# Patient Record
Sex: FEMALE | Race: ASIAN | NOT HISPANIC OR LATINO | Employment: UNEMPLOYED
[De-identification: names, ages, dates, MRNs, and addresses within clinical notes are randomized per-mention and may not be internally consistent; named-entity substitution may affect disease eponyms.]

---

## 2018-03-07 ENCOUNTER — RECORDS - HEALTHEAST (OUTPATIENT)
Dept: ADMINISTRATIVE | Facility: OTHER | Age: 68
End: 2018-03-07

## 2018-03-15 ENCOUNTER — RECORDS - HEALTHEAST (OUTPATIENT)
Dept: ADMINISTRATIVE | Facility: OTHER | Age: 68
End: 2018-03-15

## 2018-03-23 ENCOUNTER — RECORDS - HEALTHEAST (OUTPATIENT)
Dept: ADMINISTRATIVE | Facility: OTHER | Age: 68
End: 2018-03-23

## 2018-03-29 ENCOUNTER — RECORDS - HEALTHEAST (OUTPATIENT)
Dept: ADMINISTRATIVE | Facility: OTHER | Age: 68
End: 2018-03-29

## 2018-05-09 ENCOUNTER — RECORDS - HEALTHEAST (OUTPATIENT)
Dept: ADMINISTRATIVE | Facility: OTHER | Age: 68
End: 2018-05-09

## 2018-08-16 ENCOUNTER — RECORDS - HEALTHEAST (OUTPATIENT)
Dept: ADMINISTRATIVE | Facility: OTHER | Age: 68
End: 2018-08-16

## 2018-08-20 ENCOUNTER — RECORDS - HEALTHEAST (OUTPATIENT)
Dept: ADMINISTRATIVE | Facility: OTHER | Age: 68
End: 2018-08-20

## 2019-08-19 ENCOUNTER — RECORDS - HEALTHEAST (OUTPATIENT)
Dept: ADMINISTRATIVE | Facility: OTHER | Age: 69
End: 2019-08-19

## 2019-08-20 ENCOUNTER — RECORDS - HEALTHEAST (OUTPATIENT)
Dept: ADMINISTRATIVE | Facility: OTHER | Age: 69
End: 2019-08-20

## 2019-12-13 ENCOUNTER — RECORDS - HEALTHEAST (OUTPATIENT)
Dept: ADMINISTRATIVE | Facility: OTHER | Age: 69
End: 2019-12-13

## 2019-12-13 ENCOUNTER — RECORDS - HEALTHEAST (OUTPATIENT)
Dept: LAB | Facility: CLINIC | Age: 69
End: 2019-12-13

## 2019-12-13 LAB
ALBUMIN SERPL-MCNC: 3.6 G/DL (ref 3.5–5)
ALP SERPL-CCNC: 127 U/L (ref 45–120)
ALT SERPL W P-5'-P-CCNC: 11 U/L (ref 0–45)
ANION GAP SERPL CALCULATED.3IONS-SCNC: 14 MMOL/L (ref 5–18)
AST SERPL W P-5'-P-CCNC: 20 U/L (ref 0–40)
BILIRUB SERPL-MCNC: 0.5 MG/DL (ref 0–1)
BUN SERPL-MCNC: 13 MG/DL (ref 8–22)
CALCIUM SERPL-MCNC: 9.5 MG/DL (ref 8.5–10.5)
CHLORIDE BLD-SCNC: 109 MMOL/L (ref 98–107)
CO2 SERPL-SCNC: 21 MMOL/L (ref 22–31)
CREAT SERPL-MCNC: 0.94 MG/DL (ref 0.6–1.1)
GFR SERPL CREATININE-BSD FRML MDRD: 59 ML/MIN/1.73M2
GLUCOSE BLD-MCNC: 94 MG/DL (ref 70–125)
POTASSIUM BLD-SCNC: 4.3 MMOL/L (ref 3.5–5)
PROT SERPL-MCNC: 8.1 G/DL (ref 6–8)
SODIUM SERPL-SCNC: 144 MMOL/L (ref 136–145)

## 2019-12-16 ENCOUNTER — RECORDS - HEALTHEAST (OUTPATIENT)
Dept: LAB | Facility: CLINIC | Age: 69
End: 2019-12-16

## 2019-12-16 ENCOUNTER — RECORDS - HEALTHEAST (OUTPATIENT)
Dept: ADMINISTRATIVE | Facility: OTHER | Age: 69
End: 2019-12-16

## 2019-12-16 LAB
FERRITIN SERPL-MCNC: 227 NG/ML (ref 10–130)
FOLATE SERPL-MCNC: 8.7 NG/ML
IRON SATN MFR SERPL: 15 % (ref 20–50)
IRON SERPL-MCNC: 35 UG/DL (ref 42–175)
TIBC SERPL-MCNC: 229 UG/DL (ref 313–563)
TRANSFERRIN SERPL-MCNC: 183 MG/DL (ref 212–360)
VIT B12 SERPL-MCNC: 479 PG/ML (ref 213–816)

## 2019-12-17 ENCOUNTER — RECORDS - HEALTHEAST (OUTPATIENT)
Dept: ADMINISTRATIVE | Facility: OTHER | Age: 69
End: 2019-12-17

## 2019-12-17 ENCOUNTER — AMBULATORY - HEALTHEAST (OUTPATIENT)
Dept: VASCULAR SURGERY | Facility: CLINIC | Age: 69
End: 2019-12-17

## 2019-12-17 DIAGNOSIS — I71.019 THORACIC AORTIC DISSECTION (H): ICD-10-CM

## 2019-12-19 ENCOUNTER — RECORDS - HEALTHEAST (OUTPATIENT)
Dept: ADMINISTRATIVE | Facility: OTHER | Age: 69
End: 2019-12-19

## 2019-12-19 ENCOUNTER — AMBULATORY - HEALTHEAST (OUTPATIENT)
Dept: VASCULAR SURGERY | Facility: CLINIC | Age: 69
End: 2019-12-19

## 2019-12-19 DIAGNOSIS — Z91.148 HX OF MEDICATION NONCOMPLIANCE: ICD-10-CM

## 2019-12-19 DIAGNOSIS — I71.21 ASCENDING AORTIC ANEURYSM (H): ICD-10-CM

## 2019-12-19 RX ORDER — ATORVASTATIN CALCIUM 20 MG/1
20 TABLET, FILM COATED ORAL DAILY
Status: ON HOLD | COMMUNITY
Start: 2019-12-19 | End: 2021-11-09

## 2019-12-24 ENCOUNTER — HOSPITAL ENCOUNTER (OUTPATIENT)
Dept: CT IMAGING | Facility: HOSPITAL | Age: 69
Discharge: HOME | End: 2019-12-24
Attending: FAMILY MEDICINE

## 2019-12-24 DIAGNOSIS — I71.019 THORACIC AORTIC DISSECTION (H): ICD-10-CM

## 2019-12-24 ASSESSMENT — MIFFLIN-ST. JEOR: SCORE: 1081.54

## 2020-01-03 ENCOUNTER — OFFICE VISIT - HEALTHEAST (OUTPATIENT)
Dept: VASCULAR SURGERY | Facility: CLINIC | Age: 70
End: 2020-01-03

## 2020-01-03 DIAGNOSIS — I71.019 THORACIC AORTIC DISSECTION (H): ICD-10-CM

## 2020-01-03 RX ORDER — LORATADINE 10 MG/1
650 TABLET ORAL EVERY 8 HOURS PRN
Status: SHIPPED | COMMUNITY
Start: 2020-01-02

## 2020-01-03 ASSESSMENT — MIFFLIN-ST. JEOR: SCORE: 1081.54

## 2020-01-06 ENCOUNTER — COMMUNICATION - HEALTHEAST (OUTPATIENT)
Dept: VASCULAR SURGERY | Facility: CLINIC | Age: 70
End: 2020-01-06

## 2020-01-10 ENCOUNTER — COMMUNICATION - HEALTHEAST (OUTPATIENT)
Dept: VASCULAR SURGERY | Facility: CLINIC | Age: 70
End: 2020-01-10

## 2020-02-03 ENCOUNTER — RECORDS - HEALTHEAST (OUTPATIENT)
Dept: LAB | Facility: CLINIC | Age: 70
End: 2020-02-03

## 2020-02-03 LAB
ANION GAP SERPL CALCULATED.3IONS-SCNC: 10 MMOL/L (ref 5–18)
BUN SERPL-MCNC: 24 MG/DL (ref 8–22)
CALCIUM SERPL-MCNC: 9 MG/DL (ref 8.5–10.5)
CHLORIDE BLD-SCNC: 112 MMOL/L (ref 98–107)
CO2 SERPL-SCNC: 19 MMOL/L (ref 22–31)
CREAT SERPL-MCNC: 1.09 MG/DL (ref 0.6–1.1)
GFR SERPL CREATININE-BSD FRML MDRD: 50 ML/MIN/1.73M2
GLUCOSE BLD-MCNC: 220 MG/DL (ref 70–125)
POTASSIUM BLD-SCNC: 4.6 MMOL/L (ref 3.5–5)
SODIUM SERPL-SCNC: 141 MMOL/L (ref 136–145)

## 2020-03-05 ENCOUNTER — RECORDS - HEALTHEAST (OUTPATIENT)
Dept: LAB | Facility: CLINIC | Age: 70
End: 2020-03-05

## 2020-03-05 LAB — TSH SERPL DL<=0.005 MIU/L-ACNC: 0.47 UIU/ML (ref 0.3–5)

## 2020-03-13 ENCOUNTER — AMBULATORY - HEALTHEAST (OUTPATIENT)
Dept: CARDIOLOGY | Facility: CLINIC | Age: 70
End: 2020-03-13

## 2020-03-16 ENCOUNTER — AMBULATORY - HEALTHEAST (OUTPATIENT)
Dept: CARDIOLOGY | Facility: CLINIC | Age: 70
End: 2020-03-16

## 2020-03-16 ENCOUNTER — RECORDS - HEALTHEAST (OUTPATIENT)
Dept: ADMINISTRATIVE | Facility: OTHER | Age: 70
End: 2020-03-16

## 2020-03-17 ENCOUNTER — AMBULATORY - HEALTHEAST (OUTPATIENT)
Dept: CARDIOLOGY | Facility: CLINIC | Age: 70
End: 2020-03-17

## 2020-03-20 ENCOUNTER — COMMUNICATION - HEALTHEAST (OUTPATIENT)
Dept: CARDIOLOGY | Facility: CLINIC | Age: 70
End: 2020-03-20

## 2020-04-07 ENCOUNTER — HOME CARE/HOSPICE - HEALTHEAST (OUTPATIENT)
Dept: HOME HEALTH SERVICES | Facility: HOME HEALTH | Age: 70
End: 2020-04-07

## 2020-04-29 ENCOUNTER — RECORDS - HEALTHEAST (OUTPATIENT)
Dept: ADMINISTRATIVE | Facility: OTHER | Age: 70
End: 2020-04-29

## 2020-04-29 ENCOUNTER — AMBULATORY - HEALTHEAST (OUTPATIENT)
Dept: CARDIOLOGY | Facility: CLINIC | Age: 70
End: 2020-04-29

## 2021-01-20 ENCOUNTER — RECORDS - HEALTHEAST (OUTPATIENT)
Dept: LAB | Facility: CLINIC | Age: 71
End: 2021-01-20

## 2021-01-20 LAB
ANION GAP SERPL CALCULATED.3IONS-SCNC: 7 MMOL/L (ref 5–18)
BUN SERPL-MCNC: 28 MG/DL (ref 8–22)
CALCIUM SERPL-MCNC: 9 MG/DL (ref 8.5–10.5)
CHLORIDE BLD-SCNC: 110 MMOL/L (ref 98–107)
CO2 SERPL-SCNC: 25 MMOL/L (ref 22–31)
CREAT SERPL-MCNC: 1.1 MG/DL (ref 0.6–1.1)
GFR SERPL CREATININE-BSD FRML MDRD: 49 ML/MIN/1.73M2
GLUCOSE BLD-MCNC: 88 MG/DL (ref 70–125)
POTASSIUM BLD-SCNC: 4.2 MMOL/L (ref 3.5–5)
SODIUM SERPL-SCNC: 142 MMOL/L (ref 136–145)

## 2021-02-22 ENCOUNTER — RECORDS - HEALTHEAST (OUTPATIENT)
Dept: ADMINISTRATIVE | Facility: OTHER | Age: 71
End: 2021-02-22

## 2021-02-22 ENCOUNTER — AMBULATORY - HEALTHEAST (OUTPATIENT)
Dept: CARDIOLOGY | Facility: CLINIC | Age: 71
End: 2021-02-22

## 2021-02-24 ENCOUNTER — COMMUNICATION - HEALTHEAST (OUTPATIENT)
Dept: CARDIOLOGY | Facility: CLINIC | Age: 71
End: 2021-02-24

## 2021-03-19 ENCOUNTER — COMMUNICATION - HEALTHEAST (OUTPATIENT)
Dept: CARDIOLOGY | Facility: CLINIC | Age: 71
End: 2021-03-19

## 2021-04-05 ASSESSMENT — MIFFLIN-ST. JEOR
SCORE: 1006.69
SCORE: 1083.81

## 2021-04-06 ENCOUNTER — SURGERY - HEALTHEAST (OUTPATIENT)
Dept: GASTROENTEROLOGY | Facility: HOSPITAL | Age: 71
End: 2021-04-06

## 2021-04-06 ENCOUNTER — COMMUNICATION - HEALTHEAST (OUTPATIENT)
Dept: SCHEDULING | Facility: CLINIC | Age: 71
End: 2021-04-06

## 2021-05-03 ENCOUNTER — RECORDS - HEALTHEAST (OUTPATIENT)
Dept: ADMINISTRATIVE | Facility: OTHER | Age: 71
End: 2021-05-03

## 2021-05-03 ENCOUNTER — RECORDS - HEALTHEAST (OUTPATIENT)
Dept: LAB | Facility: CLINIC | Age: 71
End: 2021-05-03

## 2021-05-03 LAB
IRON SATN MFR SERPL: 6 % (ref 20–50)
IRON SERPL-MCNC: 24 UG/DL (ref 42–175)
TIBC SERPL-MCNC: 385 UG/DL (ref 313–563)
TRANSFERRIN SERPL-MCNC: 308 MG/DL (ref 212–360)

## 2021-05-04 ENCOUNTER — AMBULATORY - HEALTHEAST (OUTPATIENT)
Dept: PHARMACY | Facility: HOSPITAL | Age: 71
End: 2021-05-04

## 2021-05-04 DIAGNOSIS — D50.9 IRON DEFICIENCY ANEMIA, UNSPECIFIED: ICD-10-CM

## 2021-05-13 ENCOUNTER — AMBULATORY - HEALTHEAST (OUTPATIENT)
Dept: INFUSION THERAPY | Facility: HOSPITAL | Age: 71
End: 2021-05-13

## 2021-05-13 ENCOUNTER — COMMUNICATION - HEALTHEAST (OUTPATIENT)
Dept: ADMINISTRATIVE | Facility: HOSPITAL | Age: 71
End: 2021-05-13

## 2021-05-13 DIAGNOSIS — D50.9 IRON DEFICIENCY ANEMIA, UNSPECIFIED: ICD-10-CM

## 2021-05-13 DIAGNOSIS — D50.9 IDA (IRON DEFICIENCY ANEMIA): ICD-10-CM

## 2021-05-26 ENCOUNTER — RECORDS - HEALTHEAST (OUTPATIENT)
Dept: LAB | Facility: CLINIC | Age: 71
End: 2021-05-26

## 2021-05-26 LAB — HGB BLD-MCNC: 8.2 G/DL (ref 12–16)

## 2021-06-02 ENCOUNTER — COMMUNICATION - HEALTHEAST (OUTPATIENT)
Dept: INFUSION THERAPY | Facility: HOSPITAL | Age: 71
End: 2021-06-02

## 2021-06-03 VITALS
OXYGEN SATURATION: 97 % | WEIGHT: 140 LBS | TEMPERATURE: 97.5 F | BODY MASS INDEX: 27.48 KG/M2 | HEIGHT: 60 IN | DIASTOLIC BLOOD PRESSURE: 82 MMHG | HEART RATE: 66 BPM | SYSTOLIC BLOOD PRESSURE: 122 MMHG

## 2021-06-03 VITALS — BODY MASS INDEX: 27.48 KG/M2 | WEIGHT: 140 LBS | HEIGHT: 60 IN

## 2021-06-04 NOTE — PROGRESS NOTES
Presenting complaint: Thoracoabdominal aortic aneurysm.      HPI: I am consulted in this 70 y.o. female regarding a thoracoabdominal aortic aneurysm.  Mrs. Becerril is a [facility] origin.     History was obtained after extensive interview with the patient, her daughter and reviewing a large stack of outside medical records which are from the [facility].      Below is an executive summary.      In April 2018 she was found to have shortness of breath and presented for treatment to [facility].  At that time she was found to have a murmur and clinical signs of congestive heart failure.  In March 2018 she underwent echocardiography which showed an ejection fraction of 55 to 60%.  It was noted that the systolic function is normal.  She was also found to have a 5.5 cm ascending aortic aneurysm.  Echocardiography also showed moderate aortic regurgitation.  At that point she underwent CT angiogram of the chest abdomen and pelvis.  This demonstrated a sending aneurysm measuring 5.5 cm.  At the level of the sinus of Valsalva the aortic size was 3.7 cm.  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  The descending thoracic aorta measured 3.6 cm which tapered down to 3.2 cm at the diaphragm.  In April 2018 she underwent aortic valve replacement with a 21 mm Rakesh pericardial bioprosthesis.  He also underwent aortic root reconstruction with a 27 mm Mitroflow Valsalva conduit graft, size 28 mm.  The ascending aortic aneurysm was repaired with a 28 mm Dacron graft. She was in the hospital for 10 days following her surgery.     In September 2019 she went to [facility] in [place] with complaints of severe sharp back pain.  At that time her systolic blood pressure was 196 mmHg.  She was started on esmolol and nicardipine.  A CT angiogram was obtained which showed a Reilly type B configuration thoracic dissection extending from the aortic arch into the suprarenal abdominal segment.  At that  time vascular surgery was consulted but medical management was opted at the symptoms resolved with aggressive blood pressure control.  From the report on the CT angiogram performed in September 2019 the thoracic aorta was noted to be diffusely aneurysmal and measured 4.3 cm in the distal arch and 3.2 cm at the diaphragmatic hiatus.    She recently relocated to [place] and is establishing vascular care.  She recently underwent a CT angiogram of the chest abdomen and pelvis.  This shows the proximal descending thoracic aorta to measure 4.9 x 5 cm in anterior-posterior and transverse diameter.  Just above the celiac axis is measures 28 x 37 mm.      No Known Allergies      Current Outpatient Medications:      ARTHRITIS PAIN RELIEF, ACETAM, 650 mg CR tablet, , Disp: , Rfl:      atorvastatin (LIPITOR) 20 MG tablet, Take 20 mg by mouth at bedtime., Disp: , Rfl:      ferrous sulfate 140 mg (45 mg iron) TbER, Take 1 tablet by mouth daily., Disp: , Rfl:      lisinopril (PRINIVIL,ZESTRIL) 10 MG tablet, Take 10 mg by mouth daily., Disp: , Rfl:      metoprolol succinate (TOPROL-XL) 25 MG, Take 25 mg by mouth daily., Disp: , Rfl:      mirtazapine (REMERON) 7.5 MG tablet, , Disp: , Rfl:      ranitidine (ZANTAC) 150 MG capsule, Take 150 mg by mouth 2 (two) times a day., Disp: , Rfl:      warfarin ANTICOAGULANT (COUMADIN/JANTOVEN) 2 MG tablet, Take 2 mg by mouth See Admin Instructions. Take 1 to 2 tablets (2 to 4 mg) by mouth daily. Adjust dose based on INR results as directed., Disp: , Rfl:     Past Medical History:   Diagnosis Date     Hypertension 12/19/2019       Past Surgical History:   Procedure Laterality Date     CARDIAC SURGERY  04/2018    aortic valve replacement, aortic root reconstruction       Social History     Socioeconomic History     Marital status:      Spouse name: Not on file     Number of children: Not on file     Years of education: Not on file     Highest education level: Not on file   Occupational  History     Not on file   Social Needs     Financial resource strain: Not on file     Food insecurity:     Worry: Not on file     Inability: Not on file     Transportation needs:     Medical: Not on file     Non-medical: Not on file   Tobacco Use     Smoking status: Never Smoker     Smokeless tobacco: Never Used   Substance and Sexual Activity     Alcohol use: Not on file     Drug use: Not on file     Sexual activity: Not on file   Lifestyle     Physical activity:     Days per week: Not on file     Minutes per session: Not on file     Stress: Not on file   Relationships     Social connections:     Talks on phone: Not on file     Gets together: Not on file     Attends Gnosticist service: Not on file     Active member of club or organization: Not on file     Attends meetings of clubs or organizations: Not on file     Relationship status: Not on file     Intimate partner violence:     Fear of current or ex partner: Not on file     Emotionally abused: Not on file     Physically abused: Not on file     Forced sexual activity: Not on file   Other Topics Concern     Not on file   Social History Narrative     Not on file       Review of Systems -patient and her daughter attest to severe deconditioning and limited mobility at home.  Climbing 1 flight of stairs makes her short of breath and feels weak.  She requires assistance with most activities of daily living.    /82   Pulse 66   Temp 97.5  F (36.4  C)   Ht 5' (1.524 m)   Wt 140 lb (63.5 kg)   SpO2 97%   BMI 27.34 kg/m    Body mass index is 27.34 kg/m .    EXAM:  GENERAL: This is a well developed female in no distress.  SKIN: Grossly intact  HEAD AND NECK: Cranial nerves intact. No neck masses or bruits.  CARDIAC: RRR w/out murmur  CHEST/LUNG: Clear  ABDOMEN: Soft, non-tender, B/S present, pulsatile aortic mass  GROINS: Both femoral pulses palpable    IMAGES:   Cta Chest Abdomen Pelvis    Result Date: 12/24/2019  EXAM: CTA CHEST ABDOMEN PELVIS LOCATION: [facility] DATE/TIME: 12/24/2019 11:14 AM INDICATION: Assess progression of dissection. COMPARISON: No comparison exams are available on our system. TECHNIQUE: CT angiogram chest abdomen pelvis during arterial phase of injection of IV contrast. 2D and 3D MIP reconstructions were performed by the CT technologist. Dose reduction techniques were used. CONTRAST: Iohexol (Omni) 75mL FINDINGS: CT ANGIOGRAM CHEST, ABDOMEN, AND PELVIS: Postsurgical changes of aortic valve replacement and ascending aortic aneurysm tube graft repair. The ascending aortic tube graft measures 3.4 cm in diameter. Coronary artery origins are patent. There is surrounding postoperative change without evidence of pseudoaneurysm. The native aortic arch and descending thoracic aorta is severely ectatic and tortuous with aneurysmal dilation of the proximal descending thoracic aorta measuring 4.9 x 5.0 cm in AP and transverse diameter. Ectatic and tortuous right brachiocephalic, left common carotid, and left subclavian artery takeoffs from the aortic arch. No great vessel stenosis. The descending thoracic aorta has a focal saccular outpouching along the 5:00 position with maximum diameter of this segment measuring 4.2 cm AP. There is mild to moderate asymmetric mural thrombus along the descending thoracic aorta with shallow mural ulcerations along the 3:00 position near the aortic hiatus. The supraceliac abdominal aorta has a focal saccular outpouching at the 3:00 aspect with the aorta measuring maximum of 3.5 cm at this area. The celiac artery takeoff is moderately stenotic at its origin with upward hooking orientation. SMA is patent with a post ostial moderate stenosis and mild poststenotic fusiform dilation. Bilateral single main renal arteries are widely patent. Inferior mesenteric artery is widely patent. The iliac, common femoral, and visualized portions of the superficial femoral and profunda arteries are patent without aneurysmal dilation.  No evidence of dissection flap throughout the descending thoracic aorta or abdominal aorta. LUNGS AND PLEURA: Dependent atelectasis. No pleural effusion. MEDIASTINUM/AXILLAE: Multinodular, enlarged thyroid with multiple calcifications. No mediastinal lymphadenopathy. No axillary lymphadenopathy. No pericardial effusion. HEPATOBILIARY: Normal. Gallbladder is decompressed. PANCREAS: Normal. SPLEEN: Normal. ADRENAL GLANDS: Normal. KIDNEYS/BLADDER: Normal. BOWEL: Normal. LYMPH NODES: Normal. PELVIC ORGANS: Postmenopausal uterus. Bladder is decompressed. OTHER: Atrophic appearing psoas muscles. MUSCULOSKELETAL: Degenerative facet hypertrophy at the lower lumbar spine. Sternotomy wires. No acute or destructive osseous lesions.     1.  Postsurgical changes of aortic root and ascending aortic tube graft repair. 2.  Diffusely aneurysmal and tortuous thoracic aorta extending from the aortic arch to the aortic hiatus with mural thrombus. Remote intramural hematoma could have a similar appearance. No dissection flap is identified. There are multiple ectatic saccular outpouchings of the aortic lumen at the descending thoracic aorta and supraceliac abdominal aorta. No adjacent fat stranding or fluid to suggest impending rupture. 3.  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 4.  Multinodular, enlarged thyroid gland.       Assessment/Plan: This is a complex situation.  The descending component of the aneurysm has continued to grow over time.  He also developed an area of dissection which is contributed to the aneurysmal degeneration.  My recommendation would be to have the patient evaluated by Dr. Villarreal at the [facility] for definitive treatment.    Total time spent, interviewing patient, examining patient, documentation and reviewing multiple outside records was 65 minutes, greater than 50% on face-to-face counseling.    ED PUENTES MD

## 2021-06-04 NOTE — PROGRESS NOTES
Faxed referral to [facility] along with progress note and CT results. Fax # [phone]

## 2021-06-05 VITALS — WEIGHT: 113 LBS | BODY MASS INDEX: 20.02 KG/M2 | HEIGHT: 63 IN

## 2021-06-05 NOTE — TELEPHONE ENCOUNTER
Lisa at [facility] called to request office visit notes and imaging be sent to them. They need this information before they can schedule the patient. Dr. Sandy sent a referral to [facility]-Dr. Aramis Villarreal for thoracoabdominal aortic aneuryrsm. Fax number is [phone].

## 2021-06-05 NOTE — TELEPHONE ENCOUNTER
Patient had seen Dr. Sandy last week. They had brought in a pile of medical records. Dr. Sandy wanted to hold on to them and have patient/family  medical records the following Monday. They have not picked it up yet still 1 week later. Writer left  for pt or family member to make sure they  her medical records.

## 2021-06-05 NOTE — TELEPHONE ENCOUNTER
[facility] records were faxed. Writer requested images to be pushed. Also, wrote in fax cover if [place] wants a CD please call back with address and it can be mailed.

## 2021-06-07 NOTE — TELEPHONE ENCOUNTER
Wellness Screening Tool  Symptom Screening:  Do you have a:    Fever?  No    Cough?  No    Shortness of breath?  No   o If yes, is this a change? No    Skin rash?  No  Within the past 14 days, have you been in contact with someone who:    Is currently being ruled out for COVID-19 (novel coronavirus)?  No    Has tested positive for COVID-19?  No    Has symptoms of a respiratory illness (fever, cough, shortness of breath)?  No  Have you recently traveled to an area with COVID-19 areas:    Refer to the CDC Coronavirus webpage for COVID-19 areas:  No  Within the past 3 weeks, have you been exposed to the following:    Pertussis?  No    Chicken pox?  No    Measles? No  Patient's appointment status: patient will be seen in clinic as scheduled  Patient reminded that no visitors are allowed at this time and if their appointment is at [facility], there is no  serviced offered due to COVID-19 concerns.

## 2021-06-09 ENCOUNTER — INFUSION - HEALTHEAST (OUTPATIENT)
Dept: INFUSION THERAPY | Facility: HOSPITAL | Age: 71
End: 2021-06-09

## 2021-06-09 DIAGNOSIS — D50.9 IRON DEFICIENCY ANEMIA, UNSPECIFIED: ICD-10-CM

## 2021-06-16 ENCOUNTER — INFUSION - HEALTHEAST (OUTPATIENT)
Dept: INFUSION THERAPY | Facility: HOSPITAL | Age: 71
End: 2021-06-16

## 2021-06-16 DIAGNOSIS — D50.9 IRON DEFICIENCY ANEMIA, UNSPECIFIED: ICD-10-CM

## 2021-06-16 PROBLEM — I71.21 ASCENDING AORTIC ANEURYSM (H): Status: ACTIVE | Noted: 2019-12-19

## 2021-06-16 PROBLEM — K92.2 UGIB (UPPER GASTROINTESTINAL BLEED): Status: ACTIVE | Noted: 2021-04-05

## 2021-06-16 PROBLEM — R32 URINARY INCONTINENCE: Status: ACTIVE | Noted: 2019-12-19

## 2021-06-16 PROBLEM — I10 HYPERTENSION: Status: ACTIVE | Noted: 2019-12-19

## 2021-06-16 PROBLEM — F32.A DEPRESSION: Status: ACTIVE | Noted: 2019-12-19

## 2021-06-16 PROBLEM — Z91.148 HX OF MEDICATION NONCOMPLIANCE: Status: ACTIVE | Noted: 2019-12-19

## 2021-06-20 NOTE — LETTER
Letter by Cezar Sandy MD at      Author: Cezar Sandy MD Service: -- Author Type: --    Filed:  Encounter Date: 1/3/2020 Status: Signed         Erica Canada MD  [address] [facility] [address] [facility] [address]                                  January 3, 2020    Patient: Shelly Becerril   MR Number: [id number]   YOB: 1950   Date of Visit: 1/3/2020     Dear Dr. Nancie MD:    Thank you for referring Shelly Becerril to me for evaluation. Below are the relevant portions of my assessment and plan of care.    If you have questions, please do not hesitate to call me. I look forward to following Shelly along with you.    Sincerely,        Cezar Sandy MD          CC  No Recipients  Cezar Sandy MD  1/3/2020  2:33 PM  Signed  Presenting complaint: Thoracoabdominal aortic aneurysm.      HPI: I am consulted in this 70 y.o. female regarding a thoracoabdominal aortic aneurysm.  Mrs. Becerril is a [place] origin.     History was obtained after extensive interview with the patient, her daughter and reviewing a large stack of outside medical records which are from the [facility].      Below is an executive summary.      In April 2018 she was found to have shortness of breath and presented for treatment to [facility].  At that time she was found to have a murmur and clinical signs of congestive heart failure.  In March 2018 she underwent echocardiography which showed an ejection fraction of 55 to 60%.  It was noted that the systolic function is normal.  She was also found to have a 5.5 cm ascending aortic aneurysm.  Echocardiography also showed moderate aortic regurgitation.  At that point she underwent CT angiogram of the chest abdomen and pelvis.  This demonstrated a sending aneurysm measuring 5.5 cm.  At the level of the sinus of Valsalva the aortic size was 3.7 cm.  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  The descending thoracic aorta measured 3.6 cm which tapered down to 3.2 cm at the  diaphragm.  In April 2018 she underwent aortic valve replacement with a 21 mm Rakesh pericardial bioprosthesis.  He also underwent aortic root reconstruction with a 27 mm Mitroflow Valsalva conduit graft, size 28 mm.  The ascending aortic aneurysm was repaired with a 28 mm Dacron graft. She was in the hospital for 10 days following her surgery.     In September 2019 she went to [facility] in [place] with complaints of severe sharp back pain.  At that time her systolic blood pressure was 196 mmHg.  She was started on esmolol and nicardipine.  A CT angiogram was obtained which showed a Reilly type B configuration thoracic dissection extending from the aortic arch into the suprarenal abdominal segment.  At that time vascular surgery was consulted but medical management was opted at the symptoms resolved with aggressive blood pressure control.  From the report on the CT angiogram performed in September 2019 the thoracic aorta was noted to be diffusely aneurysmal and measured 4.3 cm in the distal arch and 3.2 cm at the diaphragmatic hiatus.    She recently relocated to [place] and is establishing vascular care.  She recently underwent a CT angiogram of the chest abdomen and pelvis.  This shows the proximal descending thoracic aorta to measure 4.9 x 5 cm in anterior-posterior and transverse diameter.  Just above the celiac axis is measures 28 x 37 mm.      No Known Allergies      Current Outpatient Medications:   ?  ARTHRITIS PAIN RELIEF, ACETAM, 650 mg CR tablet, , Disp: , Rfl:   ?  atorvastatin (LIPITOR) 20 MG tablet, Take 20 mg by mouth at bedtime., Disp: , Rfl:   ?  ferrous sulfate 140 mg (45 mg iron) TbER, Take 1 tablet by mouth daily., Disp: , Rfl:   ?  lisinopril (PRINIVIL,ZESTRIL) 10 MG tablet, Take 10 mg by mouth daily., Disp: , Rfl:   ?  metoprolol succinate (TOPROL-XL) 25 MG, Take 25 mg by mouth daily., Disp: , Rfl:   ?  mirtazapine (REMERON) 7.5 MG tablet, , Disp: , Rfl:   ?   ranitidine (ZANTAC) 150 MG capsule, Take 150 mg by mouth 2 (two) times a day., Disp: , Rfl:   ?  warfarin ANTICOAGULANT (COUMADIN/JANTOVEN) 2 MG tablet, Take 2 mg by mouth See Admin Instructions. Take 1 to 2 tablets (2 to 4 mg) by mouth daily. Adjust dose based on INR results as directed., Disp: , Rfl:     Past Medical History:   Diagnosis Date   ? Hypertension 12/19/2019       Past Surgical History:   Procedure Laterality Date   ? CARDIAC SURGERY  04/2018    aortic valve replacement, aortic root reconstruction       Social History     Socioeconomic History   ? Marital status:      Spouse name: Not on file   ? Number of children: Not on file   ? Years of education: Not on file   ? Highest education level: Not on file   Occupational History   ? Not on file   Social Needs   ? Financial resource strain: Not on file   ? Food insecurity:     Worry: Not on file     Inability: Not on file   ? Transportation needs:     Medical: Not on file     Non-medical: Not on file   Tobacco Use   ? Smoking status: Never Smoker   ? Smokeless tobacco: Never Used   Substance and Sexual Activity   ? Alcohol use: Not on file   ? Drug use: Not on file   ? Sexual activity: Not on file   Lifestyle   ? Physical activity:     Days per week: Not on file     Minutes per session: Not on file   ? Stress: Not on file   Relationships   ? Social connections:     Talks on phone: Not on file     Gets together: Not on file     Attends Restorationism service: Not on file     Active member of club or organization: Not on file     Attends meetings of clubs or organizations: Not on file     Relationship status: Not on file   ? Intimate partner violence:     Fear of current or ex partner: Not on file     Emotionally abused: Not on file     Physically abused: Not on file     Forced sexual activity: Not on file   Other Topics Concern   ? Not on file   Social History Narrative   ? Not on file       Review of Systems -patient and her daughter attest to severe  deconditioning and limited mobility at home.  Climbing 1 flight of stairs makes her short of breath and feels weak.  She requires assistance with most activities of daily living.    /82   Pulse 66   Temp 97.5  F (36.4  C)   Ht 5' (1.524 m)   Wt 140 lb (63.5 kg)   SpO2 97%   BMI 27.34 kg/m     Body mass index is 27.34 kg/m .    EXAM:  GENERAL: This is a well developed female in no distress.  SKIN: Grossly intact  HEAD AND NECK: Cranial nerves intact. No neck masses or bruits.  CARDIAC: RRR w/out murmur  CHEST/LUNG: Clear  ABDOMEN: Soft, non-tender, B/S present, pulsatile aortic mass  GROINS: Both femoral pulses palpable    IMAGES:   Cta Chest Abdomen Pelvis    Result Date: 12/24/2019  EXAM: CTA CHEST ABDOMEN PELVIS LOCATION: [facility] DATE/TIME: 12/24/2019 11:14 AM INDICATION: Assess progression of dissection. COMPARISON: No comparison exams are available on our system. TECHNIQUE: CT angiogram chest abdomen pelvis during arterial phase of injection of IV contrast. 2D and 3D MIP reconstructions were performed by the CT technologist. Dose reduction techniques were used. CONTRAST: Iohexol (Omni) 75mL FINDINGS: CT ANGIOGRAM CHEST, ABDOMEN, AND PELVIS: Postsurgical changes of aortic valve replacement and ascending aortic aneurysm tube graft repair. The ascending aortic tube graft measures 3.4 cm in diameter. Coronary artery origins are patent. There is surrounding postoperative change without evidence of pseudoaneurysm. The native aortic arch and descending thoracic aorta is severely ectatic and tortuous with aneurysmal dilation of the proximal descending thoracic aorta measuring 4.9 x 5.0 cm in AP and transverse diameter. Ectatic and tortuous right brachiocephalic, left common carotid, and left subclavian artery takeoffs from the aortic arch. No great vessel stenosis. The descending thoracic aorta has a focal saccular outpouching along the 5:00 position with maximum diameter of this segment  measuring 4.2 cm AP. There is mild to moderate asymmetric mural thrombus along the descending thoracic aorta with shallow mural ulcerations along the 3:00 position near the aortic hiatus. The supraceliac abdominal aorta has a focal saccular outpouching at the 3:00 aspect with the aorta measuring maximum of 3.5 cm at this area. The celiac artery takeoff is moderately stenotic at its origin with upward hooking orientation. SMA is patent with a post ostial moderate stenosis and mild poststenotic fusiform dilation. Bilateral single main renal arteries are widely patent. Inferior mesenteric artery is widely patent. The iliac, common femoral, and visualized portions of the superficial femoral and profunda arteries are patent without aneurysmal dilation. No evidence of dissection flap throughout the descending thoracic aorta or abdominal aorta. LUNGS AND PLEURA: Dependent atelectasis. No pleural effusion. MEDIASTINUM/AXILLAE: Multinodular, enlarged thyroid with multiple calcifications. No mediastinal lymphadenopathy. No axillary lymphadenopathy. No pericardial effusion. HEPATOBILIARY: Normal. Gallbladder is decompressed. PANCREAS: Normal. SPLEEN: Normal. ADRENAL GLANDS: Normal. KIDNEYS/BLADDER: Normal. BOWEL: Normal. LYMPH NODES: Normal. PELVIC ORGANS: Postmenopausal uterus. Bladder is decompressed. OTHER: Atrophic appearing psoas muscles. MUSCULOSKELETAL: Degenerative facet hypertrophy at the lower lumbar spine. Sternotomy wires. No acute or destructive osseous lesions.     1.  Postsurgical changes of aortic root and ascending aortic tube graft repair. 2.  Diffusely aneurysmal and tortuous thoracic aorta extending from the aortic arch to the aortic hiatus with mural thrombus. Remote intramural hematoma could have a similar appearance. No dissection flap is identified. There are multiple ectatic saccular outpouchings of the aortic lumen at the descending thoracic aorta and supraceliac abdominal aorta. No adjacent fat  stranding or fluid to suggest impending rupture. 3.  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 4.  Multinodular, enlarged thyroid gland.       Assessment/Plan: This is a complex situation.  The descending component of the aneurysm has continued to grow over time.  He also developed an area of dissection which is contributed to the aneurysmal degeneration.  My recommendation would be to have the patient evaluated by Dr. Villarreal at the [facility] for definitive treatment.    Total time spent, interviewing patient, examining patient, documentation and reviewing multiple outside records was 65 minutes, greater than 50% on face-to-face counseling.    ED PUENTES MD     [facility], Carlita DUENAS, [facility]  1/3/2020  2:33 PM  Signed   CONSULT CTA comp 12/24. Thoracic aortic dissection. Dr. Canada referring. On warfarin, statin.

## 2021-06-23 ENCOUNTER — RECORDS - HEALTHEAST (OUTPATIENT)
Dept: ADMINISTRATIVE | Facility: OTHER | Age: 71
End: 2021-06-23

## 2021-06-26 NOTE — PROGRESS NOTES
Patient presented to infusion for iron. Peripheral IV placed to left wrist with good blood return. Venofer given, tolerated well. PIV flushed with saline and removed. Gauze dressing placed. Patient left infusion center via wheelchair accompanied by daughter.

## 2021-07-03 NOTE — ADDENDUM NOTE
Addendum Note by Theodore Douglass RN at 1/3/2020  1:20 PM     Author: Theodore Douglass RN Service: -- Author Type: Registered Nurse    Filed: 1/3/2020  2:57 PM Encounter Date: 1/3/2020 Status: Signed    : Theodore Douglass RN (Registered Nurse)    Addended by: THEODORE DOUGLASS on: 1/3/2020 02:57 PM        Modules accepted: Orders

## 2021-07-04 NOTE — PATIENT INSTRUCTIONS - HE
Patient Instructions by Lindy Blue RN at 2021  2:00 PM     Author: Lindy Blue RN Service: -- Author Type: Registered Nurse    Filed: 2021  3:04 PM Encounter Date: 2021 Status: Addendum    : Lindy Blue RN (Registered Nurse)    Related Notes: Original Note by Lindy Blue RN (Registered Nurse) filed at 2021  3:03 PM       Call with any questions or concerns. [address] floor infusion [phone] option #2  Patient Education     Venofer Injection 20 mg/mL  Uses  For anemia.  Instructions  This is an IV medicine. It is given through a sterile tube directly into the vein by a healthcare provider.  This medicine is given gradually through the IV line.  Read and make sure you understand the instructions for measuring your dose and using the syringe before using this medicine.  Always inspect the medicine before using.  The liquid should be clear and dark brown.  Do not use the medicine if it contains any particles or if it has changed color.  Keep this medicine at room temperature.  Protect medicine from light.  Speak with your nurse or pharmacist about how long the medicine can be stored safely at room temperature or in the refrigerator before it needs to be discarded.  Never use any medicine that has .  Discard any remaining medicine after your dose is given.  If you miss a dose, contact your doctor for instructions.  Please tell your doctor and pharmacist about all the medicines you take. Include both prescription and over-the-counter medicines. Also tell them about any vitamins, herbal medicines, or anything else you take for your health.  It is very important that you follow your doctor's instructions for all blood tests.  Cautions  Tell your doctor and pharmacist if you ever had an allergic reaction to a medicine. Symptoms of an allergic reaction can include trouble breathing, skin rash, itching, swelling, or severe dizziness.  Do not use the medication any more  than instructed.  This medicine may cause dizziness or fainting, especially after exercising or in hot weather. Be very careful when standing or sitting up quickly.  Tell the doctor or pharmacist if you are pregnant, planning to be pregnant, or breastfeeding.  Ask your pharmacist if this medicine can interact with any of your other medicines. Be sure to tell them about all the medicines you take.  Please tell all your doctors and dentists that you are on this medicine before they provide care.  Do not start or stop any other medicines without first speaking to your doctor or pharmacist.  Used needles and syringes should be thrown away properly in a medical waste container. Ask your doctor or pharmacist if you need help.  Do not share this medicine with anyone who has not been prescribed this medicine.  Side Effects  The following is a list of some common side effects from this medicine. Please speak with your doctor about what you should do if you experience these or other side effects.    constipation    diarrhea    dizziness    swelling of the legs, feet, and hands    muscle cramps    nausea    changes in taste or unpleasant taste    vomiting  Call your doctor or get medical help right away if you notice any of these more serious side effects:    chest pain    fainting    severe or persistent headache    fast or irregular heart beats    severe stomach or bowel pain    blurring or changes of vision  A few people may have an allergic reactions to this medicine. Symptoms can include difficulty breathing, skin rash, itching, swelling, or severe dizziness. If you notice any of these symptoms, seek medical help quickly.  Extra  Please speak with your doctor, nurse, or pharmacist if you have any questions about this medicine.  [email or web address]  IMPORTANT NOTE: This document tells you briefly how to take your medicine, but it does not tell you all there is to know about it.Your doctor or  pharmacist may give you other documents about your medicine. Please talk to them if you have any questions.Always follow their advice. There is a more complete description of this medicine available in English.Scan this code on your smartphone or tablet or use the web address below. You can also ask your pharmacist for a printout. If you have any questions, please ask your pharmacist.     2021 [employer].

## 2021-07-06 VITALS
OXYGEN SATURATION: 96 % | HEART RATE: 76 BPM | DIASTOLIC BLOOD PRESSURE: 73 MMHG | SYSTOLIC BLOOD PRESSURE: 144 MMHG | RESPIRATION RATE: 18 BRPM | TEMPERATURE: 98.1 F

## 2021-11-04 ENCOUNTER — APPOINTMENT (OUTPATIENT)
Dept: CT IMAGING | Facility: HOSPITAL | Age: 71
End: 2021-11-04
Attending: STUDENT IN AN ORGANIZED HEALTH CARE EDUCATION/TRAINING PROGRAM
Payer: COMMERCIAL

## 2021-11-04 ENCOUNTER — HOSPITAL ENCOUNTER (INPATIENT)
Facility: HOSPITAL | Age: 71
LOS: 4 days | Discharge: HOME | End: 2021-11-09
Attending: EMERGENCY MEDICINE | Admitting: FAMILY MEDICINE
Payer: COMMERCIAL

## 2021-11-04 DIAGNOSIS — R29.90 STROKE-LIKE SYMPTOMS: ICD-10-CM

## 2021-11-04 DIAGNOSIS — I10 ESSENTIAL HYPERTENSION: ICD-10-CM

## 2021-11-04 DIAGNOSIS — I63.9 ACUTE CVA (CEREBROVASCULAR ACCIDENT) (H): Primary | ICD-10-CM

## 2021-11-04 LAB
ANION GAP SERPL CALCULATED.3IONS-SCNC: 7 MMOL/L (ref 5–18)
APTT PPP: 30 SECONDS (ref 22–38)
BASOPHILS # BLD AUTO: 0 10E3/UL (ref 0–0.2)
BASOPHILS NFR BLD AUTO: 0 %
BUN SERPL-MCNC: 20 MG/DL (ref 8–22)
CALCIUM SERPL-MCNC: 9 MG/DL (ref 8.5–10.5)
CHLORIDE BLD-SCNC: 111 MMOL/L (ref 98–107)
CO2 SERPL-SCNC: 25 MMOL/L (ref 22–31)
CREAT BLD-MCNC: 1.1 MG/DL (ref 0.6–1.1)
CREAT SERPL-MCNC: 0.98 MG/DL (ref 0.6–1.1)
EOSINOPHIL # BLD AUTO: 0.1 10E3/UL (ref 0–0.7)
EOSINOPHIL NFR BLD AUTO: 1 %
ERYTHROCYTE [DISTWIDTH] IN BLOOD BY AUTOMATED COUNT: 17 % (ref 10–15)
GFR SERPL CREATININE-BSD FRML MDRD: 51 ML/MIN/1.73M2
GFR SERPL CREATININE-BSD FRML MDRD: 59 ML/MIN/1.73M2
GLUCOSE BLD-MCNC: 115 MG/DL (ref 70–125)
GLUCOSE BLDC GLUCOMTR-MCNC: 137 MG/DL (ref 70–99)
HCT VFR BLD AUTO: 29.3 % (ref 35–47)
HGB BLD-MCNC: 8.5 G/DL (ref 11.7–15.7)
HOLD SPECIMEN: NORMAL
IMM GRANULOCYTES # BLD: 0 10E3/UL
IMM GRANULOCYTES NFR BLD: 1 %
INR PPP: 1 (ref 0.9–1.15)
LYMPHOCYTES # BLD AUTO: 2.2 10E3/UL (ref 0.8–5.3)
LYMPHOCYTES NFR BLD AUTO: 30 %
MCH RBC QN AUTO: 24 PG (ref 26.5–33)
MCHC RBC AUTO-ENTMCNC: 29 G/DL (ref 31.5–36.5)
MCV RBC AUTO: 83 FL (ref 78–100)
MONOCYTES # BLD AUTO: 0.5 10E3/UL (ref 0–1.3)
MONOCYTES NFR BLD AUTO: 6 %
NEUTROPHILS # BLD AUTO: 4.7 10E3/UL (ref 1.6–8.3)
NEUTROPHILS NFR BLD AUTO: 62 %
NRBC # BLD AUTO: 0 10E3/UL
NRBC BLD AUTO-RTO: 0 /100
PLATELET # BLD AUTO: 314 10E3/UL (ref 150–450)
POTASSIUM BLD-SCNC: 3.7 MMOL/L (ref 3.5–5)
RBC # BLD AUTO: 3.54 10E6/UL (ref 3.8–5.2)
SARS-COV-2 RNA RESP QL NAA+PROBE: NEGATIVE
SODIUM SERPL-SCNC: 143 MMOL/L (ref 136–145)
TROPONIN I SERPL-MCNC: <0.01 NG/ML (ref 0–0.29)
WBC # BLD AUTO: 7.4 10E3/UL (ref 4–11)

## 2021-11-04 PROCEDURE — 84484 ASSAY OF TROPONIN QUANT: CPT | Performed by: EMERGENCY MEDICINE

## 2021-11-04 PROCEDURE — 87635 SARS-COV-2 COVID-19 AMP PRB: CPT | Performed by: EMERGENCY MEDICINE

## 2021-11-04 PROCEDURE — C9803 HOPD COVID-19 SPEC COLLECT: HCPCS

## 2021-11-04 PROCEDURE — 250N000009 HC RX 250: Performed by: EMERGENCY MEDICINE

## 2021-11-04 PROCEDURE — 83036 HEMOGLOBIN GLYCOSYLATED A1C: CPT | Performed by: FAMILY MEDICINE

## 2021-11-04 PROCEDURE — 250N000011 HC RX IP 250 OP 636: Performed by: FAMILY MEDICINE

## 2021-11-04 PROCEDURE — G0378 HOSPITAL OBSERVATION PER HR: HCPCS

## 2021-11-04 PROCEDURE — 93005 ELECTROCARDIOGRAM TRACING: CPT | Performed by: EMERGENCY MEDICINE

## 2021-11-04 PROCEDURE — 70496 CT ANGIOGRAPHY HEAD: CPT

## 2021-11-04 PROCEDURE — 250N000013 HC RX MED GY IP 250 OP 250 PS 637: Performed by: EMERGENCY MEDICINE

## 2021-11-04 PROCEDURE — 99220 PR INITIAL OBSERVATION CARE,LEVEL III: CPT | Performed by: FAMILY MEDICINE

## 2021-11-04 PROCEDURE — 85730 THROMBOPLASTIN TIME PARTIAL: CPT | Performed by: EMERGENCY MEDICINE

## 2021-11-04 PROCEDURE — 85025 COMPLETE CBC W/AUTO DIFF WBC: CPT | Performed by: EMERGENCY MEDICINE

## 2021-11-04 PROCEDURE — 96365 THER/PROPH/DIAG IV INF INIT: CPT

## 2021-11-04 PROCEDURE — 82565 ASSAY OF CREATININE: CPT

## 2021-11-04 PROCEDURE — 96366 THER/PROPH/DIAG IV INF ADDON: CPT

## 2021-11-04 PROCEDURE — 84443 ASSAY THYROID STIM HORMONE: CPT | Performed by: FAMILY MEDICINE

## 2021-11-04 PROCEDURE — 80061 LIPID PANEL: CPT | Performed by: FAMILY MEDICINE

## 2021-11-04 PROCEDURE — 85610 PROTHROMBIN TIME: CPT | Performed by: EMERGENCY MEDICINE

## 2021-11-04 PROCEDURE — 99285 EMERGENCY DEPT VISIT HI MDM: CPT | Mod: 25

## 2021-11-04 PROCEDURE — 80048 BASIC METABOLIC PNL TOTAL CA: CPT | Performed by: EMERGENCY MEDICINE

## 2021-11-04 PROCEDURE — 96361 HYDRATE IV INFUSION ADD-ON: CPT

## 2021-11-04 PROCEDURE — 36415 COLL VENOUS BLD VENIPUNCTURE: CPT | Performed by: STUDENT IN AN ORGANIZED HEALTH CARE EDUCATION/TRAINING PROGRAM

## 2021-11-04 RX ORDER — IOPAMIDOL 755 MG/ML
75 INJECTION, SOLUTION INTRAVASCULAR ONCE
Status: COMPLETED | OUTPATIENT
Start: 2021-11-04 | End: 2021-11-04

## 2021-11-04 RX ORDER — ASPIRIN 325 MG
325 TABLET ORAL ONCE
Status: COMPLETED | OUTPATIENT
Start: 2021-11-04 | End: 2021-11-04

## 2021-11-04 RX ADMIN — IOPAMIDOL 75 ML: 755 INJECTION, SOLUTION INTRAVENOUS at 20:15

## 2021-11-04 RX ADMIN — ASPIRIN 325 MG: 325 TABLET, FILM COATED ORAL at 22:10

## 2021-11-04 RX ADMIN — NICARDIPINE HYDROCHLORIDE 2.5 MG/HR: 0.2 INJECTION, SOLUTION INTRAVENOUS at 20:26

## 2021-11-04 ASSESSMENT — MIFFLIN-ST. JEOR: SCORE: 1083.81

## 2021-11-05 ENCOUNTER — APPOINTMENT (OUTPATIENT)
Dept: SPEECH THERAPY | Facility: HOSPITAL | Age: 71
End: 2021-11-05
Attending: FAMILY MEDICINE
Payer: COMMERCIAL

## 2021-11-05 ENCOUNTER — APPOINTMENT (OUTPATIENT)
Dept: PHYSICAL THERAPY | Facility: HOSPITAL | Age: 71
End: 2021-11-05
Attending: FAMILY MEDICINE
Payer: COMMERCIAL

## 2021-11-05 ENCOUNTER — APPOINTMENT (OUTPATIENT)
Dept: MRI IMAGING | Facility: HOSPITAL | Age: 71
End: 2021-11-05
Attending: FAMILY MEDICINE
Payer: COMMERCIAL

## 2021-11-05 ENCOUNTER — APPOINTMENT (OUTPATIENT)
Dept: CARDIOLOGY | Facility: HOSPITAL | Age: 71
End: 2021-11-05
Attending: FAMILY MEDICINE
Payer: COMMERCIAL

## 2021-11-05 PROBLEM — R47.02 DYSPHASIA: Status: ACTIVE | Noted: 2021-11-05

## 2021-11-05 PROBLEM — I63.81 RIGHT-SIDED LACUNAR INFARCTION (H): Status: ACTIVE | Noted: 2021-11-04

## 2021-11-05 PROBLEM — I66.03 MIDDLE CEREBRAL ARTERY STENOSIS, BILATERAL: Status: ACTIVE | Noted: 2021-11-05

## 2021-11-05 PROBLEM — F39 MOOD DISORDER (H): Status: ACTIVE | Noted: 2019-12-19

## 2021-11-05 PROBLEM — D64.9 NORMOCYTIC ANEMIA: Status: ACTIVE | Noted: 2021-11-05

## 2021-11-05 LAB
CHOLEST SERPL-MCNC: 206 MG/DL
GLUCOSE BLDC GLUCOMTR-MCNC: 91 MG/DL (ref 70–99)
HBA1C MFR BLD: 4.7 %
HDLC SERPL-MCNC: 36 MG/DL
LDLC SERPL CALC-MCNC: 115 MG/DL
LVEF ECHO: NORMAL
TRIGL SERPL-MCNC: 273 MG/DL
TROPONIN I SERPL-MCNC: <0.01 NG/ML (ref 0–0.29)
TSH SERPL DL<=0.005 MIU/L-ACNC: 0.13 UIU/ML (ref 0.3–5)

## 2021-11-05 PROCEDURE — G0378 HOSPITAL OBSERVATION PER HR: HCPCS

## 2021-11-05 PROCEDURE — 96372 THER/PROPH/DIAG INJ SC/IM: CPT | Performed by: FAMILY MEDICINE

## 2021-11-05 PROCEDURE — 255N000002 HC RX 255 OP 636: Performed by: FAMILY MEDICINE

## 2021-11-05 PROCEDURE — 70553 MRI BRAIN STEM W/O & W/DYE: CPT

## 2021-11-05 PROCEDURE — 97110 THERAPEUTIC EXERCISES: CPT | Mod: GP

## 2021-11-05 PROCEDURE — 120N000001 HC R&B MED SURG/OB

## 2021-11-05 PROCEDURE — 250N000011 HC RX IP 250 OP 636: Performed by: FAMILY MEDICINE

## 2021-11-05 PROCEDURE — 99223 1ST HOSP IP/OBS HIGH 75: CPT | Performed by: PSYCHIATRY & NEUROLOGY

## 2021-11-05 PROCEDURE — 250N000013 HC RX MED GY IP 250 OP 250 PS 637: Performed by: FAMILY MEDICINE

## 2021-11-05 PROCEDURE — 92610 EVALUATE SWALLOWING FUNCTION: CPT | Mod: GN | Performed by: SPEECH-LANGUAGE PATHOLOGIST

## 2021-11-05 PROCEDURE — 36415 COLL VENOUS BLD VENIPUNCTURE: CPT | Performed by: FAMILY MEDICINE

## 2021-11-05 PROCEDURE — 97530 THERAPEUTIC ACTIVITIES: CPT | Mod: GP

## 2021-11-05 PROCEDURE — 93306 TTE W/DOPPLER COMPLETE: CPT

## 2021-11-05 PROCEDURE — 93306 TTE W/DOPPLER COMPLETE: CPT | Mod: 26 | Performed by: INTERNAL MEDICINE

## 2021-11-05 PROCEDURE — A9585 GADOBUTROL INJECTION: HCPCS | Performed by: FAMILY MEDICINE

## 2021-11-05 PROCEDURE — 258N000003 HC RX IP 258 OP 636: Performed by: INTERNAL MEDICINE

## 2021-11-05 PROCEDURE — 99233 SBSQ HOSP IP/OBS HIGH 50: CPT | Performed by: INTERNAL MEDICINE

## 2021-11-05 PROCEDURE — 92526 ORAL FUNCTION THERAPY: CPT | Mod: GN | Performed by: SPEECH-LANGUAGE PATHOLOGIST

## 2021-11-05 PROCEDURE — 97162 PT EVAL MOD COMPLEX 30 MIN: CPT | Mod: GP

## 2021-11-05 PROCEDURE — 250N000013 HC RX MED GY IP 250 OP 250 PS 637: Performed by: INTERNAL MEDICINE

## 2021-11-05 PROCEDURE — 84484 ASSAY OF TROPONIN QUANT: CPT | Performed by: FAMILY MEDICINE

## 2021-11-05 RX ORDER — LIDOCAINE 40 MG/G
CREAM TOPICAL
Status: DISCONTINUED | OUTPATIENT
Start: 2021-11-05 | End: 2021-11-09 | Stop reason: HOSPADM

## 2021-11-05 RX ORDER — ASPIRIN 81 MG/1
81 TABLET ORAL DAILY
Status: DISCONTINUED | OUTPATIENT
Start: 2021-11-05 | End: 2021-11-08

## 2021-11-05 RX ORDER — WARFARIN SODIUM 2 MG/1
2 TABLET ORAL
Status: COMPLETED | OUTPATIENT
Start: 2021-11-05 | End: 2021-11-05

## 2021-11-05 RX ORDER — GADOBUTROL 604.72 MG/ML
5 INJECTION INTRAVENOUS ONCE
Status: COMPLETED | OUTPATIENT
Start: 2021-11-05 | End: 2021-11-05

## 2021-11-05 RX ORDER — METOPROLOL SUCCINATE 25 MG/1
25 TABLET, EXTENDED RELEASE ORAL DAILY
Status: DISCONTINUED | OUTPATIENT
Start: 2021-11-05 | End: 2021-11-09 | Stop reason: HOSPADM

## 2021-11-05 RX ORDER — ASPIRIN 81 MG/1
81 TABLET, CHEWABLE ORAL DAILY
Status: DISCONTINUED | OUTPATIENT
Start: 2021-11-05 | End: 2021-11-08

## 2021-11-05 RX ORDER — ATORVASTATIN CALCIUM 40 MG/1
40 TABLET, FILM COATED ORAL EVERY EVENING
Status: DISCONTINUED | OUTPATIENT
Start: 2021-11-05 | End: 2021-11-09 | Stop reason: HOSPADM

## 2021-11-05 RX ORDER — HYDRALAZINE HYDROCHLORIDE 20 MG/ML
10 INJECTION INTRAMUSCULAR; INTRAVENOUS EVERY 6 HOURS PRN
Status: DISCONTINUED | OUTPATIENT
Start: 2021-11-05 | End: 2021-11-09 | Stop reason: HOSPADM

## 2021-11-05 RX ORDER — ONDANSETRON 4 MG/1
4 TABLET, ORALLY DISINTEGRATING ORAL EVERY 6 HOURS PRN
Status: DISCONTINUED | OUTPATIENT
Start: 2021-11-05 | End: 2021-11-09 | Stop reason: HOSPADM

## 2021-11-05 RX ORDER — MIRTAZAPINE 15 MG/1
15 TABLET, FILM COATED ORAL AT BEDTIME
Status: DISCONTINUED | OUTPATIENT
Start: 2021-11-05 | End: 2021-11-05

## 2021-11-05 RX ORDER — MIRTAZAPINE 15 MG/1
15 TABLET, FILM COATED ORAL AT BEDTIME
Status: DISCONTINUED | OUTPATIENT
Start: 2021-11-05 | End: 2021-11-09 | Stop reason: HOSPADM

## 2021-11-05 RX ORDER — WARFARIN SODIUM 2 MG/1
2 TABLET ORAL ONCE
Status: DISCONTINUED | OUTPATIENT
Start: 2021-11-05 | End: 2021-11-05

## 2021-11-05 RX ORDER — ONDANSETRON 2 MG/ML
4 INJECTION INTRAMUSCULAR; INTRAVENOUS EVERY 6 HOURS PRN
Status: DISCONTINUED | OUTPATIENT
Start: 2021-11-05 | End: 2021-11-09 | Stop reason: HOSPADM

## 2021-11-05 RX ORDER — SODIUM CHLORIDE 9 MG/ML
INJECTION, SOLUTION INTRAVENOUS CONTINUOUS
Status: DISCONTINUED | OUTPATIENT
Start: 2021-11-05 | End: 2021-11-06

## 2021-11-05 RX ORDER — ATORVASTATIN CALCIUM 10 MG/1
20 TABLET, FILM COATED ORAL DAILY
Status: DISCONTINUED | OUTPATIENT
Start: 2021-11-05 | End: 2021-11-05

## 2021-11-05 RX ORDER — ACETAMINOPHEN 325 MG/1
650 TABLET ORAL EVERY 4 HOURS PRN
Status: DISCONTINUED | OUTPATIENT
Start: 2021-11-05 | End: 2021-11-09 | Stop reason: HOSPADM

## 2021-11-05 RX ADMIN — MIRTAZAPINE 15 MG: 15 TABLET, FILM COATED ORAL at 01:42

## 2021-11-05 RX ADMIN — MIRTAZAPINE 15 MG: 15 TABLET, FILM COATED ORAL at 21:48

## 2021-11-05 RX ADMIN — SODIUM CHLORIDE: 9 INJECTION, SOLUTION INTRAVENOUS at 10:33

## 2021-11-05 RX ADMIN — GADOBUTROL 5 ML: 604.72 INJECTION INTRAVENOUS at 02:36

## 2021-11-05 RX ADMIN — ATORVASTATIN CALCIUM 40 MG: 40 TABLET, FILM COATED ORAL at 21:31

## 2021-11-05 RX ADMIN — ENOXAPARIN SODIUM 90 MG: 100 INJECTION SUBCUTANEOUS at 06:49

## 2021-11-05 RX ADMIN — WARFARIN SODIUM 2 MG: 2 TABLET ORAL at 18:46

## 2021-11-05 ASSESSMENT — ACTIVITIES OF DAILY LIVING (ADL)
WHICH_OF_THE_ABOVE_FUNCTIONAL_RISKS_HAD_A_RECENT_ONSET_OR_CHANGE?: SWALLOWING;COMMUNICATION/SPEECH
ADLS_ACUITY_SCORE: 10
DRESSING/BATHING_DIFFICULTY: NO
ADLS_ACUITY_SCORE: 16
ADLS_ACUITY_SCORE: 16
TOILETING_ISSUES: NO
DEPENDENT_IADLS:: CLEANING;COOKING;LAUNDRY;SHOPPING;MEAL PREPARATION;MEDICATION MANAGEMENT;MONEY MANAGEMENT;TRANSPORTATION
WALKING_OR_CLIMBING_STAIRS_DIFFICULTY: NO
DIFFICULTY_COMMUNICATING: YES
CONCENTRATING,_REMEMBERING_OR_MAKING_DECISIONS_DIFFICULTY: NO
DOING_ERRANDS_INDEPENDENTLY_DIFFICULTY: YES
ADLS_ACUITY_SCORE: 10
COMMUNICATION: DIFFICULTY SPEAKING
ADLS_ACUITY_SCORE: 10
ADLS_ACUITY_SCORE: 8
EATING/SWALLOWING: SWALLOWING LIQUIDS;SWALLOWING SOLID FOOD;EATING
DIFFICULTY_EATING/SWALLOWING: YES
FALL_HISTORY_WITHIN_LAST_SIX_MONTHS: NO
ADLS_ACUITY_SCORE: 16
WEAR_GLASSES_OR_BLIND: NO
ADLS_ACUITY_SCORE: 10
ADLS_ACUITY_SCORE: 10
ADLS_ACUITY_SCORE: 14
ADLS_ACUITY_SCORE: 10

## 2021-11-05 ASSESSMENT — MIFFLIN-ST. JEOR
SCORE: 989
SCORE: 1036.18

## 2021-11-05 NOTE — PHARMACY-ADMISSION MEDICATION HISTORY
"Pharmacy Note - Admission Medication History    Pertinent Provider Information:   - With help of an , the patient stated that she \"took everything\". Upon further questioning, she appeared not knowing what medications she would take daily.  - Per the medication dispense histories, the patient has not filled any of her medications since June, 2021.   - The most recent filling of Warfarin was on April 2021.     ______________________________________________________________________    Prior To Admission (PTA) med list completed and updated in EMR.       PTA Med List   Medication Sig Last Dose     ARTHRITIS PAIN RELIEF, ACETAM, 650 mg CR tablet [ARTHRITIS PAIN RELIEF, ACETAM, 650 MG CR TABLET] Take 650 mg by mouth every 8 (eight) hours as needed for pain (knee pain).  Unknown at Unknown time     atorvastatin (LIPITOR) 20 MG tablet [ATORVASTATIN (LIPITOR) 20 MG TABLET] Take 20 mg by mouth daily.  Unknown at Unknown time     lisinopriL (PRINIVIL,ZESTRIL) 40 MG tablet [LISINOPRIL (PRINIVIL,ZESTRIL) 40 MG TABLET] Take 40 mg by mouth daily. Unknown at Unknown time     metoprolol succinate (TOPROL-XL) 50 MG 24 hr tablet [METOPROLOL SUCCINATE (TOPROL-XL) 50 MG 24 HR TABLET] Take 1 tablet (50 mg total) by mouth daily. Unknown at Unknown time     mirtazapine (REMERON) 15 MG tablet [MIRTAZAPINE (REMERON) 15 MG TABLET] Take 15 mg by mouth at bedtime.  Unknown at Unknown time     warfarin ANTICOAGULANT (COUMADIN/JANTOVEN) 2 MG tablet [WARFARIN ANTICOAGULANT (COUMADIN/JANTOVEN) 2 MG TABLET] Take 1 tablet (2 mg total) by mouth daily. Adjust dose based on INR results as directed. Unknown at Not taken. Ran out.       Information source(s): Patient, Patient's pharmacy and [facility]  Method of interview communication: in-person and iPad with the     Summary of Changes to PTA Med List  New:   Discontinued:   Changed:     Patient was asked about OTC/herbal products specifically.  PTA med list reflects " this.    In the past week, patient estimated taking medication this percent of the time:  less than 50% due to other.    Allergies were reviewed, assessed, and updated with the patient.      Patient did not bring any medications to the hospital and can't retrieve from home. No multi-dose medications are available for use during hospital stay.     The information provided in this note is only as accurate as the sources available at the time of the update(s).    Thank you for the opportunity to participate in the care of this patient.    Keyanna Wong, PharmD.  11/5/2021 9:35 AM

## 2021-11-05 NOTE — PROGRESS NOTES
[facility]    PROGRESS NOTE - Hospitalist Service    Assessment and Plan    71-year-old female with past medical history of thoracic and abdominal aortic aneurysm, HTN, AVR presents to the emergency room, with slurred speech concerning for CVA.,  She was admitted with    Acute CVA  - Patient had stopped taking her home medication including warfarin about a month ago.  - CT head and neck show no definite acute infarction but with age intermediate lacunar infarction in the right and left sides of the brain as well as chronic microvascular changes.  - Patient is outside TPA window  - MRI brain shows small acute lacunar infarction in the posterior limb of right internal capsule  - Neurology consult, appreciate input  - Continue neuro telemetry  - PT/OT and speech evaluation   - Pending echo    Slurred speech with aphasia  - Secondary to acute CVA  - Speech evaluation  - Patient failed speech study and should be n.p.o.    History of AAA  - Patient with know descending thoracic aortic aneurysm as well as abdominal aortic aneurysm (with dissection).    - Patient did have aortic root reconstruction and abdominal aneurysm repair in 4/2018.   - Patient then had thoracic aortic aneurysm diagnosed in 2019.     Aortic valve replacement  - Patient should be on warfarin but has not taken for the past 1 month because she ran out of her prescription  - Will bridge with lovenox  - Warfarin per pharmacy, goal INR      Accelerated hypertension  -Secondary to acute CVA plus patient stopped her medication a month ago  - Allow permissive blood pressure for CVA   - Continue to hold home lisinopril  - Continue metoprolol at half home dose for 1 day per stroke protocol recommendations.  - Add IV hydralazine as needed     Hyperlipidemia  - Lipid panel shows elevation of cholesterol and TG, low of HDL  - Patient has stopped her statin  - Start Lipitor     Normocytic anemia  - History of priorGI bleed  - Patient with  recent upper GI bleed in the setting of H. pylori infection.   - Hemoglobin stable  - Continue home iron supplement when able to take oral      Active Problems:    Mood disorder (H)    Benign essential hypertension    Ascending aortic aneurysm (H)    Hx of repair of dissecting aneurysm of ascending thoracic aorta    Stroke-like symptoms    Dysphasia    Middle cerebral artery stenosis, bilateral    Normocytic anemia      VTE prophylaxis:  Enoxaparin (Lovenox) SQ  DIET: Orders Placed This Encounter      NPO for Medical/Clinical Reasons Except for: Meds, Ice Chips      Disposition/Barriers to discharge: Post stroke protocol, dysphagia  Code Status: Full Code    Subjective:  Phoua is about the same today, still has aphasia.  No fever chills overnight.  Denies any chest pain or shortness of breath.    PHYSICAL EXAM  Vitals:    11/04/21 1900 11/05/21 0000   Weight: 59 kg (130 lb) 59 kg (130 lb)     B/P:140/75 T:98.4 P:89 R:16     Intake/Output Summary (Last 24 hours) at 11/5/2021 1352  Last data filed at 11/5/2021 0528  Gross per 24 hour   Intake --   Output 900 ml   Net -900 ml      Body mass index is 25.39 kg/m .    Constitutional: awake, alert, cooperative, no apparent distress, and appears stated age  Eyes: Lids and lashes normal, pupils equal, round and reactive to light, extra ocular muscles intact, sclera clear, conjunctiva normal  ENT: Normocephalic, without obvious abnormality, atraumatic, sinuses nontender on palpation, external ears without lesions, oral pharynx with moist mucous membranes, tonsils without erythema or exudates, gums normal and good dentition.  Respiratory: No increased work of breathing, good air exchange, clear to auscultation bilaterally, no crackles or wheezing  Cardiovascular: Normal apical impulse, regular rate and rhythm, normal S1 and S2, no S3 or S4, and no murmur noted  GI: No scars, normal bowel sounds, soft, non-distended, non-tender, no masses palpated, no hepatosplenomegally  Skin:  no bruising or bleeding and normal skin color, texture, turgor  Musculoskeletal: There is no redness, warmth, or swelling of the joints.  Full range of motion noted.  no lower extremity pitting edema present  Neurologic: Lethargic but responding to verbal stimuli, still aphasic.   Neuropsychiatric: Appropriate with examiner      PERTINENT LABS/IMAGING:  Recent Labs   Lab 11/04/21 2006 11/04/21 2002 11/04/21 1947   WBC 7.4  --   --    HGB 8.5*  --   --    MCV 83  --   --      --   --    INR 1.00  --   --      --   --    POTASSIUM 3.7  --   --    CHLORIDE 111*  --   --    CO2 25  --   --    BUN 20  --   --    CR 0.98 1.1  --    ANIONGAP 7  --   --    FAVIOLA 9.0  --   --      --  137*     Recent Results (from the past 24 hour(s))   CTA Head Neck with Contrast    Narrative    EXAM: CTA  HEAD NECK WITH CONTRAST  LOCATION: [facility]  DATE/TIME: 11/4/2021 8:01 PM    INDICATION: Code Stroke to evaluate for potential thrombolysis and thrombectomy. Slurred speech.  COMPARISON: None.  CONTRAST: Isovue 370, 75 mL.  TECHNIQUE: Head and neck CT angiogram with IV contrast. Noncontrast head CT followed by axial helical CT images of the head and neck vessels obtained during the arterial phase of intravenous contrast administration. Axial 2D reconstructed images and   multiplanar 3D MIP reconstructed images of the head and neck vessels were performed by the technologist. Dose reduction techniques were used. All stenosis measurements made according to NASCET criteria unless otherwise specified.    FINDINGS:   NONCONTRAST HEAD CT:   INTRACRANIAL CONTENTS: No intracranial hemorrhage, extraaxial collection, or mass effect. No definite large territory infarction. Age-indeterminate lacunar type infarctions identified involving the right caudate nucleus, right putamen, right subinsular   region, right jay and left cerebellum. Patchy hypodensity is identified involving the right corona  radiata/centrum semiovale which may reflect chronic small vessel ischemic change or an age-indeterminate region of ischemic injury. No definite large   territory infarction. Mild presumed chronic small vessel ischemic changes. Mild generalized volume loss. No hydrocephalus. Note is made of coarse atherosclerotic calcifications of the intracranial vasculature.     VISUALIZED ORBITS/SINUSES/MASTOIDS: No intraorbital abnormality. Right frontal sinus mucous retention cyst. No middle ear or mastoid effusion.    BONES/SOFT TISSUES: No acute abnormality.    HEAD CTA: Venous contamination degrades evaluation.    ANTERIOR CIRCULATION: There is a proximal left A2 anterior cerebral artery fusiform aneurysm measuring 2 x 3 x 3 mm (AP x TV x CC as seen on image 353 series 7). There is severe short segment stenosis identified involving the left proximal cavernous ICA   (image 320 series 7). Additionally there is moderate to severe stenosis of the left proximal supraclinoid ICA (image 335 series 7). Mild narrowing involving the left M1 middle cerebral artery proximally. There is moderate short segment narrowing of the   left M2 anterior temporal division middle cerebral artery proximally. Question fusiform poststenotic dilatation versus short segment fusiform aneurysm measuring up to 4 x 3 mm in greatest axial dimensions (image 341 series 7) involving the left proximal   anterior temporal division middle artery.  No proximal large vessel occlusion. No high flow vascular malformation. Standard [place] anatomy.    POSTERIOR CIRCULATION: Mild scattered plaque identified involving the bilateral intradural vertebral arteries, left greater than right. Mild to moderate narrowing is identified involving the mid to distal left intradural vertebral artery. Dense   noncalcified plaque is identified involving the proximal basilar artery which causes mild to moderate stenosis. Mild narrowing is identified involving the distal basilar  artery due to noncalcified plaque. The bilateral superior cerebellar arteries are   patent. Mild narrowing identified involving the right P1 posterior cerebral artery. Mild short segment narrowing involving the left proximal to mid P2 posterior cerebral artery. Moderate to severe short segment stenosis involving the distal right P2   posterior cerebral artery. Dominant left and smaller right vertebral artery contribute to a normal basilar artery.     DURAL VENOUS SINUSES: Expected enhancement of the major dural venous sinuses.    NECK CTA:  RIGHT CAROTID: No signal stenosis or dissection. Scattered plaque.    LEFT CAROTID: No significant stenosis or dissection. Scattered plaque.    VERTEBRAL ARTERIES: No focal stenosis or dissection. Dominant left and smaller right vertebral arteries.    AORTIC ARCH: The aortic arch is incompletely imaged. Scattered noncalcified plaque and calcified plaque is noted along the aortic arch. No high-grade stenosis identified involving the origins of the imaged great vessels. The patient is status post median   sternotomy. Probable descending thoracic aortic aneurysm, incompletely imaged.    NONVASCULAR STRUCTURES: Enlarged multinodular thyroid.      Impression    IMPRESSION:   HEAD CT:  1.  No definite acute intracranial hemorrhage or acute large territory infarction. Age-indeterminate lacunar type infarctions identified involving the right caudate nucleus, right putamen, right subinsular region, right jay and left cerebellum. Patchy   hypodensity is identified involving the right corona radiata/centrum semiovale which may reflect chronic small vessel ischemic change or an age-indeterminate region of ischemic injury.  2.  Chronic senescent and presumed microvascular ischemic changes, as above.    HEAD CTA:   1.  No definite acute large territory infarction.  2.  Moderate to severe short segment stenosis identified involving the left cavernous and supraclinoid internal carotid arteries,  as above.  3.  Moderate short segment stenosis involving the proximal left M2 anterior temporal division middle cerebral artery.  4.  Mild to moderate stenosis involving the left intradural vertebral artery and basilar artery.  5.  Moderate to severe short segment stenosis involving the distal right P2 posterior cerebral artery.  6.  Additional intracranial stenoses, as above.  7.  Short segment fusiform aneurysm identified involving the left proximal A2 anterior cerebral artery measuring up to 3 mm.  8.  Poststenotic dilatation versus fusiform aneurysm involving the proximal left anterior temporal M2 middle cerebral artery measuring up to 4 mm.    NECK CTA:  1.  Scattered atheromatous disease without significant stenosis or dissection.  2.  Incompletely imaged and presumed descending thoracic aortic aneurysm. Consider CTA chest for further assessment.   3.  Heterogeneous multinodular thyroid. Recommend follow-up thyroid ultrasound for further assessment.    Noncontrast head CT findings discussed with Dr. Ellis at 8:16 PM on 11/04/2021.    CTA head neck findings discussed with Dr. Ellis at 8:31 PM on 11/04/2021.   MR Brain w/o & w Contrast    Narrative    EXAM: MR BRAIN W/O and W CONTRAST  LOCATION: [facility]  DATE/TIME: 11/5/2021 2:31 AM    INDICATION: Neuro deficit, acute, stroke suspected. Slurred speech.  COMPARISON: 11/04/2021  CONTRAST: 5ml Gadavist  TECHNIQUE: Routine multiplanar multisequence head MRI without and with intravenous contrast.    FINDINGS:  INTRACRANIAL CONTENTS: There is a 4 mm focus of diffusion restriction in the posterior limb of the right internal capsule. Subtle associated FLAIR hyperintensity. Findings are consistent with an acute infarct. Chronic lacunar infarcts in the right   caudate nucleus, right external capsule, bilateral jay, left thalamus, and bilateral cerebellar hemispheres. No mass, acute hemorrhage, or extra-axial fluid collections.  Scattered chronic microhemorrhages in the bilateral cerebral hemispheres   subcortical white matter, the splenium of the corpus callosum, the left centrum semiovale, the right thalamus, the bilateral jay, and left cerebellum. Patchy and confluent nonspecific T2/FLAIR hyperintensities within the cerebral white matter most   consistent with moderate chronic microvascular ischemic change. Mild to moderate generalized cerebral atrophy. No hydrocephalus. Normal position of the cerebellar tonsils. No pathologic contrast enhancement.    SELLA: No abnormality accounting for technique.    OSSEOUS STRUCTURES/SOFT TISSUES: Normal marrow signal. The major intracranial vascular flow voids are maintained.     ORBITS: No abnormality accounting for technique.     SINUSES/MASTOIDS: Mucous retention cyst in the right nasofrontal recess. Mild mucosal thickening of ethmoid air cells. No middle ear or mastoid effusion.       Impression    IMPRESSION:  1.  Small acute lacunar infarct in the posterior limb of the right internal capsule.  2.  Chronic ischemic and age-related changes as described.  3.  Multifocal chronic microhemorrhages. Appearance is nonspecific and differential diagnosis includes chronic hypertensive microhemorrhages or sequela of prior trauma, however, early cerebral amyloid angiopathy could have a similar appearance.    Findings were discussed with Dr. Guerrero at 3:14 AM on 11/05/2021.       Discussed with patient, family, neurology, nursing staff and discharge planner    Héctor Russo MD  [facility] Medicine Service  [phone]

## 2021-11-05 NOTE — PROGRESS NOTES
[facility]      OUTPATIENT PHYSICAL THERAPY EVALUATION  PLAN OF TREATMENT FOR OUTPATIENT REHABILITATION  (COMPLETE FOR INITIAL CLAIMS ONLY)  Patient's Last Name, First Name, M.I.  YOB: 1950  Shelly Becerril                           Provider's Name  [facility] Medical Record No.  [id number]                               Onset Date:  11/04/21   Start of Care Date:  (P) 11/05/21      Type:     _X_PT   ___OT   ___SLP Medical Diagnosis:  (P) slurred speech                        PT Diagnosis:  decreased activity tolerance   Visits from SOC:  1   _________________________________________________________________________________  Plan of Treatment/Functional Goals    Planned Interventions: bed mobility training, gait training, strengthening, transfer training     Goals: See Physical Therapy Goals on Care Plan in [email or web address] electronic health record.    Therapy Frequency: Daily  Predicted Duration of Therapy Intervention: 4 days  _________________________________________________________________________________    I CERTIFY THE NEED FOR THESE SERVICES FURNISHED UNDER        THIS PLAN OF TREATMENT AND WHILE UNDER MY CARE     (Physician co-signature of this document indicates review and certification of the therapy plan).                Certification date from: (P) 11/05/21, Certification date to: (P) 11/12/21    Referring Physician: NITIN Brewer            Initial Assessment        See Physical Therapy evaluation dated (P) 11/05/21 in Epic electronic health record.

## 2021-11-05 NOTE — CONSULTS
[facility]    Stroke Consult Note    Reason for Consult: Stroke Code     Chief Complaint: Slurred Speech      IVANA Becerril is a 71 year old female hx HTN, GI ulcer 4/2021, thoracic aortic dissection 9/2019, aortic valve replacement with bioprosthesis, comes in with 1900 onset of slurred speech.    Patient had episode slurred speech [id number] that resolved without intervention but returned 1900, also found to have a slowness of speech although able to follow commands, no obvious focal weakness or numbness. /101 in ED.    Patient was once on Coumadin but was stopped, hx of GI bleed 4/2021, thoracic aortic dissection.    Imaging Findings  CTH no ICH, age indeterminate lacunars R caudate, R putamen, R insula, R jay, L cerebellum  CTA neck no LVO or dissection  CTA head severe cavernous L ICA stenosis, LM2 stenosis, L vert and bilar moderate stenosis, L A2 3mm aneurysm     Thrombolytic Treatment   Not given due to family declined after risk benefit discussion.    Endovascular Treatment  Not initiated due to absence of proximal vessel occlusion    Impression     72yo woman with significant intracranial atherosclerosis, prior CVA's, bioprosthetic valve, and HTN, comes in with slurred speech.    Exam findings are mild with bilat leg drift and slowness/dysarthria of speech and patient has significant risk factors for bleeding. A new subcortical infarct is high on differential but given mildness of symptoms and significant risk for systemic bleeding, family declined TNK after extensive discussion. Differential includes hypertensive emergency, toxic metabolic syndrome. Given findings of previously unknown multiple old infarcts, patient should come in for stroke evaluation, no need to wait for MRI.    Patient may be a condidate for DAPT given suspected mild stroke and intracranial atherosclerosis, but given bleeding risk, MRI should be completed and risk for bleeding further assessed  "before committing to Plavix.    Recommendations  - Use orderset: \"Ischemic Stroke Routine Admission\" or \"Ischemic Stroke No Thrombolytics/No Thrombectomy ICU Admission\"  - Neurochecks and Vital Signs every q2h   - Permissive HTN; goal SBP < 220 mmHg  - Daily aspirin 81 mg for secondary stroke prevention  - Statin: Atorvastatin 40mg qhs  - MRI Brain with and without contrast  - TTE (with Bubble Study if age 60 yrs or less)  - Telemetry, EKG  - Bedside Glucose Monitoring  - A1c, Lipid Panel, Troponin x 3  - PT/OT/SLP  - Stroke Education  - Euthermia, Euglycemia  - Inpt vs op NSGY follow-up for ABEL aneurysm    Patient Follow-up        Thank you for this consult. We will continue to follow.     The Stroke Staff is Dr. Urena.    Reji Sánchez MD  Vascular Neurology Fellow  To page me or covering stroke neurology team member, click here: AMCOM   Choose \"On Call\" tab at top, then search dropdown box for \"Neurology Adult\", select location, press Enter, then look for stroke/neuro ICU/telestroke.    ______________________________________________________    Clinically Significant Risk Factors Present on Admission            # Coagulation Defect: home medication list includes an anticoagulant medication           Past Medical History   Past Medical History:   Diagnosis Date     Anticoagulated on Coumadin      Aortic aneurysm (H)      Hypertension 12/19/2019     Past Surgical History   Past Surgical History:   Procedure Laterality Date     CARDIAC SURGERY  04/2018    aortic valve replacement, aortic root reconstruction     OH ESOPHAGOGASTRODUODENOSCOPY TRANSORAL DIAGNOSTIC N/A 4/6/2021    Procedure: ESOPHAGOGASTRODUODENOSCOPY (EGD) WITH BIOPSY.;  Surgeon: Joey Garcia MD;  Location: [facility];  Service: Gastroenterology     Medications   Home Meds  Prior to Admission medications    Medication Sig Start Date End Date Taking? Authorizing Provider   ARTHRITIS PAIN RELIEF, ACETAM, 650 mg CR tablet [ARTHRITIS PAIN RELIEF, " ACETAM, 650 MG CR TABLET] Take 650 mg by mouth every 8 (eight) hours as needed for pain (knee pain).  1/2/20   Provider, Historical   atorvastatin (LIPITOR) 20 MG tablet [ATORVASTATIN (LIPITOR) 20 MG TABLET] Take 20 mg by mouth daily.  12/19/19   Provider, Historical   ferrous sulfate 140 mg (45 mg iron) TbER [FERROUS SULFATE 140 MG (45 MG IRON) TBER] Take 1 tablet by mouth daily. 4/5/21   Provider, Historical   glucosamine-chondroitin 500-400 mg cap [GLUCOSAMINE-CHONDROITIN 500-400 MG CAP] Take 1 capsule by mouth 2 (two) times a day. 4/5/21   Provider, Historical   lisinopriL (PRINIVIL,ZESTRIL) 40 MG tablet [LISINOPRIL (PRINIVIL,ZESTRIL) 40 MG TABLET] Take 40 mg by mouth daily. 1/20/21   Provider, Historical   MAGNESIUM HYDROXIDE 400 mg/5 mL suspension [MAGNESIUM HYDROXIDE 400 MG/5 ML SUSPENSION] Take 15 mL by mouth at bedtime as needed (constipation/heartburn).  1/20/21   Provider, Historical   metoprolol succinate (TOPROL-XL) 50 MG 24 hr tablet [METOPROLOL SUCCINATE (TOPROL-XL) 50 MG 24 HR TABLET] Take 1 tablet (50 mg total) by mouth daily. 4/8/21   Devin Javed MD   mirtazapine (REMERON) 15 MG tablet [MIRTAZAPINE (REMERON) 15 MG TABLET] Take 15 mg by mouth at bedtime.  1/20/21   Provider, Historical   SENEXON-S 8.6-50 mg tablet [SENEXON-S 8.6-50 MG TABLET] Take 1 tablet by mouth at bedtime.  2/3/21   Provider, Historical   warfarin ANTICOAGULANT (COUMADIN/JANTOVEN) 2 MG tablet [WARFARIN ANTICOAGULANT (COUMADIN/JANTOVEN) 2 MG TABLET] Take 1 tablet (2 mg total) by mouth daily. Adjust dose based on INR results as directed. 4/7/21   Devin Javed MD       Scheduled Meds      Infusion Meds      PRN Meds      Allergies   No Known Allergies  Family History   No family history on file.  Social History   Social History     Tobacco Use     Smoking status: Never Smoker     Smokeless tobacco: Never Used   Substance Use Topics     Alcohol use: Never     Drug use: Not on file       Review of Systems   The 5 point Review of  Systems is negative other than noted in the HPI or here.        PHYSICAL EXAMINATION  Temp:  [98.1  F (36.7  C)] 98.1  F (36.7  C)  Pulse:  [] 95  Resp:  [18] 18  BP: (168-228)/() 168/82  SpO2:  [96 %-99 %] 97 %     Neuro Exam  Mental Status:  Alert to self and month but not age, Able to repeat and follow commands, dysarthria and slowness of speech  Cranial Nerves:  visual fields intact (tested by nurse), EOMI with normal smooth pursuit, facial sensation intact and symmetric (tested by nurse), facial movements symmetric, hearing not formally tested but intact to conversation,   Motor: Bilat LE drift otherwise intact  Reflexes:  unable to test (telestroke)  Sensory:  light touch sensation intact and symmetric throughout upper and lower extremities (assessed by nurse), no extinction on double simultaneous stimulation (assessed by nurse)  Coordination:  normal finger-to-nose and heel-to-shin bilaterally without dysmetria, rapid alternating movements symmetric  Station/Gait:  unable to test due to telestroke    Dysphagia Screen  Per Nursing    Stroke Scales    NIHSS  Interval     Interval Comments     1a. Level of Consciousness 0-->Alert, keenly responsive   1b. LOC Questions 1-->Answers one question correctly   1c. LOC Commands 0-->Performs both tasks correctly   2.   Best Gaze 0-->Normal   3.   Visual 0-->No visual loss   4.   Facial Palsy 0-->Normal symmetrical movements   5a. Motor Arm, Left 0-->No drift, limb holds 90 (or 45) degrees for full 10 secs   5b. Motor Arm, Right 0-->No drift, limb holds 90 (or 45) degrees for full 10 secs   6a. Motor Leg, Left 1-->Drift, leg falls by the end of the 5-sec period but does not hit bed   6b. Motor Leg, right 1-->Drift, leg falls by the end of the 5-sec period but does not hit bed   7.   Limb Ataxia 0-->Absent   8.   Sensory 0-->Normal, no sensory loss   9.   Best Language 0-->No aphasia, normal   10. Dysarthria 1-->Mild-to-moderate dysarthria, patient slurs at  least some words and, at worst, can be understood with some difficulty   11. Extinction and Inattention  0-->No abnormality   Total 4 (11/04/21 2125)     Imaging  I personally reviewed all imaging; relevant findings per HPI.     Lab Results Data   CBC  Recent Labs   Lab 11/04/21 2006   WBC 7.4   RBC 3.54*   HGB 8.5*   HCT 29.3*        Basic Metabolic Panel    Recent Labs   Lab 11/04/21 2006 11/04/21 2002 11/04/21  1947     --   --    POTASSIUM 3.7  --   --    CHLORIDE 111*  --   --    CO2 25  --   --    BUN 20  --   --    CR 0.98 1.1  --      --  137*   FAVIOLA 9.0  --   --      Liver Panel  No results for input(s): PROTTOTAL, ALBUMIN, BILITOTAL, ALKPHOS, AST, ALT, BILIDIRECT in the last 168 hours.  INR    Recent Labs   Lab Test 11/04/21 2006 04/05/21  1535   INR 1.00 0.98      Lipid Profile  No lab results found.  A1C  No lab results found.  Troponin I  No results for input(s): TROPONIN, GHTROP in the last 168 hours.       Stroke Code Data Data   Stroke Code Data  (for stroke code with tele)  Stroke code activated 11/04/21   1948   First stroke provider response 11/04/21 1950   Video start time 11/04/21 2030   Video end time 11/04/21 2110   Last known normal 11/04/21   1900   Time of discovery  (or onset of symptoms)  11/04/21   1900   Head CT read by Stroke Neuro Dr/Provider 11/04/21 2007   Was stroke code de-escalated?   11/04/21 2115           Telestroke Service Details  Type of service telemedicine diagnostic assessment of acute neurological changes   Reason telemedicine is appropriate patient requires assessment with a specialist for diagnosis and treatment of neurological symptoms   Mode of transmission secure interactive audio and video communication per Nisa   Originating site (patient location) [facility]    Distant site (provider location) Provider remote site

## 2021-11-05 NOTE — CONSULTS
This is a neurological consultation    71-year-old hospital on November 4, 2021    Chief complaint  Dysarthria/aphasia  Small lacunar infarct posterior limb right internal capsule  Significant intracranial stenosis  History of atrial fibrillation  Small fusiform aneurysm intracranial  Multifocal chronic microhemorrhages consistent with cerebral amyloid angiopathy or chronic hypertensive microhemorrhages      HPI  71-year-old brought to the hospital on November 4, 2021 with onset of slurred speech in the morning at around 8-9 AM.  It seemed to resolve after about 2 hours but then occurred again later in the evening  Patient seems to have difficulty making sense  Speech still is not clear either    Patient is anticoagulated with warfarin    Past medical history  Hypertension  Aortic valve repair  Ascending aortic aneurysm  History of repair of dissecting thoracic aneurysm  Peptic ulcer disease   Depression  Urinary incontinence      Habits  Non-smoker  Does not drink alcohol    Family history  Noncontributory      Work-up  MRI head 11/4/2021  1.  Small acute lacunar infarct in the posterior limb of the right internal capsule.  2.  Chronic ischemic and age-related changes as described.  3.  Multifocal chronic microhemorrhages. Appearance is nonspecific and differential diagnosis includes chronic hypertensive microhemorrhages or sequela of prior trauma, however, early cerebral amyloid angiopathy could have a similar appearance.  HEAD CT: 11/4/2021  1.  No definite acute intracranial hemorrhage or acute large territory infarction. Age-indeterminate lacunar type infarctions identified involving the right caudate nucleus, right putamen, right subinsular region, right jay and left cerebellum. Patchy   hypodensity is identified involving the right corona radiata/centrum semiovale which may reflect chronic small vessel ischemic change or an age-indeterminate region of ischemic injury.  2.  Chronic senescent and presumed  microvascular ischemic changes, as above.  HEAD CTA: 11/4/2021  1.  No definite acute large territory infarction.  2.  Moderate to severe short segment stenosis identified involving the left cavernous and supraclinoid internal carotid arteries, as above.  3.  Moderate short segment stenosis involving the proximal left M2 anterior temporal division middle cerebral artery.  4.  Mild to moderate stenosis involving the left intradural vertebral artery and basilar artery.  5.  Moderate to severe short segment stenosis involving the distal right P2 posterior cerebral artery.  6.  Additional intracranial stenoses, as above.  7.  Short segment fusiform aneurysm identified involving left proximal A2 identified involving the left proximal A2 anterior cerebral artery measuring up to 3 mm.  8.  Poststenotic dilatation versus fusiform aneurysm involving the proximal left anterior temporal M2 middle cerebral artery measuring up to 4 mm.  NECK CTA: 11/4/2021  1.  Scattered atheromatous disease without significant stenosis or dissection.  2.  Incompletely imaged and presumed descending thoracic aortic aneurysm. Consider CTA chest for further assessment.   3.  Heterogeneous multinodular thyroid. Recommend follow-up thyroid ultrasound for further assessment.  Covid negative  Troponin on admission less than 0.01  Hemoglobin A1c 4.7%  HDL 36/  TSH 0.13  On admission INR 1.0/PTT 30      Laboratory data  Sodium 143 potassium 3.7  BUN 20 creatinine 0.98  Glucose 115  White blood count 7.4 hemoglobin 8.5, platelets 314,000        Exam    Review of systems  Pertinent positives and negatives  Difficulty both with word output but also some slurred speech  Very tired    No specific headache or chest pain or trouble breathing  No abdominal pain  Denied focal weakness  No diplopia   No visual changes    Otherwise review systems negative    General exam  HEENT no signs of trauma  Lungs clear  Heart rate is regular  Abdomen soft  No edema  the feet    Neurologic exam  Patient sleepy difficult interview even with the help of family  Wakes up and just says that she is tired and wants to close her eyes  Difficult to test speech  Follows some simple commands by family  Speech sounds thick  Does not seem to have as much aphasia as she does some dysarthria  No neglect  Unable to test memory due to being too tired and not wanting to participate in the exam    Cranial nerves II through XII  No hemiplegia  No nystagmus  Visual fields intact  Face symmetrical    Upper extremities  No significant drift  Poor effort bilaterally    Lower extremities  Move okay in the bed    Gait  Deferred  Usually uses a walker at home    Assessment/plan    1.  Small acute lacunar infarct posterior limb internal capsule      2.  Intracranial atherosclerosis       Moderate to severe left cavernous and supraclinoid internal carotid artery stenosis       Moderate left M2 M2 temporal division stenosis       Mild to moderate left vertebral intradural stenosis and basilar artery stenosis       Moderate to severe short segment right P2 stenosis           3.  Intracranial aneurysm       Left proximal A2 fusiform aneurysm 3 mm       Left anterior M2 dilatation versus fusiform aneurysm 4 mm    4.  Chronic lacunar infarcts, right caudate, right putamen, right subinsular region, right jay, left cerebellum and patchy chronic small vessel changes seen on CT    5.  Multifocal chronic microhemorrhages consistent with cerebral amyloid angiopathy or chronic hypertensive microhemorrhages    6.  History of peptic ulcer disease, anemia hemoglobin 8.5, (hemoglobin 8.2 April 2021 chronically low)    Plan    Diagnosis  Small vessel acute lacunar infarct posterior limb internal capsule  Severe intracranial atherosclerosis  Small fusiform aneurysms  History of possible cerebral amyloid angiopathy versus microhemorrhages from hypertension  Multifocal small vessel ischemic strokes  Chronic anemia with  peptic ulcer disease  Noncompliance with medication      Echo pending  Stroke order set already placed    Patient with history of noncompliance with medication  INR was 1.0 on admission PTT 30    Complex situation will need to have tracking of her hemoglobin closely if she is going to be on both antiplatelet indication anticoagulation  Current stroke appears to be more from small vessel disease  If she is going to be on the warfarin she would not be a good candidate for dual antiplatelet agents      Aspirin 81 mg once per day  Lipitor 40 mg once per day  Warfarin dosing per pharmacy    70 minutes total care time greater than 50% face-to-face with patient care team also reviewed notes and EMR notes and studies above.

## 2021-11-05 NOTE — PHARMACY-ANTICOAGULATION SERVICE
Clinical Pharmacy - Warfarin Dosing Consult     Pharmacy has been consulted to manage this patient s warfarin therapy.  Indication: Mechanical Aortic Valve Replacement  Therapy Goal: INR 2-3  Provider/Team: Dr. Brewer  Warfarin Prior to Admission: Yes  Warfarin PTA Regimen: 2 mg daily (Has not taken in > 1 month)  Significant drug interactions: Aspirin, enoxaparin  Recent documented change in oral intake/nutrition: No    INR   Date Value Ref Range Status   11/04/2021 1.00 0.90 - 1.15 Final   04/05/2021 0.98 0.90 - 1.10 Final       Recommend warfarin 2 mg today.  Pharmacy will monitor Shelly Jone daily and order warfarin doses to achieve specified goal.      Please contact pharmacy as soon as possible if the warfarin needs to be held for a procedure or if the warfarin goals change.

## 2021-11-05 NOTE — ED TRIAGE NOTES
Per family patient had slurred speech this morning around 8-9 am that resolved. Patient developed slurred speech again tonight at 1900 hours. Son-in-law stated speech has improved but is still not normal.

## 2021-11-05 NOTE — UTILIZATION REVIEW
Admission Status; Secondary Review Determination   Under the authority of the Utilization Management Committee, the utilization review process indicated a secondary review on Shelly Becerril. The review outcome is based on review of the medical records, discussions with staff, and applying clinical experience noted on the date of the review.   (x) Inpatient Status Appropriate - This patient's medical care is consistent with medical management for inpatient care and reasonable inpatient medical practice.     RATIONALE FOR DETERMINATION   71 yr old female with thoracic and abdominal aortic aneurysm, HTN, AVR presents to the emergency room, with slurred speech .  MRI brain shows small acute lacunar infarction in the posterior limb of right internal capsule .  Patient failed speech study and is currently NPO while working with family and patient to establish goals of care and nutritional options.  Not stable for discharge with inability to take PO at this time.  At the time of admission with the information available to the attending physician more than 2 nights Hospital complex care was anticipated, based on patient risk of adverse outcome if treated as outpatient and complex care required. Inpatient admission is appropriate based on the Medicare guidelines.   The information on this document is developed by the utilization review team in order for the business office to ensure compliance. This only denotes the appropriateness of proper admission status and does not reflect the quality of care rendered.   The definitions of Inpatient Status and Observation Status used in making the determination above are those provided in the CMS Coverage Manual, Chapter 1 and Chapter 6, section 70.4.   Sincerely,   Marly Alcantar MD  Utilization Review  Physician Advisor  [facility]

## 2021-11-05 NOTE — PROGRESS NOTES
11/05/21 1030   Quick Adds   Type of Visit Initial PT Evaluation   Living Environment   People in home child(cristopher), adult   Current Living Arrangements house   Home Accessibility stairs to enter home   Number of Stairs, Main Entrance 3   Stair Railings, Main Entrance railings safe and in good condition   Transportation Anticipated family or friend will provide   Living Environment Comments someone always with pt on stairs or when walking   Self-Care   Usual Activity Tolerance moderate   Current Activity Tolerance fair   Equipment Currently Used at Home walker, rolling;cane, straight   Activity/Exercise/Self-Care Comment daughter is PCA, pt gets assist with ADL's as needed   General Information   Onset of Illness/Injury or Date of Surgery 11/04/21   Referring Physician NITIN Brewer   Patient/Family Therapy Goals Statement (PT) return home   Pertinent History of Current Problem (include personal factors and/or comorbidities that impact the POC) admit with slurred speech   Existing Precautions/Restrictions fall   General Observations pt agreeable to PT   Cognition   Orientation Status (Cognition) oriented x 3   Affect/Mental Status (Cognition) WFL   Follows Commands (Cognition) WFL   Pain Assessment   Patient Currently in Pain No   Range of Motion (ROM)   ROM Comment LE ROM WFL   Strength   Strength Comments BLE strength 4/5 and equal bilat   Bed Mobility   Bed Mobility supine-sit;sit-supine   Supine-Sit [place] (Bed Mobility) contact guard;minimum assist (75% patient effort)   Sit-Supine [place] (Bed Mobility) minimum assist (75% patient effort)   Bed Mobility Limitations   (on a cart in ED, difficult to maneuver)   Impairments Contributing to Impaired Bed Mobility decreased strength   Assistive Device (Bed Mobility) bed rails   Transfers   Transfers sit-stand transfer   Sit-Stand Transfer   Sit-Stand [place] (Transfers) contact guard   Assistive Device (Sit-Stand Transfers) walker, front-wheeled    Sit/Stand Transfer Comments able to get self on/off a higher cart with CGA   Gait/Stairs (Locomotion)   [place] Level (Gait) contact guard   Assistive Device (Gait) walker, front-wheeled   Distance in Feet (Required for LE Total Joints) 8   Pattern (Gait) step-to   Comment (Gait/Stairs) distance limited by length of IV (non-portable)   Clinical Impression   Criteria for Skilled Therapeutic Intervention yes, treatment indicated   PT Diagnosis (PT) decreased activity tolerance   Influenced by the following impairments fatigue   Functional limitations due to impairments fatigue   Clinical Presentation Stable/Uncomplicated   Clinical Presentation Rationale presents as medically diagnosed   Clinical Decision Making (Complexity) moderate complexity   Therapy Frequency (PT) Daily   Predicted Duration of Therapy Intervention (days/wks) 4 days   Planned Therapy Interventions (PT) bed mobility training;gait training;strengthening;transfer training   Anticipated Equipment Needs at Discharge (PT) walker, rolling   Risk & Benefits of therapy have been explained care plan/treatment goals reviewed;patient;daughter   PT Discharge Planning    PT Discharge Recommendation (DC Rec) home with assist   PT Rationale for DC Rec assist of family as prior to admit   PT Brief overview of current status  tolerated PT well   Total Evaluation Time   Total Evaluation Time (Minutes) 10

## 2021-11-05 NOTE — PROGRESS NOTES
"   11/05/21 0923   General Information   Onset of Illness/Injury or Date of Surgery 11/04/21   Referring Physician Janel Brewer,    Behavioral Issues   (emotional lability at times)   Patient/Family Therapy Goal Statement (SLP) Patient not able to state.   Pertinent History of Current Problem Per MD note: \"71-year-old female with thoracic and abdominal aortic aneurysm, HTN, AVR presents with slurred speech concerning for CVA. Per patient and her family she had an episode of difficult speech in the morning on 10/4 which initially resolved and then returned at around 7 PM.  Patient still feels like she is not speaking very clearly, feels like she drools at times.  Denies any pain at all in her body including chest pain, abdominal pain, headache, pain in the extremities.  Denies any weakness or tingling/numbness sensations.  Per family members and patient she has not taken her warfarin in over a month as she ran out of her prescription and had no refills and has not followed up with physician about this.\" HCT: No definite acute intracranial hemorrhage or acute large territory infarction. Age-indeterminate lacunar type infarctions identified involving the right caudate nucleus, right putamen, right subinsular region, right jay and left cerebellum. Patchy hypodensity is identified involving the right corona radiata/centrum semiovale which may reflect chronic small vessel ischemic change or an age-indeterminate region of ischemic injury.\"   General Observations Patient awake, appeared uncomfortable; eyes mostly closed. Patient able to sit self upright; note some restlessness. RN reported water drooled out of mouth this morning with attempt to swallow water.   Past History of Dysphagia None per chart review. Patient not able to provide hx information.       Present yes  (via I pad)   Language Hmong   Type of Evaluation   Type of Evaluation Swallow Evaluation   Oral Motor   Oral Musculature " anomalies present   Structural Abnormalities none present   Mucosal Quality adequate   Dentition (Oral Motor)   Dentition (Oral Motor) natural dentition   Facial Symmetry (Oral Motor)   Facial Symmetry (Oral Motor) right side impairment   Right Side Facial Asymmetry minimal impairment   Lip Function (Oral Motor)   Lip Range of Motion (Oral Motor) retraction impairment;unable/difficult to assess  (difficulty following directions)   Retraction, Lip Range of Motion moderate impairment   Lip Strength (Oral Motor) mild impairment;moderate impairment   Tongue Function (Oral Motor)   Tongue ROM (Oral Motor) protrusion is impaired;lateralization is impaired   Protrusion, Tongue ROM Impairment (Oral Motor) moderate impairment   Lateralization, Tongue ROM Impairment (Oral Motor) moderate impairment   Tongue Strength (Oral Motor) strength decreased;bilateral   Jaw Function (Oral Motor)   Jaw Function (Oral Motor) WNL   Cough/Swallow/Gag Reflex (Oral Motor)   Soft Palate/Velum (Oral Motor) unable/difficult to assess   Volitional Throat Clear/Cough (Oral Motor) reduced strength   Volitional Swallow (Oral Motor) unable/difficult to assess   Comment, Cough/Swallow/Gag Reflex (Oral Motor) Note 1 spontaneous swallow during exam; appeared weak, incoordinated.   Vocal Quality/Secretion Management (Oral Motor)   Vocal Quality (Oral Motor) breathy;hypophonic;hoarse   Secretion Management (Oral Motor) difficulty swallowing secretions;drooling, left side;awareness of drooling   General Swallowing Observations   Current Diet/Method of Nutritional Intake (General Swallowing Observations, NIS) NPO   Swallowing Evaluation Clinical swallow evaluation   Clinical Swallow Evaluation   Feeding Assistance dependent   Additional evaluation(s) completed today Recommended  (when demonstrating readiness)   Clinical Swallow Evaluation Textures Trialed mildly thick liquids   Clinical Swallow Eval: Mildly Thick Liquids   Mode of Presentation fed by  clinician;spoon   Volume Presented 2 small sips by spoon mildly thick water   Oral Phase poor AP movement;residue in oral cavity  (poor oral acceptance; minimal to no AP bolus transit)   Pharyngeal Phase   (minimal to no swallow response apparent)   Diagnostic Statement Mild drooling left and right side. High aspiration risk.   Swallowing Recommendations   Diet Consistency Recommendations NPO;consider alternative means of nutrition   Medication Administration Recommendations, Swallowing (SLP) alternate route   Instrumental Assessment Recommendations VFSS (videofluroscopic swallowing study)  (when demonstrating readiness)   General Therapy Interventions   Planned Therapy Interventions Dysphagia Treatment   Dysphagia treatment Oropharyngeal exercise training;Modified diet education;Instruction of safe swallow strategies;Compensatory strategies for swallowing   SLP Therapy Assessment/Plan   Criteria for Skilled Therapeutic Interventions Met (SLP Eval) yes;treatment indicated   SLP Diagnosis Severe oropharyngeal dysphagia   Rehab Potential (SLP Eval) good, to achieve stated therapy goals   Therapy Frequency (SLP Eval) daily   Predicted Duration of Therapy Intervention (SLP Eval) 2 weeks   Comment, Therapy Assessment/Plan (SLP) Severe oropharyngeal dysphagia characterized by moderate oral motor impairments, poor oral acceptance and oral bolus control, poor AP bolus transit and minimal to no swallow movement apparent with limited PO trials of mildly thick water by spoon. Patient does not appear to be managing oral secretions (wiping saliva with tissue) and note mild drooling on left and right side during exam. Patient currently at high aspiration risk.   Therapy Plan Review/Discharge Plan (SLP)   Therapy Plan Review (SLP) evaluation/treatment results reviewed;care plan/treatment goals reviewed;risks/benefits reviewed;current/potential barriers reviewed;participants voiced agreement with care plan;participants  included;patient   Demonstrates Need for Referral to Another Service (SLP) occupational therapist;physical therapist   SLP Discharge Planning    SLP Discharge Recommendation (DC Rec) [facility] patient will benefit from intensive, interdisciplinary therapy.  Anticipate will be able to tolerate 3 hours of therapy per day   SLP Rationale for DC Rec Patient below baseline for swallowing.   SLP Brief overview of current status  Recommend continue NPO status due to high aspiration risk. Patient not managing oral secretions. Recommend alternate route for medications; may consider alternate nutrition/hydration source pending patient progress. Recommend video swallow study once patient demonstrating readiness.    Total Evaluation Time   Total Evaluation Time (Minutes) 18

## 2021-11-05 NOTE — ED PROVIDER NOTES
"EMERGENCY DEPARTMENT NOTE     Name: Shelly Becerril    Age/Sex: 71 year old female   MRN: [id number]   Evaluation Date & Time:  No admission date for patient encounter.    PCP:    Erica Canada   ED Provider: Devin Ellis D.O.       CHIEF COMPLAINT    Slurred Speech       DIAGNOSIS & DISPOSITION     1. Stroke-like symptoms      DISPOSITION: Admit to neuro telemetry observation/[facility]    At the conclusion of the encounter I discussed the results of all of the tests and the disposition. The questions were answered. The patient or family acknowledged understanding and was agreeable with the care plan.    TOTAL CRITICAL CARE TIME (EXCLUDING PROCEDURES): Not applicable    PROCEDURES:   None    EMERGENCY DEPARTMENT COURSE/MEDICAL DECISION MAKING   7:42 PM I met with the patient to gather history and to perform my initial exam.  We discussed treatment options and the plan for care while in the Emergency Department. PPE worn: N95, surgical mask and cap, face shield.   7:45 PM Tier 1 stroke code called.   7:54 PM I spoke to neurology regarding the patient's case.   8:16 PM Spoke to [facility].  9:15 PM Stroke code cancelled.   9:21 PM Spoke to neurology regarding the patient's case.   9:57 PM I spoke with Dr. Brewer with the hospitalist service who agrees to admit the patient.        Shelly Becerril is a 71 year old female with relevant past history of thoracic aortic dissection status post repair with AVR, admission in April for GI bleeding not currently anticoagulated per the family, hypertension, hyperlipidemia who presents to the emergency department for evaluation of stroke like symptoms  Triage note reviewed:No notes on file  Vital signs:BP (!) 172/98   Pulse 101   Temp 98.1  F (36.7  C) (Oral)   Resp 20   Ht 1.6 m (5' 3\")   Wt 59 kg (130 lb)   SpO2 97%   BMI 23.03 kg/m    Pertinent physical exam findings:  Cardiac: Regular rate and rhythm S1-S2 without murmur rub  Neuro: See NIH stroke scale.  Patient had " mild dysarthria and left-sided facial paralysis  Diagnostic studies:  Imaging: CT of the head: No acute mass bleed or infarct  Lab: Hemoglobin 8.5, basic metabolic profile within normal limits, INR 1, troponin less than 0.01  EKG Impression: Sinus tachycardia  Interventions: Aspirin  Medical decision making: Patient was not a TNKase candidate secondary to low NIH stroke scale, bleeding risk with prior history of upper GI bleeding, thoracic aortic dissection, current anemia, 3 mm aneurysm on CT scan.  She will be admitted to neuro telemetry for further stroke work-up with MRI and echocardiogram.    ED INTERVENTIONS     Medications   iopamidol (ISOVUE-370) solution 75 mL (75 mLs Intravenous Given 11/4/21 2015)   niCARdipine 40 mg in 200 mL 0.83% NaCl (CARDENE) infusion (0 mg/hr Intravenous Stopped 11/4/21 2128)   aspirin (ASA) tablet 325 mg (325 mg Oral Given 11/4/21 2210)       DISCHARGE MEDICATIONS        Review of your medicines      UNREVIEWED medicines. Ask your doctor about these medicines      Dose / Directions   atorvastatin 20 MG tablet  Commonly known as: LIPITOR      Dose: 20 mg  [ATORVASTATIN (LIPITOR) 20 MG TABLET] Take 20 mg by mouth daily.  Refills: 0     ferrous sulfate 140 (45 Fe) MG Tbcr CR tablet      Dose: 1 tablet  [FERROUS SULFATE 140 MG (45 MG IRON) TBER] Take 1 tablet by mouth daily.  Refills: 0     lisinopril 40 MG tablet  Commonly known as: ZESTRIL      Dose: 40 mg  [LISINOPRIL (PRINIVIL,ZESTRIL) 40 MG TABLET] Take 40 mg by mouth daily.  Refills: 0     metoprolol succinate ER 50 MG 24 hr tablet  Commonly known as: TOPROL-XL  Used for: Essential hypertension      Dose: 50 mg  [METOPROLOL SUCCINATE (TOPROL-XL) 50 MG 24 HR TABLET] Take 1 tablet (50 mg total) by mouth daily.  Quantity: 30 tablet  Refills: 0     Milk of Magnesia 400 MG/5ML suspension  Generic drug: magnesium hydroxide      Dose: 15 mL  [MAGNESIUM HYDROXIDE 400 MG/5 ML SUSPENSION] Take 15 mL by mouth at bedtime as needed  (constipation/heartburn).  Refills: 0     mirtazapine 15 MG tablet  Commonly known as: REMERON      Dose: 15 mg  [MIRTAZAPINE (REMERON) 15 MG TABLET] Take 15 mg by mouth at bedtime.  Refills: 0     RA Arthritis Pain Relief 650 MG CR tablet  Generic drug: acetaminophen      Dose: 650 mg  [ARTHRITIS PAIN RELIEF, ACETAM, 650 MG CR TABLET] Take 650 mg by mouth every 8 (eight) hours as needed for pain (knee pain).  Refills: 0     Senexon-S 8.6-50 MG tablet  Generic drug: senna-docusate      Dose: 1 tablet  [SENEXON-S 8.6-50 MG TABLET] Take 1 tablet by mouth at bedtime.  Refills: 0     warfarin ANTICOAGULANT 2 MG tablet  Commonly known as: COUMADIN  Used for: H/O aortic valve replacement      Dose: 2 mg  [WARFARIN ANTICOAGULANT (COUMADIN/JANTOVEN) 2 MG TABLET] Take 1 tablet (2 mg total) by mouth daily. Adjust dose based on INR results as directed.  Quantity: 30 tablet  Refills: 0        CONTINUE these medicines which have NOT CHANGED      Dose / Directions   glucosamine-chondroitin 500-400 MG Caps per capsule      Dose: 1 capsule  [GLUCOSAMINE-CHONDROITIN 500-400 MG CAP] Take 1 capsule by mouth 2 (two) times a day.  Refills: 0              INFORMATION SOURCE AND LIMITATIONS    History/Exam limitations: None  Patient information was obtained from: Patient's family member  Use of : Yes (In person) - Language ong    HISTORY OF PRESENT ILLNESS   Shelly Becerril female with a relevant past history of HTN, aortic aneurysm, aortic valve replacement, and anticoagulation on coumadin, who presents to this ED via walk in for evaluation of slurred speech.    Per patient's family member, patient had onset of slurred speech this morning around 8:00 AM. Slurred speech resolved after 2 hours and patient returned to normal for the day. Around 7:00 PM tonight (~45 minutes ago), patient's slurred speech returned. Her words make sense, but the speech is not clear. Patient lives with her family member.     Patient is anticoagulated  on Coumadin.     REVIEW OF SYSTEMS:   Constitutional: Negative for  fever.   HENT: Negative for URI symptoms or sore throat.    Cardiac: Negative for  chest pain,palpitations, near syncope or syncope  Respiratory: Negative for cough and shortness of breath.    Gastrointestinal: Negative for abdominal pain, nausea, vomiting, constipation, diarrhea, rectal bleeding or melena.  Genitourinary: Negative for dysuria, flank pain and hematuria.   Musculoskeletal: Negative for back pain.   Skin: Negative for  rash  Neurological: Positive for slurred speech. Negative for dizziness, headache, syncope, unilateral weakness or imbalance with walking.   Hematological: Negative for adenopathy. Does not bruise/bleed easily.   Psychiatric/Behavioral: Negative for confusion.       PATIENT HISTORY     Past Medical History:   Diagnosis Date     Anticoagulated on Coumadin      Aortic aneurysm (H)      Hypertension 12/19/2019     Patient Active Problem List   Diagnosis     Depression     Hypertension     Hx of medication noncompliance     Urinary incontinence     Ascending aortic aneurysm (H)     UGIB (upper gastrointestinal bleed)     Anticoagulated on Coumadin     H/O aortic valve replacement     Hx of repair of dissecting aneurysm of ascending thoracic aorta     Anemia due to blood loss, acute     Duodenal ulcer due to Helicobacter pylori     Iron deficiency anemia, unspecified     Stroke-like symptoms     Past Surgical History:   Procedure Laterality Date     CARDIAC SURGERY  04/2018    aortic valve replacement, aortic root reconstruction     CT ESOPHAGOGASTRODUODENOSCOPY TRANSORAL DIAGNOSTIC N/A 4/6/2021    Procedure: ESOPHAGOGASTRODUODENOSCOPY (EGD) WITH BIOPSY.;  Surgeon: Joey Garcia MD;  [facility]: [facility];  Service: Gastroenterology     [facility]  Smoking:None  Alcohol Use:None  No Known Allergies      OUTPATIENT MEDICATIONS     New Prescriptions    No medications on file      Vitals:    11/04/21 2017 11/04/21  2030 11/04/21 2045 11/04/21 2100   BP:  (!) 168/82 (!) 148/69 (!) 156/75   Pulse:  95 99 106   Resp:       Temp: 98.1  F (36.7  C)      TempSrc: Oral      SpO2:  97% 93% 97%   Weight:       Height:           Physical Exam   Constitutional: Oriented to person, place, and time. Appears well-developed and well-nourished.   HEENT:    Head: Atraumatic.   Neck: Normal range of motion. Neck supple.   Cardiovascular: Normal rate, regular rhythm and normal heart sounds.    Pulmonary/Chest: Normal effort  and breath sounds normal.   Abdominal: Soft. Bowel sounds are normal.   Musculoskeletal: Normal range of motion.   Neurological: See NIH Stroke Scale.   National Institutes of Health Stroke Scale  Exam Interval: Baseline   Score    Level of consciousness: (0)   Alert, keenly responsive    LOC questions: (0)   Answers both questions correctly    LOC commands: (0)   Performs both tasks correctly    Best gaze: (0)   Normal    Visual: (0)   No visual loss    Facial palsy: (1)   Minor paralysis (flat nasolabial fold, smile asymmetry)    Motor arm (left): (0)   No drift    Motor arm (right): (0)   No drift    Motor leg (left): (0)   No drift    Motor leg (right): (0)   No drift    Limb ataxia: (0)   Absent    Sensory: (0)   Normal- no sensory loss    Best language: (0)   Normal- no aphasia    Dysarthria: (1)   Mild to moderate dysarthria    Extinction and inattention: (0)   No abnormality        Total Score:  2     Psychiatric: Normal mood and affect. Behavior is normal. Thought content normal.       DIAGNOSTICS    LABORATORY FINDINGS (REVIEWED AND INTERPRETED):  Labs Ordered and Resulted from Time of ED Arrival to Time of ED Departure   BASIC METABOLIC PANEL - Abnormal       Result Value    Sodium 143      Potassium 3.7      Chloride 111 (*)     Carbon Dioxide (CO2) 25      Anion Gap 7      Urea Nitrogen 20      Creatinine 0.98      Calcium 9.0      Glucose 115      GFR Estimate 59 (*)    GLUCOSE BY METER - Abnormal    GLUCOSE BY  METER POCT 137 (*)    ISTAT CREATININE POCT - Abnormal    Creatinine POCT 1.1      GFR, ESTIMATED POCT 51 (*)    CBC WITH PLATELETS AND DIFFERENTIAL - Abnormal    WBC Count 7.4      RBC Count 3.54 (*)     Hemoglobin 8.5 (*)     Hematocrit 29.3 (*)     MCV 83      MCH 24.0 (*)     MCHC 29.0 (*)     RDW 17.0 (*)     Platelet Count 314      % Neutrophils 62      % Lymphocytes 30      % Monocytes 6      % Eosinophils 1      % Basophils 0      % Immature Granulocytes 1      NRBCs per 100 WBC 0      Absolute Neutrophils 4.7      Absolute Lymphocytes 2.2      Absolute Monocytes 0.5      Absolute Eosinophils 0.1      Absolute Basophils 0.0      Absolute Immature Granulocytes 0.0      Absolute NRBCs 0.0     INR - Normal    INR 1.00     PARTIAL THROMBOPLASTIN TIME - Normal    aPTT 30     TROPONIN I - Normal    Troponin I <0.01     COVID-19 VIRUS (CORONAVIRUS) BY PCR - Normal    SARS CoV2 PCR Negative           IMAGING (REVIEWED AND INTERPRETED):  CTA Head Neck with Contrast   Final Result   IMPRESSION:    HEAD CT:   1.  No definite acute intracranial hemorrhage or acute large territory infarction. Age-indeterminate lacunar type infarctions identified involving the right caudate nucleus, right putamen, right subinsular region, right jay and left cerebellum. Patchy    hypodensity is identified involving the right corona radiata/centrum semiovale which may reflect chronic small vessel ischemic change or an age-indeterminate region of ischemic injury.   2.  Chronic senescent and presumed microvascular ischemic changes, as above.      HEAD CTA:    1.  No definite acute large territory infarction.   2.  Moderate to severe short segment stenosis identified involving the left cavernous and supraclinoid internal carotid arteries, as above.   3.  Moderate short segment stenosis involving the proximal left M2 anterior temporal division middle cerebral artery.   4.  Mild to moderate stenosis involving the left intradural vertebral  artery and basilar artery.   5.  Moderate to severe short segment stenosis involving the distal right P2 posterior cerebral artery.   6.  Additional intracranial stenoses, as above.   7.  Short segment fusiform aneurysm identified involving the left proximal A2 anterior cerebral artery measuring up to 3 mm.   8.  Poststenotic dilatation versus fusiform aneurysm involving the proximal left anterior temporal M2 middle cerebral artery measuring up to 4 mm.      NECK CTA:   1.  Scattered atheromatous disease without significant stenosis or dissection.   2.  Incompletely imaged and presumed descending thoracic aortic aneurysm. Consider CTA chest for further assessment.    3.  Heterogeneous multinodular thyroid. Recommend follow-up thyroid ultrasound for further assessment.      Noncontrast head CT findings discussed with Dr. Ellis at 8:16 PM on 11/04/2021.      CTA head neck findings discussed with Dr. Ellis at 8:31 PM on 11/04/2021.              ECG (REVIEWED AND INTERPRETED):   ECG:   Performed at: 21:20  HR:  107bpm  Rhythm: Sinus  Axis: 90  QRS duration: 88 ms  QTC: 480 ms  ST changes: No ST segment elevation or depression, no T wave inversion,No Q wave  Interpretation: Sinus tachycardia  No prior for comparison    I have reviewed the patient's ECG, with comments made as listed above. Please see scanned image for full interpretation.         I, Kia Gomez, am serving as a scribe to document services personally performed by Devin Ellis D.O., based on my observation and the provider s statements to me.    I, Devin Ellis D.O., attest that Kia Gomez is acting in a scribe capacity, has observed my performance of the services and has documented them in accordance with my direction.    Devin Ellis D.O.  EMERGENCY MEDICINE   11/04/21  [facility] EMERGENCY DEPARTMENT  [address] [place] [id number]  [phone]  Dept: [phone]       Devin Ellis,  DO  11/05/21 0013

## 2021-11-05 NOTE — ED TRIAGE NOTES
Pt back from CT.  Has had slurred speech since 1900 today.  Had another episode earlier today but it resolved and came back.  No incident in CT,  Pt was abble to get up from w/c to ct table and then to stretcher with asst of one.

## 2021-11-05 NOTE — PHARMACY-CONSULT NOTE
Pharmacy Consult to evaluate for medication related stroke core measures    Shelly Becerril, 71 year old female admitted for Acute CVA on 11/4/2021.    Thrombolytic was not given because of Time from onset contraindications    VTE Prophylaxis Enoxaparin given on 11/5/21 as appropriate prior to end of hospital day 2.    Antithrombotic: aspirin started on 11/4/21, as appropriate by end of hospital day 2. Continue antithrombotic therapy on discharge to meet quality measures, unless contraindicated.    Anticoagulation if history of A-fib/flutter: Patient on warfarin; continue anticoagulation on discharge to meet quality measures, unless contraindicated.    LDL Cholesterol Calculated   Date Value Ref Range Status   11/04/2021 115 <=129 mg/dL Final       Patient currently receiving Lipitor (atorvastatin) continue statin on discharge to meet quality measures, unless contraindicated.    Recommendations: None at this time    Thank you for the consult.    Gela Al [facility] 11/5/2021 2:49 PM

## 2021-11-05 NOTE — H&P
Admission History & Physical  Shelly eBcerril, 1950, [id number]    [facility]  [unfilled]  [place], [phone]   Code Status:  No Order  FULL CODE   Extended Emergency Contact Information  Primary Emergency Contact: Quintin Lopez  Mobile Phone: [phone]  Relation: Daughter  Secondary Emergency Contact: Kelly Banegas  Mobile Phone: [phone]  Relation: Grandchild     Assessment and Plan:   71-year-old female with thoracic and abdominal aortic aneurysm, HTN, AVR presents with slurred speech concerning for CVA.    Active Problems:    Mood disorder (H)    Benign essential hypertension    Ascending aortic aneurysm (H)    Hx of repair of dissecting aneurysm of ascending thoracic aorta    Stroke-like symptoms    Dysphasia    Middle cerebral artery stenosis, bilateral    Normocytic anemia    Present on Admission:    Stroke-like symptoms      Dysphasia  -CTA head and neck with no definitive acute infarct but with age indeterminate lacunar infarctions in both the right and left sides of the brain, as well as chronic microvascular changes. Emergency physician did discuss with neurology and thrombolytics was not recommended at that time.  Patient slurred speech has persisted.  -Neuro telemetry  -ASA 81 mg p.o. daily  -Increase atorvastatin to 40 mg p.o. daily  -Check lipid panel  -Permissive hypertension  -Neurochecks  -TTE  -MRI brain  -PT/OT/SLP  -Neurology consulted, appreciate recommendations    Cerebral artery stenosis  -Patient with short segment of severe stenosis of the left cavernous and supraclinoid carotid arteries as well as moderate stenosis of the left M2 anterior division of the cerebral artery, moderate/severe stenosis of the right P2 posterior cerebral artery    AAA  -Patient with know descending thoracic aortic aneurysm as well as abdominal aortic aneurysm (with dissection).  Patient did have aortic root reconstruction and abdominal aneurysm repair in 4/2018. Patient then had thoracic aortic  aneurysm diagnosed in 2019.    Aortic valve replacement  -Patient should be on warfarin but has not taken for the past 1 month because she ran out of her prescription  -Will bridge with lovenox  -Warfarin per pharmacy, goal INR     HTN  -Hold home lisinopril  -Continue metoprolol at half home dose for 1 day per stroke protocol recommendations.  -Hydralazine as needed  -Labetalol as needed    Mood disorder  -Mirtazapine 15 mg p.o. q. bedtime    Normocytic anemia, priorGI bleed  -Patient with recent upper GI bleed in the setting of H. pylori infection. -Hemoglobin currently 8.5 with an MCV of 83.  -Continue home iron supplement    Electrolytes: Within normal limits  Fluids: P.o.  Diet: N.p.o. until SLP evaluation  VTE prophylaxis: Lovenox bridge and warfarin        COVID19 symptom check 11/4/2021  Fevers/Chills/myalgias: NEGATIVE TO ALL  Sick contacts/COVID19 exposure: NEGATIVE TO ALL  Cough/trouble breathing/SOB/sore throat: NEGATIVE TO ALL  Diarrhea: NEGATIVE    Medical reconciliation still pending at time of admission      Chief Complaint:  Slurred speech     HPI:    Shelly Becerril is a 71 year old old female with AVR, thoracic and abdominal aortic aneurysms, HTN presents with slurred speech.  Per patient and her family she had an episode of difficult speech in the morning on 10/4 which initially resolved and then returned at around 7 PM.  Patient still feels like she is not speaking very clearly, feels like she drools at times.  Denies any pain at all in her body including chest pain, abdominal pain, headache, pain in the extremities.  Denies any weakness or tingling/numbness sensations.  Per family members and patient she has not taken her warfarin in over a month as she ran out of her prescription and had no refills and has not followed up with physician about this.    History is provided by patient and family at bedside, [facility] interpretor used    Medical History  Present on Admission:    Stroke-like symptoms      Past Medical History:   Diagnosis Date     Anticoagulated on Coumadin      Aortic aneurysm (H)      Hypertension 12/19/2019     Patient Active Problem List   Diagnosis     Depression     Hypertension     Hx of medication noncompliance     Urinary incontinence     Ascending aortic aneurysm (H)     UGIB (upper gastrointestinal bleed)     Anticoagulated on Coumadin     H/O aortic valve replacement     Hx of repair of dissecting aneurysm of ascending thoracic aorta     Anemia due to blood loss, acute     Duodenal ulcer due to Helicobacter pylori     Iron deficiency anemia, unspecified     Stroke-like symptoms     Surgical History  She  has a past surgical history that includes Cardiac surgery (04/2018) and Pr Esophagogastroduodenoscopy Transoral Diagnostic (N/A, 4/6/2021).     Past Surgical History:   Procedure Laterality Date     CARDIAC SURGERY  04/2018    aortic valve replacement, aortic root reconstruction     ME ESOPHAGOGASTRODUODENOSCOPY TRANSORAL DIAGNOSTIC N/A 4/6/2021    Procedure: ESOPHAGOGASTRODUODENOSCOPY (EGD) WITH BIOPSY.;  Surgeon: Joey Garcia MD;  Location: [facility];  Service: Gastroenterology    Social History  Reviewed, and she  reports that she has never smoked. She has never used smokeless tobacco. She reports that she does not drink alcohol.  Social History     Tobacco Use     Smoking status: Never Smoker     Smokeless tobacco: Never Used   Substance Use Topics     Alcohol use: Never      Allergies  No Known Allergies Family History  Reviewed, and family history is not on file.  Noncontributory family history Psychosocial Needs  Social History     Social History Narrative     Not on file     Additional psychosocial needs reviewed per nursing assessment.       Prior to Admission Medications   (Not in a hospital admission)    Prior to Admission Medications   Prescriptions Last Dose Informant Patient Reported? Taking?   ARTHRITIS PAIN RELIEF, ACETAM, 650 mg CR tablet   Yes No   Sig:  [ARTHRITIS PAIN RELIEF, ACETAM, 650 MG CR TABLET] Take 650 mg by mouth every 8 (eight) hours as needed for pain (knee pain).    MAGNESIUM HYDROXIDE 400 mg/5 mL suspension   Yes No   Sig: [MAGNESIUM HYDROXIDE 400 MG/5 ML SUSPENSION] Take 15 mL by mouth at bedtime as needed (constipation/heartburn).    SENEXON-S 8.6-50 mg tablet   Yes No   Sig: [SENEXON-S 8.6-50 MG TABLET] Take 1 tablet by mouth at bedtime.    atorvastatin (LIPITOR) 20 MG tablet   Yes No   Sig: [ATORVASTATIN (LIPITOR) 20 MG TABLET] Take 20 mg by mouth daily.    ferrous sulfate 140 mg (45 mg iron) TbER   Yes No   Sig: [FERROUS SULFATE 140 MG (45 MG IRON) TBER] Take 1 tablet by mouth daily.   glucosamine-chondroitin 500-400 mg cap   Yes No   Sig: [GLUCOSAMINE-CHONDROITIN 500-400 MG CAP] Take 1 capsule by mouth 2 (two) times a day.   lisinopriL (PRINIVIL,ZESTRIL) 40 MG tablet   Yes No   Sig: [LISINOPRIL (PRINIVIL,ZESTRIL) 40 MG TABLET] Take 40 mg by mouth daily.   metoprolol succinate (TOPROL-XL) 50 MG 24 hr tablet   No No   Sig: [METOPROLOL SUCCINATE (TOPROL-XL) 50 MG 24 HR TABLET] Take 1 tablet (50 mg total) by mouth daily.   mirtazapine (REMERON) 15 MG tablet   Yes No   Sig: [MIRTAZAPINE (REMERON) 15 MG TABLET] Take 15 mg by mouth at bedtime.    warfarin ANTICOAGULANT (COUMADIN/JANTOVEN) 2 MG tablet   No No   Sig: [WARFARIN ANTICOAGULANT (COUMADIN/JANTOVEN) 2 MG TABLET] Take 1 tablet (2 mg total) by mouth daily. Adjust dose based on INR results as directed.      Facility-Administered Medications: None             Review of Systems: History obtained from the patient  General ROS: negative  for  - chills, fatigue, fever  ENT ROS: negative for - headaches, hearing change, nasal congestion, nasal discharge  Hematological and Lymphatic ROS: negative for - bleeding problems, blood clots, bruising  Respiratory ROS: negative for - cough, pleuritic pain, shortness of breath  Cardiovascular ROS: negative for - chest pain, dyspnea on exertion,  "edema  Gastrointestinal ROS: negative for - abdominal pain, constipation, diarrhea, and nausea/vomiting  Genito-Urinary ROS: negative for - change in urinary stream, dysuria, hematuria  Musculoskeletal ROS: negative for - gait disturbance, muscle pain  Neurological ROS: negative for - behavioral changes, confusion, dizziness, numbness/tingling, positive for difficulty speaking  Dermatological ROS: negative for pruritus, rash    BP (!) 168/82   Pulse 95   Temp 98.1  F (36.7  C) (Oral)   Resp 18   Ht 1.6 m (5' 3\")   Wt 59 kg (130 lb)   SpO2 97%   BMI 23.03 kg/m      Physical Examination:   General appearance - alert, well appearing, and in no distress and oriented to person, place, and time  Mental status - alert, oriented to person, place, and time, normal mood, behavior, speech is somewhat slurred and slow and difficult to understand, dress normal, motor activity normal, and thought processes intact  HEENT - sclera anicteric, left eye normal, right eye normal, nares normal and patent, no erythema, very slight downward droop to the right side of the mouth.  Patient keeps her eyes mainly close but when asked to open them wide they do both open equally  Lymphatics - no palpable lymphadenopathy, no hepatosplenomegaly  Respiratory - clear to auscultation, no wheezes, rales or rhonchi, symmetric air entry, no tachypnea, retractions or cyanosis  Cardiac - normal rate, regular rhythm, normal S1, S2, loud clicking systolic murmur, no rubs or gallops, no JVD  Abdomen - soft, nontender, nondistended, no masses or organomegaly no rebound tenderness noted bowel sounds normal, no bladder distension noted  Neurological - alert, oriented, speech is slow and difficult to understand, no focal findings or movement disorder noted, 5+ upper extremity and  strength, 5+ lower extremity strength.  Musculoskeletal - no joint tenderness, deformity or swelling, full range of motion without pain  Extremities - peripheral pulses " normal, no pedal edema, no clubbing or cyanosis  Skin - normal coloration and turgor, no rashes, no suspicious skin lesions noted    Results:  Recent Results (from the past 24 hour(s))   Glucose by meter    Collection Time: 11/04/21  7:47 PM   Result Value Ref Range    GLUCOSE BY METER POCT 137 (H) 70 - 99 mg/dL   Creatinine POCT    Collection Time: 11/04/21  8:02 PM   Result Value Ref Range    Creatinine POCT 1.1 0.6 - 1.1 mg/dL    GFR, ESTIMATED POCT 51 (L) >60 mL/min/1.73m2   Basic metabolic panel    Collection Time: 11/04/21  8:06 PM   Result Value Ref Range    Sodium 143 136 - 145 mmol/L    Potassium 3.7 3.5 - 5.0 mmol/L    Chloride 111 (H) 98 - 107 mmol/L    Carbon Dioxide (CO2) 25 22 - 31 mmol/L    Anion Gap 7 5 - 18 mmol/L    Urea Nitrogen 20 8 - 22 mg/dL    Creatinine 0.98 0.60 - 1.10 mg/dL    Calcium 9.0 8.5 - 10.5 mg/dL    Glucose 115 70 - 125 mg/dL    GFR Estimate 59 (L) >60 mL/min/1.73m2   INR    Collection Time: 11/04/21  8:06 PM   Result Value Ref Range    INR 1.00 0.90 - 1.15   Partial thromboplastin time    Collection Time: 11/04/21  8:06 PM   Result Value Ref Range    aPTT 30 22 - 38 Seconds   Troponin I    Collection Time: 11/04/21  8:06 PM   Result Value Ref Range    Troponin I <0.01 0.00 - 0.29 ng/mL   CBC with platelets and differential    Collection Time: 11/04/21  8:06 PM   Result Value Ref Range    WBC Count 7.4 4.0 - 11.0 10e3/uL    RBC Count 3.54 (L) 3.80 - 5.20 10e6/uL    Hemoglobin 8.5 (L) 11.7 - 15.7 g/dL    Hematocrit 29.3 (L) 35.0 - 47.0 %    MCV 83 78 - 100 fL    MCH 24.0 (L) 26.5 - 33.0 pg    MCHC 29.0 (L) 31.5 - 36.5 g/dL    RDW 17.0 (H) 10.0 - 15.0 %    Platelet Count 314 150 - 450 10e3/uL    % Neutrophils 62 %    % Lymphocytes 30 %    % Monocytes 6 %    % Eosinophils 1 %    % Basophils 0 %    % Immature Granulocytes 1 %    NRBCs per 100 WBC 0 <1 /100    Absolute Neutrophils 4.7 1.6 - 8.3 10e3/uL    Absolute Lymphocytes 2.2 0.8 - 5.3 10e3/uL    Absolute Monocytes 0.5 0.0 - 1.3  10e3/uL    Absolute Eosinophils 0.1 0.0 - 0.7 10e3/uL    Absolute Basophils 0.0 0.0 - 0.2 10e3/uL    Absolute Immature Granulocytes 0.0 <=0.0 10e3/uL    Absolute NRBCs 0.0 10e3/uL   Extra Red Top Tube    Collection Time: 11/04/21  8:06 PM   Result Value Ref Range    Hold Specimen JIC        IMPRESSION:   HEAD CT:  1.  No definite acute intracranial hemorrhage or acute large territory infarction. Age-indeterminate lacunar type infarctions identified involving the right caudate nucleus, right putamen, right subinsular region, right jay and left cerebellum. Patchy   hypodensity is identified involving the right corona radiata/centrum semiovale which may reflect chronic small vessel ischemic change or an age-indeterminate region of ischemic injury.  2.  Chronic senescent and presumed microvascular ischemic changes, as above.     HEAD CTA:   1.  No definite acute large territory infarction.  2.  Severe short segment stenosis identified involving the left cavernous and supraclinoid internal carotid arteries, as above.  3.  Moderate short segment stenosis involving the proximal left M2 anterior division middle cerebral artery.  4.  Mild to moderate stenosis involving the left intradural vertebral artery and basilar artery.  5.  Moderate to severe short segment stenosis involving the distal right P2 posterior cerebral artery.  6.  Additional intracranial stenoses, as above.  7.  Short segment fusiform aneurysm identified involving the left proximal A2 anterior cerebral artery measuring up to 3 mm.  8.  Poststenotic dilatation versus fusiform aneurysm involving the proximal left anterior temporal M2 middle cerebral artery.     NECK CTA:  1.  Scattered atheromatous disease without significant stenosis or dissection.  2.  Incompletely imaged and presumed descending thoracic aortic aneurysm.  3.  Heterogeneous multinodular thyroid. Recommend follow-up thyroid ultrasound for further assessment.     Noncontrast head CT findings  discussed with Dr. Ellis at 8:16 PM on 11/04/2021.    Lab Results personally reviewed 11/4  Imaging Results personally reviewed 11/4  Discussed with patient and family at bedside  Reviewed old records   Discharge Planning discussed with patient and family at bedside  Discussed care with patient and family at bedside for 55 minutes with greater than 50% of time spent in counseling and coordination of care.        This note was created using dragon dictation, any spelling and grammatical errors are unintentional.

## 2021-11-05 NOTE — ED NOTES
[facility] ED Handoff Report    ED Chief Complaint: Slurred speach    ED Diagnosis:  (R29.90) Stroke-like symptoms  Comment:   Plan:        PMH:    Past Medical History:   Diagnosis Date     Anticoagulated on Coumadin      Aortic aneurysm (H)      Hypertension 12/19/2019        Code Status:  Full Code     Falls Risk: Yes Band: Applied    Current Living Situation/Residence: lives with their son or daughter     Elimination Status: Continent: Yes     Activity Level: SBA    Patients Preferred Language:  Other: Hmong     Needed: Yes    Vital Signs:  BP (!) 157/83   Pulse 88   Temp 98.1  F (36.7  C) (Oral)   Resp 17   Ht 1.524 m (5')   Wt 59 kg (130 lb)   SpO2 96%   BMI 25.39 kg/m       Cardiac Rhythm: NSR, occasional ST    Pain Score: 0/10    Is the Patient Confused:  No    Last Food or Drink: 11/05/21 at 8am    Focused Assessment: Shelly Becerril female with a relevant past history of HTN, aortic aneurysm, aortic valve replacement, and anticoagulation on coumadin, who presents to this ED via walk in for evaluation of slurred speech.     Per patient's family member, patient had onset of slurred speech this morning around 8:00 AM.     Cardiac: WDL  Respiratory: CLR, Negative for cough and shortness of breath.    Gastrointestinal: Negative for abdominal pain, nausea, vomiting  Genitourinary: WDL  Musculoskeletal: Denies pain  Skin: Negative for  rash  Neurological: Positive for slurred speech. Poor swallowing, Negative for dizziness, headache  Psychiatric/Behavioral: Negative for confusion.        Tests Performed: Done: Labs and Imaging    Treatments Provided:      Family Dynamics/Concerns: no    Family Updated On Visitor Policy: Yes    Plan of Care Communicated to Family: Yes    Who Was Updated about Plan of Care: Daughter    Belongings Checklist Done and Signed by Patient: Yes    Covid: asymptomatic , negative    Additional Information:     RN: Elder Montero   11/5/2021 11:53 AM

## 2021-11-05 NOTE — PROGRESS NOTES
NEUROLOGY INPATIENT PROGRESS NOTE       [facility] [place]  [address] [place] [address]  Tel: [phone]  Fax: [phone]  [email or web address]     ASSESSMENT & PLAN   Hospital Day#: 1  Visit diagnosis:  Lacunar infarction    Right internal capsule lacunar infarction  71-year-old female with history of HTN, atrial fibrillation, AVR admitted with speech difficulty  CT of the head and CTA shows small vessel ischemic disease.  Moderate to severe left M2, left vertebral and basilar artery, right P2 stenosis.  No significant stenosis noted in bilateral internal carotid  Incidental proximal A2 aneurysm measuring 3 mm noted  MRI brain confirms a right internal capsule lacunar infarct with multifocal chronic microhemorrhages likely due to chronic hypertension bilaterally cerebral amyloid angiopathy could have similar presentation  Echocardiogram shows normal ejection fraction with no significant regional wall motion abnormality.  Mild mitral regurgitation  Patient on aspirin and Coumadin in addition to Lipitor.  LDL is 115, in view of significant intracranial atherosclerotic disease goal is less than 70  Physical, occupational, speech, pharmacy consult  Stroke education provided    Neurology Discharge Planning :   FLORY Pena MD  [facility]  ([facility], [facility]     PROBLEM LIST      Patient Active Problem List   Diagnosis Code     Mood disorder (H) F39     Benign essential hypertension I10     Hx of medication noncompliance Z91.14     Urinary incontinence R32     Ascending aortic aneurysm (H) I71.2     UGIB (upper gastrointestinal bleed) K92.2     Anticoagulated on Coumadin Z79.01     H/O aortic valve replacement Z95.2     Hx of repair of dissecting aneurysm of ascending thoracic aorta Z98.890, Z86.79     Anemia due to blood loss, acute D62     Duodenal ulcer due to Helicobacter pylori K26.9, B96.81     Iron  deficiency anemia, unspecified D50.9     Right-sided lacunar infarction (H) I63.81     Dysphasia R47.02     Middle cerebral artery stenosis, bilateral I66.03     Normocytic anemia D64.9       Past Medical History:   Patient  has a past medical history of Anticoagulated on Coumadin, Aortic aneurysm (H), and Hypertension (12/19/2019).     SUBJECTIVE     Patient drowsy but wakes up when name called.  She has dysarthric speech and facial droop.  Does not follow commands     OBJECTIVE     Vital signs in last 24 hours  Temp:  [97.1  F (36.2  C)-98.4  F (36.9  C)] 97.9  F (36.6  C)  Pulse:  [] 80  Resp:  [16-26] 18  BP: (130-196)/() 156/82  SpO2:  [91 %-98 %] 95 %    Weight:   Wt Readings from Last 1 Encounters:   11/05/21 54.3 kg (119 lb 9.6 oz)         I/O last 24 hours    Intake/Output Summary (Last 24 hours) at 11/5/2021 1830  Last data filed at 11/5/2021 0528  Gross per 24 hour   Intake --   Output 900 ml   Net -900 ml       Review of Systems   Pertinent items are noted in HPI.    General Physical Exam: Patient is alert and oriented x 1. Vital signs were reviewed and are documented in EMR. Neck was supple, no Carotid bruit, thyromegaly, JVD or lymphadenopathy noted.  Neurological Exam:  Patient is alert and oriented wakes up when name called and able to follow some commands.  Speech appears dysarthric according to family member no aphasia.  Cranial nerves are intact left pronator drift.  Reflexes 1+ with equivocal toe on the left downgoing toe on the right gait testing deferred     DIAGNOSTIC STUDIES     Pertinent Radiology   Following imaging studies were reviewed:    CT  HEAD CT:   1.  No definite acute intracranial hemorrhage or acute large territory infarction. Age-indeterminate lacunar type infarctions identified involving the right caudate nucleus, right putamen, right subinsular region, right jay and left cerebellum. Patchy   hypodensity is identified involving the right corona radiata/centrum  semiovale which may reflect chronic small vessel ischemic change or an age-indeterminate region of ischemic injury.   2.  Chronic senescent and presumed microvascular ischemic changes, as above.     HEAD CTA:   1.  No definite acute large territory infarction.   2.  Moderate to severe short segment stenosis identified involving the left cavernous and supraclinoid internal carotid arteries, as above.   3.  Moderate short segment stenosis involving the proximal left M2 anterior temporal division middle cerebral artery.   4.  Mild to moderate stenosis involving the left intradural vertebral artery and basilar artery.   5.  Moderate to severe short segment stenosis involving the distal right P2 posterior cerebral artery.   6.  Additional intracranial stenoses, as above.   7.  Short segment fusiform aneurysm identified involving the left proximal A2 anterior cerebral artery measuring up to 3 mm.   8.  Poststenotic dilatation versus fusiform aneurysm involving the proximal left anterior temporal M2 middle cerebral artery measuring up to 4 mm.     NECK CTA:   1.  Scattered atheromatous disease without significant stenosis or dissection.   2.  Incompletely imaged and presumed descending thoracic aortic aneurysm. Consider CTA chest for further assessment.   3.  Heterogeneous multinodular thyroid. Recommend follow-up thyroid ultrasound for further assessment.     MRI  1.  Small acute lacunar infarct in the posterior limb of the right internal capsule.   2.  Chronic ischemic and age-related changes as described.   3.  Multifocal chronic microhemorrhages. Appearance is nonspecific and differential diagnosis includes chronic hypertensive microhemorrhages or sequela of prior trauma, however, early cerebral amyloid angiopathy could have a similar appearance.     Echocardiogram  Echo result w/o MOPS: Interpretation Summary There is mild concentric left ventricular hypertrophy.The visual ejection fraction is 55-60%.No regional wall  motion abnormalities noted.There is mild (1+) mitral regurgitation.There is mild trileaflet aortic sclerosis.    Pertinent Labs   Lab Results: Personally Reviewed  Recent Results (from the past 24 hour(s))   Echocardiogram Complete - For age > 60 yrs    Collection Time: 11/05/21  3:42 PM   Result Value Ref Range    LVEF  55-60%    Glucose by meter    Collection Time: 11/05/21  6:00 PM   Result Value Ref Range    GLUCOSE BY METER POCT 91 70 - 99 mg/dL         HOSPITAL MEDICATIONS       aspirin  81 mg Oral Daily    Or     aspirin  81 mg Oral or NG Tube Daily     atorvastatin  40 mg Oral QPM     enoxaparin ANTICOAGULANT  1.5 mg/kg Subcutaneous Q24H     metoprolol succinate ER  25 mg Oral Daily     mirtazapine  15 mg Oral At Bedtime     sodium chloride (PF)  3 mL Intracatheter Q8H        Total time spent for face to face visit, reviewing labs/imaging studies, counseling and coordination of care was: 30 Minutes More than 50% of this time was spent on counseling and coordination of care.      This note was dictated using voice recognition software.  Any grammatical or context distortions are unintentional and inherent to the software.

## 2021-11-05 NOTE — CONSULTS
Care Management Initial Consult    General Information  Assessment completed with: Children, Daughter, Quintin  Type of CM/SW Visit: Initial Assessment    Primary Care Provider verified and updated as needed: Yes   Readmission within the last 30 days:        Reason for Consult: discharge planning  Advance Care Planning:       General Information Comments: Family goal is a home dischare    Communication Assessment  Patient's communication style: spoken language (non-English)    Hearing Difficulty or Deaf: no   Wear Glasses or Blind: no    Cognitive  Cognitive/Neuro/Behavioral: .WDL except, speech  Level of Consciousness: alert     Orientation: oriented x 4  Mood/Behavior: calm, cooperative  Best Language: 0 - No aphasia  Speech: slurred (per family report)    Living Environment:   People in home: child(cristopher), adult  Quintin Joseph, daughter  Current living Arrangements: house      Able to return to prior arrangements: yes  Living Arrangement Comments: Daughter would like to take patient to outpatient speech therapy.    Family/Social Support:  Care provided by: child(cristopher)  Provides care for: no one, unable/limited ability to care for self     Children, PCA          Description of Support System: Supportive, Involved    Support Assessment: Adequate family and caregiver support    Current Resources:   Patient receiving home care services: No     Community Resources: PCA  Equipment currently used at home:    Supplies currently used at home:      Employment/Financial:  Employment Status: retired        Financial Concerns: No concerns identified   Referral to Financial Counselor: No       Lifestyle & Psychosocial Needs:  Social Determinants of Health     Tobacco Use: Low Risk      Smoking Tobacco Use: Never Smoker     Smokeless Tobacco Use: Never Used   Alcohol Use:      Frequency of Alcohol Consumption:      Average Number of Drinks:      Frequency of Binge Drinking:    Financial Resource Strain:      Difficulty of Paying Living  Expenses:    Food Insecurity:      Worried About Running Out of Food in the Last Year:      Ran Out of Food in the Last Year:    Transportation Needs:      Lack of Transportation (Medical):      Lack of Transportation (Non-Medical):    Physical Activity:      Days of Exercise per Week:      Minutes of Exercise per Session:    Stress:      Feeling of Stress :    Social Connections:      Frequency of Communication with Friends and Family:      Frequency of Social Gatherings with Friends and Family:      Attends [employer] Services:      Active Member of Clubs or Organizations:      Attends Club or Organization Meetings:      Marital Status:    Intimate Partner Violence:      Fear of Current or Ex-Partner:      Emotionally Abused:      Physically Abused:      Sexually Abused:    Depression:      PHQ-2 Score:    Housing Stability:      Unable to Pay for Housing in the Last Year:      Number of Places Lived in the Last Year:      Unstable Housing in the Last Year:        Functional Status:  Prior to admission patient needed assistance:   Dependent ADLs:: Bathing, Dressing, Grooming  Dependent IADLs:: Cleaning, Cooking, Laundry, Shopping, Meal Preparation, Medication Management, Money Management, Transportation       Mental Health Status:  Mental Health Status: No Current Concerns       Chemical Dependency Status:  Chemical Dependency Status: No Current Concerns             Values/Beliefs:  Spiritual, Cultural Beliefs, Episcopal Practices, Values that affect care:                 Additional Information:  Writer met with patient's daughter Quintin, with support of [employer]  via [employer], to review role of care management services, discuss goals of care and assess need for any possible services at discharge. Patient is having difficulty with speech. Per Quintin, patient lives with family who provide assist with all activities of daily living as needed at baseline. Daughter Quintin states that she is patient's PCA 8.5 hours per  day. Discussed therapy recommendation of acute rehab but patient indicated to her daughter that she did not want to go anywhere without family. Quintin states that their strong preference is to take patient home at discharge. She is willing to take her to outpatient speech therapy daily.  Care management will follow along.     Susannah Becerra RN

## 2021-11-06 ENCOUNTER — APPOINTMENT (OUTPATIENT)
Dept: OCCUPATIONAL THERAPY | Facility: HOSPITAL | Age: 71
End: 2021-11-06
Attending: FAMILY MEDICINE
Payer: COMMERCIAL

## 2021-11-06 ENCOUNTER — APPOINTMENT (OUTPATIENT)
Dept: SPEECH THERAPY | Facility: HOSPITAL | Age: 71
End: 2021-11-06
Payer: COMMERCIAL

## 2021-11-06 ENCOUNTER — APPOINTMENT (OUTPATIENT)
Dept: RADIOLOGY | Facility: HOSPITAL | Age: 71
End: 2021-11-06
Attending: INTERNAL MEDICINE
Payer: COMMERCIAL

## 2021-11-06 LAB
ALBUMIN SERPL-MCNC: 3.3 G/DL (ref 3.5–5)
ALP SERPL-CCNC: 138 U/L (ref 45–120)
ALT SERPL W P-5'-P-CCNC: <9 U/L (ref 0–45)
ANION GAP SERPL CALCULATED.3IONS-SCNC: 5 MMOL/L (ref 5–18)
AST SERPL W P-5'-P-CCNC: 18 U/L (ref 0–40)
BILIRUB SERPL-MCNC: 0.8 MG/DL (ref 0–1)
BUN SERPL-MCNC: 13 MG/DL (ref 8–22)
CALCIUM SERPL-MCNC: 9.1 MG/DL (ref 8.5–10.5)
CHLORIDE BLD-SCNC: 116 MMOL/L (ref 98–107)
CO2 SERPL-SCNC: 23 MMOL/L (ref 22–31)
CREAT SERPL-MCNC: 0.83 MG/DL (ref 0.6–1.1)
ERYTHROCYTE [DISTWIDTH] IN BLOOD BY AUTOMATED COUNT: 17.2 % (ref 10–15)
GFR SERPL CREATININE-BSD FRML MDRD: 72 ML/MIN/1.73M2
GLUCOSE BLD-MCNC: 82 MG/DL (ref 70–125)
GLUCOSE BLDC GLUCOMTR-MCNC: 101 MG/DL (ref 70–99)
GLUCOSE BLDC GLUCOMTR-MCNC: 78 MG/DL (ref 70–99)
GLUCOSE BLDC GLUCOMTR-MCNC: 81 MG/DL (ref 70–99)
HCT VFR BLD AUTO: 29.3 % (ref 35–47)
HGB BLD-MCNC: 8.4 G/DL (ref 11.7–15.7)
HOLD SPECIMEN: NORMAL
INR PPP: 1.11 (ref 0.9–1.15)
MCH RBC QN AUTO: 23.5 PG (ref 26.5–33)
MCHC RBC AUTO-ENTMCNC: 28.7 G/DL (ref 31.5–36.5)
MCV RBC AUTO: 82 FL (ref 78–100)
PLATELET # BLD AUTO: 289 10E3/UL (ref 150–450)
POTASSIUM BLD-SCNC: 3.8 MMOL/L (ref 3.5–5)
PROT SERPL-MCNC: 7.1 G/DL (ref 6–8)
RBC # BLD AUTO: 3.58 10E6/UL (ref 3.8–5.2)
SODIUM SERPL-SCNC: 144 MMOL/L (ref 136–145)
WBC # BLD AUTO: 5.2 10E3/UL (ref 4–11)

## 2021-11-06 PROCEDURE — 36415 COLL VENOUS BLD VENIPUNCTURE: CPT | Performed by: INTERNAL MEDICINE

## 2021-11-06 PROCEDURE — 250N000011 HC RX IP 250 OP 636: Performed by: FAMILY MEDICINE

## 2021-11-06 PROCEDURE — 82040 ASSAY OF SERUM ALBUMIN: CPT | Performed by: INTERNAL MEDICINE

## 2021-11-06 PROCEDURE — 250N000013 HC RX MED GY IP 250 OP 250 PS 637: Performed by: INTERNAL MEDICINE

## 2021-11-06 PROCEDURE — 120N000001 HC R&B MED SURG/OB

## 2021-11-06 PROCEDURE — 250N000011 HC RX IP 250 OP 636: Performed by: INTERNAL MEDICINE

## 2021-11-06 PROCEDURE — 92611 MOTION FLUOROSCOPY/SWALLOW: CPT | Mod: GN

## 2021-11-06 PROCEDURE — 97166 OT EVAL MOD COMPLEX 45 MIN: CPT | Mod: GO

## 2021-11-06 PROCEDURE — 99232 SBSQ HOSP IP/OBS MODERATE 35: CPT | Performed by: INTERNAL MEDICINE

## 2021-11-06 PROCEDURE — 250N000013 HC RX MED GY IP 250 OP 250 PS 637: Performed by: FAMILY MEDICINE

## 2021-11-06 PROCEDURE — 74230 X-RAY XM SWLNG FUNCJ C+: CPT

## 2021-11-06 PROCEDURE — 99232 SBSQ HOSP IP/OBS MODERATE 35: CPT | Performed by: PSYCHIATRY & NEUROLOGY

## 2021-11-06 PROCEDURE — 85027 COMPLETE CBC AUTOMATED: CPT | Performed by: INTERNAL MEDICINE

## 2021-11-06 PROCEDURE — 85610 PROTHROMBIN TIME: CPT | Performed by: FAMILY MEDICINE

## 2021-11-06 PROCEDURE — 258N000003 HC RX IP 258 OP 636: Performed by: INTERNAL MEDICINE

## 2021-11-06 PROCEDURE — 92526 ORAL FUNCTION THERAPY: CPT | Mod: GN

## 2021-11-06 RX ORDER — WARFARIN SODIUM 2.5 MG/1
2.5 TABLET ORAL
Status: COMPLETED | OUTPATIENT
Start: 2021-11-06 | End: 2021-11-06

## 2021-11-06 RX ORDER — BARIUM SULFATE 400 MG/ML
SUSPENSION ORAL ONCE
Status: COMPLETED | OUTPATIENT
Start: 2021-11-06 | End: 2021-11-06

## 2021-11-06 RX ORDER — DEXTROSE MONOHYDRATE, SODIUM CHLORIDE, AND POTASSIUM CHLORIDE 50; 1.49; 4.5 G/1000ML; G/1000ML; G/1000ML
INJECTION, SOLUTION INTRAVENOUS CONTINUOUS
Status: DISCONTINUED | OUTPATIENT
Start: 2021-11-06 | End: 2021-11-08

## 2021-11-06 RX ADMIN — BARIUM SULFATE 20 ML: 400 SUSPENSION ORAL at 14:19

## 2021-11-06 RX ADMIN — ASPIRIN 81 MG CHEWABLE TABLET 81 MG: 81 TABLET CHEWABLE at 08:13

## 2021-11-06 RX ADMIN — METOPROLOL SUCCINATE 25 MG: 25 TABLET, EXTENDED RELEASE ORAL at 08:14

## 2021-11-06 RX ADMIN — POTASSIUM CHLORIDE, DEXTROSE MONOHYDRATE AND SODIUM CHLORIDE: 150; 5; 450 INJECTION, SOLUTION INTRAVENOUS at 09:25

## 2021-11-06 RX ADMIN — BARIUM SULFATE 60 ML: 400 SUSPENSION ORAL at 14:20

## 2021-11-06 RX ADMIN — ENOXAPARIN SODIUM 90 MG: 100 INJECTION SUBCUTANEOUS at 07:40

## 2021-11-06 RX ADMIN — HYDRALAZINE HYDROCHLORIDE 10 MG: 20 INJECTION INTRAMUSCULAR; INTRAVENOUS at 19:23

## 2021-11-06 RX ADMIN — ATORVASTATIN CALCIUM 40 MG: 40 TABLET, FILM COATED ORAL at 20:39

## 2021-11-06 RX ADMIN — WARFARIN SODIUM 2.5 MG: 2.5 TABLET ORAL at 19:24

## 2021-11-06 RX ADMIN — MIRTAZAPINE 15 MG: 15 TABLET, FILM COATED ORAL at 22:46

## 2021-11-06 ASSESSMENT — ACTIVITIES OF DAILY LIVING (ADL)
ADLS_ACUITY_SCORE: 14
ADLS_ACUITY_SCORE: 16
ADLS_ACUITY_SCORE: 14
ADLS_ACUITY_SCORE: 16
ADLS_ACUITY_SCORE: 16
ADLS_ACUITY_SCORE: 18
ADLS_ACUITY_SCORE: 16
ADLS_ACUITY_SCORE: 16
ADLS_ACUITY_SCORE: 14
ADLS_ACUITY_SCORE: 16
ADLS_ACUITY_SCORE: 14
ADLS_ACUITY_SCORE: 18
ADLS_ACUITY_SCORE: 16
ADLS_ACUITY_SCORE: 14

## 2021-11-06 NOTE — PLAN OF CARE
Problem: Communication Impairment (Stroke, Ischemic/Transient Ischemic Attack)  Goal: Improved Communication Skills  Outcome: No Change   Speech slurred and hard for  to understand sometimes.       Problem: Eating/Swallowing Impairment (Stroke, Ischemic/Transient Ischemic Attack)  Goal: Oral Intake without Aspiration  Outcome: No Change   Patient has difficulty with swallowing and noted coughing on nectar thick liquids. SLP following and swallow eval done this shift. Patient now on pureed and honey thickened liquids.     NIH score of 3 for right facial droop, LOC questions and dysarthria .  NSR with prolonged QT on tele.

## 2021-11-06 NOTE — PLAN OF CARE
Problem: Adult Inpatient Plan of Care  Goal: Plan of Care Review  Outcome: No Change  Problem: Cognitive Impairment (Stroke, Ischemic/Transient Ischemic Attack)  Goal: Optimal Cognitive Function  Outcome: No Change  Problem: Communication Impairment (Stroke, Ischemic/Transient Ischemic Attack)  Goal: Improved Communication Skills  Outcome: No Change    Patient drowsy but easily woken up. Scored 5 on NIH for facial droop, slurred speech, drowsiness, and not answering LOC questions correctly.  was not able to understand some answers per report. Patient able to follow commands. VSS. On IVF. Continuing to monitor.    Teagan Smith RN

## 2021-11-06 NOTE — PROGRESS NOTES
11/06/21 1148   Quick Adds   Type of Visit Initial Occupational Therapy Evaluation       Present yes  (language line 2254)   Language Hmong   Living Environment   Living Environment Comments See PT note   Self-Care   Activity/Exercise/Self-Care Comment See PT note; Pt's speech unintelligible per    General Information   Onset of Illness/Injury or Date of Surgery 11/04/21   Referring Physician Daniella   Patient/Family Therapy Goal Statement (OT) none stated   Performance Patterns (Routines, Roles, Habits) PCA help from family for ADL per PT note. Hours and prior level of care unknown.    Existing Precautions/Restrictions fall   Cognitive Status Examination   Cognitive Status Comments Unable to assess d/t dysarthria.  unable to understand.    Visual Perception   Visual Impairment/Limitations   (Further assessment needed)   Sensory   Sensory Quick Adds No deficits were identified   Posture   Posture Comments Cues for upright posture with transfer.    Range of Motion Comprehensive   General Range of Motion no range of motion deficits identified   Strength Comprehensive (MMT)   Comment, General Manual Muscle Testing (MMT) Assessment generalized weakness B 4+/5. L elbow extension slightly weaker than R    Coordination   Gross Motor Coordination WFL    Coordination Comments Further assessment indicated   Bed Mobility   Bed Mobility supine-sit   Supine-Sit [place] (Bed Mobility) moderate assist (50% patient effort)   Assistive Device (Bed Mobility) bed rails   Comment (Bed Mobility) weakness   Transfers   Transfers bed-chair transfer   Transfer Comments Min A bed>chair close transfer using FWW.    Balance   Balance Assessment sitting balance: static;standing balance: static   Sitting Balance: Static mild impairment   Standing Balance: Static fair balance;poor balance   Clinical Impression   Criteria for Skilled Therapeutic Interventions Met (OT) yes;meets criteria;skilled  treatment is necessary   OT Problem List-Impairments impacting ADL problems related to;activity tolerance impaired;balance;communication;mobility;strength;postural control   Therapy Frequency (OT) Daily   Predicted Duration of Therapy 5 days   Risk & Benefits of therapy have been explained evaluation/treatment results reviewed;care plan/treatment goals reviewed;patient   OT Discharge Planning    OT Discharge Recommendation (DC Rec) Home with assist;home with home care occupational therapy;Transitional Care Facility   OT Rationale for DC Rec Pt requires min A with close transfers. If going home, will need 24 hour assist, may need w/c and/or commode for mobility and toileting.    Total Evaluation Time (Minutes)   Total Evaluation Time (Minutes) 15

## 2021-11-06 NOTE — PLAN OF CARE
Problem: Adult Inpatient Plan of Care  Goal: Readiness for Transition of Care  Outcome: No Change    used during assessments and patient teaching. Patient drowsy and lethargic at beginning of shift but was more alert as shift progressed. Alert & oriented except for time. Scored 4 on NIH. NSR on telemetry. Denied pain. Able to help reposition self. Purewick in place. Patient reminded to use call light to call for help. Call light within reach.

## 2021-11-06 NOTE — PROGRESS NOTES
Chart review. MD update; swallow evaluation, care management follow for recommendations. 11/05 PT recommendations home with assistance.    1400 Charge RN update. Video swallow, care manager follow for recommendations.

## 2021-11-06 NOTE — PROGRESS NOTES
[facility]    PROGRESS NOTE - Hospitalist Service    Assessment and Plan    71-year-old female with past medical history of thoracic and abdominal aortic aneurysm, HTN, AVR presents to the emergency room, with slurred speech concerning for CVA.,  She was admitted with     Acute CVA  - Patient had stopped taking her home medication including warfarin about a month ago.  - CT head and neck show no definite acute infarction but with age intermediate lacunar infarction in the right and left sides of the brain as well as chronic microvascular changes.  - Patient is outside TPA window  - MRI brain shows small acute lacunar infarction in the posterior limb of right internal capsule  - Neurology consult, appreciate input  - Continue neuro telemetry  - PT/OT and speech evaluation   - Unremarkable echo except for mild concentric left ventricular hypertrophy with EF of 55 to 60%     Slurred speech with aphasia  - Secondary to acute CVA  - Speech evaluation  - Patient failed speech study and should be n.p.o.  - Plan for video swallow today  - If persistent dysphagia will consider tube feeding     History of AAA  - Patient with know descending thoracic aortic aneurysm as well as abdominal aortic aneurysm (with dissection).    - Patient did have aortic root reconstruction and abdominal aneurysm repair in 4/2018.   - Patient then had thoracic aortic aneurysm diagnosed in 2019.     Aortic valve replacement  - Patient should be on warfarin but has not taken for the past 1 month because she ran out of her prescription  - Will bridge with lovenox  - Warfarin per pharmacy, goal INR      Accelerated hypertension  -Secondary to acute CVA plus patient stopped her medication a month ago  - Allow permissive blood pressure for CVA   - hold home lisinopril initially, restart if blood pressure is elevated.  - Continue metoprolol at half home dose for 1 day per stroke protocol recommendations.  - Add IV hydralazine as  needed  - Avoid hypotension    Hypoglycemia  - Secondary to n.p.o. status  - Change IV fluids to dextrose normal saline  - Continue to monitor blood sugar  - Low hemoglobin A1c at 4.7     Hyperlipidemia  - Lipid panel shows elevation of cholesterol and TG, low of HDL  - Patient has stopped her statin  - Started on Lipitor     Normocytic anemia  - History of priorGI bleed  - Patient with recent upper GI bleed in the setting of H. pylori infection.   - Hemoglobin stable  - Continue home iron supplement when able to take oral    Principal Problem:    Right-sided lacunar infarction (H)  Active Problems:    Mood disorder (H)    Benign essential hypertension    Ascending aortic aneurysm (H)    Hx of repair of dissecting aneurysm of ascending thoracic aorta    Dysphasia    Middle cerebral artery stenosis, bilateral    Normocytic anemia      VTE prophylaxis:  Enoxaparin (Lovenox) SQ  DIET: Orders Placed This Encounter      NPO for Medical/Clinical Reasons Except for: Meds, Ice Chips      Disposition/Barriers to discharge: Improve mental condition, n.p.o., post stroke protocol  Code Status: Full Code    Subjective:  Shelly is feeling slightly better today, still has some slurred speech.  No significant acute neurological changes overnight.    PHYSICAL EXAM  Vitals:    11/04/21 1900 11/05/21 0000 11/05/21 1337   Weight: 59 kg (130 lb) 59 kg (130 lb) 54.3 kg (119 lb 9.6 oz)     B/P:140/93 T:98.9 P:80 R:18     Intake/Output Summary (Last 24 hours) at 11/6/2021 1437  Last data filed at 11/6/2021 1300  Gross per 24 hour   Intake 877 ml   Output 200 ml   Net 677 ml      Body mass index is 23.36 kg/m .    Constitutional: awake, alert, cooperative, no apparent distress, and appears stated age  Eyes: Lids and lashes normal, pupils equal, round and reactive to light, extra ocular muscles intact, sclera clear, conjunctiva normal  ENT: Normocephalic, without obvious abnormality, atraumatic, sinuses nontender on palpation, external ears  without lesions, oral pharynx with moist mucous membranes, tonsils without erythema or exudates, gums normal and good dentition.  Respiratory: No increased work of breathing, good air exchange, clear to auscultation bilaterally, no crackles or wheezing  Cardiovascular: Normal apical impulse, regular rate and rhythm, normal S1 and S2, no S3 or S4, and no murmur noted  GI: No scars, normal bowel sounds, soft, non-distended, non-tender, no masses palpated, no hepatosplenomegally  Skin: no bruising or bleeding and normal skin color, texture, turgor  Musculoskeletal: There is no redness, warmth, or swelling of the joints.  Full range of motion noted.  no lower extremity pitting edema present  Neurologic: Awake, alert, oriented to name, place but not time.  Still has slurred speech.    Neuropsychiatric: Appropriate with examiner      PERTINENT LABS/IMAGING:  Recent Labs   Lab 11/06/21  0840 11/06/21  0759 11/06/21  0758 11/05/21  1800 11/04/21 2006 11/04/21 2002 11/04/21  1947   WBC  --  5.2  --   --  7.4  --   --    HGB  --  8.4*  --   --  8.5*  --   --    MCV  --  82  --   --  83  --   --    PLT  --  289  --   --  314  --   --    INR  --   --  1.11  --  1.00  --   --    NA  --   --  144  --  143  --   --    POTASSIUM  --   --  3.8  --  3.7  --   --    CHLORIDE  --   --  116*  --  111*  --   --    CO2  --   --  23  --  25  --   --    BUN  --   --  13  --  20  --   --    CR  --   --  0.83  --  0.98 1.1  --    ANIONGAP  --   --  5  --  7  --   --    FAVIOLA  --   --  9.1  --  9.0  --   --    GLC 78  --  82 91 115  --    < >   ALBUMIN  --   --  3.3*  --   --   --   --    PROTTOTAL  --   --  7.1  --   --   --   --    BILITOTAL  --   --  0.8  --   --   --   --    ALKPHOS  --   --  138*  --   --   --   --    ALT  --   --  <9  --   --   --   --    AST  --   --  18  --   --   --   --     < > = values in this interval not displayed.     Recent Results (from the past 24 hour(s))   Echocardiogram Complete - For age > 60 yrs   Result  Value    LVEF  55-60%    [facility]    [id number]  [id number]  [id number]  [id number]^ROBERT^RONY^MARYURI     [id number] [place] [address]     Name: JAYESH HOOVER  MRN: [id number]  : 1950  Study Date: 2021 02:50 PM  Age: 71 yrs  Gender: Female  Patient Location: [facility]  Reason For Study: Cerebrovascular Incident  History: Bioprosthetic AVR (2018); Ascending Aorta Disscection with graft  repair (2018)  Ordering Physician: RONY JONES  Performed By: MADISON     BSA: 1.5 m2  Height: 60 in  Weight: 119 lb  HR: 89  BP: 140/75 mmHg  ______________________________________________________________________________  Procedure  Complete Portable Echo Adult.  ______________________________________________________________________________  Interpretation Summary     There is mild concentric left ventricular hypertrophy.  The visual ejection fraction is 55-60%.  No regional wall motion abnormalities noted.  There is mild (1+) mitral regurgitation.  There is mild trileaflet aortic sclerosis.  ______________________________________________________________________________  Left Ventricle  The left ventricle is normal in size. There is mild concentric left  ventricular hypertrophy. The visual ejection fraction is 55-60%. No regional  wall motion abnormalities noted.     Right Ventricle  The right ventricle is normal size. TAPSE is abnormal, which is consistent  with abnormal right ventricular systolic function. Mildly decreased right  ventricular systolic function.     Atria  Normal left atrial size. Right atrial size is normal. Intact atrial septum.     Mitral Valve  The mitral valve leaflets appear thickened, but open well. There is mild (1+)  mitral regurgitation.     Tricuspid Valve  Tricuspid valve leaflets appear normal. There is no evidence of tricuspid  stenosis or clinically significant tricuspid regurgitation.     Aortic Valve  There is mild trileaflet aortic sclerosis. No aortic regurgitation is  present.  No aortic stenosis is present.     Pulmonic Valve  Normal pulmonic valve. There is trace pulmonic valvular regurgitation.     Vessels  The aorta root is normal. IVC diameter <2.1 cm collapsing >50% with sniff  suggests a normal RA pressure of 3 mmHg.     Pericardium  There is no pericardial effusion.     ______________________________________________________________________________  MMode/2D Measurements & Calculations  RVDd: 2.8 cm  IVSd: 1.3 cm  LVIDd: 4.1 cm  LVIDs: 2.9 cm  LVPWd: 1.2 cm  FS: 30.3 %     LV mass(C)d: 180.3 grams  LV mass(C)dI: 120.4 grams/m2  Ao root diam: 3.3 cm  LA dimension: 3.4 cm  asc Aorta Diam: 3.9 cm  LA/Ao: 1.0  LVOT diam: 1.9 cm  LVOT area: 2.8 cm2  LA Volume Indexed (AL/bp): 29.6 ml/m2  RWT: 0.58     Time Measurements  Aortic HR: 82.0 BPM  MM HR: 80.0 BPM     Doppler Measurements & Calculations  MV E max ryan: 73.7 cm/sec  MV A max ryan: 100.4 cm/sec  MV E/A: 0.73  MV dec slope: 436.2 cm/sec2  MV dec time: 0.17 sec  Ao V2 max: 181.9 cm/sec  Ao max P.0 mmHg  Ao V2 mean: 126.5 cm/sec  Ao mean P.0 mmHg  Ao V2 VTI: 34.1 cm  YAMILET(I,D): 1.8 cm2  YAMILET(V,D): 1.8 cm2  LV V1 max P.3 mmHg  LV V1 max: 115.1 cm/sec  LV V1 VTI: 21.7 cm  CO(LVOT): 5.0 l/min  CI(LVOT): 3.3 l/min/m2  SV(LVOT): 60.8 ml  SI(LVOT): 40.6 ml/m2  PA acc time: 0.11 sec  PI end-d ryan: 78.7 cm/sec  AV Ryan Ratio (DI): 0.63  YAMILET Index (cm2/m2): 1.2  E/E' av.7  Lateral E/e': 13.4  Medial E/e': 12.1     ______________________________________________________________________________  Report approved by: Stephon Goins 2021 04:07 PM         XR Video Swallow with SLP or OT    Narrative    EXAM: XR VIDEO SWALLOW WITH SLP OR OT  LOCATION: [facility]  DATE/TIME: 2021 1:47 PM    INDICATION: Difficulty swallowing.  COMPARISON: None.    TECHNIQUE: Routine swallow study with speech pathology using multiple barium thicknesses.    FINDINGS:   FLUOROSCOPIC TIME: 1.5 minutes  NUMBER OF  IMAGES: 8    Swallow study with Speech Pathology using multiple barium thicknesses. Aspiration with thin liquid noted. Episode of penetration with nectar. No penetration or aspiration with honey or pudding. Consistent delayed AP movement of oral bolus.             Discussed with patient, family, neurology, nursing staff and discharge planner    Héctor Russo MD  [facility] Medicine Service  [phone]

## 2021-11-06 NOTE — PROGRESS NOTES
"   11/06/21 1430   General Information   Onset of Illness/Injury or Date of Surgery 11/04/21   Pertinent History of Current Problem Per MD note: \"71-year-old female with thoracic and abdominal aortic aneurysm, HTN, AVR presents with slurred speech concerning for CVA. Per patient and her family she had an episode of difficult speech in the morning on 10/4 which initially resolved and then returned at around 7 PM.  Patient still feels like she is not speaking very clearly, feels like she drools at times.  Denies any pain at all in her body including chest pain, abdominal pain, headache, pain in the extremities.  Denies any weakness or tingling/numbness sensations.  Per family members and patient she has not taken her warfarin in over a month as she ran out of her prescription and had no refills and has not followed up with physician about this.\" HCT: No definite acute intracranial hemorrhage or acute large territory infarction. Age-indeterminate lacunar type infarctions identified involving the right caudate nucleus, right putamen, right subinsular region, right jay and left cerebellum. Patchy hypodensity is identified involving the right corona radiata/centrum semiovale which may reflect chronic small vessel ischemic change or an age-indeterminate region of ischemic injury.\"      Language Hmong via language line   Type of Evaluation   Type of Evaluation Swallow Evaluation   Oral Motor   Oral Musculature anomalies present  (weak, decreased movement)   Dentition (Oral Motor)   Dentition (Oral Motor)   (own lower dentition, no upper dentition)   Facial Symmetry (Oral Motor)   Bilateral Facial Asymmetry minimal impairment;moderate impairment   Comment, Facial Symmetry (Oral Motor) Difficult to fully assess due to limited movement   Lip Function (Oral Motor)   Comment, Lip Function (Oral Motor) Decreased lip seal and overall decreased movement   Tongue Function (Oral Motor)   Tongue ROM (Oral Motor) " lateralization is impaired;protrusion is impaired   Cough/Swallow/Gag Reflex (Oral Motor)   Comment, Cough/Swallow/Gag Reflex (Oral Motor) Spontaneous cough intact, adequate strength   General Swallowing Observations   Current Diet/Method of Nutritional Intake (General Swallowing Observations, NIS) NPO   Comment, General Swallowing Observations See BSS   Swallowing Evaluation Videofluoroscopic swallow study (VFSS)   VFSS Evaluation   Radiologist Dr. Anguiano   Views Taken left lateral   VFSS Textures Trialed thin liquids;mildly thick liquids;moderately thick liquids/liquidized;pureed   VFSS Eval: Thin Liquid Texture Trial   Mode of Presentation, Thin Liquid cup   Preparatory Phase Poor bolus control   Oral Phase, Thin Liquid Poor AP movement;Premature pharyngeal entry   Pharyngeal Phase, Thin Liquid Delayed swallow reflex   Rosenbek's Penetration Aspiration Scale: Thin Liquid Trial Results 7 - contrast passes glottis, visible subglottic residue remains despite patient's response (aspiration)   Response to Aspiration unproductive reflexive involuntary cough/throat clear   Diagnostic Statement Ghulam aspiration with thin liquids; Poor bolus control   VFSS Eval: Mildly Thick Liquids   Mode of Presentation cup   Preparatory Phase WFL;Holding  (decreased bolus control; poor AP)   Oral Phase Premature pharyngeal entry   Pharyngeal Phase Delayed swallow reflex   Rosenbek's Penetration Aspiration Scale 3 - contrast remains above the vocal cords, visible residue remains (penetration)   Diagnostic Statement Penetration that does not clear   VFSS Eval: Moderately Thick Liquids   Mode of Presentation spoon   Preparatory Phase   (highly effortful AP transit; tips head back to assist)   Oral Phase Poor AP movement;WFL  (WFL with increased time and effort)   Pharyngeal Phase WFL;Delayed swallow reflex   Rosenbek's Penetration Aspiration Scale 1 - no aspiration, contrast does not enter airway   Diagnostic Statement No aspiration or  penetration; Effortful and delayed transit needing increased time- inefficient   VFSS Evaluation: Puree Solid Texture Trial   Mode of Presentation, Puree spoon   Preparatory Phase WFL  (Effortful and delayed AP transit; appearance of holding)   Oral Phase, Puree Poor AP movement;WFL   Pharyngeal Phase, Puree WFL;Delayed swallow reflex   Rosenbek's Penetration Aspiration Scale: Puree Food Trial Results 1 - no aspiration, contrast does not enter airway   Diagnostic Statement WFL with effort and time for AP transit   Swallowing Recommendations   Diet Consistency Recommendations moderately thick liquids/liquidized (level 3);pureed (level 4)   Supervision Level for Intake close supervision needed   Mode of Delivery Recommendations bolus size, small;slow rate of intake   Postural Recommendations   (neutral head position-do not tilt back)   Swallowing Maneuver Recommendations alternate food and liquid intake   Monitoring/Assistance Required (Eating/Swallowing) monitor for cough or change in vocal quality with intake   Recommended Feeding/Eating Techniques (Swallow Eval) set-up and prepare tray;patient is independent, no specific recommendations;maintain upright sitting position for eating   Medication Administration Recommendations, Swallowing (SLP) crushed in puree   Instrumental Assessment Recommendations VFSS (videofluroscopic swallowing study)  (repeat in 2-3 weeks)   Comment, Swallowing Recommendations VFSS completed. Aspiration with a cough with thin liquids and penetration that does not fully clear with mildly thick liquids. Significant oral phase difficulties with effortful and delayed AP transit, but ultimately safe and complete with puree and honey thick liquids. There is concern for decreased intake given oral phase inefficiency and intake should be monitored. SLP to continue to follow for strategies, diet tolerance and advancement as appopriate.    General Therapy Interventions   Planned Therapy Interventions  Dysphagia Treatment   Dysphagia treatment Compensatory strategies for swallowing;Instruction of safe swallow strategies;Modified diet education   SLP Therapy Assessment/Plan   Criteria for Skilled Therapeutic Interventions Met (SLP Eval) yes;treatment indicated   SLP Diagnosis oral and pharyngeal dysphagia   Rehab Potential (SLP Eval) fair, will monitor progress closely   Therapy Frequency (SLP Eval) 6 times/wk   Predicted Duration of Therapy Intervention (SLP Eval) LOS   Comment, Therapy Assessment/Plan (SLP) SLP to follow for diet tolerance and further diet recommendations   SLP Discharge Planning    SLP Discharge Recommendation (DC Rec) [facility] patient will benefit from intensive, interdisciplinary therapy.  Anticipate will be able to tolerate 3 hours of therapy per day  (ongoing ST at d/c)    Total Evaluation Time   Total Evaluation Time (Minutes) 15

## 2021-11-07 ENCOUNTER — APPOINTMENT (OUTPATIENT)
Dept: PHYSICAL THERAPY | Facility: HOSPITAL | Age: 71
End: 2021-11-07
Payer: COMMERCIAL

## 2021-11-07 ENCOUNTER — APPOINTMENT (OUTPATIENT)
Dept: OCCUPATIONAL THERAPY | Facility: HOSPITAL | Age: 71
End: 2021-11-07
Payer: COMMERCIAL

## 2021-11-07 ENCOUNTER — APPOINTMENT (OUTPATIENT)
Dept: SPEECH THERAPY | Facility: HOSPITAL | Age: 71
End: 2021-11-07
Payer: COMMERCIAL

## 2021-11-07 LAB
CREAT SERPL-MCNC: 0.8 MG/DL (ref 0.6–1.1)
GFR SERPL CREATININE-BSD FRML MDRD: 75 ML/MIN/1.73M2
INR PPP: 1.17 (ref 0.9–1.15)
PLATELET # BLD AUTO: 318 10E3/UL (ref 150–450)

## 2021-11-07 PROCEDURE — 250N000013 HC RX MED GY IP 250 OP 250 PS 637: Performed by: INTERNAL MEDICINE

## 2021-11-07 PROCEDURE — 85610 PROTHROMBIN TIME: CPT | Performed by: FAMILY MEDICINE

## 2021-11-07 PROCEDURE — 97110 THERAPEUTIC EXERCISES: CPT | Mod: GP

## 2021-11-07 PROCEDURE — 258N000003 HC RX IP 258 OP 636

## 2021-11-07 PROCEDURE — 82565 ASSAY OF CREATININE: CPT | Performed by: FAMILY MEDICINE

## 2021-11-07 PROCEDURE — 99231 SBSQ HOSP IP/OBS SF/LOW 25: CPT | Performed by: INTERNAL MEDICINE

## 2021-11-07 PROCEDURE — 92526 ORAL FUNCTION THERAPY: CPT | Mod: GN | Performed by: SPEECH-LANGUAGE PATHOLOGIST

## 2021-11-07 PROCEDURE — 120N000001 HC R&B MED SURG/OB

## 2021-11-07 PROCEDURE — 99232 SBSQ HOSP IP/OBS MODERATE 35: CPT | Performed by: PSYCHIATRY & NEUROLOGY

## 2021-11-07 PROCEDURE — 250N000013 HC RX MED GY IP 250 OP 250 PS 637: Performed by: FAMILY MEDICINE

## 2021-11-07 PROCEDURE — 97116 GAIT TRAINING THERAPY: CPT | Mod: GP

## 2021-11-07 PROCEDURE — 250N000011 HC RX IP 250 OP 636: Performed by: INTERNAL MEDICINE

## 2021-11-07 PROCEDURE — 36415 COLL VENOUS BLD VENIPUNCTURE: CPT | Performed by: FAMILY MEDICINE

## 2021-11-07 PROCEDURE — 250N000011 HC RX IP 250 OP 636: Performed by: FAMILY MEDICINE

## 2021-11-07 PROCEDURE — 85049 AUTOMATED PLATELET COUNT: CPT | Performed by: FAMILY MEDICINE

## 2021-11-07 PROCEDURE — 97535 SELF CARE MNGMENT TRAINING: CPT | Mod: GO

## 2021-11-07 RX ORDER — WARFARIN SODIUM 3 MG/1
3 TABLET ORAL
Status: COMPLETED | OUTPATIENT
Start: 2021-11-07 | End: 2021-11-07

## 2021-11-07 RX ORDER — LISINOPRIL 20 MG/1
20 TABLET ORAL DAILY
Status: DISCONTINUED | OUTPATIENT
Start: 2021-11-07 | End: 2021-11-08

## 2021-11-07 RX ORDER — DEXTROSE MONOHYDRATE, SODIUM CHLORIDE, AND POTASSIUM CHLORIDE 50; 1.49; 4.5 G/1000ML; G/1000ML; G/1000ML
INJECTION, SOLUTION INTRAVENOUS
Status: COMPLETED
Start: 2021-11-07 | End: 2021-11-07

## 2021-11-07 RX ADMIN — HYDRALAZINE HYDROCHLORIDE 10 MG: 20 INJECTION INTRAMUSCULAR; INTRAVENOUS at 04:41

## 2021-11-07 RX ADMIN — ATORVASTATIN CALCIUM 40 MG: 40 TABLET, FILM COATED ORAL at 21:53

## 2021-11-07 RX ADMIN — ASPIRIN 81 MG CHEWABLE TABLET 81 MG: 81 TABLET CHEWABLE at 09:55

## 2021-11-07 RX ADMIN — METOPROLOL SUCCINATE 25 MG: 25 TABLET, EXTENDED RELEASE ORAL at 09:56

## 2021-11-07 RX ADMIN — MIRTAZAPINE 15 MG: 15 TABLET, FILM COATED ORAL at 21:53

## 2021-11-07 RX ADMIN — WARFARIN SODIUM 3 MG: 3 TABLET ORAL at 18:45

## 2021-11-07 RX ADMIN — POTASSIUM CHLORIDE, DEXTROSE MONOHYDRATE AND SODIUM CHLORIDE 1 ML: 150; 5; 450 INJECTION, SOLUTION INTRAVENOUS at 04:34

## 2021-11-07 RX ADMIN — ENOXAPARIN SODIUM 90 MG: 100 INJECTION SUBCUTANEOUS at 06:31

## 2021-11-07 RX ADMIN — LISINOPRIL 20 MG: 20 TABLET ORAL at 16:32

## 2021-11-07 ASSESSMENT — ACTIVITIES OF DAILY LIVING (ADL)
ADLS_ACUITY_SCORE: 14

## 2021-11-07 NOTE — PROGRESS NOTES
Spoke with pt's daughter Quintin Joseph [phone] with phone   to introduce care management role, discuss MD/PT recommendations that of home care services RN,OT/PT, Speech and assist with discharge plan. Informed daughter that of readiness for discharge per MD is Monday, 1108. Daughter notes she is able to provide 24 hour assist for mother. Daughter in agreement of home care services and referral to [facility] (agency that is accepting referrals at this time). Referral made to Norma with [facility]; referral accepted. Family able to provide transport at discharge.   Show Aperture Variable?: Yes Consent: The patient's consent was obtained including but not limited to risks of crusting, scabbing, blistering, scarring, darker or lighter pigmentary change, recurrence, incomplete removal and infection. Duration Of Freeze Thaw-Cycle (Seconds): 5 Detail Level: Detailed Render Note In Bullet Format When Appropriate: No Number Of Freeze-Thaw Cycles: 2 freeze-thaw cycles Post-Care Instructions: I reviewed with the patient in detail post-care instructions. Patient is to wear sunprotection, and avoid picking at any of the treated lesions. Pt may apply Vaseline to crusted or scabbing areas.

## 2021-11-07 NOTE — PLAN OF CARE
Problem: Adult Inpatient Plan of Care  Goal: Plan of Care Review  Outcome: No Change  Problem: Cognitive Impairment (Stroke, Ischemic/Transient Ischemic Attack)  Goal: Optimal Cognitive Function  Outcome: No Change  Problem: Communication Impairment (Stroke, Ischemic/Transient Ischemic Attack)  Goal: Improved Communication Skills  Outcome: No Change  VSS. Patient scored a 3 on NIH scale for LOC questions, right facial droop, and slurred speech. Patient alert but disoriented to time. Patient able to make needs known. On IVF. Purewick in place. Continuing to monitor.     Patient's BP elevated at 185/93, PRN hydralazine given.    Problem: Pain Acute  Goal: Acceptable Pain Control and Functional Ability  Outcome: No Change  Intervention: Develop Pain Management Plan    Patient reports sore throat. Declined medication. Offered oral intake but pt declined. Patient educated on plan of care. Answered all questions and concerns. Verbalized understanding. Continuing to monitor.    [employer]  used.    Teagan Smith RN

## 2021-11-07 NOTE — PROGRESS NOTES
NEUROLOGY INPATIENT PROGRESS NOTE       [facility] [place]  [address] [place] [address]  Tel: [phone]  Fax: [phone]  [email or web address]     ASSESSMENT & PLAN   Hospital Day#: 2  Visit diagnosis:  Lacunar infarction    Right internal capsule lacunar infarction  71-year-old female with history of HTN, atrial fibrillation, AVR admitted with speech difficulty  CT of the head shows small vessel ischemic disease.  CTA shows moderate to severe intracranial atherosclerotic disease including left M2, left vertebral, basilar, right P2.  No stenosis noted in bilateral internal carotid arteries.  CTA shows incidental A2 aneurysm measuring 3 mm.  No intervention needed at present but she will need yearly CTA to ensure stability.  I would recommend intervention if the size of aneurysm increases above 5 mm  MRI brain shows a right internal capsule lacunar infarct in addition to multifocal microhemorrhages due to underlying hypertension.  Echocardiogram shows normal ejection fraction with no significant valvular heart abnormality or cardiac source of emboli.  Secondary prevention with Coumadin and aspirin.  LDL is 115, patient on Lipitor.  In view of significant intracranial atherosclerotic disease I would recommend keeping LDL less than 70  Blood pressure goal long-term 120-130/80  Evaluated by physical and occupational therapy they feel patient can return home with assistance    Neurology Discharge Planning :   Discharge when okay with hospitalist    Rodrick Pena MD  [facility]  ([facility], [facility]     PROBLEM LIST      Patient Active Problem List   Diagnosis Code     Mood disorder (H) F39     Benign essential hypertension I10     Hx of medication noncompliance Z91.14     Urinary incontinence R32     Ascending aortic aneurysm (H) I71.2     UGIB (upper gastrointestinal bleed) K92.2     Anticoagulated on Coumadin Z79.01      H/O aortic valve replacement Z95.2     Hx of repair of dissecting aneurysm of ascending thoracic aorta Z98.890, Z86.79     Anemia due to blood loss, acute D62     Duodenal ulcer due to Helicobacter pylori K26.9, B96.81     Iron deficiency anemia, unspecified D50.9     Right-sided lacunar infarction (H) I63.81     Dysphasia R47.02     Middle cerebral artery stenosis, bilateral I66.03     Normocytic anemia D64.9       Past Medical History:   Patient  has a past medical history of Anticoagulated on Coumadin, Aortic aneurysm (H), and Hypertension (12/19/2019).     SUBJECTIVE     No significant change in neuro status, evaluated by therapies and they feel patient can return home with assistance     OBJECTIVE     Vital signs in last 24 hours  Temp:  [97.8  F (36.6  C)-98.9  F (37.2  C)] 98.5  F (36.9  C)  Pulse:  [75-93] 75  Resp:  [18] 18  BP: (117-198)/() 185/93  SpO2:  [95 %-98 %] 95 %    Weight:   Wt Readings from Last 1 Encounters:   11/05/21 54.3 kg (119 lb 9.6 oz)         I/O last 24 hours    Intake/Output Summary (Last 24 hours) at 11/5/2021 1830  Last data filed at 11/5/2021 0528  Gross per 24 hour   Intake --   Output 900 ml   Net -900 ml       Review of Systems   Pertinent items are noted in HPI.    General Physical Exam: Patient is alert and oriented x 1. Vital signs were reviewed and are documented in EMR. Neck was supple, no Carotid bruit, thyromegaly, JVD or lymphadenopathy noted.  Neurological Exam:  Patient is alert and oriented wakes up when name called and able to follow some commands.  Speech appears dysarthric according to family member no aphasia.  Cranial nerves are intact left pronator drift.  Reflexes 1+ with equivocal toe on the left downgoing toe on the right gait testing deferred     DIAGNOSTIC STUDIES     Pertinent Radiology   Following imaging studies were reviewed:    CT  HEAD CT:   1.  No definite acute intracranial hemorrhage or acute large territory infarction. Age-indeterminate  lacunar type infarctions identified involving the right caudate nucleus, right putamen, right subinsular region, right jay and left cerebellum. Patchy   hypodensity is identified involving the right corona radiata/centrum semiovale which may reflect chronic small vessel ischemic change or an age-indeterminate region of ischemic injury.   2.  Chronic senescent and presumed microvascular ischemic changes, as above.     HEAD CTA:   1.  No definite acute large territory infarction.   2.  Moderate to severe short segment stenosis identified involving the left cavernous and supraclinoid internal carotid arteries, as above.   3.  Moderate short segment stenosis involving the proximal left M2 anterior temporal division middle cerebral artery.   4.  Mild to moderate stenosis involving the left intradural vertebral artery and basilar artery.   5.  Moderate to severe short segment stenosis involving the distal right P2 posterior cerebral artery.   6.  Additional intracranial stenoses, as above.   7.  Short segment fusiform aneurysm identified involving the left proximal A2 anterior cerebral artery measuring up to 3 mm.   8.  Poststenotic dilatation versus fusiform aneurysm involving the proximal left anterior temporal M2 middle cerebral artery measuring up to 4 mm.     NECK CTA:   1.  Scattered atheromatous disease without significant stenosis or dissection.   2.  Incompletely imaged and presumed descending thoracic aortic aneurysm. Consider CTA chest for further assessment.   3.  Heterogeneous multinodular thyroid. Recommend follow-up thyroid ultrasound for further assessment.     MRI  1.  Small acute lacunar infarct in the posterior limb of the right internal capsule.   2.  Chronic ischemic and age-related changes as described.   3.  Multifocal chronic microhemorrhages. Appearance is nonspecific and differential diagnosis includes chronic hypertensive microhemorrhages or sequela of prior trauma, however, early cerebral  amyloid angiopathy could have a similar appearance.     Echocardiogram  Echo result w/o MOPS: Interpretation Summary There is mild concentric left ventricular hypertrophy.The visual ejection fraction is 55-60%.No regional wall motion abnormalities noted.There is mild (1+) mitral regurgitation.There is mild trileaflet aortic sclerosis.    Pertinent Labs   Lab Results: Personally Reviewed  Recent Results (from the past 24 hour(s))   INR    Collection Time: 11/06/21  7:58 AM   Result Value Ref Range    INR 1.11 0.90 - 1.15   Comprehensive metabolic panel    Collection Time: 11/06/21  7:58 AM   Result Value Ref Range    Sodium 144 136 - 145 mmol/L    Potassium 3.8 3.5 - 5.0 mmol/L    Chloride 116 (H) 98 - 107 mmol/L    Carbon Dioxide (CO2) 23 22 - 31 mmol/L    Anion Gap 5 5 - 18 mmol/L    Urea Nitrogen 13 8 - 22 mg/dL    Creatinine 0.83 0.60 - 1.10 mg/dL    Calcium 9.1 8.5 - 10.5 mg/dL    Glucose 82 70 - 125 mg/dL    Alkaline Phosphatase 138 (H) 45 - 120 U/L    AST 18 0 - 40 U/L    ALT <9 0 - 45 U/L    Protein Total 7.1 6.0 - 8.0 g/dL    Albumin 3.3 (L) 3.5 - 5.0 g/dL    Bilirubin Total 0.8 0.0 - 1.0 mg/dL    GFR Estimate 72 >60 mL/min/1.73m2   Extra Red Top Tube    Collection Time: 11/06/21  7:58 AM   Result Value Ref Range    Hold Specimen JI    CBC with platelets    Collection Time: 11/06/21  7:59 AM   Result Value Ref Range    WBC Count 5.2 4.0 - 11.0 10e3/uL    RBC Count 3.58 (L) 3.80 - 5.20 10e6/uL    Hemoglobin 8.4 (L) 11.7 - 15.7 g/dL    Hematocrit 29.3 (L) 35.0 - 47.0 %    MCV 82 78 - 100 fL    MCH 23.5 (L) 26.5 - 33.0 pg    MCHC 28.7 (L) 31.5 - 36.5 g/dL    RDW 17.2 (H) 10.0 - 15.0 %    Platelet Count 289 150 - 450 10e3/uL   Glucose by meter    Collection Time: 11/06/21  8:40 AM   Result Value Ref Range    GLUCOSE BY METER POCT 78 70 - 99 mg/dL   Glucose by meter    Collection Time: 11/06/21  5:02 PM   Result Value Ref Range    GLUCOSE BY METER POCT 81 70 - 99 mg/dL   Glucose by meter    Collection Time:  11/06/21  8:32 PM   Result Value Ref Range    GLUCOSE BY METER POCT 101 (H) 70 - 99 mg/dL         HOSPITAL MEDICATIONS       aspirin  81 mg Oral Daily    Or     aspirin  81 mg Oral or NG Tube Daily     atorvastatin  40 mg Oral QPM     enoxaparin ANTICOAGULANT  1.5 mg/kg Subcutaneous Q24H     metoprolol succinate ER  25 mg Oral Daily     mirtazapine  15 mg Oral At Bedtime     sodium chloride (PF)  3 mL Intracatheter Q8H        Total time spent for face to face visit, reviewing labs/imaging studies, counseling and coordination of care was: 30 Minutes More than 50% of this time was spent on counseling and coordination of care.      This note was dictated using voice recognition software.  Any grammatical or context distortions are unintentional and inherent to the software.

## 2021-11-07 NOTE — PROGRESS NOTES
"CLINICAL NUTRITION SERVICES - ASSESSMENT NOTE     Nutrition Prescription    RECOMMENDATIONS FOR MDs/PROVIDERS TO ORDER:      Malnutrition Status:    unable    Recommendations already ordered by Registered Dietitian (RD):  Continue diet as ordered.  Generate meal trays.    Future/Additional Recommendations:  Consider Enteral Nutrition if unable to take adequate po     Pt admitted with CVA, aphasia.  Hx AV replacement, HTN    REASON FOR ASSESSMENT  Shelly Becerril is a/an 71 year old female assessed by the dietitian for Admission Nutrition Risk Screen for wt loss and decreased po intake.    NUTRITION HISTORY  Met pt and pt's daughter as speech therapist working with pt.  Pt unable to give hx. Pt's daughter reports pt ate slow at home, would hold food/fluid in mouth before being able to swallow it.  Pt eating small amounts at home    CURRENT NUTRITION ORDERS  Diet: Level 4: Pureed Dysphagia Diet with level 3 liquids. Was NPO x 2 days  Intake/Tolerance: eating bites.  Pt refused meals today, only eating some applesauce and thickened water with speech therapist.      LABS  Labs reviewed    MEDICATIONS  D5% infusing at 50 ml/hr  Medications reviewed    ANTHROPOMETRICS  Height: 152.4 cm (5' 0\")  Most Recent Weight: 54.3 kg (119 lb 9.6 oz)    BMI: Normal BMI  Weight History:   Wt Readings from Last 7 Encounters:   11/05/21 54.3 kg (119 lb 9.6 oz)   04/05/21 51.3 kg (113 lb)   01/03/20 63.5 kg (140 lb)   limited wt hx available.    Dosing Weight: 54.3 kg    ASSESSED NUTRITION NEEDS  Estimated Energy Needs: 8685-5541 kcals/day (25 - 30 kcals/kg)  Justification: Maintenance  Estimated Protein Needs: 54+ grams protein/day (1+)  Justification: Maintenance  Estimated Fluid Needs: 4750-0837 mL/day (1 mL/kcal)   Justification: Maintenance    PHYSICAL FINDINGS  See malnutrition section below.    MALNUTRITION:  % Weight Loss:  None noted, limited data.  % Intake:  <75% for > 7 days (moderate malnutrition)  Subcutaneous Fat Loss:  " Deferred as speech therapist working with pt  Muscle Loss:  deferred  Fluid Retention:  None noted per charting    NUTRITION DIAGNOSIS  Swallowing difficulty related to acute and chronic illness as evidenced by dysphagia      INTERVENTIONS  Implementation     Collaboration with other providers   Will generate meal trays as pt has refused trays and with aphasia ordering would be challenging.  Pt declines supplements    Goals  Patient to consume % of nutritionally adequate meals three times per day, or the equivalent with supplements/snacks.     Monitoring/Evaluation  Progress toward goals will be monitored and evaluated per protocol.

## 2021-11-07 NOTE — PROGRESS NOTES
[facility]    PROGRESS NOTE - Hospitalist Service    Assessment and Plan  71-year-old female with past medical history of thoracic and abdominal aortic aneurysm, HTN, AVR presents to the emergency room, with slurred speech concerning for CVA.,  She was admitted with     Acute CVA  - Patient had stopped taking her home medication including warfarin about a month ago.  - CT head and neck show no definite acute infarction but with age intermediate lacunar infarction in the right and left sides of the brain as well as chronic microvascular changes.  - Patient is outside TPA window  - MRI brain shows small acute lacunar infarction in the posterior limb of right internal capsule  - Neurology consult, appreciate input  - Continue neuro telemetry  - PT/OT and speech evaluation   - Unremarkable echo except for mild concentric left ventricular hypertrophy with EF of 55 to 60%     Slurred speech with aphasia  - Secondary to acute CVA  - Speech evaluation  - Patient failed speech study and should be n.p.o.  - Plan for video swallow today  - If persistent dysphagia will consider tube feeding     History of AAA  - Patient with know descending thoracic aortic aneurysm as well as abdominal aortic aneurysm (with dissection).    - Patient did have aortic root reconstruction and abdominal aneurysm repair in 4/2018.   - Patient then had thoracic aortic aneurysm diagnosed in 2019.     Aortic valve replacement  - Patient should be on warfarin but has not taken for the past 1 month because she ran out of her prescription  - Will bridge with lovenox  - Warfarin per pharmacy, goal INR      Accelerated hypertension  -Secondary to acute CVA plus patient stopped her medication a month ago  - Allow permissive blood pressure for CVA   - hold home lisinopril initially, restart if blood pressure is elevated.  - Continue metoprolol at half home dose for 1 day per stroke protocol recommendations.  - Add IV hydralazine as  needed  - Avoid hypotension  - Restart lisinopril today at lower dose as blood pressure is up     Hypoglycemia  - Secondary to n.p.o. status  - Change IV fluids to dextrose normal saline  - Continue to monitor blood sugar  - Low hemoglobin A1c at 4.7     Hyperlipidemia  - Lipid panel shows elevation of cholesterol and TG, low of HDL  - Patient has stopped her statin  - Started on Lipitor     Normocytic anemia  - History of priorGI bleed  - Patient with recent upper GI bleed in the setting of H. pylori infection.   - Hemoglobin stable  - Continue home iron supplement as oral diet is started         Principal Problem:    Right-sided lacunar infarction (H)  Active Problems:    Mood disorder (H)    Benign essential hypertension    Ascending aortic aneurysm (H)    Hx of repair of dissecting aneurysm of ascending thoracic aorta    Dysphasia    Middle cerebral artery stenosis, bilateral    Normocytic anemia      VTE prophylaxis:  Enoxaparin (Lovenox) SQ  DIET: Orders Placed This Encounter      Pureed Diet (level 4) Liquidized/Moderately Thick (level 3)      Disposition/Barriers to discharge: Post stroke protocol, speech therapy  Code Status: Full Code    Subjective:  Phoua is feeling better, tolerating puréed diet.  No fever chills overnight.  No significant new changes overnight.  Still has some slurred speech    PHYSICAL EXAM  Vitals:    11/04/21 1900 11/05/21 0000 11/05/21 1337   Weight: 59 kg (130 lb) 59 kg (130 lb) 54.3 kg (119 lb 9.6 oz)     B/P:127/70 T:98 P:84 R:20     Intake/Output Summary (Last 24 hours) at 11/7/2021 1614  Last data filed at 11/7/2021 0434  Gross per 24 hour   Intake 995 ml   Output 1400 ml   Net -405 ml      Body mass index is 23.36 kg/m .    Constitutional: awake, alert, cooperative, no apparent distress, and appears stated age  Eyes: Lids and lashes normal, pupils equal, round and reactive to light, extra ocular muscles intact, sclera clear, conjunctiva normal  ENT: Normocephalic, without  obvious abnormality, atraumatic, sinuses nontender on palpation, external ears without lesions, oral pharynx with moist mucous membranes, tonsils without erythema or exudates, gums normal and good dentition.  Respiratory: No increased work of breathing, good air exchange, clear to auscultation bilaterally, no crackles or wheezing  Cardiovascular: Normal apical impulse, regular rate and rhythm, normal S1 and S2, no S3 or S4, and no murmur noted  GI: No scars, normal bowel sounds, soft, non-distended, non-tender, no masses palpated, no hepatosplenomegally  Skin: no bruising or bleeding and normal skin color, texture, turgor  Musculoskeletal: There is no redness, warmth, or swelling of the joints.  Full range of motion noted.  no lower extremity pitting edema present  Neurologic: Awake, alert, oriented to name, place but not time.  Slurred speech.  Right facial droop.  Neuropsychiatric: Appropriate with examiner      PERTINENT LABS/IMAGING:  Recent Labs   Lab 11/07/21  0720 11/06/21  2032 11/06/21  1702 11/06/21  0840 11/06/21  0759 11/06/21  0758 11/05/21  1800 11/04/21 2006   WBC  --   --   --   --  5.2  --   --  7.4   HGB  --   --   --   --  8.4*  --   --  8.5*   MCV  --   --   --   --  82  --   --  83     --   --   --  289  --   --  314   INR 1.17*  --   --   --   --  1.11  --  1.00   NA  --   --   --   --   --  144  --  143   POTASSIUM  --   --   --   --   --  3.8  --  3.7   CHLORIDE  --   --   --   --   --  116*  --  111*   CO2  --   --   --   --   --  23  --  25   BUN  --   --   --   --   --  13  --  20   CR 0.80  --   --   --   --  0.83  --  0.98   ANIONGAP  --   --   --   --   --  5  --  7   FAVIOLA  --   --   --   --   --  9.1  --  9.0   GLC  --  101* 81 78  --  82   < > 115   ALBUMIN  --   --   --   --   --  3.3*  --   --    PROTTOTAL  --   --   --   --   --  7.1  --   --    BILITOTAL  --   --   --   --   --  0.8  --   --    ALKPHOS  --   --   --   --   --  138*  --   --    ALT  --   --   --   --   --   <9  --   --    AST  --   --   --   --   --  18  --   --     < > = values in this interval not displayed.       Discussed with patient, family, neurology, nursing staff and discharge planner    Héctor Russo MD  [facility] Medicine Service  [phone]

## 2021-11-07 NOTE — PROGRESS NOTES
11/07/21 1439   Signing Clinician's Name / Credentials   Signing clinician's name / credentials Renée Knight MS CCC SLP   Quick Adds   Rehab Discipline SLP   SLP - Dysphagia/Swallow   Minutes of Treatment 24 minutes   Symptoms Noted During/After Treatment Fatigue   Treatment Detail RN reported intake concerns, after starting po yesterday she did eat ~30% of a meal, then all day today refusing all po intake. Pt sitting upright in recliner chair. Utilized  via language line on [employer]. A daughter was present. Observed pt with po, she required encouragement to trial. No immediate coughing with pureed/moderately thick liquids. Continues to have significantly delayed oral phase, oral holding and delayed AP transit with both consistencies observed. She naturally goes at very slow rate and takes small bolus size. She was alternating puree and moderately thick intermittently as well. She seemed to have difficulty with swallow trigger and intermittently would tilt head backwards, cued to keep head in neutral position and not tilt head back. Daughter gave some ideas of foods she eats at baseline to SLP and dietitian present. Concern for decreased intake and refusal of po intake. Pt has oral phase inefficiency and intake should be monitored closely. MD/Medical team, please consider if discussion on pt/family wishes/alternative nutrition is necessary.   SLP Discharge Planning    SLP Discharge Recommendation (DC Rec)   (per treatment team)   SLP Rationale for DC Rec needs ongoing ST at dc, below baseline for swallowing   SLP Brief overview of current status  diet: Level 4 Puree, Level 3 Moderately thick; concern of poor intake   Additional Documentation   SLP Plan f/u on diet tolerance/intake concerns, has had significantly delayed oral phase, monitor if appropriate to trial advancement   Total Session Time   Total Session Time (minutes) 24 minutes

## 2021-11-07 NOTE — PLAN OF CARE
Problem: Oral Intake Inadequate  Goal: Improved Oral Intake  Outcome: Declining   Patient declined breakfast and lunch this shift. Discussed with SLP/daughter. Daughter thinks she does not like the meals offered here but would attempt to order some rice for her mom tonight. Dietician aware. Will continue to monitor.     -NIH score of 3; Dysarthria, facial droop and LOC questions  -/61 after PRN IV hydralazine earlier this A.M . Recheck at noon was 161/74. Permissive HTN allowed .

## 2021-11-08 ENCOUNTER — APPOINTMENT (OUTPATIENT)
Dept: SPEECH THERAPY | Facility: HOSPITAL | Age: 71
End: 2021-11-08
Payer: COMMERCIAL

## 2021-11-08 ENCOUNTER — APPOINTMENT (OUTPATIENT)
Dept: OCCUPATIONAL THERAPY | Facility: HOSPITAL | Age: 71
End: 2021-11-08
Payer: COMMERCIAL

## 2021-11-08 LAB
GLUCOSE BLDC GLUCOMTR-MCNC: 102 MG/DL (ref 70–99)
GLUCOSE BLDC GLUCOMTR-MCNC: 85 MG/DL (ref 70–99)
HOLD SPECIMEN: NORMAL
HOLD SPECIMEN: NORMAL
INR PPP: 1.27 (ref 0.9–1.15)

## 2021-11-08 PROCEDURE — 99232 SBSQ HOSP IP/OBS MODERATE 35: CPT | Performed by: PSYCHIATRY & NEUROLOGY

## 2021-11-08 PROCEDURE — 92526 ORAL FUNCTION THERAPY: CPT | Mod: GN

## 2021-11-08 PROCEDURE — 97535 SELF CARE MNGMENT TRAINING: CPT | Mod: GO

## 2021-11-08 PROCEDURE — 250N000013 HC RX MED GY IP 250 OP 250 PS 637: Performed by: PSYCHIATRY & NEUROLOGY

## 2021-11-08 PROCEDURE — 250N000011 HC RX IP 250 OP 636: Performed by: FAMILY MEDICINE

## 2021-11-08 PROCEDURE — 250N000013 HC RX MED GY IP 250 OP 250 PS 637: Performed by: FAMILY MEDICINE

## 2021-11-08 PROCEDURE — 85610 PROTHROMBIN TIME: CPT | Performed by: FAMILY MEDICINE

## 2021-11-08 PROCEDURE — 250N000013 HC RX MED GY IP 250 OP 250 PS 637: Performed by: INTERNAL MEDICINE

## 2021-11-08 PROCEDURE — 99232 SBSQ HOSP IP/OBS MODERATE 35: CPT | Performed by: INTERNAL MEDICINE

## 2021-11-08 PROCEDURE — 36415 COLL VENOUS BLD VENIPUNCTURE: CPT | Performed by: FAMILY MEDICINE

## 2021-11-08 PROCEDURE — 258N000003 HC RX IP 258 OP 636: Performed by: INTERNAL MEDICINE

## 2021-11-08 PROCEDURE — 120N000001 HC R&B MED SURG/OB

## 2021-11-08 PROCEDURE — 97110 THERAPEUTIC EXERCISES: CPT | Mod: GO

## 2021-11-08 RX ORDER — LISINOPRIL 20 MG/1
40 TABLET ORAL DAILY
Status: DISCONTINUED | OUTPATIENT
Start: 2021-11-08 | End: 2021-11-09 | Stop reason: HOSPADM

## 2021-11-08 RX ADMIN — APIXABAN 5 MG: 5 TABLET, FILM COATED ORAL at 21:06

## 2021-11-08 RX ADMIN — METOPROLOL SUCCINATE 25 MG: 25 TABLET, EXTENDED RELEASE ORAL at 09:03

## 2021-11-08 RX ADMIN — MIRTAZAPINE 15 MG: 15 TABLET, FILM COATED ORAL at 21:06

## 2021-11-08 RX ADMIN — POTASSIUM CHLORIDE, DEXTROSE MONOHYDRATE AND SODIUM CHLORIDE: 150; 5; 450 INJECTION, SOLUTION INTRAVENOUS at 00:48

## 2021-11-08 RX ADMIN — ENOXAPARIN SODIUM 90 MG: 100 INJECTION SUBCUTANEOUS at 05:27

## 2021-11-08 RX ADMIN — ATORVASTATIN CALCIUM 40 MG: 40 TABLET, FILM COATED ORAL at 21:06

## 2021-11-08 RX ADMIN — APIXABAN 5 MG: 5 TABLET, FILM COATED ORAL at 09:04

## 2021-11-08 RX ADMIN — LISINOPRIL 40 MG: 20 TABLET ORAL at 09:03

## 2021-11-08 ASSESSMENT — ACTIVITIES OF DAILY LIVING (ADL)
ADLS_ACUITY_SCORE: 14

## 2021-11-08 ASSESSMENT — MIFFLIN-ST. JEOR: SCORE: 991.27

## 2021-11-08 NOTE — PLAN OF CARE
Problem: Cognitive Impairment (Stroke, Ischemic/Transient Ischemic Attack)  Goal: Optimal Cognitive Function  Outcome: No Change     Problem: Functional Ability Impaired (Stroke, Ischemic/Transient Ischemic Attack)  Goal: Optimal Functional Ability  Outcome: No Change  Intervention: Optimize Functional Ability  Recent Flowsheet Documentation  Taken 11/7/2021 2000 by Pérez Burgos RN  Activity Management: activity adjusted per tolerance  Taken 11/7/2021 1600 by Pérez Burgos RN  Activity Management: activity adjusted per tolerance     Problem: Eating/Swallowing Impairment (Stroke, Ischemic/Transient Ischemic Attack)  Goal: Oral Intake without Aspiration  Outcome: No Change     Problem: Communication Impairment  Goal: Effective Communication Skills  Outcome: No Change     Problem: Adult Inpatient Plan of Care  Goal: Plan of Care Review  Outcome: Improving  Goal: Optimal Comfort and Wellbeing  Outcome: Improving     Problem: Pain Acute  Goal: Acceptable Pain Control and Functional Ability  Outcome: Adequate for Discharge   Pt denies pain or nausea, ate about 50% of her dinner. IVF infusing. Assist of 1 with gait belt and walker. NIH q4. Purewick placed at HS.

## 2021-11-08 NOTE — PROGRESS NOTES
[facility]    PROGRESS NOTE - Hospitalist Service    Assessment and Plan  71-year-old female with past medical history of thoracic and abdominal aortic aneurysm, HTN, AVR presents to the emergency room, with slurred speech concerning for CVA.,  She was admitted with     Acute CVA  - Patient had stopped taking her home medication including warfarin about a month ago.  - CT head and neck show no definite acute infarction but with age intermediate lacunar infarction in the right and left sides of the brain as well as chronic microvascular changes.  - Patient is outside TPA window  - MRI brain shows small acute lacunar infarction in the posterior limb of right internal capsule  - Neurology consult, appreciate input  - Continue neuro telemetry  - PT/OT and speech evaluation   - Unremarkable echo except for mild concentric left ventricular hypertrophy with EF of 55 to 60%     Slurred speech with aphasia  - Secondary to acute CVA  - Speech evaluation  - Patient failed speech study and should be n.p.o on admission  -Had video swallow test on 11/6/2021 which shows aspiration of clear liquid  - Started on puréed diet with honey thickened liquids as recommended by speech  - Continue to monitor oral intake     History of AAA  - Patient with know descending thoracic aortic aneurysm as well as abdominal aortic aneurysm (with dissection).    - Patient did have aortic root reconstruction and abdominal aneurysm repair in 4/2018.   - Patient then had thoracic aortic aneurysm diagnosed in 2019.     Aortic valve replacement  - Patient should be on warfarin but has not taken for the past 1 month because she ran out of her prescription  - Will bridge with lovenox  - Warfarin per pharmacy, goal INR   - Change to Eliquis by neurologist given her noncompliance with warfarin  - Unremarkable echo for significant acute changes     Accelerated hypertension  -Secondary to acute CVA plus patient stopped her medication a  month ago  - Allow permissive blood pressure for CVA   - hold home lisinopril initially, restart if blood pressure is elevated.  - Continue metoprolol at half home dose for 1 day per stroke protocol recommendations.  - Add IV hydralazine as needed  - Avoid hypotension  - Restart lisinopril today at lower dose as blood pressure is up     Hypoglycemia  - Secondary to n.p.o. status  - Change IV fluids to dextrose normal saline  - Continue to monitor blood sugar  - Low hemoglobin A1c at 4.7  - Discontinue IV fluid and monitor oral intake today     Hyperlipidemia  - Lipid panel shows elevation of cholesterol and TG, low of HDL  - Patient has stopped her statin  - Started on Lipitor     Normocytic anemia  - History of priorGI bleed  - Patient with recent upper GI bleed in the setting of H. pylori infection.   - Hemoglobin stable  - Continue home iron supplement as oral diet is started     Weakness and deconditioning  - PT/OT evaluation  - Patient would need home care on discharge    Principal Problem:    Right-sided lacunar infarction (H)  Active Problems:    Mood disorder (H)    Benign essential hypertension    Ascending aortic aneurysm (H)    Hx of repair of dissecting aneurysm of ascending thoracic aorta    Dysphasia    Middle cerebral artery stenosis, bilateral    Normocytic anemia      VTE prophylaxis:  DOAC  DIET: Orders Placed This Encounter      Pureed Diet (level 4) Liquidized/Moderately Thick (level 3)      Disposition/Barriers to discharge: Post stroke protocol, monitor oral intake  Code Status: Full Code    Subjective:  Shelly is more lethargic today, denies any chest pain or shortness of breath.  Still has little oral intake.  No significant acute neurological changes overnight    PHYSICAL EXAM  Vitals:    11/05/21 0000 11/05/21 1337 11/08/21 1015   Weight: 59 kg (130 lb) 54.3 kg (119 lb 9.6 oz) 54.5 kg (120 lb 1.6 oz)     B/P:118/70 T:98.1 P:74 R:18     Intake/Output Summary (Last 24 hours) at 11/8/2021  1225  Last data filed at 11/8/2021 1052  Gross per 24 hour   Intake 980 ml   Output --   Net 980 ml      Body mass index is 23.46 kg/m .    Constitutional: awake, alert, cooperative, no apparent distress, and appears stated age  Eyes: Lids and lashes normal, pupils equal, round and reactive to light, extra ocular muscles intact, sclera clear, conjunctiva normal  ENT: Normocephalic, without obvious abnormality, atraumatic, sinuses nontender on palpation, external ears without lesions, oral pharynx with moist mucous membranes, tonsils without erythema or exudates, gums normal and good dentition.  Respiratory: No increased work of breathing, good air exchange, clear to auscultation bilaterally, no crackles or wheezing  Cardiovascular: Normal apical impulse, regular rate and rhythm, normal S1 and S2, no S3 or S4, and no murmur noted  GI: No scars, normal bowel sounds, soft, non-distended, non-tender, no masses palpated, no hepatosplenomegally  Skin: no bruising or bleeding and normal skin color, texture, turgor  Musculoskeletal: There is no redness, warmth, or swelling of the joints.  Full range of motion noted.  no lower extremity pitting edema present  Neurologic: Awake, alert, oriented to name, place only.  Left facial droop.  Slurred speech.  Neuropsychiatric: Appropriate with examiner      PERTINENT LABS/IMAGING:  Recent Labs   Lab 11/08/21  0858 11/08/21  0648 11/07/21  0720 11/06/21  2032 11/06/21  1702 11/06/21  0840 11/06/21  0759 11/06/21  0758 11/05/21  1800 11/04/21 2006   WBC  --   --   --   --   --   --  5.2  --   --  7.4   HGB  --   --   --   --   --   --  8.4*  --   --  8.5*   MCV  --   --   --   --   --   --  82  --   --  83   PLT  --   --  318  --   --   --  289  --   --  314   INR  --  1.27* 1.17*  --   --   --   --  1.11  --  1.00   NA  --   --   --   --   --   --   --  144  --  143   POTASSIUM  --   --   --   --   --   --   --  3.8  --  3.7   CHLORIDE  --   --   --   --   --   --   --  116*  --   111*   CO2  --   --   --   --   --   --   --  23  --  25   BUN  --   --   --   --   --   --   --  13  --  20   CR  --   --  0.80  --   --   --   --  0.83  --  0.98   ANIONGAP  --   --   --   --   --   --   --  5  --  7   FAVIOLA  --   --   --   --   --   --   --  9.1  --  9.0   *  --   --  101* 81   < >  --  82   < > 115   ALBUMIN  --   --   --   --   --   --   --  3.3*  --   --    PROTTOTAL  --   --   --   --   --   --   --  7.1  --   --    BILITOTAL  --   --   --   --   --   --   --  0.8  --   --    ALKPHOS  --   --   --   --   --   --   --  138*  --   --    ALT  --   --   --   --   --   --   --  <9  --   --    AST  --   --   --   --   --   --   --  18  --   --     < > = values in this interval not displayed.     No results found for this or any previous visit (from the past 24 hour(s)).    Discussed with patient, family, neurology only, nursing staff and discharge planner    Héctor Russo MD  [facility] Medicine Service  [phone]

## 2021-11-08 NOTE — DISCHARGE INSTRUCTIONS
Home care services have been arranged for you.  Agency: [facility]  Services: RN,OT/PT, Speech  Phone: [phone]  Instructions: Home Care will contact you within 24 hours to arrange the first visit.     Follow UP appointment: Friday November 12, 2021 at 11:30 AM with Marguerite Canada.

## 2021-11-08 NOTE — PROGRESS NOTES
Care Management Follow Up    Length of Stay (days): 3    Expected Discharge Date: 11/09/2021     Concerns to be Addressed: all concerns addressed in this encounter     Patient plan of care discussed at interdisciplinary rounds: Yes    Anticipated Discharge Disposition: Home Care     Anticipated Discharge Services:    Anticipated Discharge DME:      Patient/family educated on Medicare website which has current facility and service quality ratings: no  Education Provided on the Discharge Plan:    Patient/Family in Agreement with the Plan: yes    Referrals Placed by CM/SW:    Private pay costs discussed: Not applicable    Additional Information:  Spoke to Norma at [facility] and verified they can accept. Updated that anticipated discharge date is tomorrow       Susannah Lr RN

## 2021-11-08 NOTE — PROGRESS NOTES
NEUROLOGY INPATIENT PROGRESS NOTE       [facility] NEUROLOGY [place]  [address] [place] [address]  Tel: [phone]  Fax: [phone]  [email or web address]     ASSESSMENT & PLAN   Hospital Day#: 3  Visit diagnosis:  Lacunar infarction    Right internal capsule lacunar infarction  71-year-old female with history of HTN, atrial fibrillation, AVR admitted with speech difficulty  CT of the head shows small vessel ischemic disease.  CTA shows moderate to severe intracranial atherosclerotic disease including left M2, left vertebral, basilar, right P2.  No stenosis noted in bilateral internal carotid arteries.  CTA shows incidental A2 aneurysm measuring 3 mm.  No intervention needed at present but she will need yearly CTA to ensure stability.  I would recommend intervention if the size of aneurysm increases above 5 mm  MRI brain shows a right internal capsule lacunar infarct in addition to multifocal microhemorrhages due to underlying hypertension.    Echocardiogram shows normal ejection fraction with no significant valvular heart abnormality or cardiac source of emboli.    I was under the impression that patient was taking Coumadin at home but it appears she had stopped taking Coumadin about a month ago.  I would therefore recommend switching her to Eliquis 5 mg twice daily  LDL is 115, patient on Lipitor.  In view of significant intracranial atherosclerotic disease I would recommend keeping LDL less than 70  Blood pressure goal long-term 120-130/80  Evaluated by physical and occupational therapy they feel patient can return home with assistance  Patient failed swallow study and had Video swallowing that showed aspiration with thin liquid.  They recommended ongoing speech therapy after discharge    Neurology Discharge Planning :   Discharge when okay with hospitalist    Rodrick Pena MD  [facility]  (Formerly, [facility]     PROBLEM  LIST      Patient Active Problem List   Diagnosis Code     Mood disorder (H) F39     Benign essential hypertension I10     Hx of medication noncompliance Z91.14     Urinary incontinence R32     Ascending aortic aneurysm (H) I71.2     UGIB (upper gastrointestinal bleed) K92.2     Anticoagulated on Coumadin Z79.01     H/O aortic valve replacement Z95.2     Hx of repair of dissecting aneurysm of ascending thoracic aorta Z98.890, Z86.79     Anemia due to blood loss, acute D62     Duodenal ulcer due to Helicobacter pylori K26.9, B96.81     Iron deficiency anemia, unspecified D50.9     Right-sided lacunar infarction (H) I63.81     Dysphasia R47.02     Middle cerebral artery stenosis, bilateral I66.03     Normocytic anemia D64.9       Past Medical History:   Patient  has a past medical history of Anticoagulated on Coumadin, Aortic aneurysm (H), and Hypertension (12/19/2019).     SUBJECTIVE     No significant change in neuro status, evaluated by therapies and they feel patient can return home with assistance however, she failed swallow study and also was noted to have aspiration on video swallow study     OBJECTIVE     Vital signs in last 24 hours  Temp:  [97.9  F (36.6  C)-98.8  F (37.1  C)] 98.6  F (37  C)  Pulse:  [] 74  Resp:  [16-20] 16  BP: (106-161)/(61-86) 150/76  SpO2:  [91 %-98 %] 94 %    Weight:   Wt Readings from Last 1 Encounters:   11/05/21 54.3 kg (119 lb 9.6 oz)         I/O last 24 hours  Intake/Output Summary (Last 24 hours) at 11/5/2021 1830  Last data filed at 11/5/2021 0528  Gross per 24 hour   Intake --   Output 900 ml   Net -900 ml       Review of Systems   Pertinent items are noted in HPI.    General Physical Exam: Patient is alert and oriented x 1. Vital signs were reviewed and are documented in EMR. Neck was supple, no Carotid bruit, thyromegaly, JVD or lymphadenopathy noted.  Neurological Exam:  Patient is alert and oriented wakes up when name called and able to follow some commands.  Speech  appears dysarthric according to family member no aphasia.  Cranial nerves are intact left pronator drift.  Reflexes 1+ with equivocal toe on the left downgoing toe on the right gait testing deferred     DIAGNOSTIC STUDIES     Pertinent Radiology   Following imaging studies were reviewed:    CT  HEAD CT:   1.  No definite acute intracranial hemorrhage or acute large territory infarction. Age-indeterminate lacunar type infarctions identified involving the right caudate nucleus, right putamen, right subinsular region, right jay and left cerebellum. Patchy   hypodensity is identified involving the right corona radiata/centrum semiovale which may reflect chronic small vessel ischemic change or an age-indeterminate region of ischemic injury.   2.  Chronic senescent and presumed microvascular ischemic changes, as above.     HEAD CTA:   1.  No definite acute large territory infarction.   2.  Moderate to severe short segment stenosis identified involving the left cavernous and supraclinoid internal carotid arteries, as above.   3.  Moderate short segment stenosis involving the proximal left M2 anterior temporal division middle cerebral artery.   4.  Mild to moderate stenosis involving the left intradural vertebral artery and basilar artery.   5.  Moderate to severe short segment stenosis involving the distal right P2 posterior cerebral artery.   6.  Additional intracranial stenoses, as above.   7.  Short segment fusiform aneurysm identified involving the left proximal A2 anterior cerebral artery measuring up to 3 mm.   8.  Poststenotic dilatation versus fusiform aneurysm involving the proximal left anterior temporal M2 middle cerebral artery measuring up to 4 mm.     NECK CTA:   1.  Scattered atheromatous disease without significant stenosis or dissection.   2.  Incompletely imaged and presumed descending thoracic aortic aneurysm. Consider CTA chest for further assessment.   3.  Heterogeneous multinodular thyroid. Recommend  follow-up thyroid ultrasound for further assessment.     MRI  1.  Small acute lacunar infarct in the posterior limb of the right internal capsule.   2.  Chronic ischemic and age-related changes as described.   3.  Multifocal chronic microhemorrhages. Appearance is nonspecific and differential diagnosis includes chronic hypertensive microhemorrhages or sequela of prior trauma, however, early cerebral amyloid angiopathy could have a similar appearance.     Echocardiogram  Echo result w/o MOPS: Interpretation Summary There is mild concentric left ventricular hypertrophy.The visual ejection fraction is 55-60%.No regional wall motion abnormalities noted.There is mild (1+) mitral regurgitation.There is mild trileaflet aortic sclerosis.    Pertinent Labs   Lab Results: Personally Reviewed  Recent Results (from the past 24 hour(s))   Platelet count    Collection Time: 11/07/21  7:20 AM   Result Value Ref Range    Platelet Count 318 150 - 450 10e3/uL   Creatinine    Collection Time: 11/07/21  7:20 AM   Result Value Ref Range    Creatinine 0.80 0.60 - 1.10 mg/dL    GFR Estimate 75 >60 mL/min/1.73m2   INR    Collection Time: 11/07/21  7:20 AM   Result Value Ref Range    INR 1.17 (H) 0.90 - 1.15         HOSPITAL MEDICATIONS       apixaban ANTICOAGULANT  5 mg Oral BID     atorvastatin  40 mg Oral QPM     enoxaparin ANTICOAGULANT  1.5 mg/kg Subcutaneous Q24H     lisinopril  20 mg Oral Daily     metoprolol succinate ER  25 mg Oral Daily     mirtazapine  15 mg Oral At Bedtime     sodium chloride (PF)  3 mL Intracatheter Q8H        Total time spent for face to face visit, reviewing labs/imaging studies, counseling and coordination of care was: 30 Minutes More than 50% of this time was spent on counseling and coordination of care.      This note was dictated using voice recognition software.  Any grammatical or context distortions are unintentional and inherent to the software.

## 2021-11-08 NOTE — PLAN OF CARE
Nih 6 today. Pt unable to participate in most of the assessments.  Non verbal for me so far today. Nod head yes an no. Assist of one to the bed or chair.  Fed self her pureed diet.

## 2021-11-09 ENCOUNTER — APPOINTMENT (OUTPATIENT)
Dept: PHYSICAL THERAPY | Facility: HOSPITAL | Age: 71
End: 2021-11-09
Payer: COMMERCIAL

## 2021-11-09 ENCOUNTER — PATIENT OUTREACH (OUTPATIENT)
Dept: CARE COORDINATION | Facility: CLINIC | Age: 71
End: 2021-11-09
Payer: COMMERCIAL

## 2021-11-09 VITALS
HEART RATE: 76 BPM | RESPIRATION RATE: 18 BRPM | TEMPERATURE: 98.5 F | DIASTOLIC BLOOD PRESSURE: 72 MMHG | WEIGHT: 120.1 LBS | OXYGEN SATURATION: 95 % | HEIGHT: 60 IN | SYSTOLIC BLOOD PRESSURE: 115 MMHG | BODY MASS INDEX: 23.58 KG/M2

## 2021-11-09 LAB
GLUCOSE BLDC GLUCOMTR-MCNC: 75 MG/DL (ref 70–99)
HOLD SPECIMEN: NORMAL
HOLD SPECIMEN: NORMAL
INR PPP: 1.69 (ref 0.9–1.15)

## 2021-11-09 PROCEDURE — 99239 HOSP IP/OBS DSCHRG MGMT >30: CPT | Performed by: INTERNAL MEDICINE

## 2021-11-09 PROCEDURE — 250N000013 HC RX MED GY IP 250 OP 250 PS 637: Performed by: FAMILY MEDICINE

## 2021-11-09 PROCEDURE — 99232 SBSQ HOSP IP/OBS MODERATE 35: CPT | Performed by: PSYCHIATRY & NEUROLOGY

## 2021-11-09 PROCEDURE — 97530 THERAPEUTIC ACTIVITIES: CPT | Mod: GP

## 2021-11-09 PROCEDURE — 36415 COLL VENOUS BLD VENIPUNCTURE: CPT | Performed by: FAMILY MEDICINE

## 2021-11-09 PROCEDURE — 85610 PROTHROMBIN TIME: CPT | Performed by: FAMILY MEDICINE

## 2021-11-09 PROCEDURE — 250N000013 HC RX MED GY IP 250 OP 250 PS 637: Performed by: PSYCHIATRY & NEUROLOGY

## 2021-11-09 PROCEDURE — 97116 GAIT TRAINING THERAPY: CPT | Mod: GP

## 2021-11-09 RX ORDER — ATORVASTATIN CALCIUM 40 MG/1
40 TABLET, FILM COATED ORAL EVERY EVENING
Qty: 90 TABLET | Refills: 0 | Status: ON HOLD | OUTPATIENT
Start: 2021-11-09 | End: 2023-01-01

## 2021-11-09 RX ORDER — METOPROLOL SUCCINATE 50 MG/1
50 TABLET, EXTENDED RELEASE ORAL DAILY
Qty: 90 TABLET | Refills: 0 | Status: ON HOLD | OUTPATIENT
Start: 2021-11-09 | End: 2023-01-01

## 2021-11-09 RX ORDER — LISINOPRIL 40 MG/1
40 TABLET ORAL DAILY
Qty: 90 TABLET | Refills: 0 | Status: ON HOLD | OUTPATIENT
Start: 2021-11-09 | End: 2023-01-01

## 2021-11-09 RX ADMIN — METOPROLOL SUCCINATE 25 MG: 25 TABLET, EXTENDED RELEASE ORAL at 08:22

## 2021-11-09 RX ADMIN — APIXABAN 5 MG: 5 TABLET, FILM COATED ORAL at 08:22

## 2021-11-09 ASSESSMENT — ACTIVITIES OF DAILY LIVING (ADL)
ADLS_ACUITY_SCORE: 14
ADLS_ACUITY_SCORE: 20
ADLS_ACUITY_SCORE: 18
ADLS_ACUITY_SCORE: 18
ADLS_ACUITY_SCORE: 20
ADLS_ACUITY_SCORE: 20
ADLS_ACUITY_SCORE: 18
ADLS_ACUITY_SCORE: 14
ADLS_ACUITY_SCORE: 16
ADLS_ACUITY_SCORE: 16
ADLS_ACUITY_SCORE: 20
ADLS_ACUITY_SCORE: 18
ADLS_ACUITY_SCORE: 18

## 2021-11-09 NOTE — PLAN OF CARE
Problem: Adult Inpatient Plan of Care  Goal: Plan of Care Review  Outcome: Improving     Problem: Adult Inpatient Plan of Care  Goal: Optimal Comfort and Wellbeing  Outcome: Improving     Problem: Adjustment to Illness (Stroke, Ischemic/Transient Ischemic Attack)  Goal: Optimal Coping  Outcome: Improving     Problem: Communication Impairment (Stroke, Ischemic/Transient Ischemic Attack)  Goal: Improved Communication Skills  Outcome: Improving    Pt drowsy but easily arousable for assessments on NOC. Answering questions appropriately although very slurred and difficult for  to understand. Patient scored 7 on NIH d/t not answering questions correctly, drowsiness, slurring of words. Up to chair this am, ambulating well with SBA. Denies pain or discomfort. BP slightly elevated, although per Neurology ok for slight elevations. Tele: NSR

## 2021-11-09 NOTE — PROGRESS NOTES
NEUROLOGY INPATIENT PROGRESS NOTE       [facility] NEUROLOGY [place]  [address] [place] [address]  Tel: [phone]  Fax: [phone]  [email or web address]     ASSESSMENT & PLAN   Hospital Day#: 4  Visit diagnosis:  Lacunar infarction    Right internal capsule lacunar infarction  71-year-old female with history of HTN, atrial fibrillation, AVR admitted with speech difficulty  CT of the head showed small vessel ischemic disease.  CTA showed moderate to severe intracranial atherosclerotic disease, including left M2, left vertebral, basilar, right P2 internal carotids were patent.  Incidentally A2 3 mm aneurysm noted.  This is an incidental finding but will need yearly CTA to ensure stability  MRI brain confirmed right internal capsule lacunar infarct with additional multifocal microhemorrhages likely due to untreated underlying hypertension  Echocardiogram showed normal ejection fraction with no significant valvular heart abnormality  Although patient has history of atrial fibrillation she was not taking Coumadin and was switched to Eliquis.  Continue Lipitor with goal of LDL less than 70 discharge when okay with hospitalist.  Follow-up with me in 6 to 8 weeks    Neurology Discharge Planning :   Discharge when okay with hospitalist    Rodrick Pena MD  [facility]  ([facility], [facility]     PROBLEM LIST      Patient Active Problem List   Diagnosis Code     Mood disorder (H) F39     Benign essential hypertension I10     Hx of medication noncompliance Z91.14     Urinary incontinence R32     Ascending aortic aneurysm (H) I71.2     UGIB (upper gastrointestinal bleed) K92.2     Anticoagulated on Coumadin Z79.01     H/O aortic valve replacement Z95.2     Hx of repair of dissecting aneurysm of ascending thoracic aorta Z98.890, Z86.79     Anemia due to blood loss, acute D62     Duodenal ulcer due to Helicobacter pylori K26.9, B96.81      Iron deficiency anemia, unspecified D50.9     Right-sided lacunar infarction (H) I63.81     Dysphasia R47.02     Middle cerebral artery stenosis, bilateral I66.03     Normocytic anemia D64.9       Past Medical History:   Patient  has a past medical history of Anticoagulated on Coumadin, Aortic aneurysm (H), and Hypertension (12/19/2019).     SUBJECTIVE     No significant change in neuro status.  Patient alert but at times confused family feels that patient is doing well     OBJECTIVE     Vital signs in last 24 hours  Temp:  [97.5  F (36.4  C)-98.6  F (37  C)] 98.2  F (36.8  C)  Pulse:  [69-88] 69  Resp:  [16-19] 19  BP: (114-157)/(59-78) 157/74  SpO2:  [93 %-96 %] 96 %    Weight:   Wt Readings from Last 1 Encounters:   11/08/21 54.5 kg (120 lb 1.6 oz)         I/O last 24 hours  Intake/Output Summary (Last 24 hours) at 11/5/2021 1830  Last data filed at 11/5/2021 0528  Gross per 24 hour   Intake --   Output 900 ml   Net -900 ml       Review of Systems   Pertinent items are noted in HPI.    General Physical Exam: Patient is alert and oriented x 1. Vital signs were reviewed and are documented in EMR. Neck was supple, no Carotid bruit, thyromegaly, JVD or lymphadenopathy noted.  Neurological Exam:  Patient is alert and oriented wakes up when name called and able to follow some commands.  Speech appears dysarthric according to family member no aphasia.  Cranial nerves are intact left pronator drift.  Reflexes 1+ with equivocal toe on the left downgoing toe on the right gait testing deferred     DIAGNOSTIC STUDIES     Pertinent Radiology   Following imaging studies were reviewed:    CT  HEAD CT:   1.  No definite acute intracranial hemorrhage or acute large territory infarction. Age-indeterminate lacunar type infarctions identified involving the right caudate nucleus, right putamen, right subinsular region, right jay and left cerebellum. Patchy   hypodensity is identified involving the right corona radiata/centrum  semiovale which may reflect chronic small vessel ischemic change or an age-indeterminate region of ischemic injury.   2.  Chronic senescent and presumed microvascular ischemic changes, as above.     HEAD CTA:   1.  No definite acute large territory infarction.   2.  Moderate to severe short segment stenosis identified involving the left cavernous and supraclinoid internal carotid arteries, as above.   3.  Moderate short segment stenosis involving the proximal left M2 anterior temporal division middle cerebral artery.   4.  Mild to moderate stenosis involving the left intradural vertebral artery and basilar artery.   5.  Moderate to severe short segment stenosis involving the distal right P2 posterior cerebral artery.   6.  Additional intracranial stenoses, as above.   7.  Short segment fusiform aneurysm identified involving the left proximal A2 anterior cerebral artery measuring up to 3 mm.   8.  Poststenotic dilatation versus fusiform aneurysm involving the proximal left anterior temporal M2 middle cerebral artery measuring up to 4 mm.     NECK CTA:   1.  Scattered atheromatous disease without significant stenosis or dissection.   2.  Incompletely imaged and presumed descending thoracic aortic aneurysm. Consider CTA chest for further assessment.   3.  Heterogeneous multinodular thyroid. Recommend follow-up thyroid ultrasound for further assessment.     MRI  1.  Small acute lacunar infarct in the posterior limb of the right internal capsule.   2.  Chronic ischemic and age-related changes as described.   3.  Multifocal chronic microhemorrhages. Appearance is nonspecific and differential diagnosis includes chronic hypertensive microhemorrhages or sequela of prior trauma, however, early cerebral amyloid angiopathy could have a similar appearance.     Echocardiogram  Echo result w/o MOPS: Interpretation Summary There is mild concentric left ventricular hypertrophy.The visual ejection fraction is 55-60%.No regional wall  motion abnormalities noted.There is mild (1+) mitral regurgitation.There is mild trileaflet aortic sclerosis.    Pertinent Labs   Lab Results: Personally Reviewed  Recent Results (from the past 24 hour(s))   INR    Collection Time: 11/08/21  6:48 AM   Result Value Ref Range    INR 1.27 (H) 0.90 - 1.15   Extra Purple Top Tube    Collection Time: 11/08/21  6:48 AM   Result Value Ref Range    Hold Specimen JIC    Extra Red Top Tube    Collection Time: 11/08/21  6:48 AM   Result Value Ref Range    Hold Specimen JIC    Glucose by meter    Collection Time: 11/08/21  8:58 AM   Result Value Ref Range    GLUCOSE BY METER POCT 102 (H) 70 - 99 mg/dL   Glucose by meter    Collection Time: 11/08/21 12:42 PM   Result Value Ref Range    GLUCOSE BY METER POCT 85 70 - 99 mg/dL         HOSPITAL MEDICATIONS       apixaban ANTICOAGULANT  5 mg Oral BID     atorvastatin  40 mg Oral QPM     lisinopril  40 mg Oral Daily     metoprolol succinate ER  25 mg Oral Daily     mirtazapine  15 mg Oral At Bedtime     sodium chloride (PF)  3 mL Intracatheter Q8H        Total time spent for face to face visit, reviewing labs/imaging studies, counseling and coordination of care was: 30 Minutes More than 50% of this time was spent on counseling and coordination of care.      This note was dictated using voice recognition software.  Any grammatical or context distortions are unintentional and inherent to the software.

## 2021-11-09 NOTE — PLAN OF CARE
Physical Therapy Discharge Summary    Reason for therapy discharge:    Discharged to home with home therapy.    Progress towards therapy goal(s). See goals on Care Plan in [facility] electronic health record for goal details.  Goals met    Therapy recommendation(s):    Continued therapy is recommended.  Rationale/Recommendations:  home PT for CVA rehab.    Juany Castaneda, PT

## 2021-11-09 NOTE — PLAN OF CARE
"Speech Language Therapy Discharge Summary    Reason for therapy discharge:    Discharged to home with home therapy.    Progress towards therapy goal(s). See goals on Care Plan in [facility] electronic health record for goal details.  Goals partially met.  Barriers to achieving goals:   discharge from facility.    Therapy recommendation(s):    Continued therapy is recommended.  Rationale/Recommendations:  See below.    SLP note from 11/8/2021: Patient lethargic. Her speech remains quite dysarthric, with daughter unable to understand her at times, and  (present via Jabber) unable to hear patient most of the time. Patient stated that she \"can't swallow\". Reviewed results of VFSS (11/6/21) with patient and daughter and explained rationale for current diet of pureed textures and moderately thick liquids. Observed patient with several sips of moderately thick juice via spoon. Occasional brief bolus holding noted. No overt clinical s/sx of aspiration. Also observed her with several bites of applesauce. Patient frequently demonstrated prolonged bolus holding with puree. She often required max verbal and tactile cues and increased time to initiate a swallow. Per recent CM documentation, probable discharge home tomorrow with home care services. Explained to daughter that, to minimize risk of aspiration/choking, patient should continue current diet at discharge. Recommend home care Speech Therapy to continue to address dysphagia. Daughter verbalized understanding of this information and was in agreement with these recommendations.      "

## 2021-11-09 NOTE — PLAN OF CARE
Occupational Therapy Discharge Summary    Reason for therapy discharge:    Discharged to home with home therapy.    Progress towards therapy goal(s). See goals on Care Plan in [facility] electronic health record for goal details.  Goals partially met.  Barriers to achieving goals:   discharge from facility.    Therapy recommendation(s):    Continued therapy is recommended.  Rationale/Recommendations:  not at baseline ADL functioning.

## 2021-11-09 NOTE — DISCHARGE SUMMARY
[facility]  Hospitalist Discharge Summary      Date of Admission:  11/4/2021  Date of Discharge:  11/9/2021  Discharging Provider: Héctor Russo MD      Discharge Diagnoses   Acute CVA  Slow speech with dysphagia, improving  History of AAA  History of aortic valve replacement  Accelerated hypertension, resolved  Hypoglycemia, resolved  Weakness and deconditioning  Hyperlipidemia    Follow-ups Needed After Discharge   Follow-up Appointments     Follow-up and recommended labs and tests       Follow up with primary care provider, Eriac Canada, within 7 days for   hospital follow- up.  No follow up labs or test are needed.    Follow up with neurology as scheduled             Unresulted Labs Ordered in the Past 30 Days of this Admission     No orders found from 10/5/2021 to 11/5/2021.      These results will be followed up by PCP    Discharge Disposition   Discharged to home with home care  Condition at discharge: Stable      Hospital Course   71-year-old female with past medical history of thoracic and abdominal aortic aneurysm, HTN, AVR presents to the emergency room, with slurred speech concerning for CVA.,  She was admitted with     Acute CVA  - Patient had stopped taking her home medication including warfarin about a month ago.  - CT head and neck show no definite acute infarction but with age intermediate lacunar infarction in the right and left sides of the brain as well as chronic microvascular changes.  - Patient is outside TPA window  - MRI brain shows small acute lacunar infarction in the posterior limb of right internal capsule  - Neurology consult, appreciate input  - Unremarkable neuro telemetry  - PT/OT and speech evaluation   - Unremarkable echo except for mild concentric left ventricular hypertrophy with EF of 55 to 60%  - Anticoagulation changed from warfarin to Eliquis because of noncompliance as recommended by neurology  - Continue PT/OT and speech therapy on  discharge     Slurred speech with aphasia  - Secondary to acute CVA  - Speech evaluation  - Patient failed speech study and should be n.p.o on admission  -Had video swallow test on 11/6/2021 which shows aspiration of clear liquid  - Started on puréed diet with honey thickened liquids as recommended by speech  - Continue speech therapy with home care     History of AAA  - Patient with know descending thoracic aortic aneurysm as well as abdominal aortic aneurysm (with dissection).    - Patient did have aortic root reconstruction and abdominal aneurysm repair in 4/2018.   - Patient then had thoracic aortic aneurysm diagnosed in 2019.     Aortic valve replacement  - Patient should be on warfarin but has not taken for the past 1 month because she ran out of her prescription  -Discontinue Lovenox and warfarin because of noncompliance  - Change to Eliquis by neurologist given her noncompliance with warfarin  - Unremarkable echo for significant acute changes     Accelerated hypertension  -Secondary to acute CVA plus patient stopped her medication a month ago  - Allow permissive blood pressure for CVA   - hold home lisinopril initially, restart if blood pressure is elevated.  - Continue metoprolol at half home dose for 1 day per stroke protocol recommendations.  - Add IV hydralazine as needed  - Avoid hypotension  - Restart home medication on discharge and discussed with patient the importance of medication compliance.    Hypoglycemia  - Secondary to n.p.o. status  - Change IV fluids to dextrose normal saline  - Continue to monitor blood sugar  - Low hemoglobin A1c at 4.7  - Discontinue IV fluid and monitor oral intake today     Hyperlipidemia  - Lipid panel shows elevation of cholesterol and TG, low of HDL  - Patient has stopped her statin  - Started on Lipitor, increase dose on discharge     Normocytic anemia  - History of priorGI bleed  - Patient with recent upper GI bleed in the setting of H. pylori infection.   -  Hemoglobin stable  - Continue home iron supplement as oral diet is started     Weakness and deconditioning  - PT/OT evaluation  - Family declined TCU  - Patient would need home care on discharge    Consultations This Hospital Stay   NEUROLOGY IP CONSULT  SPEECH LANGUAGE PATH ADULT IP CONSULT  PHARMACY IP CONSULT  PHARMACY IP CONSULT  PHARMACY IP CONSULT  PHYSICAL THERAPY ADULT IP CONSULT  OCCUPATIONAL THERAPY ADULT IP CONSULT  REHAB ADMISSIONS LIAISON IP CONSULT  CARE MANAGEMENT / SOCIAL WORK IP CONSULT  PHARMACY TO DOSE WARFARIN  SMOKING CESSATION PROGRAM IP CONSULT    Code Status   Full Code    Time Spent on this Encounter   I, Héctor Russo MD, personally saw the patient today and spent greater than 30 minutes discharging this patient.       Héctor Russo MD  [address] [address]  Phone: [phone]  Fax: [phone]  ______________________________________________________________________    Physical Exam   Vital Signs: Temp: 98.5  F (36.9  C) Temp src: Oral BP: 115/72 Pulse: 76   Resp: 18 SpO2: 95 % O2 Device: None (Room air)    Weight: 120 lbs 1.6 oz  Constitutional: awake, alert, cooperative, no apparent distress, and appears stated age  Eyes: Lids and lashes normal, pupils equal, round and reactive to light, extra ocular muscles intact, sclera clear, conjunctiva normal  ENT: Normocephalic, without obvious abnormality, atraumatic, sinuses nontender on palpation, external ears without lesions, oral pharynx with moist mucous membranes, tonsils without erythema or exudates, gums normal and good dentition.  Respiratory: No increased work of breathing, good air exchange, clear to auscultation bilaterally, no crackles or wheezing  Cardiovascular: Normal apical impulse, regular rate and rhythm, normal S1 and S2, no S3 or S4, and no murmur noted  GI: No scars, normal bowel sounds, soft, non-distended, non-tender, no masses palpated, no  hepatosplenomegally  Skin: no bruising or bleeding and normal skin color, texture, turgor  Musculoskeletal: There is no redness, warmth, or swelling of the joints.  Full range of motion noted.  no lower extremity pitting edema present  Neurologic: Awake, alert, oriented to name, place only.  Left facial droop.  Slurred speech.  Neuropsychiatric: Appropriate with examiner       Primary Care Physician   Erica Canada    Discharge Orders      Home Care PT Referral for Hospital Discharge      Home Care OT Referral for Hospital Discharge      Home Care SLP Referral for Hospital Discharge      Reason for your hospital stay    Acute CVA     Follow-up and recommended labs and tests     Follow up with primary care provider, Erica Canada, within 7 days for hospital follow- up.  No follow up labs or test are needed.    Follow up with neurology as scheduled     Activity    Your activity upon discharge: activity as tolerated     MD face to face encounter    Documentation of Face to Face and Certification for Home Health Services    I certify that patient: Shelly Becerril is under my care and that I, or a nurse practitioner or physician's assistant working with me, had a face-to-face encounter that meets the physician face-to-face encounter requirements with this patient on: 11/9/21.    This encounter with the patient was in whole, or in part, for the following medical condition, which is the primary reason for home health care: .71-year-old female with past medical history of thoracic and abdominal aortic aneurysm, HTN, AVR presents to the emergency room, with slurred speech concerning for CVA.,  She was admitted with Acute CVA    I certify that, based on my findings, the following services are medically necessary home health services: Nursing, Occupational Therapy, and Physical Therapy.    My clinical findings support the need for the above services because: Nurse is needed: To provide assessment and oversight required in the  home to assure adherence to the medical plan due to: non compliance .., Occupational Therapy Services are needed to assess and treat cognitive ability and address ADL safety due to impairment in ambulation ., and Physical Therapy Services are needed to assess and treat the following functional impairments: ambulation.    Further, I certify that my clinical findings support that this patient is homebound (i.e. absences from home require considerable and taxing effort and are for medical reasons or [employer] services or infrequently or of short duration when for other reasons) because: Requires assistance of another person or specialized equipment to access medical services because patient: Is unable to operate assistive equipment on their own...    Based on the above findings. I certify that this patient is confined to the home and needs intermittent skilled nursing care, physical therapy and/or speech therapy.  The patient is under my care, and I have initiated the establishment of the plan of care.  This patient will be followed by a physician who will periodically review the plan of care.  Physician/Provider to provide follow up care: Erica Canada    Attending hospital physician (the Medicare certified [place] provider): Héctor Russo MD  Physician Signature: See electronic signature associated with these discharge orders.  Date: 11/9/2021     Diet    Follow this diet upon discharge: Orders Placed This Encounter      Pureed Diet (level 4) Liquidized/Moderately Thick (level 3)       Significant Results and Procedures   Most Recent 3 CBC's:Recent Labs   Lab Test 11/07/21  0720 11/06/21  0759 11/04/21 2006 05/26/21  1050 04/06/21  1315 04/06/21  0630   WBC  --  5.2 7.4  --   --  4.9   HGB  --  8.4* 8.5* 8.2*   < > 8.2*   MCV  --  82 83  --   --  87    289 314  --   --  178    < > = values in this interval not displayed.     Most Recent 3 BMP's:Recent Labs   Lab Test 11/09/21  0859 11/08/21  1242  11/08/21  0858 11/07/21  0720 11/06/21  0840 11/06/21  0758 11/05/21  1800 11/04/21 2006 11/04/21 1947 04/06/21  0630   NA  --   --   --   --   --  144  --  143  --  141   POTASSIUM  --   --   --   --   --  3.8  --  3.7  --  4.2   CHLORIDE  --   --   --   --   --  116*  --  111*  --  113*   CO2  --   --   --   --   --  23  --  25  --  27   BUN  --   --   --   --   --  13  --  20  --  30*   CR  --   --   --  0.80  --  0.83  --  0.98   < > 0.91   ANIONGAP  --   --   --   --   --  5  --  7  --  1*   FAVIOLA  --   --   --   --   --  9.1  --  9.0  --  8.3*   GLC 75 85 102*  --    < > 82   < > 115   < > 100    < > = values in this interval not displayed.   ,   Results for orders placed or performed during the hospital encounter of 11/04/21   CTA Head Neck with Contrast    Narrative    EXAM: CTA  HEAD NECK WITH CONTRAST  LOCATION: [facility]  DATE/TIME: 11/4/2021 8:01 PM    INDICATION: Code Stroke to evaluate for potential thrombolysis and thrombectomy. Slurred speech.  COMPARISON: None.  CONTRAST: Isovue 370, 75 mL.  TECHNIQUE: Head and neck CT angiogram with IV contrast. Noncontrast head CT followed by axial helical CT images of the head and neck vessels obtained during the arterial phase of intravenous contrast administration. Axial 2D reconstructed images and   multiplanar 3D MIP reconstructed images of the head and neck vessels were performed by the technologist. Dose reduction techniques were used. All stenosis measurements made according to NASCET criteria unless otherwise specified.    FINDINGS:   NONCONTRAST HEAD CT:   INTRACRANIAL CONTENTS: No intracranial hemorrhage, extraaxial collection, or mass effect. No definite large territory infarction. Age-indeterminate lacunar type infarctions identified involving the right caudate nucleus, right putamen, right subinsular   region, right jay and left cerebellum. Patchy hypodensity is identified involving the right corona radiata/centrum  semiovale which may reflect chronic small vessel ischemic change or an age-indeterminate region of ischemic injury. No definite large   territory infarction. Mild presumed chronic small vessel ischemic changes. Mild generalized volume loss. No hydrocephalus. Note is made of coarse atherosclerotic calcifications of the intracranial vasculature.     VISUALIZED ORBITS/SINUSES/MASTOIDS: No intraorbital abnormality. Right frontal sinus mucous retention cyst. No middle ear or mastoid effusion.    BONES/SOFT TISSUES: No acute abnormality.    HEAD CTA: Venous contamination degrades evaluation.    ANTERIOR CIRCULATION: There is a proximal left A2 anterior cerebral artery fusiform aneurysm measuring 2 x 3 x 3 mm (AP x TV x CC as seen on image 353 series 7). There is severe short segment stenosis identified involving the left proximal cavernous ICA   (image 320 series 7). Additionally there is moderate to severe stenosis of the left proximal supraclinoid ICA (image 335 series 7). Mild narrowing involving the left M1 middle cerebral artery proximally. There is moderate short segment narrowing of the   left M2 anterior temporal division middle cerebral artery proximally. Question fusiform poststenotic dilatation versus short segment fusiform aneurysm measuring up to 4 x 3 mm in greatest axial dimensions (image 341 series 7) involving the left proximal   anterior temporal division middle artery.  No proximal large vessel occlusion. No high flow vascular malformation. Standard [place] of [place] anatomy.    POSTERIOR CIRCULATION: Mild scattered plaque identified involving the bilateral intradural vertebral arteries, left greater than right. Mild to moderate narrowing is identified involving the mid to distal left intradural vertebral artery. Dense   noncalcified plaque is identified involving the proximal basilar artery which causes mild to moderate stenosis. Mild narrowing is identified involving the distal basilar artery due to  noncalcified plaque. The bilateral superior cerebellar arteries are   patent. Mild narrowing identified involving the right P1 posterior cerebral artery. Mild short segment narrowing involving the left proximal to mid P2 posterior cerebral artery. Moderate to severe short segment stenosis involving the distal right P2   posterior cerebral artery. Dominant left and smaller right vertebral artery contribute to a normal basilar artery.     DURAL VENOUS SINUSES: Expected enhancement of the major dural venous sinuses.    NECK CTA:  RIGHT CAROTID: No signal stenosis or dissection. Scattered plaque.    LEFT CAROTID: No significant stenosis or dissection. Scattered plaque.    VERTEBRAL ARTERIES: No focal stenosis or dissection. Dominant left and smaller right vertebral arteries.    AORTIC ARCH: The aortic arch is incompletely imaged. Scattered noncalcified plaque and calcified plaque is noted along the aortic arch. No high-grade stenosis identified involving the origins of the imaged great vessels. The patient is status post median   sternotomy. Probable descending thoracic aortic aneurysm, incompletely imaged.    NONVASCULAR STRUCTURES: Enlarged multinodular thyroid.      Impression    IMPRESSION:   HEAD CT:  1.  No definite acute intracranial hemorrhage or acute large territory infarction. Age-indeterminate lacunar type infarctions identified involving the right caudate nucleus, right putamen, right subinsular region, right jay and left cerebellum. Patchy   hypodensity is identified involving the right corona radiata/centrum semiovale which may reflect chronic small vessel ischemic change or an age-indeterminate region of ischemic injury.  2.  Chronic senescent and presumed microvascular ischemic changes, as above.    HEAD CTA:   1.  No definite acute large territory infarction.  2.  Moderate to severe short segment stenosis identified involving the left cavernous and supraclinoid internal carotid arteries, as above.  3.   Moderate short segment stenosis involving the proximal left M2 anterior temporal division middle cerebral artery.  4.  Mild to moderate stenosis involving the left intradural vertebral artery and basilar artery.  5.  Moderate to severe short segment stenosis involving the distal right P2 posterior cerebral artery.  6.  Additional intracranial stenoses, as above.  7.  Short segment fusiform aneurysm identified involving the left proximal A2 anterior cerebral artery measuring up to 3 mm.  8.  Poststenotic dilatation versus fusiform aneurysm involving the proximal left anterior temporal M2 middle cerebral artery measuring up to 4 mm.    NECK CTA:  1.  Scattered atheromatous disease without significant stenosis or dissection.  2.  Incompletely imaged and presumed descending thoracic aortic aneurysm. Consider CTA chest for further assessment.   3.  Heterogeneous multinodular thyroid. Recommend follow-up thyroid ultrasound for further assessment.    Noncontrast head CT findings discussed with Dr. Ellis at 8:16 PM on 11/04/2021.    CTA head neck findings discussed with Dr. Ellis at 8:31 PM on 11/04/2021.   MR Brain w/o & w Contrast    Narrative    EXAM: MR BRAIN W/O and W CONTRAST  LOCATION: [facility]  DATE/TIME: 11/5/2021 2:31 AM    INDICATION: Neuro deficit, acute, stroke suspected. Slurred speech.  COMPARISON: 11/04/2021  CONTRAST: 5ml Gadavist  TECHNIQUE: Routine multiplanar multisequence head MRI without and with intravenous contrast.    FINDINGS:  INTRACRANIAL CONTENTS: There is a 4 mm focus of diffusion restriction in the posterior limb of the right internal capsule. Subtle associated FLAIR hyperintensity. Findings are consistent with an acute infarct. Chronic lacunar infarcts in the right   caudate nucleus, right external capsule, bilateral jay, left thalamus, and bilateral cerebellar hemispheres. No mass, acute hemorrhage, or extra-axial fluid collections. Scattered chronic  microhemorrhages in the bilateral cerebral hemispheres   subcortical white matter, the splenium of the corpus callosum, the left centrum semiovale, the right thalamus, the bilateral jay, and left cerebellum. Patchy and confluent nonspecific T2/FLAIR hyperintensities within the cerebral white matter most   consistent with moderate chronic microvascular ischemic change. Mild to moderate generalized cerebral atrophy. No hydrocephalus. Normal position of the cerebellar tonsils. No pathologic contrast enhancement.    SELLA: No abnormality accounting for technique.    OSSEOUS STRUCTURES/SOFT TISSUES: Normal marrow signal. The major intracranial vascular flow voids are maintained.     ORBITS: No abnormality accounting for technique.     SINUSES/MASTOIDS: Mucous retention cyst in the right nasofrontal recess. Mild mucosal thickening of ethmoid air cells. No middle ear or mastoid effusion.       Impression    IMPRESSION:  1.  Small acute lacunar infarct in the posterior limb of the right internal capsule.  2.  Chronic ischemic and age-related changes as described.  3.  Multifocal chronic microhemorrhages. Appearance is nonspecific and differential diagnosis includes chronic hypertensive microhemorrhages or sequela of prior trauma, however, early cerebral amyloid angiopathy could have a similar appearance.    Findings were discussed with Dr. Guerrero at 3:14 AM on 11/05/2021.   XR Video Swallow with SLP or OT    Narrative    EXAM: XR VIDEO SWALLOW WITH SLP OR OT  LOCATION: [facility]  DATE/TIME: 11/6/2021 1:47 PM    INDICATION: Difficulty swallowing.  COMPARISON: None.    TECHNIQUE: Routine swallow study with speech pathology using multiple barium thicknesses.    FINDINGS:   FLUOROSCOPIC TIME: 1.5 minutes  NUMBER OF IMAGES: 8    Swallow study with Speech Pathology using multiple barium thicknesses. Aspiration with thin liquid noted. Episode of penetration with nectar. No penetration or aspiration with  honey or pudding. Consistent delayed AP movement of oral bolus.         Echocardiogram Complete - For age > 60 yrs     Value    LVEF  55-60%    Narrative    [id number]  [id number]  [id number]  [id number]^[id number]^RONY^[place] [address]     Name: JAYESH HOOVER  MRN: [id number]  : 1950  Study Date: 2021 02:50 PM  Age: 71 yrs  Gender: Female  Patient Location: [facility]  Reason For Study: Cerebrovascular Incident  History: Bioprosthetic AVR (2018); Ascending Aorta Disscection with graft  repair (2018)  Ordering Physician: RONY JONES  Performed By: MADISON     BSA: 1.5 m2  Height: 60 in  Weight: 119 lb  HR: 89  BP: 140/75 mmHg  ______________________________________________________________________________  Procedure  Complete Portable Echo Adult.  ______________________________________________________________________________  Interpretation Summary     There is mild concentric left ventricular hypertrophy.  The visual ejection fraction is 55-60%.  No regional wall motion abnormalities noted.  There is mild (1+) mitral regurgitation.  There is mild trileaflet aortic sclerosis.  ______________________________________________________________________________  Left Ventricle  The left ventricle is normal in size. There is mild concentric left  ventricular hypertrophy. The visual ejection fraction is 55-60%. No regional  wall motion abnormalities noted.     Right Ventricle  The right ventricle is normal size. TAPSE is abnormal, which is consistent  with abnormal right ventricular systolic function. Mildly decreased right  ventricular systolic function.     Atria  Normal left atrial size. Right atrial size is normal. Intact atrial septum.     Mitral Valve  The mitral valve leaflets appear thickened, but open well. There is mild (1+)  mitral regurgitation.     Tricuspid Valve  Tricuspid valve leaflets appear normal. There is no evidence of tricuspid  stenosis or clinically  significant tricuspid regurgitation.     Aortic Valve  There is mild trileaflet aortic sclerosis. No aortic regurgitation is present.  No aortic stenosis is present.     Pulmonic Valve  Normal pulmonic valve. There is trace pulmonic valvular regurgitation.     Vessels  The aorta root is normal. IVC diameter <2.1 cm collapsing >50% with sniff  suggests a normal RA pressure of 3 mmHg.     Pericardium  There is no pericardial effusion.     ______________________________________________________________________________  MMode/2D Measurements & Calculations  RVDd: 2.8 cm  IVSd: 1.3 cm  LVIDd: 4.1 cm  LVIDs: 2.9 cm  LVPWd: 1.2 cm  FS: 30.3 %     LV mass(C)d: 180.3 grams  LV mass(C)dI: 120.4 grams/m2  Ao root diam: 3.3 cm  LA dimension: 3.4 cm  asc Aorta Diam: 3.9 cm  LA/Ao: 1.0  LVOT diam: 1.9 cm  LVOT area: 2.8 cm2  LA Volume Indexed (AL/bp): 29.6 ml/m2  RWT: 0.58     Time Measurements  Aortic HR: 82.0 BPM  MM HR: 80.0 BPM     Doppler Measurements & Calculations  MV E max ryan: 73.7 cm/sec  MV A max ryan: 100.4 cm/sec  MV E/A: 0.73  MV dec slope: 436.2 cm/sec2  MV dec time: 0.17 sec  Ao V2 max: 181.9 cm/sec  Ao max P.0 mmHg  Ao V2 mean: 126.5 cm/sec  Ao mean P.0 mmHg  Ao V2 VTI: 34.1 cm  YAMILET(I,D): 1.8 cm2  YAMILET(V,D): 1.8 cm2  LV V1 max P.3 mmHg  LV V1 max: 115.1 cm/sec  LV V1 VTI: 21.7 cm  CO(LVOT): 5.0 l/min  CI(LVOT): 3.3 l/min/m2  SV(LVOT): 60.8 ml  SI(LVOT): 40.6 ml/m2  PA acc time: 0.11 sec  PI end-d ryan: 78.7 cm/sec  AV Ryan Ratio (DI): 0.63  YAMILET Index (cm2/m2): 1.2  E/E' av.7  Lateral E/e': 13.4  Medial E/e': 12.1     ______________________________________________________________________________  Report approved by: Stephon Goins 2021 04:07 PM               Discharge Medications   Current Discharge Medication List      START taking these medications    Details   apixaban ANTICOAGULANT (ELIQUIS) 5 MG tablet Take 1 tablet (5 mg) by mouth 2 times daily  Qty: 180 tablet, Refills: 1    Associated  Diagnoses: Acute CVA (cerebrovascular accident) (H)         CONTINUE these medications which have CHANGED    Details   atorvastatin (LIPITOR) 40 MG tablet Take 1 tablet (40 mg) by mouth every evening  Qty: 90 tablet, Refills: 0    Associated Diagnoses: Acute CVA (cerebrovascular accident) (H)      lisinopril (ZESTRIL) 40 MG tablet Take 1 tablet (40 mg) by mouth daily  Qty: 90 tablet, Refills: 0    Associated Diagnoses: Acute CVA (cerebrovascular accident) (H)      metoprolol succinate ER (TOPROL-XL) 50 MG 24 hr tablet Take 1 tablet (50 mg) by mouth daily  Qty: 90 tablet, Refills: 0    Associated Diagnoses: Essential hypertension         CONTINUE these medications which have NOT CHANGED    Details   ARTHRITIS PAIN RELIEF, ACETAM, 650 mg CR tablet [ARTHRITIS PAIN RELIEF, ACETAM, 650 MG CR TABLET] Take 650 mg by mouth every 8 (eight) hours as needed for pain (knee pain).       mirtazapine (REMERON) 15 MG tablet [MIRTAZAPINE (REMERON) 15 MG TABLET] Take 15 mg by mouth at bedtime.            Allergies   No Known Allergies

## 2021-11-09 NOTE — PLAN OF CARE
Patient is discharge to home with home care follow up. Patient and her daughter Quintin received discharge instructions. Follow up appointment scheduled. Patient has all her belongings and her daughter is providing transport to home.

## 2021-11-09 NOTE — PLAN OF CARE
Problem: Functional Ability Impaired (Stroke, Ischemic/Transient Ischemic Attack)  Goal: Optimal Functional Ability  Outcome: No Change     Problem: Adjustment to Illness (Stroke, Ischemic/Transient Ischemic Attack)  Goal: Optimal Coping  Outcome: Improving   Patient alert with confusion noted.  VSS.  No c/o pain.  Daughter visited and stated that she felt Phoua was doing better today.  Patient stated they weren't talking much due to throat irritation.  Lozenge offered but refused.  Patient had difficulty with [facility] due to need for  and difficulty speaking.

## 2021-11-09 NOTE — PROGRESS NOTES
Clinic Care Coordination Contact  Care Team Conversations    Patient identified for care management outreach, however [facility] RN staff has already followed up with patient to ensure they are following up with PCP and have needs and resources met. Clinic RN will refer back to JASON MARIN if needed/appropriate.    Gifty Wang [facility]  Clinic Care Coordinator  [facility]   [phone]

## 2021-11-09 NOTE — PROGRESS NOTES
Care Management Discharge Note    Discharge Date: 11/09/2021       Discharge Disposition: Home Care    Discharge Services:      Discharge DME:      Discharge Transportation: family or friend will provide    Private pay costs discussed: Not applicable    PAS Confirmation Code:    Patient/family educated on Medicare website which has current facility and service quality ratings: no    Education Provided on the Discharge Plan:    Persons Notified of Discharge Plans: home care  Patient/Family in Agreement with the Plan: yes    Handoff Referral Completed: Yes    Additional Information:  Discharge orders faxed via [employer] to [facility]         Susannah Lr RN

## 2022-01-01 ENCOUNTER — TRANSCRIBE ORDERS (OUTPATIENT)
Dept: FAMILY MEDICINE | Facility: CLINIC | Age: 72
End: 2022-01-01

## 2022-01-01 ENCOUNTER — LAB REQUISITION (OUTPATIENT)
Dept: LAB | Facility: CLINIC | Age: 72
End: 2022-01-01
Payer: COMMERCIAL

## 2022-01-01 DIAGNOSIS — D64.9 ANEMIA, UNSPECIFIED: ICD-10-CM

## 2022-01-01 DIAGNOSIS — I10 ESSENTIAL (PRIMARY) HYPERTENSION: ICD-10-CM

## 2022-01-01 DIAGNOSIS — I69.391 DYSPHAGIA AS LATE EFFECT OF STROKE: Primary | ICD-10-CM

## 2022-01-01 DIAGNOSIS — E78.5 HYPERLIPIDEMIA, UNSPECIFIED: ICD-10-CM

## 2022-01-01 LAB
ALBUMIN SERPL-MCNC: 3.3 G/DL (ref 3.5–5)
ALP SERPL-CCNC: 123 U/L (ref 45–120)
ALT SERPL W P-5'-P-CCNC: 15 U/L (ref 0–45)
ANION GAP SERPL CALCULATED.3IONS-SCNC: 13 MMOL/L (ref 7–15)
ANION GAP SERPL CALCULATED.3IONS-SCNC: 9 MMOL/L (ref 5–18)
AST SERPL W P-5'-P-CCNC: 20 U/L (ref 0–40)
BILIRUB SERPL-MCNC: 0.5 MG/DL (ref 0–1)
BUN SERPL-MCNC: 26.3 MG/DL (ref 8–23)
BUN SERPL-MCNC: 28 MG/DL (ref 8–28)
CALCIUM SERPL-MCNC: 8.7 MG/DL (ref 8.5–10.5)
CALCIUM SERPL-MCNC: 9.1 MG/DL (ref 8.8–10.2)
CHLORIDE BLD-SCNC: 112 MMOL/L (ref 98–107)
CHLORIDE SERPL-SCNC: 103 MMOL/L (ref 98–107)
CHOLEST SERPL-MCNC: 223 MG/DL
CO2 SERPL-SCNC: 23 MMOL/L (ref 22–31)
CREAT SERPL-MCNC: 1.01 MG/DL (ref 0.6–1.1)
CREAT SERPL-MCNC: 1.22 MG/DL (ref 0.51–0.95)
DEPRECATED HCO3 PLAS-SCNC: 20 MMOL/L (ref 22–29)
GFR SERPL CREATININE-BSD FRML MDRD: 48 ML/MIN/1.73M2
GFR SERPL CREATININE-BSD FRML MDRD: 60 ML/MIN/1.73M2
GLUCOSE BLD-MCNC: 107 MG/DL (ref 70–125)
GLUCOSE SERPL-MCNC: 91 MG/DL (ref 70–99)
HDLC SERPL-MCNC: 49 MG/DL
HGB BLD-MCNC: 12.1 G/DL (ref 11.7–15.7)
LDLC SERPL CALC-MCNC: 151 MG/DL
POTASSIUM BLD-SCNC: 3.9 MMOL/L (ref 3.5–5)
POTASSIUM SERPL-SCNC: 4 MMOL/L (ref 3.4–5.3)
PROT SERPL-MCNC: 6.9 G/DL (ref 6–8)
SODIUM SERPL-SCNC: 136 MMOL/L (ref 136–145)
SODIUM SERPL-SCNC: 144 MMOL/L (ref 136–145)
TRIGL SERPL-MCNC: 115 MG/DL

## 2022-01-01 PROCEDURE — 85018 HEMOGLOBIN: CPT | Mod: ORL | Performed by: FAMILY MEDICINE

## 2022-01-01 PROCEDURE — 80053 COMPREHEN METABOLIC PANEL: CPT | Mod: ORL | Performed by: FAMILY MEDICINE

## 2022-01-01 PROCEDURE — 80048 BASIC METABOLIC PNL TOTAL CA: CPT | Mod: ORL | Performed by: FAMILY MEDICINE

## 2022-01-01 PROCEDURE — 80061 LIPID PANEL: CPT | Mod: ORL | Performed by: FAMILY MEDICINE

## 2023-01-01 ENCOUNTER — APPOINTMENT (OUTPATIENT)
Dept: GENERAL RADIOLOGY | Facility: CLINIC | Age: 73
End: 2023-01-01
Attending: SURGERY
Payer: COMMERCIAL

## 2023-01-01 ENCOUNTER — APPOINTMENT (OUTPATIENT)
Dept: CARDIOLOGY | Facility: CLINIC | Age: 73
End: 2023-01-01
Attending: STUDENT IN AN ORGANIZED HEALTH CARE EDUCATION/TRAINING PROGRAM
Payer: COMMERCIAL

## 2023-01-01 ENCOUNTER — APPOINTMENT (OUTPATIENT)
Dept: INTERPRETER SERVICES | Facility: CLINIC | Age: 73
End: 2023-01-01
Attending: SURGERY
Payer: COMMERCIAL

## 2023-01-01 ENCOUNTER — APPOINTMENT (OUTPATIENT)
Dept: GENERAL RADIOLOGY | Facility: CLINIC | Age: 73
End: 2023-01-01
Attending: STUDENT IN AN ORGANIZED HEALTH CARE EDUCATION/TRAINING PROGRAM
Payer: COMMERCIAL

## 2023-01-01 ENCOUNTER — APPOINTMENT (OUTPATIENT)
Dept: ULTRASOUND IMAGING | Facility: CLINIC | Age: 73
End: 2023-01-01
Attending: PHYSICIAN ASSISTANT
Payer: COMMERCIAL

## 2023-01-01 ENCOUNTER — APPOINTMENT (OUTPATIENT)
Dept: CT IMAGING | Facility: CLINIC | Age: 73
End: 2023-01-01
Attending: SURGERY
Payer: COMMERCIAL

## 2023-01-01 ENCOUNTER — ANESTHESIA (OUTPATIENT)
Dept: INTENSIVE CARE | Facility: CLINIC | Age: 73
End: 2023-01-01
Payer: COMMERCIAL

## 2023-01-01 ENCOUNTER — APPOINTMENT (OUTPATIENT)
Dept: GENERAL RADIOLOGY | Facility: CLINIC | Age: 73
End: 2023-01-01
Attending: INTERNAL MEDICINE
Payer: COMMERCIAL

## 2023-01-01 ENCOUNTER — APPOINTMENT (OUTPATIENT)
Dept: OCCUPATIONAL THERAPY | Facility: CLINIC | Age: 73
End: 2023-01-01
Attending: STUDENT IN AN ORGANIZED HEALTH CARE EDUCATION/TRAINING PROGRAM
Payer: COMMERCIAL

## 2023-01-01 ENCOUNTER — APPOINTMENT (OUTPATIENT)
Dept: CARDIOLOGY | Facility: CLINIC | Age: 73
End: 2023-01-01
Attending: SURGERY
Payer: COMMERCIAL

## 2023-01-01 ENCOUNTER — APPOINTMENT (OUTPATIENT)
Dept: MRI IMAGING | Facility: CLINIC | Age: 73
End: 2023-01-01
Attending: SURGERY
Payer: COMMERCIAL

## 2023-01-01 ENCOUNTER — APPOINTMENT (OUTPATIENT)
Dept: ULTRASOUND IMAGING | Facility: CLINIC | Age: 73
End: 2023-01-01
Attending: STUDENT IN AN ORGANIZED HEALTH CARE EDUCATION/TRAINING PROGRAM
Payer: COMMERCIAL

## 2023-01-01 ENCOUNTER — HOSPITAL ENCOUNTER (INPATIENT)
Facility: CLINIC | Age: 73
LOS: 18 days | End: 2023-02-22
Attending: SURGERY | Admitting: INTERNAL MEDICINE
Payer: COMMERCIAL

## 2023-01-01 ENCOUNTER — APPOINTMENT (OUTPATIENT)
Dept: CT IMAGING | Facility: CLINIC | Age: 73
End: 2023-01-01
Attending: STUDENT IN AN ORGANIZED HEALTH CARE EDUCATION/TRAINING PROGRAM
Payer: COMMERCIAL

## 2023-01-01 ENCOUNTER — APPOINTMENT (OUTPATIENT)
Dept: CARDIOLOGY | Facility: CLINIC | Age: 73
End: 2023-01-01
Attending: PHYSICIAN ASSISTANT
Payer: COMMERCIAL

## 2023-01-01 ENCOUNTER — APPOINTMENT (OUTPATIENT)
Dept: ULTRASOUND IMAGING | Facility: CLINIC | Age: 73
End: 2023-01-01
Attending: SURGERY
Payer: COMMERCIAL

## 2023-01-01 ENCOUNTER — APPOINTMENT (OUTPATIENT)
Dept: GENERAL RADIOLOGY | Facility: CLINIC | Age: 73
End: 2023-01-01
Attending: THORACIC SURGERY (CARDIOTHORACIC VASCULAR SURGERY)
Payer: COMMERCIAL

## 2023-01-01 ENCOUNTER — APPOINTMENT (OUTPATIENT)
Dept: CT IMAGING | Facility: HOSPITAL | Age: 73
End: 2023-01-01
Attending: STUDENT IN AN ORGANIZED HEALTH CARE EDUCATION/TRAINING PROGRAM
Payer: COMMERCIAL

## 2023-01-01 ENCOUNTER — ANESTHESIA (OUTPATIENT)
Dept: SURGERY | Facility: CLINIC | Age: 73
End: 2023-01-01
Payer: COMMERCIAL

## 2023-01-01 ENCOUNTER — TRANSFERRED RECORDS (OUTPATIENT)
Dept: HEALTH INFORMATION MANAGEMENT | Facility: CLINIC | Age: 73
End: 2023-01-01

## 2023-01-01 ENCOUNTER — PREP FOR PROCEDURE (OUTPATIENT)
Dept: CARDIOLOGY | Facility: CLINIC | Age: 73
End: 2023-01-01
Payer: COMMERCIAL

## 2023-01-01 ENCOUNTER — ANESTHESIA EVENT (OUTPATIENT)
Dept: INTENSIVE CARE | Facility: CLINIC | Age: 73
End: 2023-01-01
Payer: COMMERCIAL

## 2023-01-01 ENCOUNTER — APPOINTMENT (OUTPATIENT)
Dept: CT IMAGING | Facility: CLINIC | Age: 73
End: 2023-01-01
Attending: NURSE PRACTITIONER
Payer: COMMERCIAL

## 2023-01-01 ENCOUNTER — ANESTHESIA EVENT (OUTPATIENT)
Dept: SURGERY | Facility: CLINIC | Age: 73
End: 2023-01-01
Payer: COMMERCIAL

## 2023-01-01 ENCOUNTER — HOSPITAL ENCOUNTER (EMERGENCY)
Facility: HOSPITAL | Age: 73
Discharge: ANOTHER HOSPITAL | End: 2023-02-04
Attending: STUDENT IN AN ORGANIZED HEALTH CARE EDUCATION/TRAINING PROGRAM | Admitting: STUDENT IN AN ORGANIZED HEALTH CARE EDUCATION/TRAINING PROGRAM
Payer: COMMERCIAL

## 2023-01-01 VITALS
HEART RATE: 77 BPM | SYSTOLIC BLOOD PRESSURE: 200 MMHG | OXYGEN SATURATION: 100 % | TEMPERATURE: 98.2 F | DIASTOLIC BLOOD PRESSURE: 111 MMHG | BODY MASS INDEX: 28.32 KG/M2 | WEIGHT: 145 LBS | RESPIRATION RATE: 15 BRPM

## 2023-01-01 VITALS
HEIGHT: 60 IN | RESPIRATION RATE: 55 BRPM | HEART RATE: 96 BPM | DIASTOLIC BLOOD PRESSURE: 79 MMHG | BODY MASS INDEX: 28 KG/M2 | SYSTOLIC BLOOD PRESSURE: 114 MMHG | OXYGEN SATURATION: 79 % | WEIGHT: 142.64 LBS | TEMPERATURE: 99.9 F

## 2023-01-01 DIAGNOSIS — I71.03 DISSECTION OF THORACOABDOMINAL AORTA (H): Primary | ICD-10-CM

## 2023-01-01 DIAGNOSIS — I25.10 CORONARY ARTERY DISEASE INVOLVING NATIVE CORONARY ARTERY OF NATIVE HEART WITHOUT ANGINA PECTORIS: ICD-10-CM

## 2023-01-01 DIAGNOSIS — Z98.890 HISTORY OF AORTIC ARCH REPAIR: Primary | ICD-10-CM

## 2023-01-01 DIAGNOSIS — I71.010 DISSECTION OF ASCENDING AORTA (H): ICD-10-CM

## 2023-01-01 LAB
ABO/RH(D): NORMAL
ALBUMIN SERPL BCG-MCNC: 1.8 G/DL (ref 3.5–5.2)
ALBUMIN SERPL BCG-MCNC: 1.8 G/DL (ref 3.5–5.2)
ALBUMIN SERPL BCG-MCNC: 1.9 G/DL (ref 3.5–5.2)
ALBUMIN SERPL BCG-MCNC: 2.1 G/DL (ref 3.5–5.2)
ALBUMIN SERPL BCG-MCNC: 2.2 G/DL (ref 3.5–5.2)
ALBUMIN SERPL BCG-MCNC: 2.3 G/DL (ref 3.5–5.2)
ALBUMIN SERPL BCG-MCNC: 2.4 G/DL (ref 3.5–5.2)
ALBUMIN SERPL BCG-MCNC: 3.1 G/DL (ref 3.5–5.2)
ALBUMIN SERPL BCG-MCNC: 3.2 G/DL (ref 3.5–5.2)
ALBUMIN SERPL BCG-MCNC: 3.5 G/DL (ref 3.5–5.2)
ALBUMIN SERPL BCG-MCNC: 3.9 G/DL (ref 3.5–5.2)
ALBUMIN SERPL BCG-MCNC: 4.4 G/DL (ref 3.5–5.2)
ALBUMIN UR-MCNC: 70 MG/DL
ALLEN'S TEST: ABNORMAL
ALLEN'S TEST: NO
ALLEN'S TEST: NORMAL
ALLEN'S TEST: YES
ALP SERPL-CCNC: 1013 U/L (ref 35–104)
ALP SERPL-CCNC: 107 U/L (ref 35–104)
ALP SERPL-CCNC: 119 U/L (ref 35–104)
ALP SERPL-CCNC: 141 U/L (ref 35–104)
ALP SERPL-CCNC: 150 U/L (ref 35–104)
ALP SERPL-CCNC: 151 U/L (ref 35–104)
ALP SERPL-CCNC: 159 U/L (ref 35–104)
ALP SERPL-CCNC: 161 U/L (ref 35–104)
ALP SERPL-CCNC: 166 U/L (ref 35–104)
ALP SERPL-CCNC: 170 U/L (ref 35–104)
ALP SERPL-CCNC: 172 U/L (ref 35–104)
ALP SERPL-CCNC: 176 U/L (ref 35–104)
ALP SERPL-CCNC: 186 U/L (ref 35–104)
ALP SERPL-CCNC: 234 U/L (ref 35–104)
ALP SERPL-CCNC: 283 U/L (ref 35–104)
ALP SERPL-CCNC: 291 U/L (ref 35–104)
ALP SERPL-CCNC: 291 U/L (ref 35–104)
ALP SERPL-CCNC: 319 U/L (ref 35–104)
ALP SERPL-CCNC: 51 U/L (ref 35–104)
ALP SERPL-CCNC: 52 U/L (ref 35–104)
ALP SERPL-CCNC: 53 U/L (ref 35–104)
ALP SERPL-CCNC: 65 U/L (ref 35–104)
ALP SERPL-CCNC: 885 U/L (ref 35–104)
ALT SERPL W P-5'-P-CCNC: 103 U/L (ref 10–35)
ALT SERPL W P-5'-P-CCNC: 121 U/L (ref 10–35)
ALT SERPL W P-5'-P-CCNC: 13 U/L (ref 10–35)
ALT SERPL W P-5'-P-CCNC: 136 U/L (ref 10–35)
ALT SERPL W P-5'-P-CCNC: 15 U/L (ref 10–35)
ALT SERPL W P-5'-P-CCNC: 157 U/L (ref 10–35)
ALT SERPL W P-5'-P-CCNC: 167 U/L (ref 10–35)
ALT SERPL W P-5'-P-CCNC: 167 U/L (ref 10–35)
ALT SERPL W P-5'-P-CCNC: 18 U/L (ref 10–35)
ALT SERPL W P-5'-P-CCNC: 18 U/L (ref 10–35)
ALT SERPL W P-5'-P-CCNC: 30 U/L (ref 10–35)
ALT SERPL W P-5'-P-CCNC: 311 U/L (ref 10–35)
ALT SERPL W P-5'-P-CCNC: 33 U/L (ref 10–35)
ALT SERPL W P-5'-P-CCNC: 34 U/L (ref 10–35)
ALT SERPL W P-5'-P-CCNC: 381 U/L (ref 10–35)
ALT SERPL W P-5'-P-CCNC: 43 U/L (ref 10–35)
ALT SERPL W P-5'-P-CCNC: 51 U/L (ref 10–35)
ALT SERPL W P-5'-P-CCNC: 59 U/L (ref 10–35)
ALT SERPL W P-5'-P-CCNC: 59 U/L (ref 10–35)
ALT SERPL W P-5'-P-CCNC: 68 U/L (ref 10–35)
ALT SERPL W P-5'-P-CCNC: 71 U/L (ref 10–35)
ALT SERPL W P-5'-P-CCNC: 84 U/L (ref 10–35)
ALT SERPL W P-5'-P-CCNC: 85 U/L (ref 10–35)
ANION GAP SERPL CALCULATED.3IONS-SCNC: 10 MMOL/L (ref 7–15)
ANION GAP SERPL CALCULATED.3IONS-SCNC: 11 MMOL/L (ref 7–15)
ANION GAP SERPL CALCULATED.3IONS-SCNC: 12 MMOL/L (ref 7–15)
ANION GAP SERPL CALCULATED.3IONS-SCNC: 13 MMOL/L (ref 7–15)
ANION GAP SERPL CALCULATED.3IONS-SCNC: 20 MMOL/L (ref 7–15)
ANION GAP SERPL CALCULATED.3IONS-SCNC: 7 MMOL/L (ref 7–15)
ANION GAP SERPL CALCULATED.3IONS-SCNC: 8 MMOL/L (ref 7–15)
ANION GAP SERPL CALCULATED.3IONS-SCNC: 9 MMOL/L (ref 7–15)
ANTIBODY SCREEN: NEGATIVE
APPEARANCE UR: ABNORMAL
APTT PPP: 130 SECONDS (ref 22–38)
APTT PPP: 217 SECONDS (ref 22–38)
APTT PPP: 239 SECONDS (ref 22–38)
APTT PPP: 27 SECONDS (ref 22–38)
APTT PPP: 31 SECONDS (ref 22–38)
APTT PPP: 31 SECONDS (ref 22–38)
APTT PPP: 37 SECONDS (ref 22–38)
APTT PPP: 37 SECONDS (ref 22–38)
APTT PPP: 38 SECONDS (ref 22–38)
APTT PPP: 41 SECONDS (ref 22–38)
APTT PPP: 44 SECONDS (ref 22–38)
APTT PPP: 44 SECONDS (ref 22–38)
APTT PPP: 45 SECONDS (ref 22–38)
APTT PPP: 54 SECONDS (ref 22–38)
APTT PPP: 55 SECONDS (ref 22–38)
APTT PPP: 58 SECONDS (ref 22–38)
APTT PPP: 62 SECONDS (ref 22–38)
APTT PPP: 67 SECONDS (ref 22–38)
APTT PPP: 72 SECONDS (ref 22–38)
APTT PPP: 85 SECONDS (ref 22–38)
APTT PPP: 87 SECONDS (ref 22–38)
APTT PPP: NORMAL S
APTT PPP: NORMAL S
AST SERPL W P-5'-P-CCNC: 100 U/L (ref 10–35)
AST SERPL W P-5'-P-CCNC: 102 U/L (ref 10–35)
AST SERPL W P-5'-P-CCNC: 117 U/L (ref 10–35)
AST SERPL W P-5'-P-CCNC: 134 U/L (ref 10–35)
AST SERPL W P-5'-P-CCNC: 136 U/L (ref 10–35)
AST SERPL W P-5'-P-CCNC: 145 U/L (ref 10–35)
AST SERPL W P-5'-P-CCNC: 154 U/L (ref 10–35)
AST SERPL W P-5'-P-CCNC: 154 U/L (ref 10–35)
AST SERPL W P-5'-P-CCNC: 161 U/L (ref 10–35)
AST SERPL W P-5'-P-CCNC: 202 U/L (ref 10–35)
AST SERPL W P-5'-P-CCNC: 227 U/L (ref 10–35)
AST SERPL W P-5'-P-CCNC: 247 U/L (ref 10–35)
AST SERPL W P-5'-P-CCNC: 248 U/L (ref 10–35)
AST SERPL W P-5'-P-CCNC: 27 U/L (ref 10–35)
AST SERPL W P-5'-P-CCNC: 29 U/L (ref 10–35)
AST SERPL W P-5'-P-CCNC: 33 U/L (ref 10–35)
AST SERPL W P-5'-P-CCNC: 34 U/L (ref 10–35)
AST SERPL W P-5'-P-CCNC: 500 U/L (ref 10–35)
AST SERPL W P-5'-P-CCNC: 502 U/L (ref 10–35)
AST SERPL W P-5'-P-CCNC: 61 U/L (ref 10–35)
AST SERPL W P-5'-P-CCNC: 88 U/L (ref 10–35)
AST SERPL W P-5'-P-CCNC: 89 U/L (ref 10–35)
AST SERPL W P-5'-P-CCNC: ABNORMAL U/L
ATRIAL RATE - MUSE: 105 BPM
ATRIAL RATE - MUSE: 119 BPM
ATRIAL RATE - MUSE: 120 BPM
ATRIAL RATE - MUSE: 57 BPM
ATRIAL RATE - MUSE: 60 BPM
ATRIAL RATE - MUSE: 71 BPM
ATRIAL RATE - MUSE: 79 BPM
BACTERIA BLD CULT: NO GROWTH
BACTERIA BLD CULT: NO GROWTH
BACTERIA BRONCH: ABNORMAL
BACTERIA UR CULT: NORMAL
BASE EXCESS BLDA CALC-SCNC: -0.3 MMOL/L (ref -9–1.8)
BASE EXCESS BLDA CALC-SCNC: -0.4 MMOL/L (ref -9.6–2)
BASE EXCESS BLDA CALC-SCNC: -0.9 MMOL/L (ref -9.6–2)
BASE EXCESS BLDA CALC-SCNC: -1.1 MMOL/L (ref -9.6–2)
BASE EXCESS BLDA CALC-SCNC: -1.3 MMOL/L (ref -9.6–2)
BASE EXCESS BLDA CALC-SCNC: -1.7 MMOL/L (ref -9.6–2)
BASE EXCESS BLDA CALC-SCNC: -1.9 MMOL/L (ref -9.6–2)
BASE EXCESS BLDA CALC-SCNC: -2.4 MMOL/L (ref -9–1.8)
BASE EXCESS BLDA CALC-SCNC: -3 MMOL/L (ref -9–1.8)
BASE EXCESS BLDA CALC-SCNC: -3.2 MMOL/L (ref -9–1.8)
BASE EXCESS BLDA CALC-SCNC: -3.3 MMOL/L (ref -9.6–2)
BASE EXCESS BLDA CALC-SCNC: -3.3 MMOL/L (ref -9–1.8)
BASE EXCESS BLDA CALC-SCNC: -3.6 MMOL/L (ref -9–1.8)
BASE EXCESS BLDA CALC-SCNC: -3.6 MMOL/L (ref -9–1.8)
BASE EXCESS BLDA CALC-SCNC: -3.7 MMOL/L (ref -9.6–2)
BASE EXCESS BLDA CALC-SCNC: -4.3 MMOL/L (ref -9.6–2)
BASE EXCESS BLDA CALC-SCNC: -4.3 MMOL/L (ref -9–1.8)
BASE EXCESS BLDA CALC-SCNC: -4.6 MMOL/L (ref -9–1.8)
BASE EXCESS BLDA CALC-SCNC: -4.6 MMOL/L (ref -9–1.8)
BASE EXCESS BLDA CALC-SCNC: -5 MMOL/L (ref -9–1.8)
BASE EXCESS BLDA CALC-SCNC: -5.2 MMOL/L (ref -9–1.8)
BASE EXCESS BLDA CALC-SCNC: -5.3 MMOL/L (ref -9.6–2)
BASE EXCESS BLDA CALC-SCNC: -5.7 MMOL/L (ref -9–1.8)
BASE EXCESS BLDA CALC-SCNC: -6.5 MMOL/L (ref -9–1.8)
BASE EXCESS BLDA CALC-SCNC: 0 MMOL/L (ref -9.6–2)
BASE EXCESS BLDA CALC-SCNC: 0.3 MMOL/L (ref -9–1.8)
BASE EXCESS BLDA CALC-SCNC: 1.1 MMOL/L (ref -9–1.8)
BASE EXCESS BLDA CALC-SCNC: 1.8 MMOL/L (ref -9–1.8)
BASE EXCESS BLDA CALC-SCNC: 2.3 MMOL/L (ref -9–1.8)
BASE EXCESS BLDA CALC-SCNC: 3 MMOL/L (ref -9–1.8)
BASE EXCESS BLDA CALC-SCNC: 3.1 MMOL/L (ref -9–1.8)
BASE EXCESS BLDA CALC-SCNC: 4.2 MMOL/L (ref -9–1.8)
BASE EXCESS BLDA CALC-SCNC: 4.5 MMOL/L (ref -9–1.8)
BASE EXCESS BLDA CALC-SCNC: 4.8 MMOL/L (ref -9–1.8)
BASE EXCESS BLDA CALC-SCNC: 5.5 MMOL/L (ref -9–1.8)
BASE EXCESS BLDA CALC-SCNC: 6.5 MMOL/L (ref -9–1.8)
BASE EXCESS BLDV CALC-SCNC: -0.9 MMOL/L (ref -7.7–1.9)
BASE EXCESS BLDV CALC-SCNC: -1.2 MMOL/L (ref -7.7–1.9)
BASE EXCESS BLDV CALC-SCNC: -1.5 MMOL/L (ref -7.7–1.9)
BASE EXCESS BLDV CALC-SCNC: -2.1 MMOL/L (ref -7.7–1.9)
BASE EXCESS BLDV CALC-SCNC: -2.3 MMOL/L (ref -7.7–1.9)
BASE EXCESS BLDV CALC-SCNC: -2.8 MMOL/L (ref -7.7–1.9)
BASE EXCESS BLDV CALC-SCNC: -3 MMOL/L (ref -7.7–1.9)
BASE EXCESS BLDV CALC-SCNC: -3.2 MMOL/L (ref -7.7–1.9)
BASE EXCESS BLDV CALC-SCNC: -3.3 MMOL/L (ref -7.7–1.9)
BASE EXCESS BLDV CALC-SCNC: -4 MMOL/L (ref -8.1–1.9)
BASE EXCESS BLDV CALC-SCNC: -4.1 MMOL/L (ref -7.7–1.9)
BASE EXCESS BLDV CALC-SCNC: -4.2 MMOL/L (ref -7.7–1.9)
BASE EXCESS BLDV CALC-SCNC: -5.1 MMOL/L (ref -7.7–1.9)
BASE EXCESS BLDV CALC-SCNC: -5.1 MMOL/L (ref -7.7–1.9)
BASE EXCESS BLDV CALC-SCNC: -5.7 MMOL/L (ref -7.7–1.9)
BASE EXCESS BLDV CALC-SCNC: -5.7 MMOL/L (ref -7.7–1.9)
BASE EXCESS BLDV CALC-SCNC: 1 MMOL/L (ref -7.7–1.9)
BASE EXCESS BLDV CALC-SCNC: 2 MMOL/L (ref -7.7–1.9)
BASE EXCESS BLDV CALC-SCNC: 2 MMOL/L (ref -7.7–1.9)
BASE EXCESS BLDV CALC-SCNC: 4.7 MMOL/L (ref -7.7–1.9)
BASE EXCESS BLDV CALC-SCNC: 4.8 MMOL/L (ref -7.7–1.9)
BASE EXCESS BLDV CALC-SCNC: 5.4 MMOL/L (ref -7.7–1.9)
BASOPHILS # BLD AUTO: 0 10E3/UL (ref 0–0.2)
BASOPHILS # BLD AUTO: 0 10E3/UL (ref 0–0.2)
BASOPHILS NFR BLD AUTO: 0 %
BASOPHILS NFR BLD AUTO: 0 %
BILIRUB DIRECT SERPL-MCNC: <0.2 MG/DL (ref 0–0.3)
BILIRUB SERPL-MCNC: 0.3 MG/DL
BILIRUB SERPL-MCNC: 0.4 MG/DL
BILIRUB SERPL-MCNC: 0.5 MG/DL
BILIRUB SERPL-MCNC: 0.6 MG/DL
BILIRUB SERPL-MCNC: 0.6 MG/DL
BILIRUB SERPL-MCNC: 0.7 MG/DL
BILIRUB SERPL-MCNC: 0.8 MG/DL
BILIRUB SERPL-MCNC: 0.9 MG/DL
BILIRUB SERPL-MCNC: 1 MG/DL
BILIRUB SERPL-MCNC: 1.5 MG/DL
BILIRUB SERPL-MCNC: 2.5 MG/DL
BILIRUB SERPL-MCNC: 3.3 MG/DL
BILIRUB UR QL STRIP: ABNORMAL
BLD PROD TYP BPU: NORMAL
BLOOD COMPONENT TYPE: NORMAL
BUN SERPL-MCNC: 12.5 MG/DL (ref 8–23)
BUN SERPL-MCNC: 12.7 MG/DL (ref 8–23)
BUN SERPL-MCNC: 13 MG/DL (ref 8–23)
BUN SERPL-MCNC: 13.4 MG/DL (ref 8–23)
BUN SERPL-MCNC: 14.4 MG/DL (ref 8–23)
BUN SERPL-MCNC: 14.8 MG/DL (ref 8–23)
BUN SERPL-MCNC: 16.4 MG/DL (ref 8–23)
BUN SERPL-MCNC: 17.1 MG/DL (ref 8–23)
BUN SERPL-MCNC: 17.3 MG/DL (ref 8–23)
BUN SERPL-MCNC: 21.3 MG/DL (ref 8–23)
BUN SERPL-MCNC: 21.4 MG/DL (ref 8–23)
BUN SERPL-MCNC: 23.7 MG/DL (ref 8–23)
BUN SERPL-MCNC: 24.4 MG/DL (ref 8–23)
BUN SERPL-MCNC: 24.8 MG/DL (ref 8–23)
BUN SERPL-MCNC: 25.5 MG/DL (ref 8–23)
BUN SERPL-MCNC: 25.5 MG/DL (ref 8–23)
BUN SERPL-MCNC: 25.8 MG/DL (ref 8–23)
BUN SERPL-MCNC: 26.1 MG/DL (ref 8–23)
BUN SERPL-MCNC: 26.6 MG/DL (ref 8–23)
BUN SERPL-MCNC: 26.7 MG/DL (ref 8–23)
BUN SERPL-MCNC: 27 MG/DL (ref 8–23)
BUN SERPL-MCNC: 29.2 MG/DL (ref 8–23)
BUN SERPL-MCNC: 29.5 MG/DL (ref 8–23)
BUN SERPL-MCNC: 30.2 MG/DL (ref 8–23)
BUN SERPL-MCNC: 32.3 MG/DL (ref 8–23)
BUN SERPL-MCNC: 32.4 MG/DL (ref 8–23)
BUN SERPL-MCNC: 33.6 MG/DL (ref 8–23)
BUN SERPL-MCNC: 34.4 MG/DL (ref 8–23)
BUN SERPL-MCNC: 38.9 MG/DL (ref 8–23)
BUN SERPL-MCNC: 46 MG/DL (ref 8–23)
BUN SERPL-MCNC: 48 MG/DL (ref 8–23)
C DIFF TOX B STL QL: NEGATIVE
CA-I BLD-MCNC: 3.6 MG/DL (ref 4.4–5.2)
CA-I BLD-MCNC: 3.8 MG/DL (ref 4.4–5.2)
CA-I BLD-MCNC: 3.9 MG/DL (ref 4.4–5.2)
CA-I BLD-MCNC: 4.1 MG/DL (ref 4.4–5.2)
CA-I BLD-MCNC: 4.4 MG/DL (ref 4.4–5.2)
CA-I BLD-MCNC: 4.5 MG/DL (ref 4.4–5.2)
CA-I BLD-MCNC: 4.6 MG/DL (ref 4.4–5.2)
CA-I BLD-MCNC: 4.7 MG/DL (ref 4.4–5.2)
CA-I BLD-MCNC: 4.7 MG/DL (ref 4.4–5.2)
CA-I BLD-MCNC: 5 MG/DL (ref 4.4–5.2)
CA-I BLD-MCNC: 5.1 MG/DL (ref 4.4–5.2)
CA-I BLD-MCNC: 5.2 MG/DL (ref 4.4–5.2)
CA-I BLD-MCNC: 5.3 MG/DL (ref 4.4–5.2)
CA-I BLD-MCNC: 6.8 MG/DL (ref 4.4–5.2)
CALCIUM SERPL-MCNC: 11.8 MG/DL (ref 8.8–10.2)
CALCIUM SERPL-MCNC: 7.5 MG/DL (ref 8.8–10.2)
CALCIUM SERPL-MCNC: 7.5 MG/DL (ref 8.8–10.2)
CALCIUM SERPL-MCNC: 7.6 MG/DL (ref 8.8–10.2)
CALCIUM SERPL-MCNC: 7.7 MG/DL (ref 8.8–10.2)
CALCIUM SERPL-MCNC: 7.7 MG/DL (ref 8.8–10.2)
CALCIUM SERPL-MCNC: 7.8 MG/DL (ref 8.8–10.2)
CALCIUM SERPL-MCNC: 7.9 MG/DL (ref 8.8–10.2)
CALCIUM SERPL-MCNC: 7.9 MG/DL (ref 8.8–10.2)
CALCIUM SERPL-MCNC: 8 MG/DL (ref 8.8–10.2)
CALCIUM SERPL-MCNC: 8.1 MG/DL (ref 8.8–10.2)
CALCIUM SERPL-MCNC: 8.1 MG/DL (ref 8.8–10.2)
CALCIUM SERPL-MCNC: 8.2 MG/DL (ref 8.8–10.2)
CALCIUM SERPL-MCNC: 8.3 MG/DL (ref 8.8–10.2)
CALCIUM SERPL-MCNC: 8.5 MG/DL (ref 8.8–10.2)
CALCIUM SERPL-MCNC: 8.7 MG/DL (ref 8.8–10.2)
CALCIUM SERPL-MCNC: 8.7 MG/DL (ref 8.8–10.2)
CALCIUM SERPL-MCNC: 8.8 MG/DL (ref 8.8–10.2)
CALCIUM SERPL-MCNC: 8.8 MG/DL (ref 8.8–10.2)
CALCIUM SERPL-MCNC: 8.9 MG/DL (ref 8.8–10.2)
CALCIUM SERPL-MCNC: 9.3 MG/DL (ref 8.8–10.2)
CALCIUM SERPL-MCNC: 9.7 MG/DL (ref 8.8–10.2)
CHLORIDE SERPL-SCNC: 100 MMOL/L (ref 98–107)
CHLORIDE SERPL-SCNC: 101 MMOL/L (ref 98–107)
CHLORIDE SERPL-SCNC: 101 MMOL/L (ref 98–107)
CHLORIDE SERPL-SCNC: 102 MMOL/L (ref 98–107)
CHLORIDE SERPL-SCNC: 103 MMOL/L (ref 98–107)
CHLORIDE SERPL-SCNC: 104 MMOL/L (ref 98–107)
CHLORIDE SERPL-SCNC: 104 MMOL/L (ref 98–107)
CHLORIDE SERPL-SCNC: 105 MMOL/L (ref 98–107)
CHLORIDE SERPL-SCNC: 105 MMOL/L (ref 98–107)
CHLORIDE SERPL-SCNC: 106 MMOL/L (ref 98–107)
CHLORIDE SERPL-SCNC: 107 MMOL/L (ref 98–107)
CHLORIDE SERPL-SCNC: 107 MMOL/L (ref 98–107)
CHLORIDE SERPL-SCNC: 108 MMOL/L (ref 98–107)
CHLORIDE SERPL-SCNC: 109 MMOL/L (ref 98–107)
CHLORIDE SERPL-SCNC: 109 MMOL/L (ref 98–107)
CHLORIDE SERPL-SCNC: 110 MMOL/L (ref 98–107)
CHLORIDE SERPL-SCNC: 111 MMOL/L (ref 98–107)
CHLORIDE SERPL-SCNC: 111 MMOL/L (ref 98–107)
CHLORIDE SERPL-SCNC: 113 MMOL/L (ref 98–107)
CHLORIDE SERPL-SCNC: 114 MMOL/L (ref 98–107)
CHLORIDE SERPL-SCNC: 115 MMOL/L (ref 98–107)
CHLORIDE SERPL-SCNC: 115 MMOL/L (ref 98–107)
CHLORIDE SERPL-SCNC: 98 MMOL/L (ref 98–107)
CHLORIDE SERPL-SCNC: 99 MMOL/L (ref 98–107)
CK SERPL-CCNC: 31 U/L (ref 26–192)
CLOT INIT KAOL IND TO POST HEP NEUT TRTO: 1 {RATIO}
CLOT INIT KAOL IND TO POST HEP NEUT TRTO: 1.1 {RATIO}
CLOT INIT KAOL IND TO POST HEP NEUT TRTO: 1.1 {RATIO}
CLOT INIT KAOL IND TO POST HEP NEUT TRTO: ABNORMAL {RATIO}
CLOT INIT KAOL IND TO POST HEP NEUT TRTO: ABNORMAL {RATIO}
CLOT INIT KAOLIN IND BLD US: 145 SEC (ref 113–166)
CLOT INIT KAOLIN IND BLD US: 149 SEC (ref 113–166)
CLOT INIT KAOLIN IND BLD US: 158 SEC (ref 113–166)
CLOT INIT KAOLIN IND BLD US: 165 SEC (ref 113–166)
CLOT INIT KAOLIN IND BLD US: 89 SEC (ref 113–166)
CLOT INIT KAOLIN IND BLD US: ABNORMAL S
CLOT INIT KAOLIN IND BLD US: ABNORMAL S
CLOT INIT KAOLIN IND P HEP NEUT BLD US: 138 SEC (ref 103–153)
CLOT INIT KAOLIN IND P HEP NEUT BLD US: 138 SEC (ref 103–153)
CLOT INIT KAOLIN IND P HEP NEUT BLD US: 158 SEC (ref 103–153)
CLOT INIT KAOLIN IND P HEP NEUT BLD US: 159 SEC (ref 103–153)
CLOT INIT KAOLIN IND P HEP NEUT BLD US: 175 SEC (ref 103–153)
CLOT INIT KAOLIN IND P HEP NEUT BLD US: 182 SEC (ref 103–153)
CLOT INIT KAOLIN IND P HEP NEUT BLD US: 90 SEC (ref 103–153)
CLOT STIFF PLT CONT BLD CALC: 13.1 HPA (ref 11.9–29.8)
CLOT STIFF PLT CONT BLD CALC: 19.2 HPA (ref 11.9–29.8)
CLOT STIFF PLT CONT BLD CALC: 23.8 HPA (ref 11.9–29.8)
CLOT STIFF PLT CONT BLD CALC: 27.3 HPA (ref 11.9–29.8)
CLOT STIFF PLT CONT BLD CALC: 7 HPA (ref 11.9–29.8)
CLOT STIFF PLT CONT BLD CALC: 8 HPA (ref 11.9–29.8)
CLOT STIFF PLT CONT BLD CALC: 8.3 HPA (ref 11.9–29.8)
CLOT STIFF TF IND P HEP NEUT BLD US: 10.7 HPA (ref 13–33.2)
CLOT STIFF TF IND P HEP NEUT BLD US: 11.7 HPA (ref 13–33.2)
CLOT STIFF TF IND P HEP NEUT BLD US: 16.7 HPA (ref 13–33.2)
CLOT STIFF TF IND P HEP NEUT BLD US: 22.9 HPA (ref 13–33.2)
CLOT STIFF TF IND P HEP NEUT BLD US: 32.5 HPA (ref 13–33.2)
CLOT STIFF TF IND P HEP NEUT BLD US: 37.9 HPA (ref 13–33.2)
CLOT STIFF TF IND P HEP NEUT BLD US: 9.7 HPA (ref 13–33.2)
CLOT STIFF TF IND+IIB-IIIA INH P HEP NEU: 10.6 HPA (ref 1–3.7)
CLOT STIFF TF IND+IIB-IIIA INH P HEP NEU: 2.7 HPA (ref 1–3.7)
CLOT STIFF TF IND+IIB-IIIA INH P HEP NEU: 2.7 HPA (ref 1–3.7)
CLOT STIFF TF IND+IIB-IIIA INH P HEP NEU: 3.4 HPA (ref 1–3.7)
CLOT STIFF TF IND+IIB-IIIA INH P HEP NEU: 3.6 HPA (ref 1–3.7)
CLOT STIFF TF IND+IIB-IIIA INH P HEP NEU: 3.7 HPA (ref 1–3.7)
CLOT STIFF TF IND+IIB-IIIA INH P HEP NEU: 8.7 HPA (ref 1–3.7)
CODING SYSTEM: NORMAL
COLOR UR AUTO: ABNORMAL
CPB POCT: NO
CREAT BLD-MCNC: 1.3 MG/DL (ref 0.6–1.1)
CREAT SERPL-MCNC: 0.64 MG/DL (ref 0.51–0.95)
CREAT SERPL-MCNC: 0.67 MG/DL (ref 0.51–0.95)
CREAT SERPL-MCNC: 0.67 MG/DL (ref 0.51–0.95)
CREAT SERPL-MCNC: 0.69 MG/DL (ref 0.51–0.95)
CREAT SERPL-MCNC: 0.73 MG/DL (ref 0.51–0.95)
CREAT SERPL-MCNC: 0.77 MG/DL (ref 0.51–0.95)
CREAT SERPL-MCNC: 0.78 MG/DL (ref 0.51–0.95)
CREAT SERPL-MCNC: 0.81 MG/DL (ref 0.51–0.95)
CREAT SERPL-MCNC: 0.85 MG/DL (ref 0.51–0.95)
CREAT SERPL-MCNC: 0.87 MG/DL (ref 0.51–0.95)
CREAT SERPL-MCNC: 0.89 MG/DL (ref 0.51–0.95)
CREAT SERPL-MCNC: 0.9 MG/DL (ref 0.51–0.95)
CREAT SERPL-MCNC: 0.94 MG/DL (ref 0.51–0.95)
CREAT SERPL-MCNC: 0.94 MG/DL (ref 0.51–0.95)
CREAT SERPL-MCNC: 0.95 MG/DL (ref 0.51–0.95)
CREAT SERPL-MCNC: 0.96 MG/DL (ref 0.51–0.95)
CREAT SERPL-MCNC: 0.97 MG/DL (ref 0.51–0.95)
CREAT SERPL-MCNC: 0.98 MG/DL (ref 0.51–0.95)
CREAT SERPL-MCNC: 0.98 MG/DL (ref 0.51–0.95)
CREAT SERPL-MCNC: 0.99 MG/DL (ref 0.51–0.95)
CREAT SERPL-MCNC: 1.02 MG/DL (ref 0.51–0.95)
CREAT SERPL-MCNC: 1.04 MG/DL (ref 0.51–0.95)
CREAT SERPL-MCNC: 1.06 MG/DL (ref 0.51–0.95)
CREAT SERPL-MCNC: 1.1 MG/DL (ref 0.51–0.95)
CREAT SERPL-MCNC: 1.12 MG/DL (ref 0.51–0.95)
CREAT SERPL-MCNC: 1.15 MG/DL (ref 0.51–0.95)
CREAT SERPL-MCNC: 1.18 MG/DL (ref 0.51–0.95)
CREAT SERPL-MCNC: 1.19 MG/DL (ref 0.51–0.95)
CREAT SERPL-MCNC: 1.24 MG/DL (ref 0.51–0.95)
CREAT UR-MCNC: 43.5 MG/DL
CROSSMATCH: NORMAL
DEPRECATED HCO3 PLAS-SCNC: 17 MMOL/L (ref 22–29)
DEPRECATED HCO3 PLAS-SCNC: 18 MMOL/L (ref 22–29)
DEPRECATED HCO3 PLAS-SCNC: 18 MMOL/L (ref 22–29)
DEPRECATED HCO3 PLAS-SCNC: 19 MMOL/L (ref 22–29)
DEPRECATED HCO3 PLAS-SCNC: 20 MMOL/L (ref 22–29)
DEPRECATED HCO3 PLAS-SCNC: 21 MMOL/L (ref 22–29)
DEPRECATED HCO3 PLAS-SCNC: 22 MMOL/L (ref 22–29)
DEPRECATED HCO3 PLAS-SCNC: 23 MMOL/L (ref 22–29)
DEPRECATED HCO3 PLAS-SCNC: 24 MMOL/L (ref 22–29)
DEPRECATED HCO3 PLAS-SCNC: 25 MMOL/L (ref 22–29)
DEPRECATED HCO3 PLAS-SCNC: 25 MMOL/L (ref 22–29)
DEPRECATED HCO3 PLAS-SCNC: 26 MMOL/L (ref 22–29)
DEPRECATED HCO3 PLAS-SCNC: 28 MMOL/L (ref 22–29)
DIASTOLIC BLOOD PRESSURE - MUSE: NORMAL MMHG
EOSINOPHIL # BLD AUTO: 0 10E3/UL (ref 0–0.7)
EOSINOPHIL # BLD AUTO: 0.1 10E3/UL (ref 0–0.7)
EOSINOPHIL NFR BLD AUTO: 0 %
EOSINOPHIL NFR BLD AUTO: 1 %
ERYTHROCYTE [DISTWIDTH] IN BLOOD BY AUTOMATED COUNT: 13.2 % (ref 10–15)
ERYTHROCYTE [DISTWIDTH] IN BLOOD BY AUTOMATED COUNT: 13.3 % (ref 10–15)
ERYTHROCYTE [DISTWIDTH] IN BLOOD BY AUTOMATED COUNT: 13.5 % (ref 10–15)
ERYTHROCYTE [DISTWIDTH] IN BLOOD BY AUTOMATED COUNT: 13.8 % (ref 10–15)
ERYTHROCYTE [DISTWIDTH] IN BLOOD BY AUTOMATED COUNT: 13.9 % (ref 10–15)
ERYTHROCYTE [DISTWIDTH] IN BLOOD BY AUTOMATED COUNT: 14.6 % (ref 10–15)
ERYTHROCYTE [DISTWIDTH] IN BLOOD BY AUTOMATED COUNT: 14.6 % (ref 10–15)
ERYTHROCYTE [DISTWIDTH] IN BLOOD BY AUTOMATED COUNT: 14.7 % (ref 10–15)
ERYTHROCYTE [DISTWIDTH] IN BLOOD BY AUTOMATED COUNT: 14.7 % (ref 10–15)
ERYTHROCYTE [DISTWIDTH] IN BLOOD BY AUTOMATED COUNT: 14.8 % (ref 10–15)
ERYTHROCYTE [DISTWIDTH] IN BLOOD BY AUTOMATED COUNT: 14.9 % (ref 10–15)
ERYTHROCYTE [DISTWIDTH] IN BLOOD BY AUTOMATED COUNT: 15 % (ref 10–15)
ERYTHROCYTE [DISTWIDTH] IN BLOOD BY AUTOMATED COUNT: 15 % (ref 10–15)
ERYTHROCYTE [DISTWIDTH] IN BLOOD BY AUTOMATED COUNT: 15.2 % (ref 10–15)
ERYTHROCYTE [DISTWIDTH] IN BLOOD BY AUTOMATED COUNT: 15.3 % (ref 10–15)
ERYTHROCYTE [DISTWIDTH] IN BLOOD BY AUTOMATED COUNT: 15.3 % (ref 10–15)
ERYTHROCYTE [DISTWIDTH] IN BLOOD BY AUTOMATED COUNT: 15.5 % (ref 10–15)
ERYTHROCYTE [DISTWIDTH] IN BLOOD BY AUTOMATED COUNT: 15.5 % (ref 10–15)
ERYTHROCYTE [DISTWIDTH] IN BLOOD BY AUTOMATED COUNT: 15.7 % (ref 10–15)
ERYTHROCYTE [DISTWIDTH] IN BLOOD BY AUTOMATED COUNT: 15.7 % (ref 10–15)
ERYTHROCYTE [DISTWIDTH] IN BLOOD BY AUTOMATED COUNT: 15.8 % (ref 10–15)
FIBRINOGEN PPP-MCNC: 243 MG/DL (ref 170–490)
FIBRINOGEN PPP-MCNC: 275 MG/DL (ref 170–490)
FIBRINOGEN PPP-MCNC: 279 MG/DL (ref 170–490)
FIBRINOGEN PPP-MCNC: 281 MG/DL (ref 170–490)
FIBRINOGEN PPP-MCNC: 467 MG/DL (ref 170–490)
FIBRINOGEN PPP-MCNC: 633 MG/DL (ref 170–490)
GFR SERPL CREATININE-BSD FRML MDRD: 44 ML/MIN/1.73M2
GFR SERPL CREATININE-BSD FRML MDRD: 46 ML/MIN/1.73M2
GFR SERPL CREATININE-BSD FRML MDRD: 49 ML/MIN/1.73M2
GFR SERPL CREATININE-BSD FRML MDRD: 49 ML/MIN/1.73M2
GFR SERPL CREATININE-BSD FRML MDRD: 51 ML/MIN/1.73M2
GFR SERPL CREATININE-BSD FRML MDRD: 52 ML/MIN/1.73M2
GFR SERPL CREATININE-BSD FRML MDRD: 53 ML/MIN/1.73M2
GFR SERPL CREATININE-BSD FRML MDRD: 56 ML/MIN/1.73M2
GFR SERPL CREATININE-BSD FRML MDRD: 57 ML/MIN/1.73M2
GFR SERPL CREATININE-BSD FRML MDRD: 59 ML/MIN/1.73M2
GFR SERPL CREATININE-BSD FRML MDRD: 61 ML/MIN/1.73M2
GFR SERPL CREATININE-BSD FRML MDRD: 62 ML/MIN/1.73M2
GFR SERPL CREATININE-BSD FRML MDRD: 63 ML/MIN/1.73M2
GFR SERPL CREATININE-BSD FRML MDRD: 64 ML/MIN/1.73M2
GFR SERPL CREATININE-BSD FRML MDRD: 65 ML/MIN/1.73M2
GFR SERPL CREATININE-BSD FRML MDRD: 65 ML/MIN/1.73M2
GFR SERPL CREATININE-BSD FRML MDRD: 68 ML/MIN/1.73M2
GFR SERPL CREATININE-BSD FRML MDRD: 69 ML/MIN/1.73M2
GFR SERPL CREATININE-BSD FRML MDRD: 71 ML/MIN/1.73M2
GFR SERPL CREATININE-BSD FRML MDRD: 73 ML/MIN/1.73M2
GFR SERPL CREATININE-BSD FRML MDRD: 77 ML/MIN/1.73M2
GFR SERPL CREATININE-BSD FRML MDRD: 81 ML/MIN/1.73M2
GFR SERPL CREATININE-BSD FRML MDRD: 82 ML/MIN/1.73M2
GFR SERPL CREATININE-BSD FRML MDRD: 87 ML/MIN/1.73M2
GFR SERPL CREATININE-BSD FRML MDRD: >90 ML/MIN/1.73M2
GLUCOSE BLD-MCNC: 128 MG/DL (ref 70–99)
GLUCOSE BLD-MCNC: 131 MG/DL (ref 70–99)
GLUCOSE BLD-MCNC: 136 MG/DL (ref 70–99)
GLUCOSE BLD-MCNC: 136 MG/DL (ref 70–99)
GLUCOSE BLD-MCNC: 145 MG/DL (ref 70–99)
GLUCOSE BLD-MCNC: 151 MG/DL (ref 70–99)
GLUCOSE BLD-MCNC: 160 MG/DL (ref 70–99)
GLUCOSE BLD-MCNC: 162 MG/DL (ref 70–99)
GLUCOSE BLD-MCNC: 179 MG/DL (ref 70–99)
GLUCOSE BLD-MCNC: 182 MG/DL (ref 70–99)
GLUCOSE BLD-MCNC: 190 MG/DL (ref 70–99)
GLUCOSE BLD-MCNC: 202 MG/DL (ref 70–99)
GLUCOSE BLD-MCNC: 216 MG/DL (ref 70–99)
GLUCOSE BLDC GLUCOMTR-MCNC: 101 MG/DL (ref 70–99)
GLUCOSE BLDC GLUCOMTR-MCNC: 103 MG/DL (ref 70–99)
GLUCOSE BLDC GLUCOMTR-MCNC: 104 MG/DL (ref 70–99)
GLUCOSE BLDC GLUCOMTR-MCNC: 105 MG/DL (ref 70–99)
GLUCOSE BLDC GLUCOMTR-MCNC: 105 MG/DL (ref 70–99)
GLUCOSE BLDC GLUCOMTR-MCNC: 106 MG/DL (ref 70–99)
GLUCOSE BLDC GLUCOMTR-MCNC: 108 MG/DL (ref 70–99)
GLUCOSE BLDC GLUCOMTR-MCNC: 110 MG/DL (ref 70–99)
GLUCOSE BLDC GLUCOMTR-MCNC: 110 MG/DL (ref 70–99)
GLUCOSE BLDC GLUCOMTR-MCNC: 111 MG/DL (ref 70–99)
GLUCOSE BLDC GLUCOMTR-MCNC: 112 MG/DL (ref 70–99)
GLUCOSE BLDC GLUCOMTR-MCNC: 114 MG/DL (ref 70–99)
GLUCOSE BLDC GLUCOMTR-MCNC: 114 MG/DL (ref 70–99)
GLUCOSE BLDC GLUCOMTR-MCNC: 118 MG/DL (ref 70–99)
GLUCOSE BLDC GLUCOMTR-MCNC: 118 MG/DL (ref 70–99)
GLUCOSE BLDC GLUCOMTR-MCNC: 119 MG/DL (ref 70–99)
GLUCOSE BLDC GLUCOMTR-MCNC: 120 MG/DL (ref 70–99)
GLUCOSE BLDC GLUCOMTR-MCNC: 120 MG/DL (ref 70–99)
GLUCOSE BLDC GLUCOMTR-MCNC: 121 MG/DL (ref 70–99)
GLUCOSE BLDC GLUCOMTR-MCNC: 122 MG/DL (ref 70–99)
GLUCOSE BLDC GLUCOMTR-MCNC: 123 MG/DL (ref 70–99)
GLUCOSE BLDC GLUCOMTR-MCNC: 124 MG/DL (ref 70–99)
GLUCOSE BLDC GLUCOMTR-MCNC: 124 MG/DL (ref 70–99)
GLUCOSE BLDC GLUCOMTR-MCNC: 125 MG/DL (ref 70–99)
GLUCOSE BLDC GLUCOMTR-MCNC: 126 MG/DL (ref 70–99)
GLUCOSE BLDC GLUCOMTR-MCNC: 127 MG/DL (ref 70–99)
GLUCOSE BLDC GLUCOMTR-MCNC: 128 MG/DL (ref 70–99)
GLUCOSE BLDC GLUCOMTR-MCNC: 129 MG/DL (ref 70–99)
GLUCOSE BLDC GLUCOMTR-MCNC: 130 MG/DL (ref 70–99)
GLUCOSE BLDC GLUCOMTR-MCNC: 130 MG/DL (ref 70–99)
GLUCOSE BLDC GLUCOMTR-MCNC: 132 MG/DL (ref 70–99)
GLUCOSE BLDC GLUCOMTR-MCNC: 133 MG/DL (ref 70–99)
GLUCOSE BLDC GLUCOMTR-MCNC: 134 MG/DL (ref 70–99)
GLUCOSE BLDC GLUCOMTR-MCNC: 135 MG/DL (ref 70–99)
GLUCOSE BLDC GLUCOMTR-MCNC: 136 MG/DL (ref 70–99)
GLUCOSE BLDC GLUCOMTR-MCNC: 136 MG/DL (ref 70–99)
GLUCOSE BLDC GLUCOMTR-MCNC: 137 MG/DL (ref 70–99)
GLUCOSE BLDC GLUCOMTR-MCNC: 140 MG/DL (ref 70–99)
GLUCOSE BLDC GLUCOMTR-MCNC: 141 MG/DL (ref 70–99)
GLUCOSE BLDC GLUCOMTR-MCNC: 141 MG/DL (ref 70–99)
GLUCOSE BLDC GLUCOMTR-MCNC: 142 MG/DL (ref 70–99)
GLUCOSE BLDC GLUCOMTR-MCNC: 143 MG/DL (ref 70–99)
GLUCOSE BLDC GLUCOMTR-MCNC: 144 MG/DL (ref 70–99)
GLUCOSE BLDC GLUCOMTR-MCNC: 144 MG/DL (ref 70–99)
GLUCOSE BLDC GLUCOMTR-MCNC: 145 MG/DL (ref 70–99)
GLUCOSE BLDC GLUCOMTR-MCNC: 145 MG/DL (ref 70–99)
GLUCOSE BLDC GLUCOMTR-MCNC: 146 MG/DL (ref 70–99)
GLUCOSE BLDC GLUCOMTR-MCNC: 147 MG/DL (ref 70–99)
GLUCOSE BLDC GLUCOMTR-MCNC: 149 MG/DL (ref 70–99)
GLUCOSE BLDC GLUCOMTR-MCNC: 150 MG/DL (ref 70–99)
GLUCOSE BLDC GLUCOMTR-MCNC: 150 MG/DL (ref 70–99)
GLUCOSE BLDC GLUCOMTR-MCNC: 151 MG/DL (ref 70–99)
GLUCOSE BLDC GLUCOMTR-MCNC: 152 MG/DL (ref 70–99)
GLUCOSE BLDC GLUCOMTR-MCNC: 153 MG/DL (ref 70–99)
GLUCOSE BLDC GLUCOMTR-MCNC: 155 MG/DL (ref 70–99)
GLUCOSE BLDC GLUCOMTR-MCNC: 157 MG/DL (ref 70–99)
GLUCOSE BLDC GLUCOMTR-MCNC: 159 MG/DL (ref 70–99)
GLUCOSE BLDC GLUCOMTR-MCNC: 159 MG/DL (ref 70–99)
GLUCOSE BLDC GLUCOMTR-MCNC: 161 MG/DL (ref 70–99)
GLUCOSE BLDC GLUCOMTR-MCNC: 166 MG/DL (ref 70–99)
GLUCOSE BLDC GLUCOMTR-MCNC: 171 MG/DL (ref 70–99)
GLUCOSE BLDC GLUCOMTR-MCNC: 172 MG/DL (ref 70–99)
GLUCOSE BLDC GLUCOMTR-MCNC: 174 MG/DL (ref 70–99)
GLUCOSE BLDC GLUCOMTR-MCNC: 175 MG/DL (ref 70–99)
GLUCOSE BLDC GLUCOMTR-MCNC: 182 MG/DL (ref 70–99)
GLUCOSE BLDC GLUCOMTR-MCNC: 183 MG/DL (ref 70–99)
GLUCOSE BLDC GLUCOMTR-MCNC: 185 MG/DL (ref 70–99)
GLUCOSE BLDC GLUCOMTR-MCNC: 198 MG/DL (ref 70–99)
GLUCOSE BLDC GLUCOMTR-MCNC: 80 MG/DL (ref 70–99)
GLUCOSE BLDC GLUCOMTR-MCNC: 86 MG/DL (ref 70–99)
GLUCOSE BLDC GLUCOMTR-MCNC: 90 MG/DL (ref 70–99)
GLUCOSE BLDC GLUCOMTR-MCNC: 92 MG/DL (ref 70–99)
GLUCOSE BLDC GLUCOMTR-MCNC: 92 MG/DL (ref 70–99)
GLUCOSE BLDC GLUCOMTR-MCNC: 93 MG/DL (ref 70–99)
GLUCOSE BLDC GLUCOMTR-MCNC: 93 MG/DL (ref 70–99)
GLUCOSE BLDC GLUCOMTR-MCNC: 95 MG/DL (ref 70–99)
GLUCOSE BLDC GLUCOMTR-MCNC: 96 MG/DL (ref 70–99)
GLUCOSE BLDC GLUCOMTR-MCNC: 97 MG/DL (ref 70–99)
GLUCOSE BLDC GLUCOMTR-MCNC: 98 MG/DL (ref 70–99)
GLUCOSE BLDC GLUCOMTR-MCNC: 99 MG/DL (ref 70–99)
GLUCOSE SERPL-MCNC: 100 MG/DL (ref 70–99)
GLUCOSE SERPL-MCNC: 104 MG/DL (ref 70–99)
GLUCOSE SERPL-MCNC: 108 MG/DL (ref 70–99)
GLUCOSE SERPL-MCNC: 113 MG/DL (ref 70–99)
GLUCOSE SERPL-MCNC: 115 MG/DL (ref 70–99)
GLUCOSE SERPL-MCNC: 115 MG/DL (ref 70–99)
GLUCOSE SERPL-MCNC: 118 MG/DL (ref 70–99)
GLUCOSE SERPL-MCNC: 119 MG/DL (ref 70–99)
GLUCOSE SERPL-MCNC: 123 MG/DL (ref 70–99)
GLUCOSE SERPL-MCNC: 128 MG/DL (ref 70–99)
GLUCOSE SERPL-MCNC: 129 MG/DL (ref 70–99)
GLUCOSE SERPL-MCNC: 133 MG/DL (ref 70–99)
GLUCOSE SERPL-MCNC: 135 MG/DL (ref 70–99)
GLUCOSE SERPL-MCNC: 136 MG/DL (ref 70–99)
GLUCOSE SERPL-MCNC: 137 MG/DL (ref 70–99)
GLUCOSE SERPL-MCNC: 139 MG/DL (ref 70–99)
GLUCOSE SERPL-MCNC: 140 MG/DL (ref 70–99)
GLUCOSE SERPL-MCNC: 141 MG/DL (ref 70–99)
GLUCOSE SERPL-MCNC: 143 MG/DL (ref 70–99)
GLUCOSE SERPL-MCNC: 149 MG/DL (ref 70–99)
GLUCOSE SERPL-MCNC: 154 MG/DL (ref 70–99)
GLUCOSE SERPL-MCNC: 155 MG/DL (ref 70–99)
GLUCOSE SERPL-MCNC: 157 MG/DL (ref 70–99)
GLUCOSE SERPL-MCNC: 164 MG/DL (ref 70–99)
GLUCOSE SERPL-MCNC: 165 MG/DL (ref 70–99)
GLUCOSE SERPL-MCNC: 178 MG/DL (ref 70–99)
GLUCOSE SERPL-MCNC: 188 MG/DL (ref 70–99)
GLUCOSE SERPL-MCNC: 212 MG/DL (ref 70–99)
GLUCOSE SERPL-MCNC: 97 MG/DL (ref 70–99)
GLUCOSE UR STRIP-MCNC: NEGATIVE MG/DL
GRAM STAIN RESULT: ABNORMAL
HBA1C MFR BLD: 5.5 %
HBV CORE AB SERPL QL IA: NONREACTIVE
HBV SURFACE AB SERPL IA-ACNC: 24.51 M[IU]/ML
HBV SURFACE AB SERPL IA-ACNC: REACTIVE M[IU]/ML
HBV SURFACE AG SERPL QL IA: NONREACTIVE
HCO3 BLD-SCNC: 19 MMOL/L (ref 21–28)
HCO3 BLD-SCNC: 20 MMOL/L (ref 21–28)
HCO3 BLD-SCNC: 20 MMOL/L (ref 21–28)
HCO3 BLD-SCNC: 21 MMOL/L (ref 21–28)
HCO3 BLD-SCNC: 22 MMOL/L (ref 21–28)
HCO3 BLD-SCNC: 23 MMOL/L (ref 21–28)
HCO3 BLD-SCNC: 23 MMOL/L (ref 21–28)
HCO3 BLD-SCNC: 24 MMOL/L (ref 21–28)
HCO3 BLD-SCNC: 25 MMOL/L (ref 21–28)
HCO3 BLD-SCNC: 25 MMOL/L (ref 21–28)
HCO3 BLD-SCNC: 26 MMOL/L (ref 21–28)
HCO3 BLD-SCNC: 27 MMOL/L (ref 21–28)
HCO3 BLD-SCNC: 28 MMOL/L (ref 21–28)
HCO3 BLD-SCNC: 29 MMOL/L (ref 21–28)
HCO3 BLD-SCNC: 30 MMOL/L (ref 21–28)
HCO3 BLDA-SCNC: 20 MMOL/L (ref 21–28)
HCO3 BLDA-SCNC: 21 MMOL/L (ref 21–28)
HCO3 BLDA-SCNC: 22 MMOL/L (ref 21–28)
HCO3 BLDA-SCNC: 23 MMOL/L (ref 21–28)
HCO3 BLDA-SCNC: 24 MMOL/L (ref 21–28)
HCO3 BLDA-SCNC: 24 MMOL/L (ref 21–28)
HCO3 BLDV-SCNC: 21 MMOL/L (ref 21–28)
HCO3 BLDV-SCNC: 22 MMOL/L (ref 21–28)
HCO3 BLDV-SCNC: 23 MMOL/L (ref 21–28)
HCO3 BLDV-SCNC: 24 MMOL/L (ref 21–28)
HCO3 BLDV-SCNC: 25 MMOL/L (ref 21–28)
HCO3 BLDV-SCNC: 26 MMOL/L (ref 21–28)
HCO3 BLDV-SCNC: 27 MMOL/L (ref 21–28)
HCO3 BLDV-SCNC: 29 MMOL/L (ref 21–28)
HCO3 BLDV-SCNC: 30 MMOL/L (ref 21–28)
HCO3 BLDV-SCNC: 30 MMOL/L (ref 21–28)
HCT VFR BLD AUTO: 21.8 % (ref 35–47)
HCT VFR BLD AUTO: 22.7 % (ref 35–47)
HCT VFR BLD AUTO: 23 % (ref 35–47)
HCT VFR BLD AUTO: 23.6 % (ref 35–47)
HCT VFR BLD AUTO: 23.8 % (ref 35–47)
HCT VFR BLD AUTO: 24.7 % (ref 35–47)
HCT VFR BLD AUTO: 25 % (ref 35–47)
HCT VFR BLD AUTO: 25.4 % (ref 35–47)
HCT VFR BLD AUTO: 26.5 % (ref 35–47)
HCT VFR BLD AUTO: 28.2 % (ref 35–47)
HCT VFR BLD AUTO: 29 % (ref 35–47)
HCT VFR BLD AUTO: 29.4 % (ref 35–47)
HCT VFR BLD AUTO: 29.8 % (ref 35–47)
HCT VFR BLD AUTO: 31.2 % (ref 35–47)
HCT VFR BLD AUTO: 31.4 % (ref 35–47)
HCT VFR BLD AUTO: 31.9 % (ref 35–47)
HCT VFR BLD AUTO: 32 % (ref 35–47)
HCT VFR BLD AUTO: 32.1 % (ref 35–47)
HCT VFR BLD AUTO: 32.7 % (ref 35–47)
HCT VFR BLD AUTO: 32.8 % (ref 35–47)
HCT VFR BLD AUTO: 33.4 % (ref 35–47)
HCT VFR BLD AUTO: 33.5 % (ref 35–47)
HCT VFR BLD AUTO: 33.6 % (ref 35–47)
HCT VFR BLD AUTO: 34.5 % (ref 35–47)
HCT VFR BLD AUTO: 34.5 % (ref 35–47)
HCT VFR BLD AUTO: 34.7 % (ref 35–47)
HCT VFR BLD AUTO: 37.9 % (ref 35–47)
HCT VFR BLD AUTO: 40.9 % (ref 35–47)
HCT VFR BLD CALC: 30 % (ref 35–47)
HGB BLD-MCNC: 10.1 G/DL (ref 11.7–15.7)
HGB BLD-MCNC: 10.1 G/DL (ref 11.7–15.7)
HGB BLD-MCNC: 10.2 G/DL (ref 11.7–15.7)
HGB BLD-MCNC: 10.4 G/DL (ref 11.7–15.7)
HGB BLD-MCNC: 10.5 G/DL (ref 11.7–15.7)
HGB BLD-MCNC: 10.6 G/DL (ref 11.7–15.7)
HGB BLD-MCNC: 10.7 G/DL (ref 11.7–15.7)
HGB BLD-MCNC: 11.1 G/DL (ref 11.7–15.7)
HGB BLD-MCNC: 11.2 G/DL (ref 11.7–15.7)
HGB BLD-MCNC: 11.2 G/DL (ref 11.7–15.7)
HGB BLD-MCNC: 11.3 G/DL (ref 11.7–15.7)
HGB BLD-MCNC: 11.6 G/DL (ref 11.7–15.7)
HGB BLD-MCNC: 12.9 G/DL (ref 11.7–15.7)
HGB BLD-MCNC: 6.4 G/DL (ref 11.7–15.7)
HGB BLD-MCNC: 7 G/DL (ref 11.7–15.7)
HGB BLD-MCNC: 7.1 G/DL (ref 11.7–15.7)
HGB BLD-MCNC: 7.2 G/DL (ref 11.7–15.7)
HGB BLD-MCNC: 7.3 G/DL (ref 11.7–15.7)
HGB BLD-MCNC: 7.3 G/DL (ref 11.7–15.7)
HGB BLD-MCNC: 7.4 G/DL (ref 11.7–15.7)
HGB BLD-MCNC: 7.4 G/DL (ref 11.7–15.7)
HGB BLD-MCNC: 7.5 G/DL (ref 11.7–15.7)
HGB BLD-MCNC: 7.6 G/DL (ref 11.7–15.7)
HGB BLD-MCNC: 7.9 G/DL (ref 11.7–15.7)
HGB BLD-MCNC: 8 G/DL (ref 11.7–15.7)
HGB BLD-MCNC: 8.1 G/DL (ref 11.7–15.7)
HGB BLD-MCNC: 8.2 G/DL (ref 11.7–15.7)
HGB BLD-MCNC: 8.2 G/DL (ref 11.7–15.7)
HGB BLD-MCNC: 8.3 G/DL (ref 11.7–15.7)
HGB BLD-MCNC: 8.7 G/DL (ref 11.7–15.7)
HGB BLD-MCNC: 8.9 G/DL (ref 11.7–15.7)
HGB BLD-MCNC: 9 G/DL (ref 11.7–15.7)
HGB BLD-MCNC: 9.1 G/DL (ref 11.7–15.7)
HGB BLD-MCNC: 9.3 G/DL (ref 11.7–15.7)
HGB BLD-MCNC: 9.4 G/DL (ref 11.7–15.7)
HGB BLD-MCNC: 9.5 G/DL (ref 11.7–15.7)
HGB BLD-MCNC: 9.7 G/DL (ref 11.7–15.7)
HGB BLD-MCNC: 9.8 G/DL (ref 11.7–15.7)
HGB BLD-MCNC: 9.9 G/DL (ref 11.7–15.7)
HGB UR QL STRIP: ABNORMAL
HOLD SPECIMEN: NORMAL
IMM GRANULOCYTES # BLD: 0.1 10E3/UL
IMM GRANULOCYTES # BLD: 0.2 10E3/UL
IMM GRANULOCYTES NFR BLD: 0 %
IMM GRANULOCYTES NFR BLD: 1 %
INR PPP: 0.99 (ref 0.85–1.15)
INR PPP: 1.05 (ref 0.85–1.15)
INR PPP: 1.2 (ref 0.85–1.15)
INR PPP: 1.23 (ref 0.85–1.15)
INR PPP: 1.23 (ref 0.85–1.15)
INR PPP: 1.26 (ref 0.85–1.15)
INR PPP: 1.32 (ref 0.85–1.15)
INR PPP: 1.49 (ref 0.85–1.15)
INR PPP: 1.5 (ref 0.85–1.15)
INR PPP: 1.87 (ref 0.85–1.15)
INTERPRETATION ECG - MUSE: NORMAL
ISSUE DATE AND TIME: NORMAL
KETONES UR STRIP-MCNC: NEGATIVE MG/DL
LACTATE BLD-SCNC: 1.2 MMOL/L
LACTATE BLD-SCNC: 1.4 MMOL/L
LACTATE BLD-SCNC: 1.8 MMOL/L
LACTATE BLD-SCNC: 1.9 MMOL/L
LACTATE BLD-SCNC: 2 MMOL/L
LACTATE BLD-SCNC: 2.1 MMOL/L
LACTATE BLD-SCNC: 2.2 MMOL/L
LACTATE BLD-SCNC: 2.4 MMOL/L
LACTATE BLD-SCNC: 2.6 MMOL/L
LACTATE BLD-SCNC: 3.1 MMOL/L
LACTATE BLD-SCNC: 3.3 MMOL/L
LACTATE BLD-SCNC: 4 MMOL/L
LACTATE SERPL-SCNC: 0.7 MMOL/L (ref 0.7–2)
LACTATE SERPL-SCNC: 0.7 MMOL/L (ref 0.7–2)
LACTATE SERPL-SCNC: 0.9 MMOL/L (ref 0.7–2)
LACTATE SERPL-SCNC: 1.1 MMOL/L (ref 0.7–2)
LACTATE SERPL-SCNC: 1.2 MMOL/L (ref 0.7–2)
LACTATE SERPL-SCNC: 1.3 MMOL/L (ref 0.7–2)
LACTATE SERPL-SCNC: 1.4 MMOL/L (ref 0.7–2)
LACTATE SERPL-SCNC: 1.4 MMOL/L (ref 0.7–2)
LACTATE SERPL-SCNC: 1.5 MMOL/L (ref 0.7–2)
LACTATE SERPL-SCNC: 1.6 MMOL/L (ref 0.7–2)
LACTATE SERPL-SCNC: 1.6 MMOL/L (ref 0.7–2)
LACTATE SERPL-SCNC: 1.7 MMOL/L (ref 0.7–2)
LACTATE SERPL-SCNC: 1.9 MMOL/L (ref 0.7–2)
LACTATE SERPL-SCNC: 1.9 MMOL/L (ref 0.7–2)
LACTATE SERPL-SCNC: 2 MMOL/L (ref 0.7–2)
LACTATE SERPL-SCNC: 2.1 MMOL/L (ref 0.7–2)
LACTATE SERPL-SCNC: 2.2 MMOL/L (ref 0.7–2)
LACTATE SERPL-SCNC: 3.3 MMOL/L (ref 0.7–2)
LACTATE SERPL-SCNC: 9.4 MMOL/L (ref 0.7–2)
LEUKOCYTE ESTERASE UR QL STRIP: ABNORMAL
LVEF ECHO: NORMAL
LYMPHOCYTES # BLD AUTO: 0.5 10E3/UL (ref 0.8–5.3)
LYMPHOCYTES # BLD AUTO: 2.7 10E3/UL (ref 0.8–5.3)
LYMPHOCYTES NFR BLD AUTO: 23 %
LYMPHOCYTES NFR BLD AUTO: 3 %
MAGNESIUM SERPL-MCNC: 1.9 MG/DL (ref 1.7–2.3)
MAGNESIUM SERPL-MCNC: 2 MG/DL (ref 1.7–2.3)
MAGNESIUM SERPL-MCNC: 2.1 MG/DL (ref 1.7–2.3)
MAGNESIUM SERPL-MCNC: 2.1 MG/DL (ref 1.7–2.3)
MAGNESIUM SERPL-MCNC: 2.2 MG/DL (ref 1.7–2.3)
MAGNESIUM SERPL-MCNC: 2.3 MG/DL (ref 1.7–2.3)
MAGNESIUM SERPL-MCNC: 2.4 MG/DL (ref 1.7–2.3)
MAGNESIUM SERPL-MCNC: 2.5 MG/DL (ref 1.7–2.3)
MAGNESIUM SERPL-MCNC: 2.6 MG/DL (ref 1.7–2.3)
MAGNESIUM SERPL-MCNC: 2.7 MG/DL (ref 1.7–2.3)
MAGNESIUM SERPL-MCNC: 4.8 MG/DL (ref 1.7–2.3)
MCH RBC QN AUTO: 27.8 PG (ref 26.5–33)
MCH RBC QN AUTO: 27.9 PG (ref 26.5–33)
MCH RBC QN AUTO: 28.1 PG (ref 26.5–33)
MCH RBC QN AUTO: 28.2 PG (ref 26.5–33)
MCH RBC QN AUTO: 28.8 PG (ref 26.5–33)
MCH RBC QN AUTO: 28.8 PG (ref 26.5–33)
MCH RBC QN AUTO: 28.9 PG (ref 26.5–33)
MCH RBC QN AUTO: 29 PG (ref 26.5–33)
MCH RBC QN AUTO: 29 PG (ref 26.5–33)
MCH RBC QN AUTO: 29.1 PG (ref 26.5–33)
MCH RBC QN AUTO: 29.4 PG (ref 26.5–33)
MCH RBC QN AUTO: 29.5 PG (ref 26.5–33)
MCH RBC QN AUTO: 29.7 PG (ref 26.5–33)
MCH RBC QN AUTO: 29.9 PG (ref 26.5–33)
MCH RBC QN AUTO: 29.9 PG (ref 26.5–33)
MCH RBC QN AUTO: 30 PG (ref 26.5–33)
MCH RBC QN AUTO: 30.2 PG (ref 26.5–33)
MCH RBC QN AUTO: 30.3 PG (ref 26.5–33)
MCH RBC QN AUTO: 30.3 PG (ref 26.5–33)
MCH RBC QN AUTO: 30.4 PG (ref 26.5–33)
MCH RBC QN AUTO: 30.8 PG (ref 26.5–33)
MCHC RBC AUTO-ENTMCNC: 30 G/DL (ref 31.5–36.5)
MCHC RBC AUTO-ENTMCNC: 30.5 G/DL (ref 31.5–36.5)
MCHC RBC AUTO-ENTMCNC: 30.6 G/DL (ref 31.5–36.5)
MCHC RBC AUTO-ENTMCNC: 30.7 G/DL (ref 31.5–36.5)
MCHC RBC AUTO-ENTMCNC: 30.8 G/DL (ref 31.5–36.5)
MCHC RBC AUTO-ENTMCNC: 30.8 G/DL (ref 31.5–36.5)
MCHC RBC AUTO-ENTMCNC: 30.9 G/DL (ref 31.5–36.5)
MCHC RBC AUTO-ENTMCNC: 31.3 G/DL (ref 31.5–36.5)
MCHC RBC AUTO-ENTMCNC: 31.5 G/DL (ref 31.5–36.5)
MCHC RBC AUTO-ENTMCNC: 31.7 G/DL (ref 31.5–36.5)
MCHC RBC AUTO-ENTMCNC: 31.8 G/DL (ref 31.5–36.5)
MCHC RBC AUTO-ENTMCNC: 31.9 G/DL (ref 31.5–36.5)
MCHC RBC AUTO-ENTMCNC: 32 G/DL (ref 31.5–36.5)
MCHC RBC AUTO-ENTMCNC: 32.1 G/DL (ref 31.5–36.5)
MCHC RBC AUTO-ENTMCNC: 32.3 G/DL (ref 31.5–36.5)
MCHC RBC AUTO-ENTMCNC: 32.4 G/DL (ref 31.5–36.5)
MCHC RBC AUTO-ENTMCNC: 32.4 G/DL (ref 31.5–36.5)
MCHC RBC AUTO-ENTMCNC: 32.5 G/DL (ref 31.5–36.5)
MCHC RBC AUTO-ENTMCNC: 32.6 G/DL (ref 31.5–36.5)
MCHC RBC AUTO-ENTMCNC: 33.1 G/DL (ref 31.5–36.5)
MCHC RBC AUTO-ENTMCNC: 33.2 G/DL (ref 31.5–36.5)
MCHC RBC AUTO-ENTMCNC: 33.3 G/DL (ref 31.5–36.5)
MCHC RBC AUTO-ENTMCNC: 33.6 G/DL (ref 31.5–36.5)
MCHC RBC AUTO-ENTMCNC: 33.7 G/DL (ref 31.5–36.5)
MCV RBC AUTO: 101 FL (ref 78–100)
MCV RBC AUTO: 88 FL (ref 78–100)
MCV RBC AUTO: 88 FL (ref 78–100)
MCV RBC AUTO: 89 FL (ref 78–100)
MCV RBC AUTO: 90 FL (ref 78–100)
MCV RBC AUTO: 90 FL (ref 78–100)
MCV RBC AUTO: 91 FL (ref 78–100)
MCV RBC AUTO: 92 FL (ref 78–100)
MCV RBC AUTO: 92 FL (ref 78–100)
MCV RBC AUTO: 93 FL (ref 78–100)
MCV RBC AUTO: 94 FL (ref 78–100)
MCV RBC AUTO: 94 FL (ref 78–100)
MCV RBC AUTO: 95 FL (ref 78–100)
MCV RBC AUTO: 97 FL (ref 78–100)
MCV RBC AUTO: 97 FL (ref 78–100)
MONOCYTES # BLD AUTO: 0.4 10E3/UL (ref 0–1.3)
MONOCYTES # BLD AUTO: 0.6 10E3/UL (ref 0–1.3)
MONOCYTES NFR BLD AUTO: 2 %
MONOCYTES NFR BLD AUTO: 5 %
MUCOUS THREADS #/AREA URNS LPF: PRESENT /LPF
NEUTROPHILS # BLD AUTO: 16.2 10E3/UL (ref 1.6–8.3)
NEUTROPHILS # BLD AUTO: 8.3 10E3/UL (ref 1.6–8.3)
NEUTROPHILS NFR BLD AUTO: 71 %
NEUTROPHILS NFR BLD AUTO: 94 %
NITRATE UR QL: NEGATIVE
NRBC # BLD AUTO: 0 10E3/UL
NRBC # BLD AUTO: 0 10E3/UL
NRBC BLD AUTO-RTO: 0 /100
NRBC BLD AUTO-RTO: 0 /100
NT-PROBNP SERPL-MCNC: 459 PG/ML (ref 0–900)
O2/TOTAL GAS SETTING VFR VENT: 100 %
O2/TOTAL GAS SETTING VFR VENT: 100 %
O2/TOTAL GAS SETTING VFR VENT: 35 %
O2/TOTAL GAS SETTING VFR VENT: 40 %
O2/TOTAL GAS SETTING VFR VENT: 50 %
O2/TOTAL GAS SETTING VFR VENT: 60 %
O2/TOTAL GAS SETTING VFR VENT: 70 %
O2/TOTAL GAS SETTING VFR VENT: 70 %
O2/TOTAL GAS SETTING VFR VENT: 80 %
O2/TOTAL GAS SETTING VFR VENT: 85 %
O2/TOTAL GAS SETTING VFR VENT: 85 %
O2/TOTAL GAS SETTING VFR VENT: 90 %
O2/TOTAL GAS SETTING VFR VENT: 95 %
O2/TOTAL GAS SETTING VFR VENT: 95 %
OSMOLALITY SERPL: 296 MMOL/KG (ref 280–301)
OSMOLALITY UR: 525 MMOL/KG (ref 100–1200)
OXYHGB MFR BLD: 91 % (ref 92–100)
OXYHGB MFR BLD: 91 % (ref 92–100)
OXYHGB MFR BLD: 92 % (ref 92–100)
OXYHGB MFR BLD: 93 % (ref 92–100)
OXYHGB MFR BLD: 93 % (ref 92–100)
OXYHGB MFR BLD: 94 % (ref 92–100)
OXYHGB MFR BLD: 94 % (ref 92–100)
OXYHGB MFR BLD: 95 % (ref 92–100)
OXYHGB MFR BLD: 96 % (ref 92–100)
OXYHGB MFR BLD: 96 % (ref 92–100)
OXYHGB MFR BLD: 97 % (ref 92–100)
OXYHGB MFR BLD: 98 % (ref 92–100)
OXYHGB MFR BLDA: 98 % (ref 92–100)
OXYHGB MFR BLDV: 50 % (ref 70–75)
OXYHGB MFR BLDV: 53 % (ref 70–75)
OXYHGB MFR BLDV: 53 % (ref 70–75)
OXYHGB MFR BLDV: 56 % (ref 70–75)
OXYHGB MFR BLDV: 57 % (ref 70–75)
OXYHGB MFR BLDV: 58 % (ref 70–75)
OXYHGB MFR BLDV: 58 % (ref 70–75)
OXYHGB MFR BLDV: 59 % (ref 70–75)
OXYHGB MFR BLDV: 60 % (ref 70–75)
OXYHGB MFR BLDV: 60 % (ref 70–75)
OXYHGB MFR BLDV: 62 % (ref 70–75)
OXYHGB MFR BLDV: 63 % (ref 70–75)
OXYHGB MFR BLDV: 63 % (ref 92–100)
OXYHGB MFR BLDV: 64 % (ref 70–75)
OXYHGB MFR BLDV: 65 % (ref 70–75)
OXYHGB MFR BLDV: 65 % (ref 70–75)
OXYHGB MFR BLDV: 66 % (ref 70–75)
OXYHGB MFR BLDV: 66 % (ref 70–75)
OXYHGB MFR BLDV: 70 % (ref 70–75)
OXYHGB MFR BLDV: 71 % (ref 70–75)
OXYHGB MFR BLDV: 72 % (ref 70–75)
P AXIS - MUSE: 52 DEGREES
P AXIS - MUSE: 52 DEGREES
P AXIS - MUSE: 58 DEGREES
P AXIS - MUSE: 59 DEGREES
P AXIS - MUSE: 60 DEGREES
P AXIS - MUSE: 71 DEGREES
P AXIS - MUSE: 71 DEGREES
PATH REPORT.COMMENTS IMP SPEC: NORMAL
PATH REPORT.COMMENTS IMP SPEC: NORMAL
PATH REPORT.FINAL DX SPEC: NORMAL
PATH REPORT.GROSS SPEC: NORMAL
PATH REPORT.RELEVANT HX SPEC: NORMAL
PCO2 BLD: 35 MM HG (ref 35–45)
PCO2 BLD: 37 MM HG (ref 35–45)
PCO2 BLD: 38 MM HG (ref 35–45)
PCO2 BLD: 39 MM HG (ref 35–45)
PCO2 BLD: 40 MM HG (ref 35–45)
PCO2 BLD: 41 MM HG (ref 35–45)
PCO2 BLD: 42 MM HG (ref 35–45)
PCO2 BLD: 42 MM HG (ref 35–45)
PCO2 BLD: 43 MM HG (ref 35–45)
PCO2 BLD: 43 MM HG (ref 35–45)
PCO2 BLDA: 25 MM HG (ref 35–45)
PCO2 BLDA: 28 MM HG (ref 35–45)
PCO2 BLDA: 30 MM HG (ref 35–45)
PCO2 BLDA: 34 MM HG (ref 35–45)
PCO2 BLDA: 38 MM HG (ref 35–45)
PCO2 BLDA: 39 MM HG (ref 35–45)
PCO2 BLDA: 40 MM HG (ref 35–45)
PCO2 BLDA: 40 MM HG (ref 35–45)
PCO2 BLDA: 43 MM HG (ref 35–45)
PCO2 BLDV: 101 MM HG (ref 40–50)
PCO2 BLDV: 36 MM HG (ref 40–50)
PCO2 BLDV: 41 MM HG (ref 40–50)
PCO2 BLDV: 42 MM HG (ref 40–50)
PCO2 BLDV: 43 MM HG (ref 40–50)
PCO2 BLDV: 44 MM HG (ref 40–50)
PCO2 BLDV: 45 MM HG (ref 40–50)
PCO2 BLDV: 46 MM HG (ref 40–50)
PCO2 BLDV: 47 MM HG (ref 40–50)
PCO2 BLDV: 47 MM HG (ref 40–50)
PCO2 BLDV: 48 MM HG (ref 40–50)
PCO2 BLDV: 49 MM HG (ref 40–50)
PCO2 BLDV: 55 MM HG (ref 40–50)
PH BLD: 7.3 [PH] (ref 7.35–7.45)
PH BLD: 7.31 [PH] (ref 7.35–7.45)
PH BLD: 7.31 [PH] (ref 7.35–7.45)
PH BLD: 7.33 [PH] (ref 7.35–7.45)
PH BLD: 7.34 [PH] (ref 7.35–7.45)
PH BLD: 7.35 [PH] (ref 7.35–7.45)
PH BLD: 7.35 [PH] (ref 7.35–7.45)
PH BLD: 7.36 [PH] (ref 7.35–7.45)
PH BLD: 7.36 [PH] (ref 7.35–7.45)
PH BLD: 7.37 [PH] (ref 7.35–7.45)
PH BLD: 7.41 [PH] (ref 7.35–7.45)
PH BLD: 7.43 [PH] (ref 7.35–7.45)
PH BLD: 7.44 [PH] (ref 7.35–7.45)
PH BLD: 7.46 [PH] (ref 7.35–7.45)
PH BLD: 7.46 [PH] (ref 7.35–7.45)
PH BLD: 7.47 [PH] (ref 7.35–7.45)
PH BLD: 7.49 [PH] (ref 7.35–7.45)
PH BLD: 7.51 [PH] (ref 7.35–7.45)
PH BLDA: 7.31 [PH] (ref 7.35–7.45)
PH BLDA: 7.33 [PH] (ref 7.35–7.45)
PH BLDA: 7.34 [PH] (ref 7.35–7.45)
PH BLDA: 7.39 [PH] (ref 7.35–7.45)
PH BLDA: 7.39 [PH] (ref 7.35–7.45)
PH BLDA: 7.46 [PH] (ref 7.35–7.45)
PH BLDA: 7.5 [PH] (ref 7.35–7.45)
PH BLDA: 7.51 [PH] (ref 7.35–7.45)
PH BLDA: 7.54 [PH] (ref 7.35–7.45)
PH BLDV: 6.99 [PH] (ref 7.32–7.43)
PH BLDV: 7.27 [PH] (ref 7.32–7.43)
PH BLDV: 7.27 [PH] (ref 7.32–7.43)
PH BLDV: 7.28 [PH] (ref 7.32–7.43)
PH BLDV: 7.28 [PH] (ref 7.32–7.43)
PH BLDV: 7.29 [PH] (ref 7.32–7.43)
PH BLDV: 7.29 [PH] (ref 7.32–7.43)
PH BLDV: 7.3 [PH] (ref 7.32–7.43)
PH BLDV: 7.31 [PH] (ref 7.32–7.43)
PH BLDV: 7.32 [PH] (ref 7.32–7.43)
PH BLDV: 7.34 [PH] (ref 7.32–7.43)
PH BLDV: 7.34 [PH] (ref 7.32–7.43)
PH BLDV: 7.35 [PH] (ref 7.32–7.43)
PH BLDV: 7.37 [PH] (ref 7.32–7.43)
PH BLDV: 7.39 [PH] (ref 7.32–7.43)
PH BLDV: 7.42 [PH] (ref 7.32–7.43)
PH BLDV: 7.42 [PH] (ref 7.32–7.43)
PH BLDV: 7.44 [PH] (ref 7.32–7.43)
PH BLDV: 7.45 [PH] (ref 7.32–7.43)
PH BLDV: 7.47 [PH] (ref 7.32–7.43)
PH UR STRIP: 5.5 [PH] (ref 5–7)
PHOSPHATE SERPL-MCNC: 1.5 MG/DL (ref 2.5–4.5)
PHOSPHATE SERPL-MCNC: 1.6 MG/DL (ref 2.5–4.5)
PHOSPHATE SERPL-MCNC: 2.8 MG/DL (ref 2.5–4.5)
PHOSPHATE SERPL-MCNC: 2.8 MG/DL (ref 2.5–4.5)
PHOSPHATE SERPL-MCNC: 2.9 MG/DL (ref 2.5–4.5)
PHOSPHATE SERPL-MCNC: 2.9 MG/DL (ref 2.5–4.5)
PHOSPHATE SERPL-MCNC: 3 MG/DL (ref 2.5–4.5)
PHOSPHATE SERPL-MCNC: 3.1 MG/DL (ref 2.5–4.5)
PHOSPHATE SERPL-MCNC: 3.1 MG/DL (ref 2.5–4.5)
PHOSPHATE SERPL-MCNC: 3.3 MG/DL (ref 2.5–4.5)
PHOSPHATE SERPL-MCNC: 3.3 MG/DL (ref 2.5–4.5)
PHOSPHATE SERPL-MCNC: 3.6 MG/DL (ref 2.5–4.5)
PHOSPHATE SERPL-MCNC: 3.6 MG/DL (ref 2.5–4.5)
PHOSPHATE SERPL-MCNC: 3.8 MG/DL (ref 2.5–4.5)
PHOSPHATE SERPL-MCNC: 4 MG/DL (ref 2.5–4.5)
PHOSPHATE SERPL-MCNC: 4.1 MG/DL (ref 2.5–4.5)
PHOSPHATE SERPL-MCNC: 4.6 MG/DL (ref 2.5–4.5)
PHOSPHATE SERPL-MCNC: 4.7 MG/DL (ref 2.5–4.5)
PHOSPHATE SERPL-MCNC: 4.9 MG/DL (ref 2.5–4.5)
PHOSPHATE SERPL-MCNC: 5.6 MG/DL (ref 2.5–4.5)
PHOSPHATE SERPL-MCNC: 6 MG/DL (ref 2.5–4.5)
PHOTO IMAGE: NORMAL
PLATELET # BLD AUTO: 107 10E3/UL (ref 150–450)
PLATELET # BLD AUTO: 112 10E3/UL (ref 150–450)
PLATELET # BLD AUTO: 113 10E3/UL (ref 150–450)
PLATELET # BLD AUTO: 130 10E3/UL (ref 150–450)
PLATELET # BLD AUTO: 134 10E3/UL (ref 150–450)
PLATELET # BLD AUTO: 135 10E3/UL (ref 150–450)
PLATELET # BLD AUTO: 135 10E3/UL (ref 150–450)
PLATELET # BLD AUTO: 141 10E3/UL (ref 150–450)
PLATELET # BLD AUTO: 145 10E3/UL (ref 150–450)
PLATELET # BLD AUTO: 149 10E3/UL (ref 150–450)
PLATELET # BLD AUTO: 155 10E3/UL (ref 150–450)
PLATELET # BLD AUTO: 156 10E3/UL (ref 150–450)
PLATELET # BLD AUTO: 157 10E3/UL (ref 150–450)
PLATELET # BLD AUTO: 170 10E3/UL (ref 150–450)
PLATELET # BLD AUTO: 187 10E3/UL (ref 150–450)
PLATELET # BLD AUTO: 200 10E3/UL (ref 150–450)
PLATELET # BLD AUTO: 265 10E3/UL (ref 150–450)
PLATELET # BLD AUTO: 269 10E3/UL (ref 150–450)
PLATELET # BLD AUTO: 275 10E3/UL (ref 150–450)
PLATELET # BLD AUTO: 277 10E3/UL (ref 150–450)
PLATELET # BLD AUTO: 325 10E3/UL (ref 150–450)
PLATELET # BLD AUTO: 343 10E3/UL (ref 150–450)
PLATELET # BLD AUTO: 348 10E3/UL (ref 150–450)
PLATELET # BLD AUTO: 348 10E3/UL (ref 150–450)
PLATELET # BLD AUTO: 53 10E3/UL (ref 150–450)
PLATELET # BLD AUTO: 81 10E3/UL (ref 150–450)
PLATELET # BLD AUTO: 82 10E3/UL (ref 150–450)
PLATELET # BLD AUTO: 92 10E3/UL (ref 150–450)
PLATELET # BLD AUTO: 92 10E3/UL (ref 150–450)
PO2 BLD: 100 MM HG (ref 80–105)
PO2 BLD: 105 MM HG (ref 80–105)
PO2 BLD: 113 MM HG (ref 80–105)
PO2 BLD: 114 MM HG (ref 80–105)
PO2 BLD: 119 MM HG (ref 80–105)
PO2 BLD: 129 MM HG (ref 80–105)
PO2 BLD: 141 MM HG (ref 80–105)
PO2 BLD: 154 MM HG (ref 80–105)
PO2 BLD: 165 MM HG (ref 80–105)
PO2 BLD: 57 MM HG (ref 80–105)
PO2 BLD: 65 MM HG (ref 80–105)
PO2 BLD: 66 MM HG (ref 80–105)
PO2 BLD: 68 MM HG (ref 80–105)
PO2 BLD: 70 MM HG (ref 80–105)
PO2 BLD: 72 MM HG (ref 80–105)
PO2 BLD: 75 MM HG (ref 80–105)
PO2 BLD: 77 MM HG (ref 80–105)
PO2 BLD: 79 MM HG (ref 80–105)
PO2 BLD: 79 MM HG (ref 80–105)
PO2 BLD: 81 MM HG (ref 80–105)
PO2 BLD: 85 MM HG (ref 80–105)
PO2 BLD: 94 MM HG (ref 80–105)
PO2 BLD: 95 MM HG (ref 80–105)
PO2 BLDA: 207 MM HG (ref 80–105)
PO2 BLDA: 257 MM HG (ref 80–105)
PO2 BLDA: 286 MM HG (ref 80–105)
PO2 BLDA: 289 MM HG (ref 80–105)
PO2 BLDA: 326 MM HG (ref 80–105)
PO2 BLDA: 342 MM HG (ref 80–105)
PO2 BLDA: 385 MM HG (ref 80–105)
PO2 BLDA: 464 MM HG (ref 80–105)
PO2 BLDA: 469 MM HG (ref 80–105)
PO2 BLDA: 502 MM HG (ref 80–105)
PO2 BLDA: 92 MM HG (ref 80–105)
PO2 BLDV: 29 MM HG (ref 25–47)
PO2 BLDV: 30 MM HG (ref 25–47)
PO2 BLDV: 30 MM HG (ref 25–47)
PO2 BLDV: 31 MM HG (ref 25–47)
PO2 BLDV: 31 MM HG (ref 25–47)
PO2 BLDV: 32 MM HG (ref 25–47)
PO2 BLDV: 33 MM HG (ref 25–47)
PO2 BLDV: 34 MM HG (ref 25–47)
PO2 BLDV: 35 MM HG (ref 25–47)
PO2 BLDV: 36 MM HG (ref 25–47)
PO2 BLDV: 37 MM HG (ref 25–47)
PO2 BLDV: 41 MM HG (ref 25–47)
PO2 BLDV: 59 MM HG (ref 25–47)
POTASSIUM BLD-SCNC: 3.5 MMOL/L (ref 3.5–5)
POTASSIUM BLD-SCNC: 3.8 MMOL/L (ref 3.5–5)
POTASSIUM BLD-SCNC: 3.8 MMOL/L (ref 3.5–5)
POTASSIUM BLD-SCNC: 3.9 MMOL/L (ref 3.5–5)
POTASSIUM BLD-SCNC: 4 MMOL/L (ref 3.5–5)
POTASSIUM BLD-SCNC: 4.1 MMOL/L (ref 3.5–5)
POTASSIUM BLD-SCNC: 4.1 MMOL/L (ref 3.5–5)
POTASSIUM BLD-SCNC: 4.2 MMOL/L (ref 3.5–5)
POTASSIUM BLD-SCNC: 4.2 MMOL/L (ref 3.5–5)
POTASSIUM BLD-SCNC: 4.6 MMOL/L (ref 3.5–5)
POTASSIUM BLD-SCNC: 5.2 MMOL/L (ref 3.4–5.3)
POTASSIUM SERPL-SCNC: 3 MMOL/L (ref 3.4–5.3)
POTASSIUM SERPL-SCNC: 3.2 MMOL/L (ref 3.4–5.3)
POTASSIUM SERPL-SCNC: 3.4 MMOL/L (ref 3.4–5.3)
POTASSIUM SERPL-SCNC: 3.4 MMOL/L (ref 3.4–5.3)
POTASSIUM SERPL-SCNC: 3.5 MMOL/L (ref 3.4–5.3)
POTASSIUM SERPL-SCNC: 3.5 MMOL/L (ref 3.4–5.3)
POTASSIUM SERPL-SCNC: 3.6 MMOL/L (ref 3.4–5.3)
POTASSIUM SERPL-SCNC: 3.7 MMOL/L (ref 3.4–5.3)
POTASSIUM SERPL-SCNC: 3.8 MMOL/L (ref 3.4–5.3)
POTASSIUM SERPL-SCNC: 4 MMOL/L (ref 3.4–5.3)
POTASSIUM SERPL-SCNC: 4.2 MMOL/L (ref 3.4–5.3)
POTASSIUM SERPL-SCNC: 4.3 MMOL/L (ref 3.4–5.3)
POTASSIUM SERPL-SCNC: 4.4 MMOL/L (ref 3.4–5.3)
POTASSIUM SERPL-SCNC: 4.5 MMOL/L (ref 3.4–5.3)
POTASSIUM SERPL-SCNC: 4.6 MMOL/L (ref 3.4–5.3)
POTASSIUM SERPL-SCNC: 4.7 MMOL/L (ref 3.4–5.3)
POTASSIUM SERPL-SCNC: 4.8 MMOL/L (ref 3.4–5.3)
POTASSIUM SERPL-SCNC: 4.8 MMOL/L (ref 3.4–5.3)
POTASSIUM SERPL-SCNC: 4.9 MMOL/L (ref 3.4–5.3)
POTASSIUM SERPL-SCNC: 5 MMOL/L (ref 3.4–5.3)
POTASSIUM SERPL-SCNC: 5.1 MMOL/L (ref 3.4–5.3)
POTASSIUM SERPL-SCNC: 5.1 MMOL/L (ref 3.4–5.3)
POTASSIUM SERPL-SCNC: 5.2 MMOL/L (ref 3.4–5.3)
POTASSIUM SERPL-SCNC: 5.4 MMOL/L (ref 3.4–5.3)
POTASSIUM SERPL-SCNC: 5.5 MMOL/L (ref 3.4–5.3)
POTASSIUM SERPL-SCNC: 5.6 MMOL/L (ref 3.4–5.3)
POTASSIUM SERPL-SCNC: 5.6 MMOL/L (ref 3.4–5.3)
POTASSIUM SERPL-SCNC: 5.7 MMOL/L (ref 3.4–5.3)
POTASSIUM SERPL-SCNC: 6.1 MMOL/L (ref 3.4–5.3)
PR INTERVAL - MUSE: 120 MS
PR INTERVAL - MUSE: 136 MS
PR INTERVAL - MUSE: 138 MS
PR INTERVAL - MUSE: 142 MS
PR INTERVAL - MUSE: 174 MS
PR INTERVAL - MUSE: 184 MS
PR INTERVAL - MUSE: 194 MS
PROT SERPL-MCNC: 3.2 G/DL (ref 6.4–8.3)
PROT SERPL-MCNC: 3.3 G/DL (ref 6.4–8.3)
PROT SERPL-MCNC: 3.7 G/DL (ref 6.4–8.3)
PROT SERPL-MCNC: 4.6 G/DL (ref 6.4–8.3)
PROT SERPL-MCNC: 5.1 G/DL (ref 6.4–8.3)
PROT SERPL-MCNC: 5.1 G/DL (ref 6.4–8.3)
PROT SERPL-MCNC: 5.2 G/DL (ref 6.4–8.3)
PROT SERPL-MCNC: 5.4 G/DL (ref 6.4–8.3)
PROT SERPL-MCNC: 5.5 G/DL (ref 6.4–8.3)
PROT SERPL-MCNC: 5.6 G/DL (ref 6.4–8.3)
PROT SERPL-MCNC: 5.7 G/DL (ref 6.4–8.3)
PROT SERPL-MCNC: 5.9 G/DL (ref 6.4–8.3)
PROT SERPL-MCNC: 5.9 G/DL (ref 6.4–8.3)
PROT SERPL-MCNC: 6 G/DL (ref 6.4–8.3)
PROT SERPL-MCNC: 6.1 G/DL (ref 6.4–8.3)
PROT SERPL-MCNC: 6.5 G/DL (ref 6.4–8.3)
PROT SERPL-MCNC: 7 G/DL (ref 6.4–8.3)
PROT SERPL-MCNC: 7.5 G/DL (ref 6.4–8.3)
PROT SERPL-MCNC: 8.5 G/DL (ref 6.4–8.3)
QRS DURATION - MUSE: 84 MS
QRS DURATION - MUSE: 86 MS
QRS DURATION - MUSE: 88 MS
QRS DURATION - MUSE: 92 MS
QRS DURATION - MUSE: 94 MS
QRS DURATION - MUSE: 96 MS
QRS DURATION - MUSE: 96 MS
QT - MUSE: 298 MS
QT - MUSE: 322 MS
QT - MUSE: 372 MS
QT - MUSE: 420 MS
QT - MUSE: 428 MS
QT - MUSE: 460 MS
QT - MUSE: 514 MS
QTC - MUSE: 419 MS
QTC - MUSE: 455 MS
QTC - MUSE: 460 MS
QTC - MUSE: 465 MS
QTC - MUSE: 481 MS
QTC - MUSE: 491 MS
QTC - MUSE: 500 MS
R AXIS - MUSE: 103 DEGREES
R AXIS - MUSE: 81 DEGREES
R AXIS - MUSE: 87 DEGREES
R AXIS - MUSE: 88 DEGREES
R AXIS - MUSE: 91 DEGREES
R AXIS - MUSE: 92 DEGREES
R AXIS - MUSE: 98 DEGREES
RADIOLOGIST FLAGS: ABNORMAL
RADIOLOGIST FLAGS: NORMAL
RBC # BLD AUTO: 2.3 10E6/UL (ref 3.8–5.2)
RBC # BLD AUTO: 2.41 10E6/UL (ref 3.8–5.2)
RBC # BLD AUTO: 2.49 10E6/UL (ref 3.8–5.2)
RBC # BLD AUTO: 2.5 10E6/UL (ref 3.8–5.2)
RBC # BLD AUTO: 2.51 10E6/UL (ref 3.8–5.2)
RBC # BLD AUTO: 2.68 10E6/UL (ref 3.8–5.2)
RBC # BLD AUTO: 2.78 10E6/UL (ref 3.8–5.2)
RBC # BLD AUTO: 2.83 10E6/UL (ref 3.8–5.2)
RBC # BLD AUTO: 2.91 10E6/UL (ref 3.8–5.2)
RBC # BLD AUTO: 2.91 10E6/UL (ref 3.8–5.2)
RBC # BLD AUTO: 3.24 10E6/UL (ref 3.8–5.2)
RBC # BLD AUTO: 3.26 10E6/UL (ref 3.8–5.2)
RBC # BLD AUTO: 3.3 10E6/UL (ref 3.8–5.2)
RBC # BLD AUTO: 3.32 10E6/UL (ref 3.8–5.2)
RBC # BLD AUTO: 3.35 10E6/UL (ref 3.8–5.2)
RBC # BLD AUTO: 3.4 10E6/UL (ref 3.8–5.2)
RBC # BLD AUTO: 3.47 10E6/UL (ref 3.8–5.2)
RBC # BLD AUTO: 3.5 10E6/UL (ref 3.8–5.2)
RBC # BLD AUTO: 3.57 10E6/UL (ref 3.8–5.2)
RBC # BLD AUTO: 3.62 10E6/UL (ref 3.8–5.2)
RBC # BLD AUTO: 3.67 10E6/UL (ref 3.8–5.2)
RBC # BLD AUTO: 3.73 10E6/UL (ref 3.8–5.2)
RBC # BLD AUTO: 3.73 10E6/UL (ref 3.8–5.2)
RBC # BLD AUTO: 3.77 10E6/UL (ref 3.8–5.2)
RBC # BLD AUTO: 3.78 10E6/UL (ref 3.8–5.2)
RBC # BLD AUTO: 3.81 10E6/UL (ref 3.8–5.2)
RBC # BLD AUTO: 4.18 10E6/UL (ref 3.8–5.2)
RBC # BLD AUTO: 4.59 10E6/UL (ref 3.8–5.2)
RBC URINE: 1 /HPF
SAO2 % BLDV: 71 % (ref 94–100)
SODIUM BLD-SCNC: 137 MMOL/L (ref 133–144)
SODIUM BLD-SCNC: 139 MMOL/L (ref 133–144)
SODIUM BLD-SCNC: 140 MMOL/L (ref 133–144)
SODIUM BLD-SCNC: 141 MMOL/L (ref 133–144)
SODIUM SERPL-SCNC: 129 MMOL/L (ref 136–145)
SODIUM SERPL-SCNC: 130 MMOL/L (ref 136–145)
SODIUM SERPL-SCNC: 130 MMOL/L (ref 136–145)
SODIUM SERPL-SCNC: 131 MMOL/L (ref 136–145)
SODIUM SERPL-SCNC: 133 MMOL/L (ref 136–145)
SODIUM SERPL-SCNC: 133 MMOL/L (ref 136–145)
SODIUM SERPL-SCNC: 135 MMOL/L (ref 136–145)
SODIUM SERPL-SCNC: 138 MMOL/L (ref 136–145)
SODIUM SERPL-SCNC: 139 MMOL/L (ref 136–145)
SODIUM SERPL-SCNC: 140 MMOL/L (ref 136–145)
SODIUM SERPL-SCNC: 141 MMOL/L (ref 136–145)
SODIUM SERPL-SCNC: 141 MMOL/L (ref 136–145)
SODIUM SERPL-SCNC: 142 MMOL/L (ref 136–145)
SODIUM SERPL-SCNC: 142 MMOL/L (ref 136–145)
SODIUM SERPL-SCNC: 143 MMOL/L (ref 136–145)
SODIUM SERPL-SCNC: 146 MMOL/L (ref 136–145)
SODIUM SERPL-SCNC: 147 MMOL/L (ref 136–145)
SODIUM SERPL-SCNC: 148 MMOL/L (ref 136–145)
SODIUM SERPL-SCNC: 150 MMOL/L (ref 136–145)
SODIUM SERPL-SCNC: 150 MMOL/L (ref 136–145)
SODIUM UR-SCNC: 61 MMOL/L
SP GR UR STRIP: 1.02 (ref 1–1.03)
SPECIMEN EXPIRATION DATE: NORMAL
SYSTOLIC BLOOD PRESSURE - MUSE: NORMAL MMHG
T AXIS - MUSE: -71 DEGREES
T AXIS - MUSE: 101 DEGREES
T AXIS - MUSE: 114 DEGREES
T AXIS - MUSE: 71 DEGREES
T AXIS - MUSE: 93 DEGREES
T AXIS - MUSE: 94 DEGREES
T AXIS - MUSE: 95 DEGREES
TRANSITIONAL EPI: <1 /HPF
TROPONIN T SERPL HS-MCNC: 12 NG/L
TROPONIN T SERPL HS-MCNC: 13 NG/L
TROPONIN T SERPL HS-MCNC: 14 NG/L
TSH SERPL DL<=0.005 MIU/L-ACNC: 2.49 UIU/ML (ref 0.3–4.2)
UFH PPP CHRO-ACNC: >1.1 IU/ML
UNIT ABO/RH: NORMAL
UNIT NUMBER: NORMAL
UNIT STATUS: NORMAL
UNIT TYPE ISBT: 5100
UNIT TYPE ISBT: 6200
UNIT TYPE ISBT: 6200
UNIT TYPE ISBT: 9500
UROBILINOGEN UR STRIP-MCNC: 4 MG/DL
VENTRICULAR RATE- MUSE: 105 BPM
VENTRICULAR RATE- MUSE: 119 BPM
VENTRICULAR RATE- MUSE: 120 BPM
VENTRICULAR RATE- MUSE: 57 BPM
VENTRICULAR RATE- MUSE: 60 BPM
VENTRICULAR RATE- MUSE: 71 BPM
VENTRICULAR RATE- MUSE: 79 BPM
WBC # BLD AUTO: 10 10E3/UL (ref 4–11)
WBC # BLD AUTO: 10.1 10E3/UL (ref 4–11)
WBC # BLD AUTO: 10.2 10E3/UL (ref 4–11)
WBC # BLD AUTO: 10.3 10E3/UL (ref 4–11)
WBC # BLD AUTO: 10.4 10E3/UL (ref 4–11)
WBC # BLD AUTO: 10.6 10E3/UL (ref 4–11)
WBC # BLD AUTO: 10.8 10E3/UL (ref 4–11)
WBC # BLD AUTO: 11.6 10E3/UL (ref 4–11)
WBC # BLD AUTO: 11.7 10E3/UL (ref 4–11)
WBC # BLD AUTO: 11.7 10E3/UL (ref 4–11)
WBC # BLD AUTO: 12.6 10E3/UL (ref 4–11)
WBC # BLD AUTO: 12.6 10E3/UL (ref 4–11)
WBC # BLD AUTO: 12.7 10E3/UL (ref 4–11)
WBC # BLD AUTO: 13.1 10E3/UL (ref 4–11)
WBC # BLD AUTO: 14 10E3/UL (ref 4–11)
WBC # BLD AUTO: 15.1 10E3/UL (ref 4–11)
WBC # BLD AUTO: 15.8 10E3/UL (ref 4–11)
WBC # BLD AUTO: 16.1 10E3/UL (ref 4–11)
WBC # BLD AUTO: 17.7 10E3/UL (ref 4–11)
WBC # BLD AUTO: 17.7 10E3/UL (ref 4–11)
WBC # BLD AUTO: 6.2 10E3/UL (ref 4–11)
WBC # BLD AUTO: 6.4 10E3/UL (ref 4–11)
WBC # BLD AUTO: 6.8 10E3/UL (ref 4–11)
WBC # BLD AUTO: 6.8 10E3/UL (ref 4–11)
WBC # BLD AUTO: 7.7 10E3/UL (ref 4–11)
WBC # BLD AUTO: 8.1 10E3/UL (ref 4–11)
WBC # BLD AUTO: 8.8 10E3/UL (ref 4–11)
WBC # BLD AUTO: 9.8 10E3/UL (ref 4–11)
WBC # BLD AUTO: 9.9 10E3/UL (ref 4–11)
WBC URINE: 3 /HPF

## 2023-01-01 PROCEDURE — 999N000054 HC STATISTIC EKG NON-CHARGEABLE

## 2023-01-01 PROCEDURE — 250N000013 HC RX MED GY IP 250 OP 250 PS 637: Performed by: NURSE PRACTITIONER

## 2023-01-01 PROCEDURE — 999N000157 HC STATISTIC RCP TIME EA 10 MIN

## 2023-01-01 PROCEDURE — 93308 TTE F-UP OR LMTD: CPT | Mod: 26 | Performed by: INTERNAL MEDICINE

## 2023-01-01 PROCEDURE — 94667 MNPJ CHEST WALL 1ST: CPT

## 2023-01-01 PROCEDURE — 250N000013 HC RX MED GY IP 250 OP 250 PS 637: Performed by: STUDENT IN AN ORGANIZED HEALTH CARE EDUCATION/TRAINING PROGRAM

## 2023-01-01 PROCEDURE — 250N000013 HC RX MED GY IP 250 OP 250 PS 637: Performed by: PHYSICIAN ASSISTANT

## 2023-01-01 PROCEDURE — 83605 ASSAY OF LACTIC ACID: CPT

## 2023-01-01 PROCEDURE — 82330 ASSAY OF CALCIUM: CPT | Performed by: STUDENT IN AN ORGANIZED HEALTH CARE EDUCATION/TRAINING PROGRAM

## 2023-01-01 PROCEDURE — 82330 ASSAY OF CALCIUM: CPT

## 2023-01-01 PROCEDURE — 86923 COMPATIBILITY TEST ELECTRIC: CPT | Performed by: STUDENT IN AN ORGANIZED HEALTH CARE EDUCATION/TRAINING PROGRAM

## 2023-01-01 PROCEDURE — 84443 ASSAY THYROID STIM HORMONE: CPT | Performed by: STUDENT IN AN ORGANIZED HEALTH CARE EDUCATION/TRAINING PROGRAM

## 2023-01-01 PROCEDURE — 84100 ASSAY OF PHOSPHORUS: CPT | Performed by: STUDENT IN AN ORGANIZED HEALTH CARE EDUCATION/TRAINING PROGRAM

## 2023-01-01 PROCEDURE — 84100 ASSAY OF PHOSPHORUS: CPT | Performed by: THORACIC SURGERY (CARDIOTHORACIC VASCULAR SURGERY)

## 2023-01-01 PROCEDURE — 250N000009 HC RX 250: Performed by: STUDENT IN AN ORGANIZED HEALTH CARE EDUCATION/TRAINING PROGRAM

## 2023-01-01 PROCEDURE — 99292 CRITICAL CARE ADDL 30 MIN: CPT | Performed by: STUDENT IN AN ORGANIZED HEALTH CARE EDUCATION/TRAINING PROGRAM

## 2023-01-01 PROCEDURE — 94660 CPAP INITIATION&MGMT: CPT

## 2023-01-01 PROCEDURE — 250N000009 HC RX 250: Performed by: ANESTHESIOLOGY

## 2023-01-01 PROCEDURE — 36415 COLL VENOUS BLD VENIPUNCTURE: CPT | Performed by: STUDENT IN AN ORGANIZED HEALTH CARE EDUCATION/TRAINING PROGRAM

## 2023-01-01 PROCEDURE — 83605 ASSAY OF LACTIC ACID: CPT | Performed by: STUDENT IN AN ORGANIZED HEALTH CARE EDUCATION/TRAINING PROGRAM

## 2023-01-01 PROCEDURE — 93010 ELECTROCARDIOGRAM REPORT: CPT | Mod: 59 | Performed by: INTERNAL MEDICINE

## 2023-01-01 PROCEDURE — 250N000011 HC RX IP 250 OP 636: Performed by: STUDENT IN AN ORGANIZED HEALTH CARE EDUCATION/TRAINING PROGRAM

## 2023-01-01 PROCEDURE — 250N000009 HC RX 250

## 2023-01-01 PROCEDURE — 250N000011 HC RX IP 250 OP 636

## 2023-01-01 PROCEDURE — XW033H4 INTRODUCTION OF SYNTHETIC HUMAN ANGIOTENSIN II INTO PERIPHERAL VEIN, PERCUTANEOUS APPROACH, NEW TECHNOLOGY GROUP 4: ICD-10-PCS | Performed by: ANESTHESIOLOGY

## 2023-01-01 PROCEDURE — 250N000011 HC RX IP 250 OP 636: Performed by: SURGERY

## 2023-01-01 PROCEDURE — 83735 ASSAY OF MAGNESIUM: CPT | Performed by: STUDENT IN AN ORGANIZED HEALTH CARE EDUCATION/TRAINING PROGRAM

## 2023-01-01 PROCEDURE — 250N000013 HC RX MED GY IP 250 OP 250 PS 637

## 2023-01-01 PROCEDURE — C1769 GUIDE WIRE: HCPCS | Performed by: INTERNAL MEDICINE

## 2023-01-01 PROCEDURE — 82805 BLOOD GASES W/O2 SATURATION: CPT

## 2023-01-01 PROCEDURE — 97535 SELF CARE MNGMENT TRAINING: CPT | Mod: GO

## 2023-01-01 PROCEDURE — 31624 DX BRONCHOSCOPE/LAVAGE: CPT

## 2023-01-01 PROCEDURE — 93005 ELECTROCARDIOGRAM TRACING: CPT

## 2023-01-01 PROCEDURE — 82310 ASSAY OF CALCIUM: CPT

## 2023-01-01 PROCEDURE — 84450 TRANSFERASE (AST) (SGOT): CPT | Performed by: STUDENT IN AN ORGANIZED HEALTH CARE EDUCATION/TRAINING PROGRAM

## 2023-01-01 PROCEDURE — 85730 THROMBOPLASTIN TIME PARTIAL: CPT | Performed by: INTERNAL MEDICINE

## 2023-01-01 PROCEDURE — 85610 PROTHROMBIN TIME: CPT | Performed by: STUDENT IN AN ORGANIZED HEALTH CARE EDUCATION/TRAINING PROGRAM

## 2023-01-01 PROCEDURE — 93970 EXTREMITY STUDY: CPT

## 2023-01-01 PROCEDURE — 250N000009 HC RX 250: Performed by: THORACIC SURGERY (CARDIOTHORACIC VASCULAR SURGERY)

## 2023-01-01 PROCEDURE — 250N000011 HC RX IP 250 OP 636: Performed by: NURSE PRACTITIONER

## 2023-01-01 PROCEDURE — 82310 ASSAY OF CALCIUM: CPT | Performed by: STUDENT IN AN ORGANIZED HEALTH CARE EDUCATION/TRAINING PROGRAM

## 2023-01-01 PROCEDURE — 71045 X-RAY EXAM CHEST 1 VIEW: CPT | Mod: 26 | Performed by: RADIOLOGY

## 2023-01-01 PROCEDURE — 85730 THROMBOPLASTIN TIME PARTIAL: CPT | Performed by: STUDENT IN AN ORGANIZED HEALTH CARE EDUCATION/TRAINING PROGRAM

## 2023-01-01 PROCEDURE — 85014 HEMATOCRIT: CPT | Performed by: STUDENT IN AN ORGANIZED HEALTH CARE EDUCATION/TRAINING PROGRAM

## 2023-01-01 PROCEDURE — P9016 RBC LEUKOCYTES REDUCED: HCPCS | Performed by: STUDENT IN AN ORGANIZED HEALTH CARE EDUCATION/TRAINING PROGRAM

## 2023-01-01 PROCEDURE — 71045 X-RAY EXAM CHEST 1 VIEW: CPT | Mod: 26 | Performed by: STUDENT IN AN ORGANIZED HEALTH CARE EDUCATION/TRAINING PROGRAM

## 2023-01-01 PROCEDURE — 82805 BLOOD GASES W/O2 SATURATION: CPT | Performed by: STUDENT IN AN ORGANIZED HEALTH CARE EDUCATION/TRAINING PROGRAM

## 2023-01-01 PROCEDURE — 370N000003 HC ANESTHESIA WARD SERVICE

## 2023-01-01 PROCEDURE — 84132 ASSAY OF SERUM POTASSIUM: CPT | Performed by: THORACIC SURGERY (CARDIOTHORACIC VASCULAR SURGERY)

## 2023-01-01 PROCEDURE — 93931 UPPER EXTREMITY STUDY: CPT | Mod: RT

## 2023-01-01 PROCEDURE — 93931 UPPER EXTREMITY STUDY: CPT | Mod: 26 | Performed by: RADIOLOGY

## 2023-01-01 PROCEDURE — 84132 ASSAY OF SERUM POTASSIUM: CPT

## 2023-01-01 PROCEDURE — 85018 HEMOGLOBIN: CPT | Performed by: STUDENT IN AN ORGANIZED HEALTH CARE EDUCATION/TRAINING PROGRAM

## 2023-01-01 PROCEDURE — 85730 THROMBOPLASTIN TIME PARTIAL: CPT | Performed by: SURGERY

## 2023-01-01 PROCEDURE — 85610 PROTHROMBIN TIME: CPT | Performed by: THORACIC SURGERY (CARDIOTHORACIC VASCULAR SURGERY)

## 2023-01-01 PROCEDURE — 250N000011 HC RX IP 250 OP 636: Performed by: INTERNAL MEDICINE

## 2023-01-01 PROCEDURE — 200N000002 HC R&B ICU UMMC

## 2023-01-01 PROCEDURE — 36415 COLL VENOUS BLD VENIPUNCTURE: CPT | Performed by: THORACIC SURGERY (CARDIOTHORACIC VASCULAR SURGERY)

## 2023-01-01 PROCEDURE — 93306 TTE W/DOPPLER COMPLETE: CPT | Mod: 26 | Performed by: INTERNAL MEDICINE

## 2023-01-01 PROCEDURE — 86923 COMPATIBILITY TEST ELECTRIC: CPT

## 2023-01-01 PROCEDURE — 72146 MRI CHEST SPINE W/O DYE: CPT

## 2023-01-01 PROCEDURE — 93454 CORONARY ARTERY ANGIO S&I: CPT | Performed by: INTERNAL MEDICINE

## 2023-01-01 PROCEDURE — 250N000013 HC RX MED GY IP 250 OP 250 PS 637: Performed by: SURGERY

## 2023-01-01 PROCEDURE — 82805 BLOOD GASES W/O2 SATURATION: CPT | Performed by: SURGERY

## 2023-01-01 PROCEDURE — 250N000011 HC RX IP 250 OP 636: Performed by: THORACIC SURGERY (CARDIOTHORACIC VASCULAR SURGERY)

## 2023-01-01 PROCEDURE — 84132 ASSAY OF SERUM POTASSIUM: CPT | Performed by: STUDENT IN AN ORGANIZED HEALTH CARE EDUCATION/TRAINING PROGRAM

## 2023-01-01 PROCEDURE — 0B978ZZ DRAINAGE OF LEFT MAIN BRONCHUS, VIA NATURAL OR ARTIFICIAL OPENING ENDOSCOPIC: ICD-10-PCS | Performed by: INTERNAL MEDICINE

## 2023-01-01 PROCEDURE — 82803 BLOOD GASES ANY COMBINATION: CPT | Performed by: STUDENT IN AN ORGANIZED HEALTH CARE EDUCATION/TRAINING PROGRAM

## 2023-01-01 PROCEDURE — 83935 ASSAY OF URINE OSMOLALITY: CPT | Performed by: STUDENT IN AN ORGANIZED HEALTH CARE EDUCATION/TRAINING PROGRAM

## 2023-01-01 PROCEDURE — 86850 RBC ANTIBODY SCREEN: CPT

## 2023-01-01 PROCEDURE — 99233 SBSQ HOSP IP/OBS HIGH 50: CPT | Mod: 24 | Performed by: STUDENT IN AN ORGANIZED HEALTH CARE EDUCATION/TRAINING PROGRAM

## 2023-01-01 PROCEDURE — 258N000003 HC RX IP 258 OP 636: Performed by: PHYSICIAN ASSISTANT

## 2023-01-01 PROCEDURE — 87040 BLOOD CULTURE FOR BACTERIA: CPT | Performed by: STUDENT IN AN ORGANIZED HEALTH CARE EDUCATION/TRAINING PROGRAM

## 2023-01-01 PROCEDURE — 82550 ASSAY OF CK (CPK): CPT | Performed by: STUDENT IN AN ORGANIZED HEALTH CARE EDUCATION/TRAINING PROGRAM

## 2023-01-01 PROCEDURE — 999N000065 XR ABDOMEN PORT 1 VIEW

## 2023-01-01 PROCEDURE — 87449 NOS EACH ORGANISM AG IA: CPT | Performed by: STUDENT IN AN ORGANIZED HEALTH CARE EDUCATION/TRAINING PROGRAM

## 2023-01-01 PROCEDURE — 3E043XZ INTRODUCTION OF VASOPRESSOR INTO CENTRAL VEIN, PERCUTANEOUS APPROACH: ICD-10-PCS | Performed by: ANESTHESIOLOGY

## 2023-01-01 PROCEDURE — 31622 DX BRONCHOSCOPE/WASH: CPT

## 2023-01-01 PROCEDURE — 94799 UNLISTED PULMONARY SVC/PX: CPT

## 2023-01-01 PROCEDURE — B2111ZZ FLUOROSCOPY OF MULTIPLE CORONARY ARTERIES USING LOW OSMOLAR CONTRAST: ICD-10-PCS | Performed by: INTERNAL MEDICINE

## 2023-01-01 PROCEDURE — 85027 COMPLETE CBC AUTOMATED: CPT | Performed by: STUDENT IN AN ORGANIZED HEALTH CARE EDUCATION/TRAINING PROGRAM

## 2023-01-01 PROCEDURE — 258N000003 HC RX IP 258 OP 636

## 2023-01-01 PROCEDURE — 999N000015 HC STATISTIC ARTERIAL MONITORING DAILY

## 2023-01-01 PROCEDURE — 94640 AIRWAY INHALATION TREATMENT: CPT | Mod: 76

## 2023-01-01 PROCEDURE — 71045 X-RAY EXAM CHEST 1 VIEW: CPT

## 2023-01-01 PROCEDURE — 258N000003 HC RX IP 258 OP 636: Performed by: STUDENT IN AN ORGANIZED HEALTH CARE EDUCATION/TRAINING PROGRAM

## 2023-01-01 PROCEDURE — 85520 HEPARIN ASSAY: CPT | Performed by: STUDENT IN AN ORGANIZED HEALTH CARE EDUCATION/TRAINING PROGRAM

## 2023-01-01 PROCEDURE — 250N000009 HC RX 250: Performed by: NURSE PRACTITIONER

## 2023-01-01 PROCEDURE — 30283B1 TRANSFUSION OF NONAUTOLOGOUS 4-FACTOR PROTHROMBIN COMPLEX CONCENTRATE INTO VEIN, PERCUTANEOUS APPROACH: ICD-10-PCS | Performed by: STUDENT IN AN ORGANIZED HEALTH CARE EDUCATION/TRAINING PROGRAM

## 2023-01-01 PROCEDURE — 258N000003 HC RX IP 258 OP 636: Performed by: SURGERY

## 2023-01-01 PROCEDURE — 80053 COMPREHEN METABOLIC PANEL: CPT | Performed by: STUDENT IN AN ORGANIZED HEALTH CARE EDUCATION/TRAINING PROGRAM

## 2023-01-01 PROCEDURE — 76705 ECHO EXAM OF ABDOMEN: CPT

## 2023-01-01 PROCEDURE — 94003 VENT MGMT INPAT SUBQ DAY: CPT

## 2023-01-01 PROCEDURE — 99233 SBSQ HOSP IP/OBS HIGH 50: CPT | Mod: 24 | Performed by: INTERNAL MEDICINE

## 2023-01-01 PROCEDURE — G0463 HOSPITAL OUTPT CLINIC VISIT: HCPCS

## 2023-01-01 PROCEDURE — C1887 CATHETER, GUIDING: HCPCS | Performed by: INTERNAL MEDICINE

## 2023-01-01 PROCEDURE — 36620 INSERTION CATHETER ARTERY: CPT | Performed by: ANESTHESIOLOGY

## 2023-01-01 PROCEDURE — 33877 THORACOABDOMINAL GRAFT: CPT | Mod: 62 | Performed by: THORACIC SURGERY (CARDIOTHORACIC VASCULAR SURGERY)

## 2023-01-01 PROCEDURE — 82565 ASSAY OF CREATININE: CPT

## 2023-01-01 PROCEDURE — 410N000004: Performed by: THORACIC SURGERY (CARDIOTHORACIC VASCULAR SURGERY)

## 2023-01-01 PROCEDURE — 93925 LOWER EXTREMITY STUDY: CPT | Mod: 26 | Performed by: RADIOLOGY

## 2023-01-01 PROCEDURE — 87077 CULTURE AEROBIC IDENTIFY: CPT | Performed by: STUDENT IN AN ORGANIZED HEALTH CARE EDUCATION/TRAINING PROGRAM

## 2023-01-01 PROCEDURE — 93010 ELECTROCARDIOGRAM REPORT: CPT | Performed by: INTERNAL MEDICINE

## 2023-01-01 PROCEDURE — C1781 MESH (IMPLANTABLE): HCPCS | Performed by: THORACIC SURGERY (CARDIOTHORACIC VASCULAR SURGERY)

## 2023-01-01 PROCEDURE — 99291 CRITICAL CARE FIRST HOUR: CPT | Mod: GC | Performed by: INTERNAL MEDICINE

## 2023-01-01 PROCEDURE — 99291 CRITICAL CARE FIRST HOUR: CPT | Mod: 24 | Performed by: ANESTHESIOLOGY

## 2023-01-01 PROCEDURE — 250N000011 HC RX IP 250 OP 636: Performed by: PHYSICIAN ASSISTANT

## 2023-01-01 PROCEDURE — 85018 HEMOGLOBIN: CPT

## 2023-01-01 PROCEDURE — 99152 MOD SED SAME PHYS/QHP 5/>YRS: CPT | Mod: GC | Performed by: INTERNAL MEDICINE

## 2023-01-01 PROCEDURE — 272N000001 HC OR GENERAL SUPPLY STERILE: Performed by: INTERNAL MEDICINE

## 2023-01-01 PROCEDURE — 82805 BLOOD GASES W/O2 SATURATION: CPT | Performed by: THORACIC SURGERY (CARDIOTHORACIC VASCULAR SURGERY)

## 2023-01-01 PROCEDURE — 85027 COMPLETE CBC AUTOMATED: CPT | Performed by: SURGERY

## 2023-01-01 PROCEDURE — 36415 COLL VENOUS BLD VENIPUNCTURE: CPT

## 2023-01-01 PROCEDURE — 36415 COLL VENOUS BLD VENIPUNCTURE: CPT | Performed by: SURGERY

## 2023-01-01 PROCEDURE — 93321 DOPPLER ECHO F-UP/LMTD STD: CPT | Mod: 26 | Performed by: INTERNAL MEDICINE

## 2023-01-01 PROCEDURE — 250N000013 HC RX MED GY IP 250 OP 250 PS 637: Performed by: THORACIC SURGERY (CARDIOTHORACIC VASCULAR SURGERY)

## 2023-01-01 PROCEDURE — 71045 X-RAY EXAM CHEST 1 VIEW: CPT | Mod: 77

## 2023-01-01 PROCEDURE — 258N000001 HC RX 258: Performed by: STUDENT IN AN ORGANIZED HEALTH CARE EDUCATION/TRAINING PROGRAM

## 2023-01-01 PROCEDURE — 93010 ELECTROCARDIOGRAM REPORT: CPT | Mod: 76 | Performed by: INTERNAL MEDICINE

## 2023-01-01 PROCEDURE — 74174 CTA ABD&PLVS W/CONTRAST: CPT

## 2023-01-01 PROCEDURE — 999N000045 HC STATISTIC DAILY SWAN MONITORING

## 2023-01-01 PROCEDURE — 272N000473 HC KIT, VPS RHYTHM STYLET

## 2023-01-01 PROCEDURE — 258N000003 HC RX IP 258 OP 636: Performed by: NURSE PRACTITIONER

## 2023-01-01 PROCEDURE — 0B988ZZ DRAINAGE OF LEFT UPPER LOBE BRONCHUS, VIA NATURAL OR ARTIFICIAL OPENING ENDOSCOPIC: ICD-10-PCS | Performed by: INTERNAL MEDICINE

## 2023-01-01 PROCEDURE — 250N000024 HC ISOFLURANE, PER MIN: Performed by: THORACIC SURGERY (CARDIOTHORACIC VASCULAR SURGERY)

## 2023-01-01 PROCEDURE — 71275 CT ANGIOGRAPHY CHEST: CPT | Mod: 26 | Performed by: RADIOLOGY

## 2023-01-01 PROCEDURE — 93306 TTE W/DOPPLER COMPLETE: CPT

## 2023-01-01 PROCEDURE — 70450 CT HEAD/BRAIN W/O DYE: CPT

## 2023-01-01 PROCEDURE — 72148 MRI LUMBAR SPINE W/O DYE: CPT

## 2023-01-01 PROCEDURE — 272N000085 HC PACK CELL SAVER CSP: Performed by: THORACIC SURGERY (CARDIOTHORACIC VASCULAR SURGERY)

## 2023-01-01 PROCEDURE — 93325 DOPPLER ECHO COLOR FLOW MAPG: CPT | Mod: 26 | Performed by: INTERNAL MEDICINE

## 2023-01-01 PROCEDURE — 87086 URINE CULTURE/COLONY COUNT: CPT | Performed by: STUDENT IN AN ORGANIZED HEALTH CARE EDUCATION/TRAINING PROGRAM

## 2023-01-01 PROCEDURE — P9016 RBC LEUKOCYTES REDUCED: HCPCS

## 2023-01-01 PROCEDURE — 87077 CULTURE AEROBIC IDENTIFY: CPT | Performed by: PEDIATRICS

## 2023-01-01 PROCEDURE — 84300 ASSAY OF URINE SODIUM: CPT | Performed by: STUDENT IN AN ORGANIZED HEALTH CARE EDUCATION/TRAINING PROGRAM

## 2023-01-01 PROCEDURE — 84295 ASSAY OF SERUM SODIUM: CPT | Performed by: THORACIC SURGERY (CARDIOTHORACIC VASCULAR SURGERY)

## 2023-01-01 PROCEDURE — 85384 FIBRINOGEN ACTIVITY: CPT | Performed by: THORACIC SURGERY (CARDIOTHORACIC VASCULAR SURGERY)

## 2023-01-01 PROCEDURE — 84484 ASSAY OF TROPONIN QUANT: CPT | Performed by: STUDENT IN AN ORGANIZED HEALTH CARE EDUCATION/TRAINING PROGRAM

## 2023-01-01 PROCEDURE — 83735 ASSAY OF MAGNESIUM: CPT

## 2023-01-01 PROCEDURE — 93931 UPPER EXTREMITY STUDY: CPT | Mod: LT,XS

## 2023-01-01 PROCEDURE — P9037 PLATE PHERES LEUKOREDU IRRAD: HCPCS

## 2023-01-01 PROCEDURE — 85384 FIBRINOGEN ACTIVITY: CPT | Performed by: INTERNAL MEDICINE

## 2023-01-01 PROCEDURE — 0B9K8ZZ DRAINAGE OF RIGHT LUNG, VIA NATURAL OR ARTIFICIAL OPENING ENDOSCOPIC: ICD-10-PCS | Performed by: ANESTHESIOLOGY

## 2023-01-01 PROCEDURE — 86901 BLOOD TYPING SEROLOGIC RH(D): CPT | Performed by: STUDENT IN AN ORGANIZED HEALTH CARE EDUCATION/TRAINING PROGRAM

## 2023-01-01 PROCEDURE — 99232 SBSQ HOSP IP/OBS MODERATE 35: CPT | Mod: 24 | Performed by: NURSE PRACTITIONER

## 2023-01-01 PROCEDURE — 272N000482 HC MASK, CPAP TOTAL HEADGEAR, LATEX FREE

## 2023-01-01 PROCEDURE — 250N000009 HC RX 250: Performed by: INTERNAL MEDICINE

## 2023-01-01 PROCEDURE — 99233 SBSQ HOSP IP/OBS HIGH 50: CPT | Mod: GC | Performed by: PHYSICAL MEDICINE & REHABILITATION

## 2023-01-01 PROCEDURE — 94640 AIRWAY INHALATION TREATMENT: CPT

## 2023-01-01 PROCEDURE — 87040 BLOOD CULTURE FOR BACTERIA: CPT | Performed by: THORACIC SURGERY (CARDIOTHORACIC VASCULAR SURGERY)

## 2023-01-01 PROCEDURE — 94002 VENT MGMT INPAT INIT DAY: CPT

## 2023-01-01 PROCEDURE — 87205 SMEAR GRAM STAIN: CPT | Performed by: PEDIATRICS

## 2023-01-01 PROCEDURE — 0B9B8ZZ DRAINAGE OF LEFT LOWER LOBE BRONCHUS, VIA NATURAL OR ARTIFICIAL OPENING ENDOSCOPIC: ICD-10-PCS | Performed by: INTERNAL MEDICINE

## 2023-01-01 PROCEDURE — C1894 INTRO/SHEATH, NON-LASER: HCPCS | Performed by: INTERNAL MEDICINE

## 2023-01-01 PROCEDURE — 88305 TISSUE EXAM BY PATHOLOGIST: CPT | Mod: 26 | Performed by: PATHOLOGY

## 2023-01-01 PROCEDURE — 70450 CT HEAD/BRAIN W/O DYE: CPT | Mod: 26 | Performed by: RADIOLOGY

## 2023-01-01 PROCEDURE — 86706 HEP B SURFACE ANTIBODY: CPT | Performed by: PHYSICIAN ASSISTANT

## 2023-01-01 PROCEDURE — 99291 CRITICAL CARE FIRST HOUR: CPT | Mod: 25 | Performed by: INTERNAL MEDICINE

## 2023-01-01 PROCEDURE — 93308 TTE F-UP OR LMTD: CPT

## 2023-01-01 PROCEDURE — 82803 BLOOD GASES ANY COMBINATION: CPT | Performed by: THORACIC SURGERY (CARDIOTHORACIC VASCULAR SURGERY)

## 2023-01-01 PROCEDURE — 370N000017 HC ANESTHESIA TECHNICAL FEE, PER MIN: Performed by: THORACIC SURGERY (CARDIOTHORACIC VASCULAR SURGERY)

## 2023-01-01 PROCEDURE — 86704 HEP B CORE ANTIBODY TOTAL: CPT | Performed by: PHYSICIAN ASSISTANT

## 2023-01-01 PROCEDURE — 99152 MOD SED SAME PHYS/QHP 5/>YRS: CPT | Performed by: INTERNAL MEDICINE

## 2023-01-01 PROCEDURE — 82570 ASSAY OF URINE CREATININE: CPT | Performed by: STUDENT IN AN ORGANIZED HEALTH CARE EDUCATION/TRAINING PROGRAM

## 2023-01-01 PROCEDURE — 84295 ASSAY OF SERUM SODIUM: CPT

## 2023-01-01 PROCEDURE — 250N000009 HC RX 250: Performed by: PHYSICIAN ASSISTANT

## 2023-01-01 PROCEDURE — 74174 CTA ABD&PLVS W/CONTRAST: CPT | Mod: 26 | Performed by: RADIOLOGY

## 2023-01-01 PROCEDURE — 258N000003 HC RX IP 258 OP 636: Performed by: THORACIC SURGERY (CARDIOTHORACIC VASCULAR SURGERY)

## 2023-01-01 PROCEDURE — 0W3Q8ZZ CONTROL BLEEDING IN RESPIRATORY TRACT, VIA NATURAL OR ARTIFICIAL OPENING ENDOSCOPIC: ICD-10-PCS | Performed by: INTERNAL MEDICINE

## 2023-01-01 PROCEDURE — 999N000253 HC STATISTIC WEANING TRIALS

## 2023-01-01 PROCEDURE — C1768 GRAFT, VASCULAR: HCPCS | Performed by: THORACIC SURGERY (CARDIOTHORACIC VASCULAR SURGERY)

## 2023-01-01 PROCEDURE — 71250 CT THORAX DX C-: CPT

## 2023-01-01 PROCEDURE — 31624 DX BRONCHOSCOPE/LAVAGE: CPT | Mod: GC | Performed by: ANESTHESIOLOGY

## 2023-01-01 PROCEDURE — 87077 CULTURE AEROBIC IDENTIFY: CPT | Performed by: SURGERY

## 2023-01-01 PROCEDURE — 76705 ECHO EXAM OF ABDOMEN: CPT | Mod: 26 | Performed by: RADIOLOGY

## 2023-01-01 PROCEDURE — 85049 AUTOMATED PLATELET COUNT: CPT | Performed by: STUDENT IN AN ORGANIZED HEALTH CARE EDUCATION/TRAINING PROGRAM

## 2023-01-01 PROCEDURE — 02RW0JZ REPLACEMENT OF THORACIC AORTA, DESCENDING WITH SYNTHETIC SUBSTITUTE, OPEN APPROACH: ICD-10-PCS | Performed by: THORACIC SURGERY (CARDIOTHORACIC VASCULAR SURGERY)

## 2023-01-01 PROCEDURE — 83036 HEMOGLOBIN GLYCOSYLATED A1C: CPT | Performed by: STUDENT IN AN ORGANIZED HEALTH CARE EDUCATION/TRAINING PROGRAM

## 2023-01-01 PROCEDURE — 93971 EXTREMITY STUDY: CPT | Mod: RT

## 2023-01-01 PROCEDURE — 250N000012 HC RX MED GY IP 250 OP 636 PS 637: Performed by: STUDENT IN AN ORGANIZED HEALTH CARE EDUCATION/TRAINING PROGRAM

## 2023-01-01 PROCEDURE — 85730 THROMBOPLASTIN TIME PARTIAL: CPT | Performed by: THORACIC SURGERY (CARDIOTHORACIC VASCULAR SURGERY)

## 2023-01-01 PROCEDURE — 85027 COMPLETE CBC AUTOMATED: CPT

## 2023-01-01 PROCEDURE — 85014 HEMATOCRIT: CPT | Performed by: THORACIC SURGERY (CARDIOTHORACIC VASCULAR SURGERY)

## 2023-01-01 PROCEDURE — 82962 GLUCOSE BLOOD TEST: CPT

## 2023-01-01 PROCEDURE — 36600 WITHDRAWAL OF ARTERIAL BLOOD: CPT

## 2023-01-01 PROCEDURE — 36415 COLL VENOUS BLD VENIPUNCTURE: CPT | Performed by: NURSE PRACTITIONER

## 2023-01-01 PROCEDURE — 93970 EXTREMITY STUDY: CPT | Mod: 26 | Performed by: STUDENT IN AN ORGANIZED HEALTH CARE EDUCATION/TRAINING PROGRAM

## 2023-01-01 PROCEDURE — 85396 CLOTTING ASSAY WHOLE BLOOD: CPT

## 2023-01-01 PROCEDURE — 0B998ZZ DRAINAGE OF LINGULA BRONCHUS, VIA NATURAL OR ARTIFICIAL OPENING ENDOSCOPIC: ICD-10-PCS | Performed by: INTERNAL MEDICINE

## 2023-01-01 PROCEDURE — 71250 CT THORAX DX C-: CPT | Mod: 26 | Performed by: RADIOLOGY

## 2023-01-01 PROCEDURE — 82803 BLOOD GASES ANY COMBINATION: CPT

## 2023-01-01 PROCEDURE — 99231 SBSQ HOSP IP/OBS SF/LOW 25: CPT | Mod: 24 | Performed by: PSYCHIATRY & NEUROLOGY

## 2023-01-01 PROCEDURE — 97165 OT EVAL LOW COMPLEX 30 MIN: CPT | Mod: GO

## 2023-01-01 PROCEDURE — 74018 RADEX ABDOMEN 1 VIEW: CPT | Mod: 26 | Performed by: STUDENT IN AN ORGANIZED HEALTH CARE EDUCATION/TRAINING PROGRAM

## 2023-01-01 PROCEDURE — 85610 PROTHROMBIN TIME: CPT | Performed by: SURGERY

## 2023-01-01 PROCEDURE — 99254 IP/OBS CNSLTJ NEW/EST MOD 60: CPT | Mod: 24 | Performed by: INTERNAL MEDICINE

## 2023-01-01 PROCEDURE — 93325 DOPPLER ECHO COLOR FLOW MAPG: CPT

## 2023-01-01 PROCEDURE — 272N000054 HC CANNULA HIGH FLOW, ADULT

## 2023-01-01 PROCEDURE — 999N000259 HC STATISTIC EXTUBATION

## 2023-01-01 PROCEDURE — 72148 MRI LUMBAR SPINE W/O DYE: CPT | Mod: 26 | Performed by: RADIOLOGY

## 2023-01-01 PROCEDURE — 83880 ASSAY OF NATRIURETIC PEPTIDE: CPT | Performed by: STUDENT IN AN ORGANIZED HEALTH CARE EDUCATION/TRAINING PROGRAM

## 2023-01-01 PROCEDURE — 85384 FIBRINOGEN ACTIVITY: CPT | Performed by: SURGERY

## 2023-01-01 PROCEDURE — 85018 HEMOGLOBIN: CPT | Performed by: INTERNAL MEDICINE

## 2023-01-01 PROCEDURE — 5A1955Z RESPIRATORY VENTILATION, GREATER THAN 96 CONSECUTIVE HOURS: ICD-10-PCS | Performed by: ANESTHESIOLOGY

## 2023-01-01 PROCEDURE — 5A1221Z PERFORMANCE OF CARDIAC OUTPUT, CONTINUOUS: ICD-10-PCS | Performed by: THORACIC SURGERY (CARDIOTHORACIC VASCULAR SURGERY)

## 2023-01-01 PROCEDURE — 999N000065 XR CHEST PORT 1 VIEW

## 2023-01-01 PROCEDURE — 87493 C DIFF AMPLIFIED PROBE: CPT | Performed by: PHYSICIAN ASSISTANT

## 2023-01-01 PROCEDURE — 99223 1ST HOSP IP/OBS HIGH 75: CPT | Mod: 24 | Performed by: INTERNAL MEDICINE

## 2023-01-01 PROCEDURE — 272N000451 HC KIT SHRLOCK 5FR POWER PICC DOUBLE LUMEN

## 2023-01-01 PROCEDURE — 84155 ASSAY OF PROTEIN SERUM: CPT | Performed by: STUDENT IN AN ORGANIZED HEALTH CARE EDUCATION/TRAINING PROGRAM

## 2023-01-01 PROCEDURE — 88305 TISSUE EXAM BY PATHOLOGIST: CPT | Mod: TC | Performed by: THORACIC SURGERY (CARDIOTHORACIC VASCULAR SURGERY)

## 2023-01-01 PROCEDURE — 82947 ASSAY GLUCOSE BLOOD QUANT: CPT | Performed by: INTERNAL MEDICINE

## 2023-01-01 PROCEDURE — 85025 COMPLETE CBC W/AUTO DIFF WBC: CPT | Performed by: STUDENT IN AN ORGANIZED HEALTH CARE EDUCATION/TRAINING PROGRAM

## 2023-01-01 PROCEDURE — 93454 CORONARY ARTERY ANGIO S&I: CPT | Mod: 26 | Performed by: INTERNAL MEDICINE

## 2023-01-01 PROCEDURE — 250N000009 HC RX 250: Performed by: SURGERY

## 2023-01-01 PROCEDURE — 99153 MOD SED SAME PHYS/QHP EA: CPT | Performed by: INTERNAL MEDICINE

## 2023-01-01 PROCEDURE — 74018 RADEX ABDOMEN 1 VIEW: CPT | Mod: 26 | Performed by: RADIOLOGY

## 2023-01-01 PROCEDURE — 99024 POSTOP FOLLOW-UP VISIT: CPT | Performed by: NURSE PRACTITIONER

## 2023-01-01 PROCEDURE — 96375 TX/PRO/DX INJ NEW DRUG ADDON: CPT | Mod: 59

## 2023-01-01 PROCEDURE — 87205 SMEAR GRAM STAIN: CPT | Performed by: SURGERY

## 2023-01-01 PROCEDURE — 84132 ASSAY OF SERUM POTASSIUM: CPT | Performed by: NURSE PRACTITIONER

## 2023-01-01 PROCEDURE — 86901 BLOOD TYPING SEROLOGIC RH(D): CPT | Performed by: SURGERY

## 2023-01-01 PROCEDURE — 82810 BLOOD GASES O2 SAT ONLY: CPT

## 2023-01-01 PROCEDURE — 99253 IP/OBS CNSLTJ NEW/EST LOW 45: CPT | Performed by: NURSE PRACTITIONER

## 2023-01-01 PROCEDURE — 410N000003 HC PER-PERFUSION 1ST 30 MIN: Performed by: THORACIC SURGERY (CARDIOTHORACIC VASCULAR SURGERY)

## 2023-01-01 PROCEDURE — 272N000001 HC OR GENERAL SUPPLY STERILE: Performed by: THORACIC SURGERY (CARDIOTHORACIC VASCULAR SURGERY)

## 2023-01-01 PROCEDURE — 82040 ASSAY OF SERUM ALBUMIN: CPT | Performed by: STUDENT IN AN ORGANIZED HEALTH CARE EDUCATION/TRAINING PROGRAM

## 2023-01-01 PROCEDURE — 72146 MRI CHEST SPINE W/O DYE: CPT | Mod: 26 | Performed by: RADIOLOGY

## 2023-01-01 PROCEDURE — 999N000155 HC STATISTIC RAPCV CVP MONITORING

## 2023-01-01 PROCEDURE — 99233 SBSQ HOSP IP/OBS HIGH 50: CPT | Mod: 24 | Performed by: PSYCHIATRY & NEUROLOGY

## 2023-01-01 PROCEDURE — 85384 FIBRINOGEN ACTIVITY: CPT | Performed by: STUDENT IN AN ORGANIZED HEALTH CARE EDUCATION/TRAINING PROGRAM

## 2023-01-01 PROCEDURE — 272N000272 HC CONTINUOUS NEBULIZER MICRO PUMP

## 2023-01-01 PROCEDURE — 0W9930Z DRAINAGE OF RIGHT PLEURAL CAVITY WITH DRAINAGE DEVICE, PERCUTANEOUS APPROACH: ICD-10-PCS | Performed by: INTERNAL MEDICINE

## 2023-01-01 PROCEDURE — 360N000079 HC SURGERY LEVEL 6, PER MIN: Performed by: THORACIC SURGERY (CARDIOTHORACIC VASCULAR SURGERY)

## 2023-01-01 PROCEDURE — 36569 INSJ PICC 5 YR+ W/O IMAGING: CPT

## 2023-01-01 PROCEDURE — 93005 ELECTROCARDIOGRAM TRACING: CPT | Performed by: STUDENT IN AN ORGANIZED HEALTH CARE EDUCATION/TRAINING PROGRAM

## 2023-01-01 PROCEDURE — 272N000088 HC PUMP APP ADULT PERFUSION: Performed by: THORACIC SURGERY (CARDIOTHORACIC VASCULAR SURGERY)

## 2023-01-01 PROCEDURE — 33877 THORACOABDOMINAL GRAFT: CPT | Mod: 62 | Performed by: SURGERY

## 2023-01-01 PROCEDURE — 83605 ASSAY OF LACTIC ACID: CPT | Performed by: INTERNAL MEDICINE

## 2023-01-01 PROCEDURE — 86901 BLOOD TYPING SEROLOGIC RH(D): CPT

## 2023-01-01 PROCEDURE — 93971 EXTREMITY STUDY: CPT | Mod: 26 | Performed by: STUDENT IN AN ORGANIZED HEALTH CARE EDUCATION/TRAINING PROGRAM

## 2023-01-01 PROCEDURE — 87305 ASPERGILLUS AG IA: CPT | Performed by: STUDENT IN AN ORGANIZED HEALTH CARE EDUCATION/TRAINING PROGRAM

## 2023-01-01 PROCEDURE — 36620 INSERTION CATHETER ARTERY: CPT | Mod: GC | Performed by: ANESTHESIOLOGY

## 2023-01-01 PROCEDURE — 81001 URINALYSIS AUTO W/SCOPE: CPT | Performed by: STUDENT IN AN ORGANIZED HEALTH CARE EDUCATION/TRAINING PROGRAM

## 2023-01-01 PROCEDURE — 87070 CULTURE OTHR SPECIMN AEROBIC: CPT | Performed by: STUDENT IN AN ORGANIZED HEALTH CARE EDUCATION/TRAINING PROGRAM

## 2023-01-01 PROCEDURE — 999N000141 HC STATISTIC PRE-PROCEDURE NURSING ASSESSMENT: Performed by: THORACIC SURGERY (CARDIOTHORACIC VASCULAR SURGERY)

## 2023-01-01 PROCEDURE — 99291 CRITICAL CARE FIRST HOUR: CPT | Performed by: STUDENT IN AN ORGANIZED HEALTH CARE EDUCATION/TRAINING PROGRAM

## 2023-01-01 PROCEDURE — 44500 INTRO GASTROINTESTINAL TUBE: CPT

## 2023-01-01 PROCEDURE — 86850 RBC ANTIBODY SCREEN: CPT | Performed by: SURGERY

## 2023-01-01 PROCEDURE — 93925 LOWER EXTREMITY STUDY: CPT

## 2023-01-01 PROCEDURE — 250N000015 HC RX FACTOR IP MED 636 OP 636: Performed by: SURGERY

## 2023-01-01 PROCEDURE — 83930 ASSAY OF BLOOD OSMOLALITY: CPT | Performed by: STUDENT IN AN ORGANIZED HEALTH CARE EDUCATION/TRAINING PROGRAM

## 2023-01-01 PROCEDURE — 86850 RBC ANTIBODY SCREEN: CPT | Performed by: STUDENT IN AN ORGANIZED HEALTH CARE EDUCATION/TRAINING PROGRAM

## 2023-01-01 PROCEDURE — P9045 ALBUMIN (HUMAN), 5%, 250 ML: HCPCS | Performed by: STUDENT IN AN ORGANIZED HEALTH CARE EDUCATION/TRAINING PROGRAM

## 2023-01-01 PROCEDURE — 85027 COMPLETE CBC AUTOMATED: CPT | Performed by: PHYSICIAN ASSISTANT

## 2023-01-01 PROCEDURE — 83735 ASSAY OF MAGNESIUM: CPT | Performed by: THORACIC SURGERY (CARDIOTHORACIC VASCULAR SURGERY)

## 2023-01-01 PROCEDURE — 999N000076 HC STATISTIC ICP MONITORING

## 2023-01-01 PROCEDURE — 272N000002 HC OR SUPPLY OTHER OPNP: Performed by: INTERNAL MEDICINE

## 2023-01-01 PROCEDURE — 82947 ASSAY GLUCOSE BLOOD QUANT: CPT

## 2023-01-01 PROCEDURE — 99291 CRITICAL CARE FIRST HOUR: CPT | Mod: 24 | Performed by: INTERNAL MEDICINE

## 2023-01-01 PROCEDURE — 250N000012 HC RX MED GY IP 250 OP 636 PS 637: Performed by: PHYSICIAN ASSISTANT

## 2023-01-01 PROCEDURE — 0B9J8ZZ DRAINAGE OF LEFT LOWER LUNG LOBE, VIA NATURAL OR ARTIFICIAL OPENING ENDOSCOPIC: ICD-10-PCS | Performed by: ANESTHESIOLOGY

## 2023-01-01 PROCEDURE — 99291 CRITICAL CARE FIRST HOUR: CPT | Mod: 25

## 2023-01-01 PROCEDURE — 85610 PROTHROMBIN TIME: CPT | Performed by: INTERNAL MEDICINE

## 2023-01-01 PROCEDURE — 82248 BILIRUBIN DIRECT: CPT | Performed by: STUDENT IN AN ORGANIZED HEALTH CARE EDUCATION/TRAINING PROGRAM

## 2023-01-01 PROCEDURE — 74174 CTA ABD&PLVS W/CONTRAST: CPT | Mod: XE

## 2023-01-01 PROCEDURE — 87340 HEPATITIS B SURFACE AG IA: CPT | Performed by: PHYSICIAN ASSISTANT

## 2023-01-01 PROCEDURE — 96374 THER/PROPH/DIAG INJ IV PUSH: CPT | Mod: 59

## 2023-01-01 PROCEDURE — 99253 IP/OBS CNSLTJ NEW/EST LOW 45: CPT | Mod: GC | Performed by: PHYSICAL MEDICINE & REHABILITATION

## 2023-01-01 PROCEDURE — 99221 1ST HOSP IP/OBS SF/LOW 40: CPT | Mod: GC | Performed by: INTERNAL MEDICINE

## 2023-01-01 DEVICE — BARD® PTFE FELT, 15.2 CM X 15.2 CM
Type: IMPLANTABLE DEVICE | Site: HEART | Status: FUNCTIONAL
Brand: BARD® PTFE FELT

## 2023-01-01 DEVICE — GELWEAVE GELATIN IMPREGNATED WOVEN VASCULAR PROSTHESIS STRAIGHT
Type: IMPLANTABLE DEVICE | Site: HEART | Status: FUNCTIONAL
Brand: GELWEAVE™

## 2023-01-01 RX ORDER — CARVEDILOL 6.25 MG/1
12.5 TABLET ORAL 2 TIMES DAILY WITH MEALS
Status: DISCONTINUED | OUTPATIENT
Start: 2023-01-01 | End: 2023-01-01

## 2023-01-01 RX ORDER — LIDOCAINE 40 MG/G
CREAM TOPICAL
Status: DISCONTINUED | OUTPATIENT
Start: 2023-01-01 | End: 2023-02-23 | Stop reason: HOSPADM

## 2023-01-01 RX ORDER — CALCIUM GLUCONATE 20 MG/ML
1 INJECTION, SOLUTION INTRAVENOUS ONCE
Status: COMPLETED | OUTPATIENT
Start: 2023-01-01 | End: 2023-01-01

## 2023-01-01 RX ORDER — POTASSIUM CHLORIDE 750 MG/1
20 TABLET, EXTENDED RELEASE ORAL ONCE
Status: COMPLETED | OUTPATIENT
Start: 2023-01-01 | End: 2023-01-01

## 2023-01-01 RX ORDER — LABETALOL HYDROCHLORIDE 5 MG/ML
10 INJECTION, SOLUTION INTRAVENOUS EVERY 4 HOURS PRN
Status: DISCONTINUED | OUTPATIENT
Start: 2023-01-01 | End: 2023-01-01

## 2023-01-01 RX ORDER — AMLODIPINE BESYLATE 5 MG/1
5 TABLET ORAL DAILY
Status: DISCONTINUED | OUTPATIENT
Start: 2023-01-01 | End: 2023-01-01

## 2023-01-01 RX ORDER — LISINOPRIL 40 MG/1
40 TABLET ORAL DAILY
COMMUNITY

## 2023-01-01 RX ORDER — ACETAMINOPHEN 325 MG/10.15ML
650 LIQUID ORAL EVERY 4 HOURS PRN
Status: DISCONTINUED | OUTPATIENT
Start: 2023-01-01 | End: 2023-01-01

## 2023-01-01 RX ORDER — FUROSEMIDE 10 MG/ML
40 INJECTION INTRAMUSCULAR; INTRAVENOUS ONCE
Status: COMPLETED | OUTPATIENT
Start: 2023-01-01 | End: 2023-01-01

## 2023-01-01 RX ORDER — LIDOCAINE HYDROCHLORIDE 10 MG/ML
INJECTION, SOLUTION EPIDURAL; INFILTRATION; INTRACAUDAL; PERINEURAL
Status: COMPLETED
Start: 2023-01-01 | End: 2023-01-01

## 2023-01-01 RX ORDER — GABAPENTIN 100 MG/1
100 CAPSULE ORAL AT BEDTIME
Status: DISCONTINUED | OUTPATIENT
Start: 2023-01-01 | End: 2023-01-01

## 2023-01-01 RX ORDER — ACETAMINOPHEN 325 MG/1
975 TABLET ORAL ONCE
Status: DISCONTINUED | OUTPATIENT
Start: 2023-01-01 | End: 2023-01-01 | Stop reason: HOSPADM

## 2023-01-01 RX ORDER — HYDROMORPHONE HCL IN WATER/PF 6 MG/30 ML
0.2 PATIENT CONTROLLED ANALGESIA SYRINGE INTRAVENOUS
Status: DISCONTINUED | OUTPATIENT
Start: 2023-01-01 | End: 2023-02-23 | Stop reason: HOSPADM

## 2023-01-01 RX ORDER — HEPARIN SODIUM 10000 [USP'U]/100ML
0-5000 INJECTION, SOLUTION INTRAVENOUS CONTINUOUS
Status: DISCONTINUED | OUTPATIENT
Start: 2023-01-01 | End: 2023-01-01

## 2023-01-01 RX ORDER — IOPAMIDOL 755 MG/ML
75 INJECTION, SOLUTION INTRAVASCULAR ONCE
Status: COMPLETED | OUTPATIENT
Start: 2023-01-01 | End: 2023-01-01

## 2023-01-01 RX ORDER — DEXTROSE MONOHYDRATE 100 MG/ML
INJECTION, SOLUTION INTRAVENOUS CONTINUOUS PRN
Status: DISCONTINUED | OUTPATIENT
Start: 2023-01-01 | End: 2023-01-01

## 2023-01-01 RX ORDER — FENTANYL CITRATE 50 UG/ML
INJECTION, SOLUTION INTRAMUSCULAR; INTRAVENOUS
Status: DISCONTINUED | OUTPATIENT
Start: 2023-01-01 | End: 2023-01-01 | Stop reason: HOSPADM

## 2023-01-01 RX ORDER — SIMETHICONE 40MG/0.6ML
40 SUSPENSION, DROPS(FINAL DOSAGE FORM)(ML) ORAL EVERY 6 HOURS PRN
Status: DISCONTINUED | OUTPATIENT
Start: 2023-01-01 | End: 2023-02-23 | Stop reason: HOSPADM

## 2023-01-01 RX ORDER — LIDOCAINE 40 MG/G
CREAM TOPICAL
Status: DISCONTINUED | OUTPATIENT
Start: 2023-01-01 | End: 2023-01-01 | Stop reason: HOSPADM

## 2023-01-01 RX ORDER — NALOXONE HYDROCHLORIDE 0.4 MG/ML
0.4 INJECTION, SOLUTION INTRAMUSCULAR; INTRAVENOUS; SUBCUTANEOUS
Status: DISCONTINUED | OUTPATIENT
Start: 2023-01-01 | End: 2023-01-01

## 2023-01-01 RX ORDER — PROPOFOL 10 MG/ML
5-75 INJECTION, EMULSION INTRAVENOUS CONTINUOUS
Status: DISCONTINUED | OUTPATIENT
Start: 2023-01-01 | End: 2023-01-01

## 2023-01-01 RX ORDER — HYDRALAZINE HYDROCHLORIDE 20 MG/ML
10 INJECTION INTRAMUSCULAR; INTRAVENOUS EVERY 6 HOURS PRN
Status: DISCONTINUED | OUTPATIENT
Start: 2023-01-01 | End: 2023-01-01

## 2023-01-01 RX ORDER — METOPROLOL SUCCINATE 50 MG/1
50 TABLET, EXTENDED RELEASE ORAL DAILY
COMMUNITY

## 2023-01-01 RX ORDER — HYDRALAZINE HYDROCHLORIDE 20 MG/ML
10-20 INJECTION INTRAMUSCULAR; INTRAVENOUS EVERY 30 MIN PRN
Status: DISCONTINUED | OUTPATIENT
Start: 2023-01-01 | End: 2023-01-01

## 2023-01-01 RX ORDER — ONDANSETRON 4 MG/1
4 TABLET, ORALLY DISINTEGRATING ORAL EVERY 6 HOURS PRN
Status: DISCONTINUED | OUTPATIENT
Start: 2023-01-01 | End: 2023-02-23 | Stop reason: HOSPADM

## 2023-01-01 RX ORDER — ASPIRIN 81 MG/1
81 TABLET, CHEWABLE ORAL DAILY
Status: DISCONTINUED | OUTPATIENT
Start: 2023-01-01 | End: 2023-02-23 | Stop reason: HOSPADM

## 2023-01-01 RX ORDER — AMLODIPINE BESYLATE 5 MG/1
5 TABLET ORAL ONCE
Status: COMPLETED | OUTPATIENT
Start: 2023-01-01 | End: 2023-01-01

## 2023-01-01 RX ORDER — METHOCARBAMOL 500 MG/1
500 TABLET, FILM COATED ORAL 4 TIMES DAILY PRN
Status: DISCONTINUED | OUTPATIENT
Start: 2023-01-01 | End: 2023-01-01

## 2023-01-01 RX ORDER — NOREPINEPHRINE BITARTRATE 0.06 MG/ML
.01-.4 INJECTION, SOLUTION INTRAVENOUS CONTINUOUS PRN
Status: DISCONTINUED | OUTPATIENT
Start: 2023-01-01 | End: 2023-01-01

## 2023-01-01 RX ORDER — DEXMEDETOMIDINE HYDROCHLORIDE 4 UG/ML
.1-1.2 INJECTION, SOLUTION INTRAVENOUS CONTINUOUS
Status: DISCONTINUED | OUTPATIENT
Start: 2023-01-01 | End: 2023-01-01 | Stop reason: HOSPADM

## 2023-01-01 RX ORDER — FENTANYL CITRATE 50 UG/ML
INJECTION, SOLUTION INTRAMUSCULAR; INTRAVENOUS
Status: COMPLETED
Start: 2023-01-01 | End: 2023-01-01

## 2023-01-01 RX ORDER — POTASSIUM CHLORIDE 1.5 G/1.58G
40 POWDER, FOR SOLUTION ORAL ONCE
Status: COMPLETED | OUTPATIENT
Start: 2023-01-01 | End: 2023-01-01

## 2023-01-01 RX ORDER — NOREPINEPHRINE BITARTRATE 0.06 MG/ML
.01-.6 INJECTION, SOLUTION INTRAVENOUS CONTINUOUS
Status: DISCONTINUED | OUTPATIENT
Start: 2023-01-01 | End: 2023-01-01

## 2023-01-01 RX ORDER — NOREPINEPHRINE BITARTRATE 0.06 MG/ML
.01-.4 INJECTION, SOLUTION INTRAVENOUS CONTINUOUS
Status: DISCONTINUED | OUTPATIENT
Start: 2023-01-01 | End: 2023-01-01

## 2023-01-01 RX ORDER — METOPROLOL TARTRATE 1 MG/ML
5 INJECTION, SOLUTION INTRAVENOUS EVERY 6 HOURS PRN
Status: DISCONTINUED | OUTPATIENT
Start: 2023-01-01 | End: 2023-01-01

## 2023-01-01 RX ORDER — AMLODIPINE BESYLATE 10 MG/1
10 TABLET ORAL DAILY
Status: DISCONTINUED | OUTPATIENT
Start: 2023-01-01 | End: 2023-01-01

## 2023-01-01 RX ORDER — AMOXICILLIN 250 MG
1 CAPSULE ORAL 2 TIMES DAILY
Status: DISCONTINUED | OUTPATIENT
Start: 2023-01-01 | End: 2023-01-01

## 2023-01-01 RX ORDER — PANTOPRAZOLE SODIUM 40 MG/1
40 TABLET, DELAYED RELEASE ORAL
Status: DISCONTINUED | OUTPATIENT
Start: 2023-01-01 | End: 2023-01-01

## 2023-01-01 RX ORDER — PROTAMINE SULFATE 10 MG/ML
INJECTION, SOLUTION INTRAVENOUS PRN
Status: DISCONTINUED | OUTPATIENT
Start: 2023-01-01 | End: 2023-01-01

## 2023-01-01 RX ORDER — CALCIUM GLUCONATE 20 MG/ML
1 INJECTION, SOLUTION INTRAVENOUS
Status: DISPENSED | OUTPATIENT
Start: 2023-01-01 | End: 2023-01-01

## 2023-01-01 RX ORDER — NALOXONE HYDROCHLORIDE 0.4 MG/ML
0.2 INJECTION, SOLUTION INTRAMUSCULAR; INTRAVENOUS; SUBCUTANEOUS
Status: DISCONTINUED | OUTPATIENT
Start: 2023-01-01 | End: 2023-02-23 | Stop reason: HOSPADM

## 2023-01-01 RX ORDER — ACETAMINOPHEN 325 MG/1
650 TABLET ORAL EVERY 6 HOURS
Status: DISCONTINUED | OUTPATIENT
Start: 2023-01-01 | End: 2023-01-01

## 2023-01-01 RX ORDER — ERTAPENEM 1 G/1
1 INJECTION, POWDER, LYOPHILIZED, FOR SOLUTION INTRAMUSCULAR; INTRAVENOUS EVERY 24 HOURS
Status: DISCONTINUED | OUTPATIENT
Start: 2023-01-01 | End: 2023-01-01

## 2023-01-01 RX ORDER — MUPIROCIN 20 MG/G
0.5 OINTMENT TOPICAL 2 TIMES DAILY
Status: COMPLETED | OUTPATIENT
Start: 2023-01-01 | End: 2023-01-01

## 2023-01-01 RX ORDER — NOREPINEPHRINE BITARTRATE 0.06 MG/ML
INJECTION, SOLUTION INTRAVENOUS
Status: DISCONTINUED
Start: 2023-01-01 | End: 2023-02-23 | Stop reason: HOSPADM

## 2023-01-01 RX ORDER — ETOMIDATE 2 MG/ML
INJECTION INTRAVENOUS
Status: DISCONTINUED | OUTPATIENT
Start: 2023-01-01 | End: 2023-01-01

## 2023-01-01 RX ORDER — FENTANYL CITRATE 50 UG/ML
100 INJECTION, SOLUTION INTRAMUSCULAR; INTRAVENOUS
Status: COMPLETED | OUTPATIENT
Start: 2023-01-01 | End: 2023-01-01

## 2023-01-01 RX ORDER — PHENYLEPHRINE HCL IN 0.9% NACL 50MG/250ML
.1-6 PLASTIC BAG, INJECTION (ML) INTRAVENOUS CONTINUOUS
Status: DISCONTINUED | OUTPATIENT
Start: 2023-01-01 | End: 2023-01-01 | Stop reason: HOSPADM

## 2023-01-01 RX ORDER — DEXTROSE MONOHYDRATE 25 G/50ML
25-50 INJECTION, SOLUTION INTRAVENOUS
Status: DISCONTINUED | OUTPATIENT
Start: 2023-01-01 | End: 2023-01-01

## 2023-01-01 RX ORDER — PIPERACILLIN SODIUM, TAZOBACTAM SODIUM 3; .375 G/15ML; G/15ML
3.38 INJECTION, POWDER, LYOPHILIZED, FOR SOLUTION INTRAVENOUS EVERY 6 HOURS
Status: DISCONTINUED | OUTPATIENT
Start: 2023-01-01 | End: 2023-01-01

## 2023-01-01 RX ORDER — HYDRALAZINE HYDROCHLORIDE 20 MG/ML
INJECTION INTRAMUSCULAR; INTRAVENOUS
Status: COMPLETED
Start: 2023-01-01 | End: 2023-01-01

## 2023-01-01 RX ORDER — DEXMEDETOMIDINE HYDROCHLORIDE 4 UG/ML
.1-1.2 INJECTION, SOLUTION INTRAVENOUS CONTINUOUS
Status: DISCONTINUED | OUTPATIENT
Start: 2023-01-01 | End: 2023-01-01

## 2023-01-01 RX ORDER — HEPARIN SODIUM 5000 [USP'U]/.5ML
5000 INJECTION, SOLUTION INTRAVENOUS; SUBCUTANEOUS EVERY 8 HOURS
Status: DISCONTINUED | OUTPATIENT
Start: 2023-01-01 | End: 2023-02-23 | Stop reason: HOSPADM

## 2023-01-01 RX ORDER — AMLODIPINE BESYLATE 2.5 MG/1
10 TABLET ORAL DAILY
Status: DISCONTINUED | OUTPATIENT
Start: 2023-01-01 | End: 2023-01-01

## 2023-01-01 RX ORDER — ALBUTEROL SULFATE 0.83 MG/ML
2.5 SOLUTION RESPIRATORY (INHALATION)
Status: DISCONTINUED | OUTPATIENT
Start: 2023-01-01 | End: 2023-01-01

## 2023-01-01 RX ORDER — HYDRALAZINE HYDROCHLORIDE 20 MG/ML
10 INJECTION INTRAMUSCULAR; INTRAVENOUS EVERY 30 MIN PRN
Status: DISCONTINUED | OUTPATIENT
Start: 2023-01-01 | End: 2023-01-01

## 2023-01-01 RX ORDER — CARVEDILOL 12.5 MG/1
37.5 TABLET ORAL 2 TIMES DAILY WITH MEALS
Status: DISCONTINUED | OUTPATIENT
Start: 2023-01-01 | End: 2023-01-01

## 2023-01-01 RX ORDER — POTASSIUM CHLORIDE 1.5 G/1.58G
20 POWDER, FOR SOLUTION ORAL ONCE
Status: COMPLETED | OUTPATIENT
Start: 2023-01-01 | End: 2023-01-01

## 2023-01-01 RX ORDER — HYDRALAZINE HYDROCHLORIDE 20 MG/ML
5 INJECTION INTRAMUSCULAR; INTRAVENOUS EVERY 6 HOURS PRN
Status: DISCONTINUED | OUTPATIENT
Start: 2023-01-01 | End: 2023-01-01

## 2023-01-01 RX ORDER — NICOTINE POLACRILEX 4 MG
15-30 LOZENGE BUCCAL
Status: DISCONTINUED | OUTPATIENT
Start: 2023-01-01 | End: 2023-01-01

## 2023-01-01 RX ORDER — ACETAMINOPHEN 325 MG/1
975 TABLET ORAL EVERY 8 HOURS
Status: COMPLETED | OUTPATIENT
Start: 2023-01-01 | End: 2023-01-01

## 2023-01-01 RX ORDER — SULFAMETHOXAZOLE/TRIMETHOPRIM 800-160 MG
2 TABLET ORAL 2 TIMES DAILY
Status: DISCONTINUED | OUTPATIENT
Start: 2023-01-01 | End: 2023-01-01

## 2023-01-01 RX ORDER — METOPROLOL TARTRATE 1 MG/ML
5 INJECTION, SOLUTION INTRAVENOUS ONCE
Status: DISCONTINUED | OUTPATIENT
Start: 2023-01-01 | End: 2023-01-01

## 2023-01-01 RX ORDER — VASOPRESSIN 20 U/ML
INJECTION PARENTERAL PRN
Status: DISCONTINUED | OUTPATIENT
Start: 2023-01-01 | End: 2023-01-01

## 2023-01-01 RX ORDER — HYDROMORPHONE HCL IN WATER/PF 6 MG/30 ML
0.2 PATIENT CONTROLLED ANALGESIA SYRINGE INTRAVENOUS
Status: DISCONTINUED | OUTPATIENT
Start: 2023-01-01 | End: 2023-01-01

## 2023-01-01 RX ORDER — ACETAMINOPHEN 325 MG/1
650 TABLET ORAL EVERY 4 HOURS PRN
Status: DISCONTINUED | OUTPATIENT
Start: 2023-01-01 | End: 2023-01-01

## 2023-01-01 RX ORDER — OXYCODONE HYDROCHLORIDE 5 MG/1
5 TABLET ORAL EVERY 4 HOURS PRN
Status: DISCONTINUED | OUTPATIENT
Start: 2023-01-01 | End: 2023-02-23 | Stop reason: HOSPADM

## 2023-01-01 RX ORDER — ACETAMINOPHEN 325 MG/10.15ML
650 LIQUID ORAL EVERY 6 HOURS
Status: DISCONTINUED | OUTPATIENT
Start: 2023-01-01 | End: 2023-01-01

## 2023-01-01 RX ORDER — ACETYLCYSTEINE 100 MG/ML
4 SOLUTION ORAL; RESPIRATORY (INHALATION) 2 TIMES DAILY
Status: DISCONTINUED | OUTPATIENT
Start: 2023-01-01 | End: 2023-01-01

## 2023-01-01 RX ORDER — METOPROLOL TARTRATE 1 MG/ML
5 INJECTION, SOLUTION INTRAVENOUS EVERY 4 HOURS PRN
Status: DISCONTINUED | OUTPATIENT
Start: 2023-01-01 | End: 2023-01-01

## 2023-01-01 RX ORDER — NALOXONE HYDROCHLORIDE 0.4 MG/ML
0.2 INJECTION, SOLUTION INTRAMUSCULAR; INTRAVENOUS; SUBCUTANEOUS
Status: DISCONTINUED | OUTPATIENT
Start: 2023-01-01 | End: 2023-01-01

## 2023-01-01 RX ORDER — ATROPINE SULFATE 0.1 MG/ML
0.5 INJECTION INTRAVENOUS
Status: DISCONTINUED | OUTPATIENT
Start: 2023-01-01 | End: 2023-01-01

## 2023-01-01 RX ORDER — FAMOTIDINE 20 MG/1
20 TABLET, FILM COATED ORAL
Status: DISCONTINUED | OUTPATIENT
Start: 2023-01-01 | End: 2023-01-01 | Stop reason: HOSPADM

## 2023-01-01 RX ORDER — LABETALOL HYDROCHLORIDE 5 MG/ML
10 INJECTION, SOLUTION INTRAVENOUS
Status: DISCONTINUED | OUTPATIENT
Start: 2023-01-01 | End: 2023-01-01

## 2023-01-01 RX ORDER — BISACODYL 10 MG
10 SUPPOSITORY, RECTAL RECTAL DAILY PRN
Status: DISCONTINUED | OUTPATIENT
Start: 2023-01-01 | End: 2023-02-23 | Stop reason: HOSPADM

## 2023-01-01 RX ORDER — SULFAMETHOXAZOLE/TRIMETHOPRIM 800-160 MG
2 TABLET ORAL 2 TIMES DAILY
Status: CANCELLED | OUTPATIENT
Start: 2023-01-01

## 2023-01-01 RX ORDER — MAGNESIUM OXIDE 400 MG/1
400 TABLET ORAL EVERY 4 HOURS
Status: COMPLETED | OUTPATIENT
Start: 2023-01-01 | End: 2023-01-01

## 2023-01-01 RX ORDER — CARVEDILOL 6.25 MG/1
12.5 TABLET ORAL ONCE
Status: COMPLETED | OUTPATIENT
Start: 2023-01-01 | End: 2023-01-01

## 2023-01-01 RX ORDER — OXYCODONE HYDROCHLORIDE 10 MG/1
10 TABLET ORAL EVERY 4 HOURS PRN
Status: DISCONTINUED | OUTPATIENT
Start: 2023-01-01 | End: 2023-02-23 | Stop reason: HOSPADM

## 2023-01-01 RX ORDER — LIDOCAINE HYDROCHLORIDE AND EPINEPHRINE 10; 10 MG/ML; UG/ML
INJECTION, SOLUTION INFILTRATION; PERINEURAL
Status: COMPLETED
Start: 2023-01-01 | End: 2023-01-01

## 2023-01-01 RX ORDER — CEFTAZIDIME 2 G/1
2 INJECTION, POWDER, FOR SOLUTION INTRAVENOUS EVERY 8 HOURS
Status: DISCONTINUED | OUTPATIENT
Start: 2023-01-01 | End: 2023-02-23 | Stop reason: HOSPADM

## 2023-01-01 RX ORDER — MAGNESIUM SULFATE HEPTAHYDRATE 40 MG/ML
2 INJECTION, SOLUTION INTRAVENOUS ONCE
Status: COMPLETED | OUTPATIENT
Start: 2023-01-01 | End: 2023-01-01

## 2023-01-01 RX ORDER — LIDOCAINE HYDROCHLORIDE 20 MG/ML
5 SOLUTION OROPHARYNGEAL ONCE
Status: DISCONTINUED | OUTPATIENT
Start: 2023-01-01 | End: 2023-01-01

## 2023-01-01 RX ORDER — ALBUTEROL SULFATE 0.83 MG/ML
2.5 SOLUTION RESPIRATORY (INHALATION) 2 TIMES DAILY
Status: DISCONTINUED | OUTPATIENT
Start: 2023-01-01 | End: 2023-01-01

## 2023-01-01 RX ORDER — NICARDIPINE HYDROCHLORIDE 2.5 MG/ML
INJECTION INTRAVENOUS
Status: DISCONTINUED | OUTPATIENT
Start: 2023-01-01 | End: 2023-01-01 | Stop reason: HOSPADM

## 2023-01-01 RX ORDER — ATORVASTATIN CALCIUM 40 MG/1
40 TABLET, FILM COATED ORAL DAILY
Status: DISCONTINUED | OUTPATIENT
Start: 2023-01-01 | End: 2023-01-01

## 2023-01-01 RX ORDER — ACETAMINOPHEN 325 MG/1
975 TABLET ORAL EVERY 8 HOURS
Status: DISCONTINUED | OUTPATIENT
Start: 2023-01-01 | End: 2023-01-01

## 2023-01-01 RX ORDER — AMOXICILLIN 250 MG
1 CAPSULE ORAL EVERY MORNING
Status: DISCONTINUED | OUTPATIENT
Start: 2023-01-01 | End: 2023-02-23 | Stop reason: HOSPADM

## 2023-01-01 RX ORDER — DEXTROSE MONOHYDRATE 25 G/50ML
25 INJECTION, SOLUTION INTRAVENOUS ONCE
Status: COMPLETED | OUTPATIENT
Start: 2023-01-01 | End: 2023-01-01

## 2023-01-01 RX ORDER — POTASSIUM CHLORIDE 1.5 G/1.58G
20 POWDER, FOR SOLUTION ORAL 2 TIMES DAILY
Status: DISCONTINUED | OUTPATIENT
Start: 2023-01-01 | End: 2023-01-01

## 2023-01-01 RX ORDER — MIRTAZAPINE 15 MG/1
15 TABLET, FILM COATED ORAL AT BEDTIME
Status: DISCONTINUED | OUTPATIENT
Start: 2023-01-01 | End: 2023-01-01

## 2023-01-01 RX ORDER — IOPAMIDOL 755 MG/ML
100 INJECTION, SOLUTION INTRAVASCULAR ONCE
Status: COMPLETED | OUTPATIENT
Start: 2023-01-01 | End: 2023-01-01

## 2023-01-01 RX ORDER — FENTANYL CITRATE 50 UG/ML
50 INJECTION, SOLUTION INTRAMUSCULAR; INTRAVENOUS ONCE
Status: COMPLETED | OUTPATIENT
Start: 2023-01-01 | End: 2023-01-01

## 2023-01-01 RX ORDER — IPRATROPIUM BROMIDE AND ALBUTEROL SULFATE 2.5; .5 MG/3ML; MG/3ML
3 SOLUTION RESPIRATORY (INHALATION)
Status: DISCONTINUED | OUTPATIENT
Start: 2023-01-01 | End: 2023-02-23 | Stop reason: HOSPADM

## 2023-01-01 RX ORDER — NALOXONE HYDROCHLORIDE 0.4 MG/ML
0.4 INJECTION, SOLUTION INTRAMUSCULAR; INTRAVENOUS; SUBCUTANEOUS
Status: DISCONTINUED | OUTPATIENT
Start: 2023-01-01 | End: 2023-02-23 | Stop reason: HOSPADM

## 2023-01-01 RX ORDER — LEVOFLOXACIN 5 MG/ML
750 INJECTION, SOLUTION INTRAVENOUS EVERY 24 HOURS
Status: DISCONTINUED | OUTPATIENT
Start: 2023-01-01 | End: 2023-01-01

## 2023-01-01 RX ORDER — PROCHLORPERAZINE MALEATE 5 MG
5 TABLET ORAL EVERY 6 HOURS PRN
Status: DISCONTINUED | OUTPATIENT
Start: 2023-01-01 | End: 2023-02-23 | Stop reason: HOSPADM

## 2023-01-01 RX ORDER — HEPARIN SODIUM 5000 [USP'U]/.5ML
5000 INJECTION, SOLUTION INTRAVENOUS; SUBCUTANEOUS EVERY 8 HOURS
Status: DISCONTINUED | OUTPATIENT
Start: 2023-01-01 | End: 2023-01-01

## 2023-01-01 RX ORDER — AMOXICILLIN 250 MG
1 CAPSULE ORAL 2 TIMES DAILY PRN
Status: DISCONTINUED | OUTPATIENT
Start: 2023-01-01 | End: 2023-02-23 | Stop reason: HOSPADM

## 2023-01-01 RX ORDER — ACETAMINOPHEN 325 MG/1
650 TABLET ORAL EVERY 8 HOURS
Status: DISCONTINUED | OUTPATIENT
Start: 2023-01-01 | End: 2023-01-01

## 2023-01-01 RX ORDER — FENTANYL CITRATE 50 UG/ML
25 INJECTION, SOLUTION INTRAMUSCULAR; INTRAVENOUS
Status: DISCONTINUED | OUTPATIENT
Start: 2023-01-01 | End: 2023-01-01

## 2023-01-01 RX ORDER — FUROSEMIDE 10 MG/ML
40 INJECTION INTRAMUSCULAR; INTRAVENOUS ONCE
Status: DISCONTINUED | OUTPATIENT
Start: 2023-01-01 | End: 2023-01-01

## 2023-01-01 RX ORDER — ATORVASTATIN CALCIUM 40 MG/1
40 TABLET, FILM COATED ORAL EVERY EVENING
Status: DISCONTINUED | OUTPATIENT
Start: 2023-01-01 | End: 2023-01-01

## 2023-01-01 RX ORDER — SENNOSIDES 8.6 MG
650 CAPSULE ORAL EVERY 8 HOURS PRN
Status: DISCONTINUED | OUTPATIENT
Start: 2023-01-01 | End: 2023-01-01

## 2023-01-01 RX ORDER — ACETYLCYSTEINE 100 MG/ML
2 SOLUTION ORAL; RESPIRATORY (INHALATION) 2 TIMES DAILY
Status: DISCONTINUED | OUTPATIENT
Start: 2023-01-01 | End: 2023-01-01

## 2023-01-01 RX ORDER — ONDANSETRON 4 MG/1
4 TABLET, ORALLY DISINTEGRATING ORAL EVERY 6 HOURS PRN
Status: DISCONTINUED | OUTPATIENT
Start: 2023-01-01 | End: 2023-01-01

## 2023-01-01 RX ORDER — DEXTROSE MONOHYDRATE 25 G/50ML
25-50 INJECTION, SOLUTION INTRAVENOUS
Status: DISCONTINUED | OUTPATIENT
Start: 2023-01-01 | End: 2023-02-23 | Stop reason: HOSPADM

## 2023-01-01 RX ORDER — ESMOLOL HYDROCHLORIDE 20 MG/ML
50-300 INJECTION, SOLUTION INTRAVENOUS CONTINUOUS
Status: DISCONTINUED | OUTPATIENT
Start: 2023-01-01 | End: 2023-01-01

## 2023-01-01 RX ORDER — HEPARIN SODIUM 1000 [USP'U]/ML
INJECTION, SOLUTION INTRAVENOUS; SUBCUTANEOUS PRN
Status: DISCONTINUED | OUTPATIENT
Start: 2023-01-01 | End: 2023-01-01

## 2023-01-01 RX ORDER — AMOXICILLIN 250 MG
2 CAPSULE ORAL AT BEDTIME
Status: DISCONTINUED | OUTPATIENT
Start: 2023-01-01 | End: 2023-01-01

## 2023-01-01 RX ORDER — SODIUM CHLORIDE, SODIUM GLUCONATE, SODIUM ACETATE, POTASSIUM CHLORIDE AND MAGNESIUM CHLORIDE 526; 502; 368; 37; 30 MG/100ML; MG/100ML; MG/100ML; MG/100ML; MG/100ML
INJECTION, SOLUTION INTRAVENOUS CONTINUOUS PRN
Status: DISCONTINUED | OUTPATIENT
Start: 2023-01-01 | End: 2023-01-01

## 2023-01-01 RX ORDER — FENTANYL CITRATE 50 UG/ML
INJECTION, SOLUTION INTRAMUSCULAR; INTRAVENOUS PRN
Status: DISCONTINUED | OUTPATIENT
Start: 2023-01-01 | End: 2023-01-01

## 2023-01-01 RX ORDER — FUROSEMIDE 10 MG/ML
20 INJECTION INTRAMUSCULAR; INTRAVENOUS ONCE
Status: COMPLETED | OUTPATIENT
Start: 2023-01-01 | End: 2023-01-01

## 2023-01-01 RX ORDER — CEFTRIAXONE 2 G/1
2 INJECTION, POWDER, FOR SOLUTION INTRAMUSCULAR; INTRAVENOUS EVERY 24 HOURS
Status: DISCONTINUED | OUTPATIENT
Start: 2023-01-01 | End: 2023-01-01

## 2023-01-01 RX ORDER — OXYCODONE HYDROCHLORIDE 5 MG/1
5 TABLET ORAL EVERY 4 HOURS PRN
Status: DISCONTINUED | OUTPATIENT
Start: 2023-01-01 | End: 2023-01-01

## 2023-01-01 RX ORDER — HEPARIN SODIUM,PORCINE 10 UNIT/ML
5-20 VIAL (ML) INTRAVENOUS
Status: DISCONTINUED | OUTPATIENT
Start: 2023-01-01 | End: 2023-02-23 | Stop reason: HOSPADM

## 2023-01-01 RX ORDER — METHOCARBAMOL 500 MG/1
500 TABLET, FILM COATED ORAL EVERY 6 HOURS PRN
Status: DISCONTINUED | OUTPATIENT
Start: 2023-01-01 | End: 2023-02-23 | Stop reason: HOSPADM

## 2023-01-01 RX ORDER — CEFAZOLIN SODIUM 1 G/3ML
1 INJECTION, POWDER, FOR SOLUTION INTRAMUSCULAR; INTRAVENOUS EVERY 8 HOURS
Status: COMPLETED | OUTPATIENT
Start: 2023-01-01 | End: 2023-01-01

## 2023-01-01 RX ORDER — FENTANYL CITRATE 50 UG/ML
200 INJECTION, SOLUTION INTRAMUSCULAR; INTRAVENOUS ONCE
Status: COMPLETED | OUTPATIENT
Start: 2023-01-01 | End: 2023-01-01

## 2023-01-01 RX ORDER — CARVEDILOL 25 MG/1
25 TABLET ORAL 2 TIMES DAILY WITH MEALS
Status: DISCONTINUED | OUTPATIENT
Start: 2023-01-01 | End: 2023-01-01

## 2023-01-01 RX ORDER — CALCIUM GLUCONATE 20 MG/ML
2 INJECTION, SOLUTION INTRAVENOUS
Status: ACTIVE | OUTPATIENT
Start: 2023-01-01 | End: 2023-01-01

## 2023-01-01 RX ORDER — HYDROMORPHONE HCL IN WATER/PF 6 MG/30 ML
0.1 PATIENT CONTROLLED ANALGESIA SYRINGE INTRAVENOUS
Status: DISCONTINUED | OUTPATIENT
Start: 2023-01-01 | End: 2023-02-23 | Stop reason: HOSPADM

## 2023-01-01 RX ORDER — ATORVASTATIN CALCIUM 40 MG/1
40 TABLET, FILM COATED ORAL DAILY
COMMUNITY

## 2023-01-01 RX ORDER — DEXTROSE MONOHYDRATE 25 G/50ML
25 INJECTION, SOLUTION INTRAVENOUS ONCE
Status: DISCONTINUED | OUTPATIENT
Start: 2023-01-01 | End: 2023-01-01

## 2023-01-01 RX ORDER — GUAIFENESIN 600 MG/1
15 TABLET, EXTENDED RELEASE ORAL DAILY
Status: DISCONTINUED | OUTPATIENT
Start: 2023-01-01 | End: 2023-02-23 | Stop reason: HOSPADM

## 2023-01-01 RX ORDER — VANCOMYCIN HYDROCHLORIDE 1 G/200ML
1000 INJECTION, SOLUTION INTRAVENOUS
Status: COMPLETED | OUTPATIENT
Start: 2023-01-01 | End: 2023-01-01

## 2023-01-01 RX ORDER — CALCIUM GLUCONATE 20 MG/ML
1 INJECTION, SOLUTION INTRAVENOUS ONCE
Status: DISCONTINUED | OUTPATIENT
Start: 2023-01-01 | End: 2023-01-01

## 2023-01-01 RX ORDER — VANCOMYCIN HYDROCHLORIDE 1 G/200ML
1000 INJECTION, SOLUTION INTRAVENOUS
Status: DISCONTINUED | OUTPATIENT
Start: 2023-01-01 | End: 2023-01-01 | Stop reason: HOSPADM

## 2023-01-01 RX ORDER — DEXMEDETOMIDINE HYDROCHLORIDE 4 UG/ML
.2-.7 INJECTION, SOLUTION INTRAVENOUS CONTINUOUS
Status: DISCONTINUED | OUTPATIENT
Start: 2023-01-01 | End: 2023-01-01

## 2023-01-01 RX ORDER — NICOTINE POLACRILEX 4 MG
15-30 LOZENGE BUCCAL
Status: DISCONTINUED | OUTPATIENT
Start: 2023-01-01 | End: 2023-02-23 | Stop reason: HOSPADM

## 2023-01-01 RX ORDER — METHOCARBAMOL 500 MG/1
500 TABLET, FILM COATED ORAL 4 TIMES DAILY
Status: DISCONTINUED | OUTPATIENT
Start: 2023-01-01 | End: 2023-01-01

## 2023-01-01 RX ORDER — HEPARIN SODIUM,PORCINE 10 UNIT/ML
5-20 VIAL (ML) INTRAVENOUS EVERY 24 HOURS
Status: DISCONTINUED | OUTPATIENT
Start: 2023-01-01 | End: 2023-02-23 | Stop reason: HOSPADM

## 2023-01-01 RX ORDER — ONDANSETRON 2 MG/ML
4 INJECTION INTRAMUSCULAR; INTRAVENOUS EVERY 6 HOURS PRN
Status: DISCONTINUED | OUTPATIENT
Start: 2023-01-01 | End: 2023-01-01

## 2023-01-01 RX ORDER — HYDRALAZINE HYDROCHLORIDE 20 MG/ML
10-20 INJECTION INTRAMUSCULAR; INTRAVENOUS EVERY 4 HOURS PRN
Status: DISCONTINUED | OUTPATIENT
Start: 2023-01-01 | End: 2023-02-23 | Stop reason: HOSPADM

## 2023-01-01 RX ORDER — POLYETHYLENE GLYCOL 3350 17 G/17G
17 POWDER, FOR SOLUTION ORAL DAILY PRN
Status: DISCONTINUED | OUTPATIENT
Start: 2023-01-01 | End: 2023-02-23 | Stop reason: HOSPADM

## 2023-01-01 RX ORDER — DEXTROSE MONOHYDRATE 100 MG/ML
INJECTION, SOLUTION INTRAVENOUS CONTINUOUS PRN
Status: DISCONTINUED | OUTPATIENT
Start: 2023-01-01 | End: 2023-02-23 | Stop reason: HOSPADM

## 2023-01-01 RX ORDER — POTASSIUM CHLORIDE 29.8 MG/ML
20 INJECTION INTRAVENOUS ONCE
Status: DISCONTINUED | OUTPATIENT
Start: 2023-01-01 | End: 2023-01-01 | Stop reason: DRUGHIGH

## 2023-01-01 RX ORDER — ONDANSETRON 2 MG/ML
4 INJECTION INTRAMUSCULAR; INTRAVENOUS EVERY 6 HOURS PRN
Status: DISCONTINUED | OUTPATIENT
Start: 2023-01-01 | End: 2023-02-23 | Stop reason: HOSPADM

## 2023-01-01 RX ORDER — LIDOCAINE 4 G/G
1 PATCH TOPICAL
Status: DISCONTINUED | OUTPATIENT
Start: 2023-01-01 | End: 2023-01-01

## 2023-01-01 RX ORDER — PIPERACILLIN SODIUM, TAZOBACTAM SODIUM 4; .5 G/20ML; G/20ML
4.5 INJECTION, POWDER, LYOPHILIZED, FOR SOLUTION INTRAVENOUS EVERY 6 HOURS
Status: DISCONTINUED | OUTPATIENT
Start: 2023-01-01 | End: 2023-01-01

## 2023-01-01 RX ORDER — SODIUM CHLORIDE 9 MG/ML
75 INJECTION, SOLUTION INTRAVENOUS CONTINUOUS
Status: ACTIVE | OUTPATIENT
Start: 2023-01-01 | End: 2023-01-01

## 2023-01-01 RX ORDER — SULFAMETHOXAZOLE/TRIMETHOPRIM 800-160 MG
2 TABLET ORAL EVERY 8 HOURS
Status: DISCONTINUED | OUTPATIENT
Start: 2023-01-01 | End: 2023-01-01

## 2023-01-01 RX ORDER — FIBRINOGEN (HUMAN) 700-1300MG
1.1 KIT INTRAVENOUS ONCE
Status: COMPLETED | OUTPATIENT
Start: 2023-01-01 | End: 2023-01-01

## 2023-01-01 RX ORDER — PROPOFOL 10 MG/ML
INJECTION, EMULSION INTRAVENOUS PRN
Status: DISCONTINUED | OUTPATIENT
Start: 2023-01-01 | End: 2023-01-01

## 2023-01-01 RX ORDER — POTASSIUM CHLORIDE 750 MG/1
20 TABLET, EXTENDED RELEASE ORAL ONCE
Status: DISCONTINUED | OUTPATIENT
Start: 2023-01-01 | End: 2023-01-01

## 2023-01-01 RX ORDER — SODIUM CHLORIDE FOR INHALATION 3 %
3 VIAL, NEBULIZER (ML) INHALATION
Status: DISCONTINUED | OUTPATIENT
Start: 2023-01-01 | End: 2023-01-01

## 2023-01-01 RX ORDER — PROPOFOL 10 MG/ML
INJECTION, EMULSION INTRAVENOUS CONTINUOUS PRN
Status: DISCONTINUED | OUTPATIENT
Start: 2023-01-01 | End: 2023-01-01

## 2023-01-01 RX ORDER — ESMOLOL HYDROCHLORIDE 20 MG/ML
50-300 INJECTION, SOLUTION INTRAVENOUS CONTINUOUS
Status: DISCONTINUED | OUTPATIENT
Start: 2023-01-01 | End: 2023-01-01 | Stop reason: HOSPADM

## 2023-01-01 RX ORDER — ACETYLCYSTEINE 100 MG/ML
2 SOLUTION ORAL; RESPIRATORY (INHALATION) 3 TIMES DAILY
Status: DISCONTINUED | OUTPATIENT
Start: 2023-01-01 | End: 2023-02-23 | Stop reason: HOSPADM

## 2023-01-01 RX ORDER — FLUMAZENIL 0.1 MG/ML
0.2 INJECTION, SOLUTION INTRAVENOUS
Status: DISCONTINUED | OUTPATIENT
Start: 2023-01-01 | End: 2023-01-01

## 2023-01-01 RX ORDER — SODIUM CHLORIDE 1 G/1
2 TABLET ORAL
Status: DISCONTINUED | OUTPATIENT
Start: 2023-01-01 | End: 2023-02-23 | Stop reason: HOSPADM

## 2023-01-01 RX ORDER — MIRTAZAPINE 7.5 MG/1
15 TABLET, FILM COATED ORAL AT BEDTIME
Status: DISCONTINUED | OUTPATIENT
Start: 2023-01-01 | End: 2023-02-23 | Stop reason: HOSPADM

## 2023-01-01 RX ORDER — POLYETHYLENE GLYCOL 3350 17 G/17G
17 POWDER, FOR SOLUTION ORAL DAILY
Status: DISCONTINUED | OUTPATIENT
Start: 2023-01-01 | End: 2023-02-23 | Stop reason: HOSPADM

## 2023-01-01 RX ORDER — NOREPINEPHRINE BITARTRATE 0.06 MG/ML
.01-.1 INJECTION, SOLUTION INTRAVENOUS CONTINUOUS
Status: DISCONTINUED | OUTPATIENT
Start: 2023-01-01 | End: 2023-01-01 | Stop reason: HOSPADM

## 2023-01-01 RX ORDER — LABETALOL HYDROCHLORIDE 5 MG/ML
10 INJECTION, SOLUTION INTRAVENOUS ONCE
Status: COMPLETED | OUTPATIENT
Start: 2023-01-01 | End: 2023-01-01

## 2023-01-01 RX ORDER — AMOXICILLIN 250 MG
1 CAPSULE ORAL AT BEDTIME
Status: DISCONTINUED | OUTPATIENT
Start: 2023-01-01 | End: 2023-01-01

## 2023-01-01 RX ORDER — OMEPRAZOLE 40 MG/1
40 CAPSULE, DELAYED RELEASE ORAL DAILY
COMMUNITY

## 2023-01-01 RX ORDER — METOPROLOL TARTRATE 1 MG/ML
5 INJECTION, SOLUTION INTRAVENOUS EVERY 6 HOURS
Status: DISCONTINUED | OUTPATIENT
Start: 2023-01-01 | End: 2023-01-01

## 2023-01-01 RX ORDER — PANTOPRAZOLE SODIUM 40 MG/1
40 TABLET, DELAYED RELEASE ORAL DAILY
Status: DISCONTINUED | OUTPATIENT
Start: 2023-01-01 | End: 2023-01-01

## 2023-01-01 RX ORDER — POLYETHYLENE GLYCOL 3350 17 G/17G
17 POWDER, FOR SOLUTION ORAL DAILY
Status: DISCONTINUED | OUTPATIENT
Start: 2023-01-01 | End: 2023-01-01

## 2023-01-01 RX ADMIN — OXYCODONE HYDROCHLORIDE 5 MG: 5 TABLET ORAL at 18:01

## 2023-01-01 RX ADMIN — PIPERACILLIN AND TAZOBACTAM 3.38 G: 3; .375 INJECTION, POWDER, LYOPHILIZED, FOR SOLUTION INTRAVENOUS at 08:45

## 2023-01-01 RX ADMIN — ACETAMINOPHEN 650 MG: 325 TABLET ORAL at 17:35

## 2023-01-01 RX ADMIN — IPRATROPIUM BROMIDE AND ALBUTEROL SULFATE 3 ML: 2.5; .5 SOLUTION RESPIRATORY (INHALATION) at 14:37

## 2023-01-01 RX ADMIN — FENTANYL CITRATE 100 MCG: 50 INJECTION, SOLUTION INTRAMUSCULAR; INTRAVENOUS at 15:56

## 2023-01-01 RX ADMIN — NICARDIPINE HYDROCHLORIDE 2.5 MG/HR: 0.2 INJECTION, SOLUTION INTRAVENOUS at 02:07

## 2023-01-01 RX ADMIN — MIDAZOLAM HYDROCHLORIDE 4 MG: 1 INJECTION, SOLUTION INTRAMUSCULAR; INTRAVENOUS at 13:32

## 2023-01-01 RX ADMIN — DEXMEDETOMIDINE HYDROCHLORIDE 0.8 MCG/KG/HR: 4 INJECTION, SOLUTION INTRAVENOUS at 04:36

## 2023-01-01 RX ADMIN — METHOCARBAMOL 500 MG: 500 TABLET ORAL at 01:32

## 2023-01-01 RX ADMIN — MULTIVITAMIN 15 ML: LIQUID ORAL at 07:21

## 2023-01-01 RX ADMIN — ESMOLOL HYDROCHLORIDE 50 MCG/KG/MIN: 20 INJECTION INTRAVENOUS at 01:39

## 2023-01-01 RX ADMIN — METHOCARBAMOL 500 MG: 500 TABLET ORAL at 00:13

## 2023-01-01 RX ADMIN — SODIUM CHLORIDE, POTASSIUM CHLORIDE, SODIUM LACTATE AND CALCIUM CHLORIDE 500 ML: 600; 310; 30; 20 INJECTION, SOLUTION INTRAVENOUS at 21:48

## 2023-01-01 RX ADMIN — SODIUM CHLORIDE, SODIUM GLUCONATE, SODIUM ACETATE, POTASSIUM CHLORIDE AND MAGNESIUM CHLORIDE: 526; 502; 368; 37; 30 INJECTION, SOLUTION INTRAVENOUS at 15:54

## 2023-01-01 RX ADMIN — OXYCODONE HYDROCHLORIDE 5 MG: 5 TABLET ORAL at 07:21

## 2023-01-01 RX ADMIN — SODIUM CHLORIDE 2 G: 1 TABLET ORAL at 16:51

## 2023-01-01 RX ADMIN — SENNOSIDES AND DOCUSATE SODIUM 1 TABLET: 50; 8.6 TABLET ORAL at 07:57

## 2023-01-01 RX ADMIN — LABETALOL HYDROCHLORIDE 10 MG: 5 INJECTION, SOLUTION INTRAVENOUS at 01:55

## 2023-01-01 RX ADMIN — ALBUTEROL SULFATE 2.5 MG: 2.5 SOLUTION RESPIRATORY (INHALATION) at 20:56

## 2023-01-01 RX ADMIN — HEPARIN SODIUM 5000 UNITS: 5000 INJECTION, SOLUTION INTRAVENOUS; SUBCUTANEOUS at 16:10

## 2023-01-01 RX ADMIN — SULFAMETHOXAZOLE AND TRIMETHOPRIM 320 MG: 80; 16 INJECTION, SOLUTION, CONCENTRATE INTRAVENOUS at 06:18

## 2023-01-01 RX ADMIN — ACETAMINOPHEN 650 MG: 325 TABLET ORAL at 01:51

## 2023-01-01 RX ADMIN — HYDROMORPHONE HYDROCHLORIDE 0.2 MG: 0.2 INJECTION, SOLUTION INTRAMUSCULAR; INTRAVENOUS; SUBCUTANEOUS at 22:30

## 2023-01-01 RX ADMIN — MULTIVITAMIN 15 ML: LIQUID ORAL at 07:44

## 2023-01-01 RX ADMIN — PROPOFOL 15 MCG/KG/MIN: 10 INJECTION, EMULSION INTRAVENOUS at 21:45

## 2023-01-01 RX ADMIN — PIPERACILLIN SODIUM AND TAZOBACTAM SODIUM 4.5 G: 4; .5 INJECTION, POWDER, LYOPHILIZED, FOR SOLUTION INTRAVENOUS at 09:04

## 2023-01-01 RX ADMIN — IOPAMIDOL 100 ML: 755 INJECTION, SOLUTION INTRAVENOUS at 09:53

## 2023-01-01 RX ADMIN — ASPIRIN 81 MG 81 MG: 81 TABLET ORAL at 08:11

## 2023-01-01 RX ADMIN — HEPARIN SODIUM 5000 UNITS: 5000 INJECTION, SOLUTION INTRAVENOUS; SUBCUTANEOUS at 08:10

## 2023-01-01 RX ADMIN — ACETAMINOPHEN 650 MG: 325 TABLET ORAL at 01:37

## 2023-01-01 RX ADMIN — Medication 40 MG: at 08:25

## 2023-01-01 RX ADMIN — LABETALOL HYDROCHLORIDE 10 MG: 5 INJECTION, SOLUTION INTRAVENOUS at 02:42

## 2023-01-01 RX ADMIN — ATORVASTATIN CALCIUM 40 MG: 40 TABLET, FILM COATED ORAL at 12:01

## 2023-01-01 RX ADMIN — MIRTAZAPINE 15 MG: 7.5 TABLET, FILM COATED ORAL at 21:58

## 2023-01-01 RX ADMIN — OXYCODONE HYDROCHLORIDE 2.5 MG: 5 TABLET ORAL at 14:22

## 2023-01-01 RX ADMIN — PROTHROMBIN, COAGULATION FACTOR VII HUMAN, COAGULATION FACTOR IX HUMAN, COAGULATION FACTOR X HUMAN, PROTEIN C, PROTEIN S HUMAN, AND WATER 548 UNITS: KIT at 16:29

## 2023-01-01 RX ADMIN — POTASSIUM CHLORIDE 20 MEQ: 1.5 POWDER, FOR SOLUTION ORAL at 08:44

## 2023-01-01 RX ADMIN — SODIUM ZIRCONIUM CYCLOSILICATE 10 G: 10 POWDER, FOR SUSPENSION ORAL at 13:39

## 2023-01-01 RX ADMIN — CARVEDILOL 12.5 MG: 6.25 TABLET, FILM COATED ORAL at 08:26

## 2023-01-01 RX ADMIN — ONDANSETRON 4 MG: 2 INJECTION INTRAMUSCULAR; INTRAVENOUS at 17:40

## 2023-01-01 RX ADMIN — ACETAMINOPHEN 975 MG: 325 TABLET ORAL at 01:52

## 2023-01-01 RX ADMIN — ACETAMINOPHEN 650 MG: 325 TABLET ORAL at 03:37

## 2023-01-01 RX ADMIN — METHOCARBAMOL 500 MG: 500 TABLET ORAL at 22:22

## 2023-01-01 RX ADMIN — HYDRALAZINE HYDROCHLORIDE 10 MG: 20 INJECTION INTRAMUSCULAR; INTRAVENOUS at 06:21

## 2023-01-01 RX ADMIN — HYDROMORPHONE HYDROCHLORIDE 0.2 MG: 0.2 INJECTION, SOLUTION INTRAMUSCULAR; INTRAVENOUS; SUBCUTANEOUS at 17:59

## 2023-01-01 RX ADMIN — HYDRALAZINE HYDROCHLORIDE 10 MG: 20 INJECTION INTRAMUSCULAR; INTRAVENOUS at 09:13

## 2023-01-01 RX ADMIN — ACETAMINOPHEN 650 MG: 325 TABLET ORAL at 10:11

## 2023-01-01 RX ADMIN — HEPARIN SODIUM 5000 UNITS: 5000 INJECTION, SOLUTION INTRAVENOUS; SUBCUTANEOUS at 08:08

## 2023-01-01 RX ADMIN — MUPIROCIN 0.5 G: 20 OINTMENT TOPICAL at 08:11

## 2023-01-01 RX ADMIN — HEPARIN SODIUM 5000 UNITS: 5000 INJECTION, SOLUTION INTRAVENOUS; SUBCUTANEOUS at 16:17

## 2023-01-01 RX ADMIN — ACETAMINOPHEN 975 MG: 325 TABLET ORAL at 10:22

## 2023-01-01 RX ADMIN — MULTIVITAMIN 15 ML: LIQUID ORAL at 08:24

## 2023-01-01 RX ADMIN — ACETAMINOPHEN 975 MG: 325 TABLET ORAL at 17:21

## 2023-01-01 RX ADMIN — Medication 50 MG: at 13:00

## 2023-01-01 RX ADMIN — ACETAMINOPHEN 650 MG: 325 TABLET ORAL at 22:22

## 2023-01-01 RX ADMIN — SODIUM CHLORIDE, POTASSIUM CHLORIDE, SODIUM LACTATE AND CALCIUM CHLORIDE 500 ML: 600; 310; 30; 20 INJECTION, SOLUTION INTRAVENOUS at 00:15

## 2023-01-01 RX ADMIN — MIDAZOLAM HYDROCHLORIDE 4 MG: 1 INJECTION, SOLUTION INTRAMUSCULAR; INTRAVENOUS at 11:49

## 2023-01-01 RX ADMIN — ALBUMIN HUMAN 12.5 G: 0.05 INJECTION, SOLUTION INTRAVENOUS at 00:07

## 2023-01-01 RX ADMIN — FENTANYL CITRATE 50 MCG: 50 INJECTION, SOLUTION INTRAMUSCULAR; INTRAVENOUS at 08:54

## 2023-01-01 RX ADMIN — Medication 40 MG: at 07:54

## 2023-01-01 RX ADMIN — LABETALOL HYDROCHLORIDE 10 MG: 5 INJECTION, SOLUTION INTRAVENOUS at 18:36

## 2023-01-01 RX ADMIN — LIDOCAINE HYDROCHLORIDE 30 MG: 10 INJECTION, SOLUTION EPIDURAL; INFILTRATION; INTRACAUDAL; PERINEURAL at 09:28

## 2023-01-01 RX ADMIN — ACETAMINOPHEN 650 MG: 325 TABLET ORAL at 21:33

## 2023-01-01 RX ADMIN — MUPIROCIN 0.5 G: 20 OINTMENT TOPICAL at 19:52

## 2023-01-01 RX ADMIN — HEPARIN SODIUM 5000 UNITS: 5000 INJECTION, SOLUTION INTRAVENOUS; SUBCUTANEOUS at 13:05

## 2023-01-01 RX ADMIN — PROTAMINE SULFATE 120 MG: 10 INJECTION, SOLUTION INTRAVENOUS at 15:51

## 2023-01-01 RX ADMIN — MULTIVITAMIN 15 ML: LIQUID ORAL at 08:11

## 2023-01-01 RX ADMIN — ACETAMINOPHEN 650 MG: 325 TABLET ORAL at 16:20

## 2023-01-01 RX ADMIN — HYDRALAZINE HYDROCHLORIDE 10 MG: 20 INJECTION INTRAMUSCULAR; INTRAVENOUS at 23:12

## 2023-01-01 RX ADMIN — SODIUM CHLORIDE 2 G: 1 TABLET ORAL at 18:56

## 2023-01-01 RX ADMIN — FUROSEMIDE 40 MG: 10 INJECTION, SOLUTION INTRAVENOUS at 05:55

## 2023-01-01 RX ADMIN — ASPIRIN 81 MG 81 MG: 81 TABLET ORAL at 08:16

## 2023-01-01 RX ADMIN — CARVEDILOL 12.5 MG: 6.25 TABLET, FILM COATED ORAL at 10:28

## 2023-01-01 RX ADMIN — MUPIROCIN 0.5 G: 20 OINTMENT TOPICAL at 08:10

## 2023-01-01 RX ADMIN — Medication 4 UNITS/HR: at 23:42

## 2023-01-01 RX ADMIN — POTASSIUM CHLORIDE 20 MEQ: 750 TABLET, EXTENDED RELEASE ORAL at 08:26

## 2023-01-01 RX ADMIN — ALBUTEROL SULFATE 2.5 MG: 2.5 SOLUTION RESPIRATORY (INHALATION) at 21:03

## 2023-01-01 RX ADMIN — HEPARIN SODIUM 5000 UNITS: 5000 INJECTION, SOLUTION INTRAVENOUS; SUBCUTANEOUS at 15:28

## 2023-01-01 RX ADMIN — MIRTAZAPINE 15 MG: 7.5 TABLET, FILM COATED ORAL at 21:47

## 2023-01-01 RX ADMIN — PIPERACILLIN SODIUM AND TAZOBACTAM SODIUM 4.5 G: 4; .5 INJECTION, POWDER, LYOPHILIZED, FOR SOLUTION INTRAVENOUS at 03:35

## 2023-01-01 RX ADMIN — ACETAMINOPHEN 650 MG: 325 TABLET ORAL at 09:17

## 2023-01-01 RX ADMIN — SODIUM CHLORIDE, POTASSIUM CHLORIDE, SODIUM LACTATE AND CALCIUM CHLORIDE 500 ML: 600; 310; 30; 20 INJECTION, SOLUTION INTRAVENOUS at 07:25

## 2023-01-01 RX ADMIN — MIRTAZAPINE 15 MG: 7.5 TABLET, FILM COATED ORAL at 22:03

## 2023-01-01 RX ADMIN — SODIUM ZIRCONIUM CYCLOSILICATE 10 G: 10 POWDER, FOR SUSPENSION ORAL at 20:20

## 2023-01-01 RX ADMIN — HEPARIN SODIUM 5000 UNITS: 5000 INJECTION, SOLUTION INTRAVENOUS; SUBCUTANEOUS at 16:22

## 2023-01-01 RX ADMIN — MIRTAZAPINE 15 MG: 7.5 TABLET, FILM COATED ORAL at 21:13

## 2023-01-01 RX ADMIN — FENTANYL CITRATE 200 MCG: 50 INJECTION, SOLUTION INTRAMUSCULAR; INTRAVENOUS at 13:38

## 2023-01-01 RX ADMIN — SODIUM CHLORIDE 2 G: 1 TABLET ORAL at 12:23

## 2023-01-01 RX ADMIN — ONDANSETRON 4 MG: 2 INJECTION INTRAMUSCULAR; INTRAVENOUS at 08:18

## 2023-01-01 RX ADMIN — PROPOFOL 5 MCG/KG/MIN: 10 INJECTION, EMULSION INTRAVENOUS at 18:18

## 2023-01-01 RX ADMIN — SODIUM CHLORIDE 2 G: 1 TABLET ORAL at 07:48

## 2023-01-01 RX ADMIN — OXYCODONE HYDROCHLORIDE 5 MG: 5 TABLET ORAL at 13:03

## 2023-01-01 RX ADMIN — HYDRALAZINE HYDROCHLORIDE 10 MG: 20 INJECTION INTRAMUSCULAR; INTRAVENOUS at 22:54

## 2023-01-01 RX ADMIN — METOPROLOL TARTRATE 5 MG: 5 INJECTION INTRAVENOUS at 18:04

## 2023-01-01 RX ADMIN — SULFAMETHOXAZOLE AND TRIMETHOPRIM 320 MG: 80; 16 INJECTION, SOLUTION, CONCENTRATE INTRAVENOUS at 14:38

## 2023-01-01 RX ADMIN — ESMOLOL HYDROCHLORIDE 300 MCG/KG/MIN: 20 INJECTION INTRAVENOUS at 03:36

## 2023-01-01 RX ADMIN — ACETAMINOPHEN 650 MG: 325 TABLET ORAL at 10:07

## 2023-01-01 RX ADMIN — ACETAMINOPHEN 650 MG: 325 TABLET ORAL at 02:14

## 2023-01-01 RX ADMIN — ASPIRIN 81 MG 81 MG: 81 TABLET ORAL at 07:56

## 2023-01-01 RX ADMIN — HYDROCORTISONE SODIUM SUCCINATE 50 MG: 100 INJECTION, POWDER, FOR SOLUTION INTRAMUSCULAR; INTRAVENOUS at 04:10

## 2023-01-01 RX ADMIN — ATORVASTATIN CALCIUM 40 MG: 40 TABLET, FILM COATED ORAL at 12:48

## 2023-01-01 RX ADMIN — EPINEPHRINE 0.03 MCG/KG/MIN: 1 INJECTION INTRAMUSCULAR; INTRAVENOUS; SUBCUTANEOUS at 16:09

## 2023-01-01 RX ADMIN — MIRTAZAPINE 15 MG: 7.5 TABLET, FILM COATED ORAL at 22:02

## 2023-01-01 RX ADMIN — ACETYLCYSTEINE 2 ML: 100 SOLUTION ORAL; RESPIRATORY (INHALATION) at 21:03

## 2023-01-01 RX ADMIN — AMLODIPINE BESYLATE 10 MG: 10 TABLET ORAL at 08:26

## 2023-01-01 RX ADMIN — HYDROMORPHONE HYDROCHLORIDE 0.2 MG: 0.2 INJECTION, SOLUTION INTRAMUSCULAR; INTRAVENOUS; SUBCUTANEOUS at 05:44

## 2023-01-01 RX ADMIN — CEFTRIAXONE SODIUM 2 G: 2 INJECTION, POWDER, FOR SOLUTION INTRAMUSCULAR; INTRAVENOUS at 12:05

## 2023-01-01 RX ADMIN — ACETAMINOPHEN 650 MG: 325 TABLET ORAL at 18:11

## 2023-01-01 RX ADMIN — ERTAPENEM SODIUM 1 G: 1 INJECTION, POWDER, LYOPHILIZED, FOR SOLUTION INTRAMUSCULAR; INTRAVENOUS at 12:20

## 2023-01-01 RX ADMIN — MULTIVITAMIN 15 ML: LIQUID ORAL at 08:16

## 2023-01-01 RX ADMIN — ACETAMINOPHEN 650 MG: 325 TABLET ORAL at 00:01

## 2023-01-01 RX ADMIN — PIPERACILLIN AND TAZOBACTAM 3.38 G: 3; .375 INJECTION, POWDER, LYOPHILIZED, FOR SOLUTION INTRAVENOUS at 09:23

## 2023-01-01 RX ADMIN — Medication 40 MG: at 08:11

## 2023-01-01 RX ADMIN — CARVEDILOL 12.5 MG: 6.25 TABLET, FILM COATED ORAL at 18:04

## 2023-01-01 RX ADMIN — CALCIUM GLUCONATE 1 G: 20 INJECTION, SOLUTION INTRAVENOUS at 05:57

## 2023-01-01 RX ADMIN — ACETAMINOPHEN 650 MG: 325 TABLET ORAL at 02:22

## 2023-01-01 RX ADMIN — OXYCODONE HYDROCHLORIDE 5 MG: 5 TABLET ORAL at 20:11

## 2023-01-01 RX ADMIN — METOPROLOL TARTRATE 5 MG: 5 INJECTION INTRAVENOUS at 03:27

## 2023-01-01 RX ADMIN — ACETAMINOPHEN 650 MG: 325 TABLET ORAL at 09:29

## 2023-01-01 RX ADMIN — ACETAMINOPHEN 650 MG: 325 TABLET ORAL at 10:44

## 2023-01-01 RX ADMIN — HYDRALAZINE HYDROCHLORIDE 10 MG: 20 INJECTION INTRAMUSCULAR; INTRAVENOUS at 23:06

## 2023-01-01 RX ADMIN — Medication 40 MG: at 07:48

## 2023-01-01 RX ADMIN — INSULIN ASPART 1 UNITS: 100 INJECTION, SOLUTION INTRAVENOUS; SUBCUTANEOUS at 23:56

## 2023-01-01 RX ADMIN — POTASSIUM CHLORIDE 20 MEQ: 1.5 POWDER, FOR SOLUTION ORAL at 08:16

## 2023-01-01 RX ADMIN — EPINEPHRINE 0.06 MCG/KG/MIN: 1 INJECTION INTRAMUSCULAR; INTRAVENOUS; SUBCUTANEOUS at 13:26

## 2023-01-01 RX ADMIN — LIDOCAINE PATCH 4% 1 PATCH: 40 PATCH TOPICAL at 08:26

## 2023-01-01 RX ADMIN — Medication 0.05 MCG/KG/MIN: at 13:39

## 2023-01-01 RX ADMIN — ACETAMINOPHEN 650 MG: 325 TABLET ORAL at 18:28

## 2023-01-01 RX ADMIN — SENNOSIDES AND DOCUSATE SODIUM 1 TABLET: 50; 8.6 TABLET ORAL at 07:47

## 2023-01-01 RX ADMIN — AMLODIPINE BESYLATE 5 MG: 5 TABLET ORAL at 10:27

## 2023-01-01 RX ADMIN — ACETAMINOPHEN 650 MG: 325 TABLET ORAL at 17:59

## 2023-01-01 RX ADMIN — ATORVASTATIN CALCIUM 40 MG: 40 TABLET, FILM COATED ORAL at 07:57

## 2023-01-01 RX ADMIN — ACETAMINOPHEN 650 MG: 325 TABLET ORAL at 10:22

## 2023-01-01 RX ADMIN — PROPOFOL 30 MCG/KG/MIN: 10 INJECTION, EMULSION INTRAVENOUS at 02:00

## 2023-01-01 RX ADMIN — AMLODIPINE BESYLATE 10 MG: 10 TABLET ORAL at 05:08

## 2023-01-01 RX ADMIN — MULTIVITAMIN 15 ML: LIQUID ORAL at 08:44

## 2023-01-01 RX ADMIN — MIRTAZAPINE 15 MG: 7.5 TABLET, FILM COATED ORAL at 22:47

## 2023-01-01 RX ADMIN — HYDRALAZINE HYDROCHLORIDE 10 MG: 20 INJECTION INTRAMUSCULAR; INTRAVENOUS at 16:29

## 2023-01-01 RX ADMIN — OXYCODONE HYDROCHLORIDE 5 MG: 5 TABLET ORAL at 17:03

## 2023-01-01 RX ADMIN — LIDOCAINE HYDROCHLORIDE 30 MG: 10 INJECTION, SOLUTION EPIDURAL; INFILTRATION; INTRACAUDAL; PERINEURAL at 14:08

## 2023-01-01 RX ADMIN — CARVEDILOL 25 MG: 25 TABLET, FILM COATED ORAL at 07:49

## 2023-01-01 RX ADMIN — METHOCARBAMOL 500 MG: 500 TABLET ORAL at 18:56

## 2023-01-01 RX ADMIN — ACETYLCYSTEINE 2 ML: 100 SOLUTION ORAL; RESPIRATORY (INHALATION) at 07:57

## 2023-01-01 RX ADMIN — HEPARIN SODIUM 5000 UNITS: 5000 INJECTION, SOLUTION INTRAVENOUS; SUBCUTANEOUS at 01:30

## 2023-01-01 RX ADMIN — Medication 40 MG: at 08:10

## 2023-01-01 RX ADMIN — DEXMEDETOMIDINE HYDROCHLORIDE 0.2 MCG/KG/HR: 4 INJECTION, SOLUTION INTRAVENOUS at 00:45

## 2023-01-01 RX ADMIN — HYDROMORPHONE HYDROCHLORIDE 0.2 MG: 0.2 INJECTION, SOLUTION INTRAMUSCULAR; INTRAVENOUS; SUBCUTANEOUS at 10:48

## 2023-01-01 RX ADMIN — PROCHLORPERAZINE EDISYLATE 5 MG: 5 INJECTION INTRAMUSCULAR; INTRAVENOUS at 09:17

## 2023-01-01 RX ADMIN — HEPARIN SODIUM 5000 UNITS: 5000 INJECTION, SOLUTION INTRAVENOUS; SUBCUTANEOUS at 00:13

## 2023-01-01 RX ADMIN — IOPAMIDOL 100 ML: 755 INJECTION, SOLUTION INTRAVENOUS at 14:35

## 2023-01-01 RX ADMIN — HYDROCORTISONE SODIUM SUCCINATE 50 MG: 100 INJECTION, POWDER, FOR SOLUTION INTRAMUSCULAR; INTRAVENOUS at 16:09

## 2023-01-01 RX ADMIN — PROPOFOL 40 MCG/KG/MIN: 10 INJECTION, EMULSION INTRAVENOUS at 22:42

## 2023-01-01 RX ADMIN — LABETALOL HYDROCHLORIDE 10 MG: 5 INJECTION, SOLUTION INTRAVENOUS at 17:00

## 2023-01-01 RX ADMIN — ACETAMINOPHEN 650 MG: 325 TABLET ORAL at 21:31

## 2023-01-01 RX ADMIN — ASPIRIN 81 MG 81 MG: 81 TABLET ORAL at 07:48

## 2023-01-01 RX ADMIN — SULFAMETHOXAZOLE AND TRIMETHOPRIM 320 MG: 80; 16 INJECTION, SOLUTION, CONCENTRATE INTRAVENOUS at 13:32

## 2023-01-01 RX ADMIN — POLYETHYLENE GLYCOL 3350 17 G: 17 POWDER, FOR SOLUTION ORAL at 07:57

## 2023-01-01 RX ADMIN — SODIUM CHLORIDE 500 ML: 9 INJECTION, SOLUTION INTRAVENOUS at 01:38

## 2023-01-01 RX ADMIN — ACETAMINOPHEN 650 MG: 325 TABLET ORAL at 17:26

## 2023-01-01 RX ADMIN — LIDOCAINE PATCH 4% 1 PATCH: 40 PATCH TOPICAL at 12:49

## 2023-01-01 RX ADMIN — ACETAMINOPHEN 975 MG: 325 TABLET ORAL at 09:43

## 2023-01-01 RX ADMIN — AMLODIPINE BESYLATE 10 MG: 10 TABLET ORAL at 07:49

## 2023-01-01 RX ADMIN — ACETYLCYSTEINE 2 ML: 100 SOLUTION ORAL; RESPIRATORY (INHALATION) at 07:50

## 2023-01-01 RX ADMIN — HEPARIN SODIUM 5000 UNITS: 5000 INJECTION, SOLUTION INTRAVENOUS; SUBCUTANEOUS at 00:22

## 2023-01-01 RX ADMIN — EPINEPHRINE 0.03 MCG/KG/MIN: 1 INJECTION INTRAMUSCULAR; INTRAVENOUS; SUBCUTANEOUS at 19:19

## 2023-01-01 RX ADMIN — PANTOPRAZOLE SODIUM 40 MG: 40 TABLET, DELAYED RELEASE ORAL at 12:48

## 2023-01-01 RX ADMIN — SENNOSIDES AND DOCUSATE SODIUM 1 TABLET: 50; 8.6 TABLET ORAL at 07:54

## 2023-01-01 RX ADMIN — HYDROMORPHONE HYDROCHLORIDE 0.2 MG: 0.2 INJECTION, SOLUTION INTRAMUSCULAR; INTRAVENOUS; SUBCUTANEOUS at 01:51

## 2023-01-01 RX ADMIN — MIRTAZAPINE 15 MG: 7.5 TABLET, FILM COATED ORAL at 22:33

## 2023-01-01 RX ADMIN — HEPARIN SODIUM 5000 UNITS: 5000 INJECTION, SOLUTION INTRAVENOUS; SUBCUTANEOUS at 23:34

## 2023-01-01 RX ADMIN — POLYETHYLENE GLYCOL 3350 17 G: 17 POWDER, FOR SOLUTION ORAL at 07:53

## 2023-01-01 RX ADMIN — ACETAMINOPHEN 650 MG: 325 TABLET ORAL at 18:19

## 2023-01-01 RX ADMIN — HEPARIN SODIUM 5000 UNITS: 5000 INJECTION, SOLUTION INTRAVENOUS; SUBCUTANEOUS at 00:43

## 2023-01-01 RX ADMIN — ACETAMINOPHEN 650 MG: 325 TABLET ORAL at 09:04

## 2023-01-01 RX ADMIN — HYDRALAZINE HYDROCHLORIDE 5 MG: 20 INJECTION INTRAMUSCULAR; INTRAVENOUS at 11:25

## 2023-01-01 RX ADMIN — METHOCARBAMOL 500 MG: 500 TABLET ORAL at 05:29

## 2023-01-01 RX ADMIN — SODIUM CHLORIDE, POTASSIUM CHLORIDE, SODIUM LACTATE AND CALCIUM CHLORIDE 1000 ML: 600; 310; 30; 20 INJECTION, SOLUTION INTRAVENOUS at 05:25

## 2023-01-01 RX ADMIN — Medication 2 PACKET: at 17:35

## 2023-01-01 RX ADMIN — VANCOMYCIN HYDROCHLORIDE 1000 MG: 1 INJECTION, SOLUTION INTRAVENOUS at 10:40

## 2023-01-01 RX ADMIN — MAGNESIUM OXIDE TAB 400 MG (240 MG ELEMENTAL MG) 400 MG: 400 (240 MG) TAB at 12:19

## 2023-01-01 RX ADMIN — Medication 40 MG: at 07:44

## 2023-01-01 RX ADMIN — ACETAMINOPHEN 975 MG: 325 TABLET ORAL at 09:42

## 2023-01-01 RX ADMIN — PIPERACILLIN AND TAZOBACTAM 3.38 G: 3; .375 INJECTION, POWDER, LYOPHILIZED, FOR SOLUTION INTRAVENOUS at 03:07

## 2023-01-01 RX ADMIN — HYDROCORTISONE SODIUM SUCCINATE 50 MG: 100 INJECTION, POWDER, FOR SOLUTION INTRAMUSCULAR; INTRAVENOUS at 15:48

## 2023-01-01 RX ADMIN — POTASSIUM CHLORIDE 40 MEQ: 1.5 POWDER, FOR SOLUTION ORAL at 05:00

## 2023-01-01 RX ADMIN — DEXTROSE MONOHYDRATE 300 ML: 100 INJECTION, SOLUTION INTRAVENOUS at 06:09

## 2023-01-01 RX ADMIN — Medication 50 MG: at 11:24

## 2023-01-01 RX ADMIN — SODIUM CHLORIDE, POTASSIUM CHLORIDE, SODIUM LACTATE AND CALCIUM CHLORIDE 500 ML: 600; 310; 30; 20 INJECTION, SOLUTION INTRAVENOUS at 02:26

## 2023-01-01 RX ADMIN — SODIUM CHLORIDE 2 G: 1 TABLET ORAL at 17:26

## 2023-01-01 RX ADMIN — DEXMEDETOMIDINE HYDROCHLORIDE 0.2 MCG/KG/HR: 4 INJECTION, SOLUTION INTRAVENOUS at 13:42

## 2023-01-01 RX ADMIN — POTASSIUM CHLORIDE 20 MEQ: 1.5 POWDER, FOR SOLUTION ORAL at 20:06

## 2023-01-01 RX ADMIN — VASOPRESSIN 0.5 UNITS: 20 INJECTION, SOLUTION INTRAMUSCULAR; SUBCUTANEOUS at 11:51

## 2023-01-01 RX ADMIN — SIMETHICONE 40 MG: 20 EMULSION ORAL at 21:20

## 2023-01-01 RX ADMIN — VASOPRESSIN 2 UNITS/HR: 20 INJECTION, SOLUTION INTRAMUSCULAR; SUBCUTANEOUS at 12:35

## 2023-01-01 RX ADMIN — ACETYLCYSTEINE 2 ML: 100 SOLUTION ORAL; RESPIRATORY (INHALATION) at 14:38

## 2023-01-01 RX ADMIN — CEFEPIME HYDROCHLORIDE 2 G: 2 INJECTION, POWDER, FOR SOLUTION INTRAVENOUS at 04:24

## 2023-01-01 RX ADMIN — OXYCODONE HYDROCHLORIDE 10 MG: 10 TABLET ORAL at 08:41

## 2023-01-01 RX ADMIN — SODIUM CHLORIDE, POTASSIUM CHLORIDE, SODIUM LACTATE AND CALCIUM CHLORIDE 500 ML: 600; 310; 30; 20 INJECTION, SOLUTION INTRAVENOUS at 12:48

## 2023-01-01 RX ADMIN — FENTANYL CITRATE 100 MCG: 50 INJECTION, SOLUTION INTRAMUSCULAR; INTRAVENOUS at 09:06

## 2023-01-01 RX ADMIN — SODIUM CHLORIDE, POTASSIUM CHLORIDE, SODIUM LACTATE AND CALCIUM CHLORIDE 500 ML: 600; 310; 30; 20 INJECTION, SOLUTION INTRAVENOUS at 22:23

## 2023-01-01 RX ADMIN — PROPOFOL 40 MG: 10 INJECTION, EMULSION INTRAVENOUS at 09:32

## 2023-01-01 RX ADMIN — SULFAMETHOXAZOLE AND TRIMETHOPRIM 320 MG: 80; 16 INJECTION, SOLUTION, CONCENTRATE INTRAVENOUS at 21:28

## 2023-01-01 RX ADMIN — MIRTAZAPINE 15 MG: 7.5 TABLET, FILM COATED ORAL at 21:31

## 2023-01-01 RX ADMIN — SULFAMETHOXAZOLE AND TRIMETHOPRIM 2 TABLET: 800; 160 TABLET ORAL at 15:50

## 2023-01-01 RX ADMIN — CEFEPIME HYDROCHLORIDE 2 G: 2 INJECTION, POWDER, FOR SOLUTION INTRAVENOUS at 11:33

## 2023-01-01 RX ADMIN — POTASSIUM CHLORIDE 20 MEQ: 1.5 POWDER, FOR SOLUTION ORAL at 07:44

## 2023-01-01 RX ADMIN — HEPARIN SODIUM 5000 UNITS: 5000 INJECTION, SOLUTION INTRAVENOUS; SUBCUTANEOUS at 08:24

## 2023-01-01 RX ADMIN — FENTANYL CITRATE 200 MCG: 50 INJECTION, SOLUTION INTRAMUSCULAR; INTRAVENOUS at 11:50

## 2023-01-01 RX ADMIN — Medication 0.02 MCG/KG/MIN: at 00:03

## 2023-01-01 RX ADMIN — DEXTROSE MONOHYDRATE 25 G: 25 INJECTION, SOLUTION INTRAVENOUS at 06:05

## 2023-01-01 RX ADMIN — VASOPRESSIN 1 UNITS: 20 INJECTION, SOLUTION INTRAMUSCULAR; SUBCUTANEOUS at 12:59

## 2023-01-01 RX ADMIN — OXYCODONE HYDROCHLORIDE 10 MG: 10 TABLET ORAL at 23:00

## 2023-01-01 RX ADMIN — ACETAMINOPHEN 650 MG: 325 TABLET ORAL at 17:02

## 2023-01-01 RX ADMIN — ONDANSETRON 4 MG: 2 INJECTION INTRAMUSCULAR; INTRAVENOUS at 08:55

## 2023-01-01 RX ADMIN — Medication 100 MG: at 09:32

## 2023-01-01 RX ADMIN — ASPIRIN 81 MG 81 MG: 81 TABLET ORAL at 08:24

## 2023-01-01 RX ADMIN — Medication 2 UNITS/HR: at 12:49

## 2023-01-01 RX ADMIN — DEXTROSE MONOHYDRATE 300 ML: 100 INJECTION, SOLUTION INTRAVENOUS at 08:31

## 2023-01-01 RX ADMIN — LIDOCAINE HYDROCHLORIDE,EPINEPHRINE BITARTRATE 6 ML: 10; .01 INJECTION, SOLUTION INFILTRATION; PERINEURAL at 09:27

## 2023-01-01 RX ADMIN — DEXMEDETOMIDINE HYDROCHLORIDE 0.9 MCG/KG/HR: 4 INJECTION, SOLUTION INTRAVENOUS at 20:08

## 2023-01-01 RX ADMIN — MIRTAZAPINE 15 MG: 7.5 TABLET, FILM COATED ORAL at 22:22

## 2023-01-01 RX ADMIN — Medication 2 PACKET: at 13:44

## 2023-01-01 RX ADMIN — Medication 50 MG: at 10:51

## 2023-01-01 RX ADMIN — MULTIVITAMIN 15 ML: LIQUID ORAL at 07:55

## 2023-01-01 RX ADMIN — Medication 10 MG: at 02:05

## 2023-01-01 RX ADMIN — LABETALOL HYDROCHLORIDE 10 MG: 5 INJECTION, SOLUTION INTRAVENOUS at 21:10

## 2023-01-01 RX ADMIN — PROPOFOL 5 MCG/KG/MIN: 10 INJECTION, EMULSION INTRAVENOUS at 15:53

## 2023-01-01 RX ADMIN — ACETAMINOPHEN 650 MG: 325 TABLET ORAL at 18:00

## 2023-01-01 RX ADMIN — SULFAMETHOXAZOLE AND TRIMETHOPRIM 2 TABLET: 800; 160 TABLET ORAL at 10:11

## 2023-01-01 RX ADMIN — HYDRALAZINE HYDROCHLORIDE 10 MG: 20 INJECTION INTRAMUSCULAR; INTRAVENOUS at 18:09

## 2023-01-01 RX ADMIN — ACETAMINOPHEN 650 MG: 325 TABLET ORAL at 01:44

## 2023-01-01 RX ADMIN — Medication 5 G: at 10:39

## 2023-01-01 RX ADMIN — SULFAMETHOXAZOLE AND TRIMETHOPRIM 320 MG: 80; 16 INJECTION, SOLUTION, CONCENTRATE INTRAVENOUS at 22:14

## 2023-01-01 RX ADMIN — FENTANYL CITRATE 100 MCG: 50 INJECTION, SOLUTION INTRAMUSCULAR; INTRAVENOUS at 16:48

## 2023-01-01 RX ADMIN — Medication 2 PACKET: at 09:50

## 2023-01-01 RX ADMIN — CEFAZOLIN 1 G: 1 INJECTION, POWDER, FOR SOLUTION INTRAMUSCULAR; INTRAVENOUS at 20:30

## 2023-01-01 RX ADMIN — Medication 50 MG: at 15:21

## 2023-01-01 RX ADMIN — POTASSIUM CHLORIDE 20 MEQ: 1.5 POWDER, FOR SOLUTION ORAL at 17:35

## 2023-01-01 RX ADMIN — POTASSIUM CHLORIDE 20 MEQ: 1.5 POWDER, FOR SOLUTION ORAL at 19:58

## 2023-01-01 RX ADMIN — OXYCODONE HYDROCHLORIDE 5 MG: 5 TABLET ORAL at 07:44

## 2023-01-01 RX ADMIN — HEPARIN SODIUM 5000 UNITS: 5000 INJECTION, SOLUTION INTRAVENOUS; SUBCUTANEOUS at 00:15

## 2023-01-01 RX ADMIN — SODIUM CHLORIDE 2 G: 1 TABLET ORAL at 11:05

## 2023-01-01 RX ADMIN — ACETAMINOPHEN 650 MG: 325 TABLET ORAL at 03:02

## 2023-01-01 RX ADMIN — HYDRALAZINE HYDROCHLORIDE 10 MG: 20 INJECTION INTRAMUSCULAR; INTRAVENOUS at 23:54

## 2023-01-01 RX ADMIN — HYDROMORPHONE HYDROCHLORIDE 0.2 MG: 0.2 INJECTION, SOLUTION INTRAMUSCULAR; INTRAVENOUS; SUBCUTANEOUS at 23:57

## 2023-01-01 RX ADMIN — ACETYLCYSTEINE 2 ML: 100 SOLUTION ORAL; RESPIRATORY (INHALATION) at 20:56

## 2023-01-01 RX ADMIN — HEPARIN SODIUM 5000 UNITS: 5000 INJECTION, SOLUTION INTRAVENOUS; SUBCUTANEOUS at 08:44

## 2023-01-01 RX ADMIN — ASPIRIN 81 MG 81 MG: 81 TABLET ORAL at 07:54

## 2023-01-01 RX ADMIN — LABETALOL HYDROCHLORIDE 10 MG: 5 INJECTION, SOLUTION INTRAVENOUS at 15:45

## 2023-01-01 RX ADMIN — ACETAMINOPHEN 650 MG: 325 TABLET ORAL at 12:48

## 2023-01-01 RX ADMIN — ATORVASTATIN CALCIUM 40 MG: 40 TABLET, FILM COATED ORAL at 10:27

## 2023-01-01 RX ADMIN — VANCOMYCIN HYDROCHLORIDE 1250 MG: 10 INJECTION, POWDER, LYOPHILIZED, FOR SOLUTION INTRAVENOUS at 14:46

## 2023-01-01 RX ADMIN — PIPERACILLIN SODIUM AND TAZOBACTAM SODIUM 4.5 G: 4; .5 INJECTION, POWDER, LYOPHILIZED, FOR SOLUTION INTRAVENOUS at 15:11

## 2023-01-01 RX ADMIN — METHOCARBAMOL 500 MG: 500 TABLET ORAL at 00:00

## 2023-01-01 RX ADMIN — SULFAMETHOXAZOLE AND TRIMETHOPRIM 2 TABLET: 800; 160 TABLET ORAL at 18:56

## 2023-01-01 RX ADMIN — OXYCODONE HYDROCHLORIDE 5 MG: 5 TABLET ORAL at 15:18

## 2023-01-01 RX ADMIN — FUROSEMIDE 40 MG: 10 INJECTION, SOLUTION INTRAVENOUS at 10:11

## 2023-01-01 RX ADMIN — PANTOPRAZOLE SODIUM 40 MG: 40 TABLET, DELAYED RELEASE ORAL at 08:26

## 2023-01-01 RX ADMIN — CARVEDILOL 25 MG: 25 TABLET, FILM COATED ORAL at 18:01

## 2023-01-01 RX ADMIN — FENTANYL CITRATE 100 MCG: 50 INJECTION, SOLUTION INTRAMUSCULAR; INTRAVENOUS at 17:19

## 2023-01-01 RX ADMIN — Medication 4 UNITS/HR: at 01:05

## 2023-01-01 RX ADMIN — HYDROMORPHONE HYDROCHLORIDE 0.2 MG: 0.2 INJECTION, SOLUTION INTRAMUSCULAR; INTRAVENOUS; SUBCUTANEOUS at 14:52

## 2023-01-01 RX ADMIN — HYDRALAZINE HYDROCHLORIDE 10 MG: 20 INJECTION INTRAMUSCULAR; INTRAVENOUS at 00:44

## 2023-01-01 RX ADMIN — Medication 40 MG: at 09:13

## 2023-01-01 RX ADMIN — VASOPRESSIN 3 UNITS: 20 INJECTION, SOLUTION INTRAMUSCULAR; SUBCUTANEOUS at 13:38

## 2023-01-01 RX ADMIN — ACETAMINOPHEN 650 MG: 325 TABLET ORAL at 02:05

## 2023-01-01 RX ADMIN — METOPROLOL TARTRATE 5 MG: 5 INJECTION INTRAVENOUS at 15:07

## 2023-01-01 RX ADMIN — ACETAMINOPHEN 975 MG: 325 TABLET ORAL at 02:22

## 2023-01-01 RX ADMIN — Medication 2.5 UNITS/HR: at 22:57

## 2023-01-01 RX ADMIN — ACETAMINOPHEN 650 MG: 325 TABLET ORAL at 16:51

## 2023-01-01 RX ADMIN — ALBUTEROL SULFATE 2.5 MG: 2.5 SOLUTION RESPIRATORY (INHALATION) at 07:57

## 2023-01-01 RX ADMIN — PIPERACILLIN SODIUM AND TAZOBACTAM SODIUM 4.5 G: 4; .5 INJECTION, POWDER, LYOPHILIZED, FOR SOLUTION INTRAVENOUS at 08:10

## 2023-01-01 RX ADMIN — MIRTAZAPINE 15 MG: 7.5 TABLET, FILM COATED ORAL at 21:32

## 2023-01-01 RX ADMIN — HEPARIN SODIUM 700 UNITS/HR: 10000 INJECTION, SOLUTION INTRAVENOUS at 05:33

## 2023-01-01 RX ADMIN — OXYCODONE HYDROCHLORIDE 2.5 MG: 5 TABLET ORAL at 21:32

## 2023-01-01 RX ADMIN — OXYCODONE HYDROCHLORIDE 5 MG: 5 TABLET ORAL at 20:39

## 2023-01-01 RX ADMIN — POTASSIUM CHLORIDE 20 MEQ: 1.5 POWDER, FOR SOLUTION ORAL at 12:20

## 2023-01-01 RX ADMIN — ACETAMINOPHEN 650 MG: 325 TABLET ORAL at 10:27

## 2023-01-01 RX ADMIN — ACETAMINOPHEN 650 MG: 325 TABLET ORAL at 04:41

## 2023-01-01 RX ADMIN — MUPIROCIN 0.5 G: 20 OINTMENT TOPICAL at 07:55

## 2023-01-01 RX ADMIN — OXYCODONE HYDROCHLORIDE 5 MG: 5 TABLET ORAL at 23:27

## 2023-01-01 RX ADMIN — ANGIOTENSIN II 20 NG/KG/MIN: 2.5 INJECTION INTRAVENOUS at 10:56

## 2023-01-01 RX ADMIN — HYDROMORPHONE HYDROCHLORIDE 0.2 MG: 0.2 INJECTION, SOLUTION INTRAMUSCULAR; INTRAVENOUS; SUBCUTANEOUS at 08:08

## 2023-01-01 RX ADMIN — ASPIRIN 81 MG 81 MG: 81 TABLET ORAL at 08:39

## 2023-01-01 RX ADMIN — SODIUM CHLORIDE 2 UNITS/HR: 0.9 INJECTION, SOLUTION INTRAVENOUS at 13:46

## 2023-01-01 RX ADMIN — PIPERACILLIN AND TAZOBACTAM 3.38 G: 3; .375 INJECTION, POWDER, LYOPHILIZED, FOR SOLUTION INTRAVENOUS at 15:02

## 2023-01-01 RX ADMIN — SUGAMMADEX 200 MG: 100 INJECTION, SOLUTION INTRAVENOUS at 18:10

## 2023-01-01 RX ADMIN — POTASSIUM PHOSPHATE, MONOBASIC POTASSIUM PHOSPHATE, DIBASIC 15 MMOL: 224; 236 INJECTION, SOLUTION, CONCENTRATE INTRAVENOUS at 10:17

## 2023-01-01 RX ADMIN — SODIUM CHLORIDE 2 G: 1 TABLET ORAL at 08:24

## 2023-01-01 RX ADMIN — Medication 40 MG: at 07:57

## 2023-01-01 RX ADMIN — Medication 2 PACKET: at 12:05

## 2023-01-01 RX ADMIN — ONDANSETRON 4 MG: 2 INJECTION INTRAMUSCULAR; INTRAVENOUS at 07:33

## 2023-01-01 RX ADMIN — NOREPINEPHRINE BITARTRATE 0.03 MCG/KG/MIN: 1 INJECTION, SOLUTION, CONCENTRATE INTRAVENOUS at 19:21

## 2023-01-01 RX ADMIN — NICARDIPINE HYDROCHLORIDE 2.5 MG/HR: 0.2 INJECTION, SOLUTION INTRAVENOUS at 03:40

## 2023-01-01 RX ADMIN — POTASSIUM CHLORIDE 40 MEQ: 1.5 POWDER, FOR SOLUTION ORAL at 13:03

## 2023-01-01 RX ADMIN — HEPARIN SODIUM 5000 UNITS: 5000 INJECTION, SOLUTION INTRAVENOUS; SUBCUTANEOUS at 00:14

## 2023-01-01 RX ADMIN — METOPROLOL TARTRATE 5 MG: 5 INJECTION INTRAVENOUS at 03:37

## 2023-01-01 RX ADMIN — FENTANYL CITRATE 200 MCG: 50 INJECTION, SOLUTION INTRAMUSCULAR; INTRAVENOUS at 09:32

## 2023-01-01 RX ADMIN — SENNOSIDES AND DOCUSATE SODIUM 1 TABLET: 50; 8.6 TABLET ORAL at 20:37

## 2023-01-01 RX ADMIN — HEPARIN SODIUM 5000 UNITS: 5000 INJECTION, SOLUTION INTRAVENOUS; SUBCUTANEOUS at 15:05

## 2023-01-01 RX ADMIN — CARVEDILOL 37.5 MG: 25 TABLET, FILM COATED ORAL at 17:11

## 2023-01-01 RX ADMIN — PROTHROMBIN, COAGULATION FACTOR VII HUMAN, COAGULATION FACTOR IX HUMAN, COAGULATION FACTOR X HUMAN, PROTEIN C, PROTEIN S HUMAN, AND WATER 1126 UNITS: KIT at 16:06

## 2023-01-01 RX ADMIN — MIRTAZAPINE 15 MG: 7.5 TABLET, FILM COATED ORAL at 21:49

## 2023-01-01 RX ADMIN — ACETAMINOPHEN 975 MG: 325 TABLET ORAL at 01:07

## 2023-01-01 RX ADMIN — POTASSIUM CHLORIDE 40 MEQ: 1.5 POWDER, FOR SOLUTION ORAL at 05:14

## 2023-01-01 RX ADMIN — HYDROMORPHONE HYDROCHLORIDE 0.2 MG: 0.2 INJECTION, SOLUTION INTRAMUSCULAR; INTRAVENOUS; SUBCUTANEOUS at 04:42

## 2023-01-01 RX ADMIN — POTASSIUM CHLORIDE 20 MEQ: 1.5 POWDER, FOR SOLUTION ORAL at 06:09

## 2023-01-01 RX ADMIN — HEPARIN SODIUM 5000 UNITS: 5000 INJECTION, SOLUTION INTRAVENOUS; SUBCUTANEOUS at 16:36

## 2023-01-01 RX ADMIN — ACETAMINOPHEN 650 MG: 325 TABLET ORAL at 09:49

## 2023-01-01 RX ADMIN — Medication 4 UNITS/HR: at 09:43

## 2023-01-01 RX ADMIN — MULTIVITAMIN 15 ML: LIQUID ORAL at 07:48

## 2023-01-01 RX ADMIN — HEPARIN SODIUM 5000 UNITS: 5000 INJECTION, SOLUTION INTRAVENOUS; SUBCUTANEOUS at 04:11

## 2023-01-01 RX ADMIN — ACETAMINOPHEN 650 MG: 325 TABLET ORAL at 01:42

## 2023-01-01 RX ADMIN — SODIUM CHLORIDE, POTASSIUM CHLORIDE, SODIUM LACTATE AND CALCIUM CHLORIDE 500 ML: 600; 310; 30; 20 INJECTION, SOLUTION INTRAVENOUS at 21:32

## 2023-01-01 RX ADMIN — Medication 0.06 MCG/KG/MIN: at 09:48

## 2023-01-01 RX ADMIN — Medication 50 MG: at 16:49

## 2023-01-01 RX ADMIN — Medication 10 MG: at 21:33

## 2023-01-01 RX ADMIN — ACETAMINOPHEN 650 MG: 325 TABLET ORAL at 06:50

## 2023-01-01 RX ADMIN — CEFTAZIDIME 2 G: 2 INJECTION, POWDER, FOR SOLUTION INTRAVENOUS at 14:41

## 2023-01-01 RX ADMIN — ONDANSETRON 4 MG: 2 INJECTION INTRAMUSCULAR; INTRAVENOUS at 07:48

## 2023-01-01 RX ADMIN — HEPARIN SODIUM 25000 UNITS: 1000 INJECTION INTRAVENOUS; SUBCUTANEOUS at 13:11

## 2023-01-01 RX ADMIN — SODIUM CHLORIDE, POTASSIUM CHLORIDE, SODIUM LACTATE AND CALCIUM CHLORIDE 500 ML: 600; 310; 30; 20 INJECTION, SOLUTION INTRAVENOUS at 08:53

## 2023-01-01 RX ADMIN — CALCIUM GLUCONATE 1 G: 20 INJECTION, SOLUTION INTRAVENOUS at 04:55

## 2023-01-01 RX ADMIN — CEFTRIAXONE SODIUM 2 G: 2 INJECTION, POWDER, FOR SOLUTION INTRAMUSCULAR; INTRAVENOUS at 13:03

## 2023-01-01 RX ADMIN — MAGNESIUM OXIDE TAB 400 MG (240 MG ELEMENTAL MG) 400 MG: 400 (240 MG) TAB at 07:49

## 2023-01-01 RX ADMIN — DEXMEDETOMIDINE HYDROCHLORIDE 0.3 MCG/KG/HR: 4 INJECTION, SOLUTION INTRAVENOUS at 07:57

## 2023-01-01 RX ADMIN — OXYCODONE HYDROCHLORIDE 10 MG: 10 TABLET ORAL at 23:56

## 2023-01-01 RX ADMIN — METHOCARBAMOL 500 MG: 500 TABLET ORAL at 20:11

## 2023-01-01 RX ADMIN — HEPARIN SODIUM 500 UNITS/HR: 10000 INJECTION, SOLUTION INTRAVENOUS at 18:09

## 2023-01-01 RX ADMIN — POLYETHYLENE GLYCOL 3350 17 G: 17 POWDER, FOR SOLUTION ORAL at 08:11

## 2023-01-01 RX ADMIN — FENTANYL CITRATE 100 MCG: 50 INJECTION, SOLUTION INTRAMUSCULAR; INTRAVENOUS at 16:22

## 2023-01-01 RX ADMIN — FENTANYL CITRATE 100 MCG: 50 INJECTION, SOLUTION INTRAMUSCULAR; INTRAVENOUS at 17:16

## 2023-01-01 RX ADMIN — HYDROMORPHONE HYDROCHLORIDE 0.2 MG: 0.2 INJECTION, SOLUTION INTRAMUSCULAR; INTRAVENOUS; SUBCUTANEOUS at 05:14

## 2023-01-01 RX ADMIN — OXYCODONE HYDROCHLORIDE 5 MG: 5 TABLET ORAL at 00:13

## 2023-01-01 RX ADMIN — SODIUM CHLORIDE 2 G: 1 TABLET ORAL at 11:48

## 2023-01-01 RX ADMIN — ATORVASTATIN CALCIUM 40 MG: 40 TABLET, FILM COATED ORAL at 08:26

## 2023-01-01 RX ADMIN — MIRTAZAPINE 15 MG: 7.5 TABLET, FILM COATED ORAL at 21:41

## 2023-01-01 RX ADMIN — MIDAZOLAM HYDROCHLORIDE 2 MG: 1 INJECTION, SOLUTION INTRAMUSCULAR; INTRAVENOUS at 09:06

## 2023-01-01 RX ADMIN — HYDROCORTISONE SODIUM SUCCINATE 50 MG: 100 INJECTION, POWDER, FOR SOLUTION INTRAMUSCULAR; INTRAVENOUS at 16:41

## 2023-01-01 RX ADMIN — OXYCODONE HYDROCHLORIDE 10 MG: 10 TABLET ORAL at 04:35

## 2023-01-01 RX ADMIN — INSULIN ASPART 1 UNITS: 100 INJECTION, SOLUTION INTRAVENOUS; SUBCUTANEOUS at 20:16

## 2023-01-01 RX ADMIN — ATORVASTATIN CALCIUM 40 MG: 40 TABLET, FILM COATED ORAL at 08:11

## 2023-01-01 RX ADMIN — Medication 40 MG: at 08:16

## 2023-01-01 RX ADMIN — HEPARIN SODIUM 5000 UNITS: 5000 INJECTION, SOLUTION INTRAVENOUS; SUBCUTANEOUS at 07:44

## 2023-01-01 RX ADMIN — ACETAMINOPHEN 650 MG: 325 TABLET ORAL at 17:00

## 2023-01-01 RX ADMIN — ASPIRIN 81 MG 81 MG: 81 TABLET ORAL at 07:21

## 2023-01-01 RX ADMIN — OXYCODONE HYDROCHLORIDE 5 MG: 5 TABLET ORAL at 20:23

## 2023-01-01 RX ADMIN — IOPAMIDOL 75 ML: 755 INJECTION, SOLUTION INTRAVENOUS at 01:00

## 2023-01-01 RX ADMIN — ASPIRIN 81 MG 81 MG: 81 TABLET ORAL at 07:44

## 2023-01-01 RX ADMIN — DEXMEDETOMIDINE HYDROCHLORIDE 0.2 MCG/KG/HR: 4 INJECTION, SOLUTION INTRAVENOUS at 09:48

## 2023-01-01 RX ADMIN — SENNOSIDES AND DOCUSATE SODIUM 1 TABLET: 50; 8.6 TABLET ORAL at 19:42

## 2023-01-01 RX ADMIN — SODIUM CHLORIDE, POTASSIUM CHLORIDE, SODIUM LACTATE AND CALCIUM CHLORIDE 1000 ML: 600; 310; 30; 20 INJECTION, SOLUTION INTRAVENOUS at 13:53

## 2023-01-01 RX ADMIN — ACETAMINOPHEN 650 MG: 325 TABLET ORAL at 18:56

## 2023-01-01 RX ADMIN — ACETAMINOPHEN 650 MG: 325 TABLET ORAL at 09:13

## 2023-01-01 RX ADMIN — ACETAMINOPHEN 650 MG: 325 TABLET ORAL at 05:08

## 2023-01-01 RX ADMIN — CEFAZOLIN 1 G: 1 INJECTION, POWDER, FOR SOLUTION INTRAMUSCULAR; INTRAVENOUS at 10:21

## 2023-01-01 RX ADMIN — OXYCODONE HYDROCHLORIDE 5 MG: 5 TABLET ORAL at 11:30

## 2023-01-01 RX ADMIN — POTASSIUM CHLORIDE 40 MEQ: 1.5 POWDER, FOR SOLUTION ORAL at 04:55

## 2023-01-01 RX ADMIN — Medication 4 UNITS/HR: at 13:24

## 2023-01-01 RX ADMIN — ERTAPENEM SODIUM 1 G: 1 INJECTION, POWDER, LYOPHILIZED, FOR SOLUTION INTRAMUSCULAR; INTRAVENOUS at 11:25

## 2023-01-01 RX ADMIN — ACETAMINOPHEN 650 MG: 325 TABLET ORAL at 16:04

## 2023-01-01 RX ADMIN — HUMAN INSULIN 7.11 UNITS: 100 INJECTION, SOLUTION SUBCUTANEOUS at 08:31

## 2023-01-01 RX ADMIN — SENNOSIDES AND DOCUSATE SODIUM 1 TABLET: 50; 8.6 TABLET ORAL at 10:11

## 2023-01-01 RX ADMIN — PROPOFOL 20 MCG/KG/MIN: 10 INJECTION, EMULSION INTRAVENOUS at 12:25

## 2023-01-01 RX ADMIN — CALCIUM GLUCONATE 1 G: 20 INJECTION, SOLUTION INTRAVENOUS at 05:04

## 2023-01-01 RX ADMIN — ACETAMINOPHEN 650 MG: 325 TABLET ORAL at 22:13

## 2023-01-01 RX ADMIN — ESMOLOL HYDROCHLORIDE 200 MCG/KG/MIN: 20 INJECTION INTRAVENOUS at 07:11

## 2023-01-01 RX ADMIN — ACETAMINOPHEN 650 MG: 325 TABLET ORAL at 09:02

## 2023-01-01 RX ADMIN — Medication 12 MG: at 21:40

## 2023-01-01 RX ADMIN — HYDROCORTISONE SODIUM SUCCINATE 50 MG: 100 INJECTION, POWDER, FOR SOLUTION INTRAMUSCULAR; INTRAVENOUS at 21:50

## 2023-01-01 RX ADMIN — MULTIVITAMIN 15 ML: LIQUID ORAL at 07:54

## 2023-01-01 RX ADMIN — SULFAMETHOXAZOLE AND TRIMETHOPRIM 320 MG: 80; 16 INJECTION, SOLUTION, CONCENTRATE INTRAVENOUS at 05:14

## 2023-01-01 RX ADMIN — MIRTAZAPINE 15 MG: 7.5 TABLET, FILM COATED ORAL at 22:14

## 2023-01-01 RX ADMIN — HEPARIN SODIUM 5000 UNITS: 5000 INJECTION, SOLUTION INTRAVENOUS; SUBCUTANEOUS at 08:16

## 2023-01-01 RX ADMIN — LIDOCAINE HYDROCHLORIDE 3 ML: 10 INJECTION, SOLUTION EPIDURAL; INFILTRATION; INTRACAUDAL; PERINEURAL at 11:49

## 2023-01-01 RX ADMIN — OXYCODONE HYDROCHLORIDE 5 MG: 5 TABLET ORAL at 21:13

## 2023-01-01 RX ADMIN — FUROSEMIDE 20 MG: 10 INJECTION, SOLUTION INTRAVENOUS at 17:20

## 2023-01-01 RX ADMIN — SULFAMETHOXAZOLE AND TRIMETHOPRIM 2 TABLET: 800; 160 TABLET ORAL at 01:37

## 2023-01-01 RX ADMIN — OXYCODONE HYDROCHLORIDE 5 MG: 5 TABLET ORAL at 02:20

## 2023-01-01 RX ADMIN — HEPARIN SODIUM 5000 UNITS: 5000 INJECTION, SOLUTION INTRAVENOUS; SUBCUTANEOUS at 08:11

## 2023-01-01 RX ADMIN — SODIUM CHLORIDE 2 G: 1 TABLET ORAL at 12:01

## 2023-01-01 RX ADMIN — PROPOFOL 40 MCG/KG/MIN: 10 INJECTION, EMULSION INTRAVENOUS at 17:21

## 2023-01-01 RX ADMIN — ACETAMINOPHEN 650 MG: 325 TABLET ORAL at 00:44

## 2023-01-01 RX ADMIN — Medication 40 MG: at 08:44

## 2023-01-01 RX ADMIN — PANTOPRAZOLE SODIUM 40 MG: 40 TABLET, DELAYED RELEASE ORAL at 07:49

## 2023-01-01 RX ADMIN — INSULIN ASPART 2 UNITS: 100 INJECTION, SOLUTION INTRAVENOUS; SUBCUTANEOUS at 08:19

## 2023-01-01 RX ADMIN — SODIUM CHLORIDE, POTASSIUM CHLORIDE, SODIUM LACTATE AND CALCIUM CHLORIDE 250 ML: 600; 310; 30; 20 INJECTION, SOLUTION INTRAVENOUS at 12:49

## 2023-01-01 RX ADMIN — SODIUM ZIRCONIUM CYCLOSILICATE 10 G: 10 POWDER, FOR SUSPENSION ORAL at 12:02

## 2023-01-01 RX ADMIN — ASPIRIN 81 MG 81 MG: 81 TABLET ORAL at 08:08

## 2023-01-01 RX ADMIN — LIDOCAINE PATCH 4% 1 PATCH: 40 PATCH TOPICAL at 07:51

## 2023-01-01 RX ADMIN — FUROSEMIDE 40 MG: 10 INJECTION, SOLUTION INTRAVENOUS at 07:54

## 2023-01-01 RX ADMIN — HYDROMORPHONE HYDROCHLORIDE 0.1 MG: 0.2 INJECTION, SOLUTION INTRAMUSCULAR; INTRAVENOUS; SUBCUTANEOUS at 21:16

## 2023-01-01 RX ADMIN — Medication 50 MCG/HR: at 20:22

## 2023-01-01 RX ADMIN — METHOCARBAMOL 500 MG: 500 TABLET ORAL at 07:48

## 2023-01-01 RX ADMIN — METOPROLOL TARTRATE 5 MG: 5 INJECTION INTRAVENOUS at 21:32

## 2023-01-01 RX ADMIN — Medication 2 PACKET: at 06:34

## 2023-01-01 RX ADMIN — SULFAMETHOXAZOLE AND TRIMETHOPRIM 320 MG: 80; 16 INJECTION, SOLUTION, CONCENTRATE INTRAVENOUS at 13:50

## 2023-01-01 RX ADMIN — HEPARIN SODIUM 5000 UNITS: 5000 INJECTION, SOLUTION INTRAVENOUS; SUBCUTANEOUS at 18:00

## 2023-01-01 RX ADMIN — FIBRINOGEN (HUMAN) 1.1 G: KIT INTRAVENOUS at 16:27

## 2023-01-01 RX ADMIN — HYDRALAZINE HYDROCHLORIDE 10 MG: 20 INJECTION INTRAMUSCULAR; INTRAVENOUS at 09:36

## 2023-01-01 RX ADMIN — FIBRINOGEN (HUMAN) 1.1 G: KIT INTRAVENOUS at 16:06

## 2023-01-01 RX ADMIN — SULFAMETHOXAZOLE AND TRIMETHOPRIM 320 MG: 80; 16 INJECTION, SOLUTION, CONCENTRATE INTRAVENOUS at 22:20

## 2023-01-01 RX ADMIN — PANTOPRAZOLE SODIUM 40 MG: 40 TABLET, DELAYED RELEASE ORAL at 10:28

## 2023-01-01 RX ADMIN — CARVEDILOL 25 MG: 25 TABLET, FILM COATED ORAL at 05:08

## 2023-01-01 RX ADMIN — ACETAMINOPHEN 650 MG: 325 TABLET ORAL at 07:49

## 2023-01-01 RX ADMIN — OXYCODONE HYDROCHLORIDE 5 MG: 5 TABLET ORAL at 10:44

## 2023-01-01 RX ADMIN — SULFAMETHOXAZOLE AND TRIMETHOPRIM 2 TABLET: 800; 160 TABLET ORAL at 10:08

## 2023-01-01 RX ADMIN — SODIUM CHLORIDE 2 G: 1 TABLET ORAL at 08:39

## 2023-01-01 RX ADMIN — MUPIROCIN 0.5 G: 20 OINTMENT TOPICAL at 20:31

## 2023-01-01 RX ADMIN — OXYCODONE HYDROCHLORIDE 5 MG: 5 TABLET ORAL at 01:37

## 2023-01-01 RX ADMIN — SODIUM CHLORIDE 500 ML: 9 INJECTION, SOLUTION INTRAVENOUS at 12:37

## 2023-01-01 RX ADMIN — HYDROCORTISONE SODIUM SUCCINATE 50 MG: 100 INJECTION, POWDER, FOR SOLUTION INTRAMUSCULAR; INTRAVENOUS at 04:34

## 2023-01-01 RX ADMIN — LABETALOL HYDROCHLORIDE 10 MG: 5 INJECTION, SOLUTION INTRAVENOUS at 06:09

## 2023-01-01 RX ADMIN — CARVEDILOL 12.5 MG: 6.25 TABLET, FILM COATED ORAL at 18:13

## 2023-01-01 RX ADMIN — PIPERACILLIN AND TAZOBACTAM 3.38 G: 3; .375 INJECTION, POWDER, LYOPHILIZED, FOR SOLUTION INTRAVENOUS at 20:24

## 2023-01-01 RX ADMIN — HYDROCORTISONE SODIUM SUCCINATE 50 MG: 100 INJECTION, POWDER, FOR SOLUTION INTRAMUSCULAR; INTRAVENOUS at 22:19

## 2023-01-01 RX ADMIN — HEPARIN SODIUM 5000 UNITS: 5000 INJECTION, SOLUTION INTRAVENOUS; SUBCUTANEOUS at 07:48

## 2023-01-01 RX ADMIN — DEXMEDETOMIDINE HYDROCHLORIDE 0.2 MCG/KG/HR: 4 INJECTION, SOLUTION INTRAVENOUS at 10:12

## 2023-01-01 RX ADMIN — MUPIROCIN 0.5 G: 20 OINTMENT TOPICAL at 20:25

## 2023-01-01 RX ADMIN — HEPARIN SODIUM 5000 UNITS: 5000 INJECTION, SOLUTION INTRAVENOUS; SUBCUTANEOUS at 16:13

## 2023-01-01 RX ADMIN — PROTHROMBIN, COAGULATION FACTOR VII HUMAN, COAGULATION FACTOR IX HUMAN, COAGULATION FACTOR X HUMAN, PROTEIN C, PROTEIN S HUMAN, AND WATER 548 UNITS: KIT at 16:27

## 2023-01-01 RX ADMIN — SODIUM CHLORIDE 2 G: 1 TABLET ORAL at 18:25

## 2023-01-01 RX ADMIN — MUPIROCIN 0.5 G: 20 OINTMENT TOPICAL at 07:22

## 2023-01-01 RX ADMIN — Medication 40 MG: at 07:22

## 2023-01-01 RX ADMIN — FUROSEMIDE 40 MG: 10 INJECTION, SOLUTION INTRAVENOUS at 13:32

## 2023-01-01 RX ADMIN — MULTIVITAMIN 15 ML: LIQUID ORAL at 08:39

## 2023-01-01 RX ADMIN — SULFAMETHOXAZOLE AND TRIMETHOPRIM 320 MG: 80; 16 INJECTION, SOLUTION, CONCENTRATE INTRAVENOUS at 05:29

## 2023-01-01 RX ADMIN — SODIUM CHLORIDE, SODIUM GLUCONATE, SODIUM ACETATE, POTASSIUM CHLORIDE AND MAGNESIUM CHLORIDE: 526; 502; 368; 37; 30 INJECTION, SOLUTION INTRAVENOUS at 08:25

## 2023-01-01 RX ADMIN — MULTIVITAMIN 15 ML: LIQUID ORAL at 17:20

## 2023-01-01 RX ADMIN — OXYCODONE HYDROCHLORIDE 5 MG: 5 TABLET ORAL at 07:55

## 2023-01-01 RX ADMIN — ACETAMINOPHEN 650 MG: 325 TABLET ORAL at 09:57

## 2023-01-01 RX ADMIN — ACETAMINOPHEN 650 MG: 325 TABLET ORAL at 09:47

## 2023-01-01 RX ADMIN — INSULIN ASPART 1 UNITS: 100 INJECTION, SOLUTION INTRAVENOUS; SUBCUTANEOUS at 03:52

## 2023-01-01 RX ADMIN — HYDROMORPHONE HYDROCHLORIDE 0.2 MG: 0.2 INJECTION, SOLUTION INTRAMUSCULAR; INTRAVENOUS; SUBCUTANEOUS at 01:15

## 2023-01-01 RX ADMIN — SULFAMETHOXAZOLE AND TRIMETHOPRIM 2 TABLET: 800; 160 TABLET ORAL at 17:30

## 2023-01-01 RX ADMIN — PIPERACILLIN SODIUM AND TAZOBACTAM SODIUM 4.5 G: 4; .5 INJECTION, POWDER, LYOPHILIZED, FOR SOLUTION INTRAVENOUS at 21:22

## 2023-01-01 RX ADMIN — ASPIRIN 81 MG 81 MG: 81 TABLET ORAL at 08:44

## 2023-01-01 RX ADMIN — DEXMEDETOMIDINE HYDROCHLORIDE 0.2 MCG/KG/HR: 4 INJECTION, SOLUTION INTRAVENOUS at 06:18

## 2023-01-01 RX ADMIN — FENTANYL CITRATE 200 MCG: 50 INJECTION, SOLUTION INTRAMUSCULAR; INTRAVENOUS at 13:16

## 2023-01-01 RX ADMIN — MAGNESIUM SULFATE IN WATER 2 G: 40 INJECTION, SOLUTION INTRAVENOUS at 09:14

## 2023-01-01 RX ADMIN — HYDRALAZINE HYDROCHLORIDE 5 MG: 20 INJECTION INTRAMUSCULAR; INTRAVENOUS at 09:16

## 2023-01-01 RX ADMIN — CARVEDILOL 12.5 MG: 6.25 TABLET, FILM COATED ORAL at 09:29

## 2023-01-01 RX ADMIN — HYDRALAZINE HYDROCHLORIDE 5 MG: 20 INJECTION INTRAMUSCULAR; INTRAVENOUS at 17:00

## 2023-01-01 RX ADMIN — ACETAMINOPHEN 650 MG: 325 TABLET ORAL at 15:10

## 2023-01-01 RX ADMIN — ACETAMINOPHEN 650 MG: 325 TABLET ORAL at 22:34

## 2023-01-01 RX ADMIN — OXYCODONE HYDROCHLORIDE 2.5 MG: 5 TABLET ORAL at 07:49

## 2023-01-01 RX ADMIN — Medication 2 PACKET: at 08:11

## 2023-01-01 RX ADMIN — HYDROCORTISONE SODIUM SUCCINATE 50 MG: 100 INJECTION, POWDER, FOR SOLUTION INTRAMUSCULAR; INTRAVENOUS at 10:23

## 2023-01-01 RX ADMIN — SENNOSIDES AND DOCUSATE SODIUM 1 TABLET: 50; 8.6 TABLET ORAL at 08:11

## 2023-01-01 RX ADMIN — SULFAMETHOXAZOLE AND TRIMETHOPRIM 2 TABLET: 800; 160 TABLET ORAL at 01:32

## 2023-01-01 RX ADMIN — POTASSIUM CHLORIDE 20 MEQ: 1.5 POWDER, FOR SOLUTION ORAL at 06:33

## 2023-01-01 RX ADMIN — OXYCODONE HYDROCHLORIDE 10 MG: 10 TABLET ORAL at 18:57

## 2023-01-01 RX ADMIN — GABAPENTIN 100 MG: 100 CAPSULE ORAL at 19:42

## 2023-01-01 RX ADMIN — AMLODIPINE BESYLATE 5 MG: 5 TABLET ORAL at 10:29

## 2023-01-01 RX ADMIN — ESMOLOL HYDROCHLORIDE 300 MCG/KG/MIN: 20 INJECTION INTRAVENOUS at 05:01

## 2023-01-01 RX ADMIN — OXYCODONE HYDROCHLORIDE 10 MG: 10 TABLET ORAL at 07:49

## 2023-01-01 RX ADMIN — HYDROCORTISONE SODIUM SUCCINATE 50 MG: 100 INJECTION, POWDER, FOR SOLUTION INTRAMUSCULAR; INTRAVENOUS at 22:47

## 2023-01-01 RX ADMIN — HEPARIN SODIUM 5000 UNITS: 5000 INJECTION, SOLUTION INTRAVENOUS; SUBCUTANEOUS at 23:53

## 2023-01-01 RX ADMIN — ACETAMINOPHEN 650 MG: 325 TABLET ORAL at 02:37

## 2023-01-01 RX ADMIN — ALBUTEROL SULFATE 2.5 MG: 2.5 SOLUTION RESPIRATORY (INHALATION) at 07:50

## 2023-01-01 RX ADMIN — SODIUM CHLORIDE, POTASSIUM CHLORIDE, SODIUM LACTATE AND CALCIUM CHLORIDE 250 ML: 600; 310; 30; 20 INJECTION, SOLUTION INTRAVENOUS at 09:13

## 2023-01-01 RX ADMIN — FENTANYL CITRATE 50 MCG: 50 INJECTION, SOLUTION INTRAMUSCULAR; INTRAVENOUS at 01:56

## 2023-01-01 RX ADMIN — ACETAMINOPHEN 650 MG: 325 TABLET ORAL at 01:32

## 2023-01-01 RX ADMIN — ROCURONIUM BROMIDE 70 MG: 10 INJECTION INTRAVENOUS at 21:40

## 2023-01-01 RX ADMIN — HUMAN INSULIN 3 UNITS/HR: 100 INJECTION, SOLUTION SUBCUTANEOUS at 12:28

## 2023-01-01 RX ADMIN — HEPARIN SODIUM 5000 UNITS: 5000 INJECTION, SOLUTION INTRAVENOUS; SUBCUTANEOUS at 07:47

## 2023-01-01 RX ADMIN — PROPOFOL 10 MCG/KG/MIN: 10 INJECTION, EMULSION INTRAVENOUS at 00:15

## 2023-01-01 RX ADMIN — HEPARIN SODIUM 5000 UNITS: 5000 INJECTION, SOLUTION INTRAVENOUS; SUBCUTANEOUS at 01:10

## 2023-01-01 RX ADMIN — SULFAMETHOXAZOLE AND TRIMETHOPRIM 2 TABLET: 800; 160 TABLET ORAL at 10:31

## 2023-01-01 RX ADMIN — ATORVASTATIN CALCIUM 40 MG: 40 TABLET, FILM COATED ORAL at 07:49

## 2023-01-01 RX ADMIN — HYDROCORTISONE SODIUM SUCCINATE 50 MG: 100 INJECTION, POWDER, FOR SOLUTION INTRAMUSCULAR; INTRAVENOUS at 04:09

## 2023-01-01 RX ADMIN — LIDOCAINE HYDROCHLORIDE 5 ML: 10 INJECTION, SOLUTION EPIDURAL; INFILTRATION; INTRACAUDAL; PERINEURAL at 17:01

## 2023-01-01 RX ADMIN — SODIUM CHLORIDE, POTASSIUM CHLORIDE, SODIUM LACTATE AND CALCIUM CHLORIDE 500 ML: 600; 310; 30; 20 INJECTION, SOLUTION INTRAVENOUS at 23:04

## 2023-01-01 RX ADMIN — METHOCARBAMOL 500 MG: 500 TABLET ORAL at 21:41

## 2023-01-01 RX ADMIN — FENTANYL CITRATE 250 MCG: 50 INJECTION, SOLUTION INTRAMUSCULAR; INTRAVENOUS at 12:55

## 2023-01-01 RX ADMIN — HEPARIN SODIUM 5000 UNITS: 5000 INJECTION, SOLUTION INTRAVENOUS; SUBCUTANEOUS at 19:42

## 2023-01-01 RX ADMIN — SULFAMETHOXAZOLE AND TRIMETHOPRIM 320 MG: 80; 16 INJECTION, SOLUTION, CONCENTRATE INTRAVENOUS at 21:04

## 2023-01-01 RX ADMIN — Medication 2 PACKET: at 05:16

## 2023-01-01 RX ADMIN — OXYCODONE HYDROCHLORIDE 5 MG: 5 TABLET ORAL at 00:43

## 2023-01-01 RX ADMIN — NOREPINEPHRINE BITARTRATE 0.03 MCG/KG/MIN: 1 INJECTION, SOLUTION, CONCENTRATE INTRAVENOUS at 11:59

## 2023-01-01 RX ADMIN — GABAPENTIN 100 MG: 100 CAPSULE ORAL at 22:42

## 2023-01-01 RX ADMIN — HYDROCORTISONE SODIUM SUCCINATE 50 MG: 100 INJECTION, POWDER, FOR SOLUTION INTRAMUSCULAR; INTRAVENOUS at 09:49

## 2023-01-01 RX ADMIN — NOREPINEPHRINE BITARTRATE 0.04 MCG/KG/MIN: 1 INJECTION, SOLUTION, CONCENTRATE INTRAVENOUS at 08:38

## 2023-01-01 RX ADMIN — HEPARIN SODIUM 5000 UNITS: 5000 INJECTION, SOLUTION INTRAVENOUS; SUBCUTANEOUS at 09:33

## 2023-01-01 RX ADMIN — MULTIVITAMIN 15 ML: LIQUID ORAL at 08:10

## 2023-01-01 RX ADMIN — DEXTROSE MONOHYDRATE 25 G: 25 INJECTION, SOLUTION INTRAVENOUS at 08:31

## 2023-01-01 RX ADMIN — HYDROCORTISONE SODIUM SUCCINATE 50 MG: 100 INJECTION, POWDER, FOR SOLUTION INTRAMUSCULAR; INTRAVENOUS at 10:51

## 2023-01-01 RX ADMIN — SODIUM CHLORIDE, POTASSIUM CHLORIDE, SODIUM LACTATE AND CALCIUM CHLORIDE 250 ML: 600; 310; 30; 20 INJECTION, SOLUTION INTRAVENOUS at 15:27

## 2023-01-01 RX ADMIN — OXYCODONE HYDROCHLORIDE 10 MG: 10 TABLET ORAL at 06:34

## 2023-01-01 RX ADMIN — ACETAMINOPHEN 650 MG: 325 TABLET ORAL at 17:50

## 2023-01-01 RX ADMIN — Medication 40 MG: at 07:56

## 2023-01-01 RX ADMIN — HYDRALAZINE HYDROCHLORIDE 20 MG: 20 INJECTION INTRAMUSCULAR; INTRAVENOUS at 12:30

## 2023-01-01 RX ADMIN — CEFAZOLIN 1 G: 1 INJECTION, POWDER, FOR SOLUTION INTRAMUSCULAR; INTRAVENOUS at 03:00

## 2023-01-01 RX ADMIN — ACETAMINOPHEN 975 MG: 325 TABLET ORAL at 17:33

## 2023-01-01 RX ADMIN — OXYCODONE HYDROCHLORIDE 5 MG: 5 TABLET ORAL at 08:39

## 2023-01-01 RX ADMIN — HYDRALAZINE HYDROCHLORIDE 10 MG: 20 INJECTION INTRAMUSCULAR; INTRAVENOUS at 14:32

## 2023-01-01 RX ADMIN — MUPIROCIN 0.5 G: 20 OINTMENT TOPICAL at 19:42

## 2023-01-01 RX ADMIN — HUMAN INSULIN 6 UNITS: 100 INJECTION, SOLUTION SUBCUTANEOUS at 06:09

## 2023-01-01 ASSESSMENT — ACTIVITIES OF DAILY LIVING (ADL)
DOING_ERRANDS_INDEPENDENTLY_DIFFICULTY: YES
ADLS_ACUITY_SCORE: 34
ADLS_ACUITY_SCORE: 34
ADLS_ACUITY_SCORE: 28
ADLS_ACUITY_SCORE: 38
ADLS_ACUITY_SCORE: 40
ADLS_ACUITY_SCORE: 34
ADLS_ACUITY_SCORE: 34
ADLS_ACUITY_SCORE: 32
ADLS_ACUITY_SCORE: 36
ADLS_ACUITY_SCORE: 43
ADLS_ACUITY_SCORE: 34
ADLS_ACUITY_SCORE: 36
ADLS_ACUITY_SCORE: 44
ADLS_ACUITY_SCORE: 38
ADLS_ACUITY_SCORE: 40
ADLS_ACUITY_SCORE: 32
ADLS_ACUITY_SCORE: 34
ADLS_ACUITY_SCORE: 30
ADLS_ACUITY_SCORE: 38
ADLS_ACUITY_SCORE: 32
ADLS_ACUITY_SCORE: 34
ADLS_ACUITY_SCORE: 34
ADLS_ACUITY_SCORE: 38
ADLS_ACUITY_SCORE: 32
ADLS_ACUITY_SCORE: 34
ADLS_ACUITY_SCORE: 40
ADLS_ACUITY_SCORE: 32
ADLS_ACUITY_SCORE: 28
ADLS_ACUITY_SCORE: 34
WALKING_OR_CLIMBING_STAIRS_DIFFICULTY: NO
ADLS_ACUITY_SCORE: 47
ADLS_ACUITY_SCORE: 34
ADLS_ACUITY_SCORE: 32
ADLS_ACUITY_SCORE: 34
ADLS_ACUITY_SCORE: 40
ADLS_ACUITY_SCORE: 34
ADLS_ACUITY_SCORE: 34
ADLS_ACUITY_SCORE: 30
ADLS_ACUITY_SCORE: 34
DEPENDENT_IADLS:: CLEANING;COOKING;LAUNDRY;SHOPPING;TRANSPORTATION
ADLS_ACUITY_SCORE: 34
ADLS_ACUITY_SCORE: 36
ADLS_ACUITY_SCORE: 38
ADLS_ACUITY_SCORE: 30
ADLS_ACUITY_SCORE: 38
ADLS_ACUITY_SCORE: 32
ADLS_ACUITY_SCORE: 38
ADLS_ACUITY_SCORE: 32
ADLS_ACUITY_SCORE: 32
ADLS_ACUITY_SCORE: 34
ADLS_ACUITY_SCORE: 43
ADLS_ACUITY_SCORE: 33
ADLS_ACUITY_SCORE: 36
ADLS_ACUITY_SCORE: 34
ADLS_ACUITY_SCORE: 34
ADLS_ACUITY_SCORE: 36
ADLS_ACUITY_SCORE: 34
ADLS_ACUITY_SCORE: 32
ADLS_ACUITY_SCORE: 34
ADLS_ACUITY_SCORE: 35
CHANGE_IN_FUNCTIONAL_STATUS_SINCE_ONSET_OF_CURRENT_ILLNESS/INJURY: YES
ADLS_ACUITY_SCORE: 34
ADLS_ACUITY_SCORE: 34
ADLS_ACUITY_SCORE: 30
ADLS_ACUITY_SCORE: 38
ADLS_ACUITY_SCORE: 34
ADLS_ACUITY_SCORE: 30
ADLS_ACUITY_SCORE: 36
ADLS_ACUITY_SCORE: 32
ADLS_ACUITY_SCORE: 38
ADLS_ACUITY_SCORE: 36
ADLS_ACUITY_SCORE: 38
ADLS_ACUITY_SCORE: 38
ADLS_ACUITY_SCORE: 36
ADLS_ACUITY_SCORE: 34
ADLS_ACUITY_SCORE: 32
ADLS_ACUITY_SCORE: 40
ADLS_ACUITY_SCORE: 28
ADLS_ACUITY_SCORE: 44
ADLS_ACUITY_SCORE: 34
ADLS_ACUITY_SCORE: 34
ADLS_ACUITY_SCORE: 32
ADLS_ACUITY_SCORE: 32
ADLS_ACUITY_SCORE: 28
ADLS_ACUITY_SCORE: 36
ADLS_ACUITY_SCORE: 34
ADLS_ACUITY_SCORE: 34
ADLS_ACUITY_SCORE: 40
ADLS_ACUITY_SCORE: 34
EQUIPMENT_CURRENTLY_USED_AT_HOME: WALKER, STANDARD
FALL_HISTORY_WITHIN_LAST_SIX_MONTHS: NO
ADLS_ACUITY_SCORE: 34
CONCENTRATING,_REMEMBERING_OR_MAKING_DECISIONS_DIFFICULTY: NO
ADLS_ACUITY_SCORE: 34
ADLS_ACUITY_SCORE: 32
ADLS_ACUITY_SCORE: 32
ADLS_ACUITY_SCORE: 40
ADLS_ACUITY_SCORE: 34
ADLS_ACUITY_SCORE: 32
ADLS_ACUITY_SCORE: 34
ADLS_ACUITY_SCORE: 36
ADLS_ACUITY_SCORE: 28
ADLS_ACUITY_SCORE: 36
ADLS_ACUITY_SCORE: 34
ADLS_ACUITY_SCORE: 40
ADLS_ACUITY_SCORE: 34
ADLS_ACUITY_SCORE: 32
ADLS_ACUITY_SCORE: 30
ADLS_ACUITY_SCORE: 43
ADLS_ACUITY_SCORE: 34
ADLS_ACUITY_SCORE: 43
DIFFICULTY_EATING/SWALLOWING: NO
ADLS_ACUITY_SCORE: 34
ADLS_ACUITY_SCORE: 36
TOILETING_ISSUES: NO
ADLS_ACUITY_SCORE: 36
ADLS_ACUITY_SCORE: 30
ADLS_ACUITY_SCORE: 43
ADLS_ACUITY_SCORE: 30
ADLS_ACUITY_SCORE: 32
ADLS_ACUITY_SCORE: 33
ADLS_ACUITY_SCORE: 34
ADLS_ACUITY_SCORE: 38
ADLS_ACUITY_SCORE: 34
ADLS_ACUITY_SCORE: 38
ADLS_ACUITY_SCORE: 40
ADLS_ACUITY_SCORE: 34
ADLS_ACUITY_SCORE: 32
ADLS_ACUITY_SCORE: 36
ADLS_ACUITY_SCORE: 34
ADLS_ACUITY_SCORE: 34
ADLS_ACUITY_SCORE: 40
ADLS_ACUITY_SCORE: 34
ADLS_ACUITY_SCORE: 45
ADLS_ACUITY_SCORE: 32
ADLS_ACUITY_SCORE: 36
ADLS_ACUITY_SCORE: 33
ADLS_ACUITY_SCORE: 33
ADLS_ACUITY_SCORE: 34
ADLS_ACUITY_SCORE: 36
WEAR_GLASSES_OR_BLIND: NO
ADLS_ACUITY_SCORE: 32
ADLS_ACUITY_SCORE: 34
ADLS_ACUITY_SCORE: 32
ADLS_ACUITY_SCORE: 34
ADLS_ACUITY_SCORE: 40
ADLS_ACUITY_SCORE: 34
ADLS_ACUITY_SCORE: 40
ADLS_ACUITY_SCORE: 34
ADLS_ACUITY_SCORE: 36
ADLS_ACUITY_SCORE: 33
ADLS_ACUITY_SCORE: 40
ADLS_ACUITY_SCORE: 34
ADLS_ACUITY_SCORE: 32
ADLS_ACUITY_SCORE: 34
ADLS_ACUITY_SCORE: 36
ADLS_ACUITY_SCORE: 30
ADLS_ACUITY_SCORE: 34
ADLS_ACUITY_SCORE: 43
ADLS_ACUITY_SCORE: 34
ADLS_ACUITY_SCORE: 34
ADLS_ACUITY_SCORE: 40
ADLS_ACUITY_SCORE: 34
ADLS_ACUITY_SCORE: 33
ADLS_ACUITY_SCORE: 32
ADLS_ACUITY_SCORE: 30
ADLS_ACUITY_SCORE: 34
ADLS_ACUITY_SCORE: 36
ADLS_ACUITY_SCORE: 34
ADLS_ACUITY_SCORE: 36
ADLS_ACUITY_SCORE: 34
ADLS_ACUITY_SCORE: 30
ADLS_ACUITY_SCORE: 36
ADLS_ACUITY_SCORE: 34
ADLS_ACUITY_SCORE: 30
ADLS_ACUITY_SCORE: 34
ADLS_ACUITY_SCORE: 34
ADLS_ACUITY_SCORE: 28
ADLS_ACUITY_SCORE: 38
ADLS_ACUITY_SCORE: 34
ADLS_ACUITY_SCORE: 36
ADLS_ACUITY_SCORE: 30
ADLS_ACUITY_SCORE: 32
ADLS_ACUITY_SCORE: 30
ADLS_ACUITY_SCORE: 34
ADLS_ACUITY_SCORE: 30
ADLS_ACUITY_SCORE: 47
ADLS_ACUITY_SCORE: 40
DRESSING/BATHING_DIFFICULTY: NO
ADLS_ACUITY_SCORE: 35
ADLS_ACUITY_SCORE: 34
ADLS_ACUITY_SCORE: 36
ADLS_ACUITY_SCORE: 34
ADLS_ACUITY_SCORE: 34
ADLS_ACUITY_SCORE: 43
ADLS_ACUITY_SCORE: 36
ADLS_ACUITY_SCORE: 30
ADLS_ACUITY_SCORE: 36
ADLS_ACUITY_SCORE: 30
ADLS_ACUITY_SCORE: 34
ADLS_ACUITY_SCORE: 40
ADLS_ACUITY_SCORE: 34
ADLS_ACUITY_SCORE: 43
ADLS_ACUITY_SCORE: 36
ADLS_ACUITY_SCORE: 43
ADLS_ACUITY_SCORE: 34
ADLS_ACUITY_SCORE: 32
ADLS_ACUITY_SCORE: 47
ADLS_ACUITY_SCORE: 32
ADLS_ACUITY_SCORE: 32

## 2023-01-01 ASSESSMENT — VISUAL ACUITY
OU: BASELINE

## 2023-01-01 ASSESSMENT — COPD QUESTIONNAIRES: COPD: 0

## 2023-01-01 ASSESSMENT — LIFESTYLE VARIABLES: TOBACCO_USE: 0

## 2023-02-04 PROBLEM — I71.00 AORTIC DISSECTION (H): Status: ACTIVE | Noted: 2023-01-01

## 2023-02-04 NOTE — H&P
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 She presented to the OSH in HTN emergency with SBP over 200s. On admission she was completely neurologically intact. Left DP doppler signal present and right PT doppler signal present. Not palpable pedal pulses but feet warm and well perfused. SBP, MAP and HR were controlled on esmolol and nicardipine gtts. Starting oral agents and weaning nicardipine gtt first then esmolol as able.             Recommendations:   - appreciate SICU management  - CTA C/A/P at 6 AM   - type B aortic dissection protocol  - wean nicardipine first then esmolol   - started Norvasc 10 PO and Carvedilol 25 BID   - ECHO  - low dose heparin gtt  - hold coumadin   - regular diet, no mIVF at this time  - 1L LR bolus given MARCOS prior to repeat CTA   - Called daughter for update with no answer.     Discussed with Dr. Patterson who agrees with the assessment and plan.           History of Present Illness:   CC: Type B aortic dissection      History obtained via in person [facility] .    72 yo female with HTN, h/o ascending aortic dissection s/p aortic root reconstruction and aortic valve replacement in 2018, h/o UGI bleed, on coumadin, who presented with chest, back and abdominal pain since 8PM 2/3/23 to an outside ED. At the time she was having nausea and SOB. On arrival to our until she denied SOB. Was having improved, but present pain. Continued to feel weak all over but able to move all extremities and had full strength. Denies headache or vision changes. Denies numbness/tingling. No issues eating/drinking. No current nausea or abdominal pain. Voiding  without issue. Last BM yesterday was normal without blood. She only recalls that she has had chest surgery before and takes a medication for HTN and HLD. Cannot recall further details of her medical history. Never smoker, never drinker.           Past Medical History:     Past Medical History:   Diagnosis Date     Anticoagulated on Coumadin      Aortic aneurysm (H)      Hypertension 12/19/2019             Past Surgical History:     Past Surgical History:   Procedure Laterality Date     CARDIAC SURGERY  04/2018    aortic valve replacement, aortic root reconstruction     [place] ESOPHAGOGASTRODUODENOSCOPY TRANSORAL DIAGNOSTIC N/A 4/6/2021    Procedure: ESOPHAGOGASTRODUODENOSCOPY (EGD) WITH BIOPSY.;  Surgeon: Joey Garcia MD;  Location: [facility];  Service: Gastroenterology             Social History:     Social History     Socioeconomic History     Marital status:      Spouse name: Not on file     Number of children: Not on file     Years of education: Not on file     Highest education level: Not on file   Occupational History     Not on file   Tobacco Use     Smoking status: Never     Smokeless tobacco: Never   Substance and Sexual Activity     Alcohol use: Never     Drug use: Never     Sexual activity: Not on file   Other Topics Concern     Not on file   Social History Narrative     Not on file     Social Determinants of Health     Financial Resource Strain: Not on file   Food Insecurity: Not on file   Transportation Needs: Not on file   Physical Activity: Not on file   Stress: Not on file   Social Connections: Not on file   Intimate Partner Violence: Not on file   Housing Stability: Not on file             Family History:   No family history on file.          Allergies:    No Known Allergies          Medications:   [COMPLETED] fentaNYL (PF) (SUBLIMAZE) injection 50 mcg  [COMPLETED] iopamidol (ISOVUE-370) solution 75 mL    ARTHRITIS PAIN RELIEF, ACETAM, 650 mg CR tablet, [ARTHRITIS PAIN RELIEF, ACETAM,  650 MG CR TABLET] Take 650 mg by mouth every 8 (eight) hours as needed for pain (knee pain).   atorvastatin (LIPITOR) 40 MG tablet, Take 1 tablet (40 mg) by mouth every evening  lisinopril (ZESTRIL) 40 MG tablet, Take 1 tablet (40 mg) by mouth daily  metoprolol succinate ER (TOPROL-XL) 50 MG 24 hr tablet, Take 1 tablet (50 mg) by mouth daily  mirtazapine (REMERON) 15 MG tablet, [MIRTAZAPINE (REMERON) 15 MG TABLET] Take 15 mg by mouth at bedtime.               Review of Systems:      All other review of systems negative, except for what is mentioned above        Physical Exam:   /62 (BP Location: Left arm)   Pulse 73   Temp 98  F (36.7  C) (Oral)   Resp 20   Wt 57.3 kg (126 lb 4.8 oz)   SpO2 92%   BMI 24.67 kg/m    General: alert, oriented, pleasant    HEENT: trachea midline Cns intact, 1 not assessed, pupils ERR  Neuro: A&Ox3, NAD   Pulm/Resp: Clear breath sounds bilaterally without rhonchi, crackles or wheeze,  breathing non-labored, RA   CV: RRR by palpation and monitor, sternotomy scar, well healed   Abdomen: Soft, non-distended, non-tender, no rebound tenderness or guarding, no masses  Incisions/Skin: No noted abrasions, WWP   MSK/Extremities: no peripheral edema, moving all extremities, peripheral pulses intact assessed via doppler, calves equal, extremities well perfused          Data:   All laboratory data reviewed  All imaging studies reviewed by me.

## 2023-02-04 NOTE — ED NOTES
Report given to Yaz Jennings 4A to Ayse STATON  Patient room # 211    Esmolol at max of 300 mcg/kg/min - provider notified.

## 2023-02-04 NOTE — ED NOTES
Patient report given to [facility] and are enroute to [facility]

## 2023-02-04 NOTE — ED TRIAGE NOTES
Pt c/o chest pain, Abdominal pain and SOB that started around 8pm. No c/o N/V. Denies UTI symptoms. No c/o back pain.     Triage Assessment     Row Name 02/03/23 9084       Triage Assessment (Adult)    Airway WDL WDL       Respiratory WDL    Respiratory WDL WDL       Skin Circulation/Temperature WDL    Skin Circulation/Temperature WDL WDL       Cardiac WDL    Cardiac WDL WDL       Peripheral/Neurovascular WDL    Peripheral Neurovascular WDL WDL       Cognitive/Neuro/Behavioral WDL    Cognitive/Neuro/Behavioral WDL WDL

## 2023-02-04 NOTE — LETTER
US CONSULATE     [address] [facility] [address] [facility] [address]  February 14, 2023    RE: Request for Emergency VISA for Mr. Phyllis Negron     YOB: 1959  Residential Address: [address] [place], [place] [facility] [address]  Passport #: [id number]  Phone: [phone]            We respectfully request your embassy consider expediting an emergency visa application for Mr. Phyllis Negron based on the need for him to be present at the bedside of his critically ill mother, Shelly Becerril. Mr. Phyllis Negron's mother, Shelly Becerril, is a patient in the Intensive Care Unit at the [facility] in [facility].  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 Mr. Phyllis Negron  is needed to be in [facility] by 2/15/22 or as soon as possible.    Mr. Phyllis Negron is employed, rents his own home, lives with his wife and two sons and plans to return to [facility] following his time in [place] at the bedside of their critically ill mother. Mr. Phyllis Negron  s medical, food lodging, and travel costs will be covered by Shelly Becerril.      If you have any questions or concerns please do not hesitate to call [facility] Intensive Care Unit and speak with the attending physician at [phone]       Sincerely,          ***MD NAME AND TITLE***    Division of ***Specialty***  [address] [address]     ***OFFICE PHONE NUMBER***    ***OFFICE FAX NUMBER***

## 2023-02-04 NOTE — PROCEDURES
Left radial arterial line placement attempted x3 after infiltration of lidocaine 2cc and IV dilaudid. US guidance. Consent was obtained with in person . Patient in pain. Artery tortuous. Unable to cannulate with Arrow Catheter. Dr. Barton present at the end of the procedure. Procedure aborted after discussion given current blood pressures controlled well with only esmolol despite agitation and pain with procedure. PO medications are being initiated with hopes of weaning off esmolol.

## 2023-02-04 NOTE — Clinical Note
dry, intact, no bleeding and no hematoma. 7Fr sheath removed from RRA. TR band in place with 18cc in balloon. Hemostasis obtained.

## 2023-02-04 NOTE — ED PROVIDER NOTES
Emergency Department Encounter         FINAL IMPRESSION:  Type B aortic dissection        ED COURSE AND MEDICAL DECISION MAKING       ED Course as of 02/04/23 0215   Sat Feb 04, 2023   0014 EKG is sinus rhythm 76, inversion in V2 which is chronic from November 2021, no STEMI, no depressions , overall unchanged from previous   0014 Patient is a 73-year-old female mung speaking,  is used at bedside over the phone, here with sudden onset chest discomfort with radiation to the chest, back and abdomen.  History of AAA repair as well as dissection repair.  History of stroke with dysarthria and dysphagia chronically.  States that the pain started all of a sudden.  Has been otherwise doing well this week.  On arrival she is hypertensive.  Complaining of chest pain.  Overall looks comfortable however.   0015   Abdomen is benign.  She has normal pulses peripherally.  She has no mottling.  She is not diaphoretic.  Her heart and lungs otherwise are normal.  Does take Eliquis for recent stroke few years ago.  No neurologic complaints.  Concern for dissection.  CTA ordered.  IV established.   0135 CTA Chest Abdomen Pelvis w Contrast   -CTA showing type B dissection.  Esmolol immediately ordered.  Goal be less than 120 as well as heart rate below 60.  Code aorta initiated.  Discussed case with Dr. Cordero as well as bedboard, they will have a bed at [facility].  - Patient now maxed out on esmolol.  Nicardipine added on.  Small dose of fentanyl also given.      12:01 AM I met the patient and performed my initial interview and exam.   1:25 AM I spoke with radiology. They inform me that the patient has a dissection.  1:31 AM I updated the patient and her family.  2:06 AM rechecked the patient.  2:11 AM EMS arrived for transfer. Administering Cardene.            Medical Decision Making    History:    Supplemental history from: Family Member/Significant Other    External Record(s) reviewed: Documented  in chart, if applicable.    Work Up:    Chart documentation includes differential considered and any EKGs or imaging independently interpreted by provider, where specified.    In additional to work up documented, I considered the following work up: Documented in chart, if applicable.    External consultation:    Discussion of management with another provider: Documented in chart, if applicable    Complicating factors:    Care impacted by chronic illness: Anticoagulated State    Care affected by social determinants of health: Medication Noncompliance (history of)    Disposition considerations: Transfer to [facility].                  EKG  EKG is sinus rhythm 76, inversion in V2 which is chronic from November 2021, no STEMI, no depressions , overall unchanged from previous.    At the conclusion of the encounter I discussed the results of all the tests and the disposition. The questions were answered. The patient or family acknowledged understanding and was agreeable with the care plan.                  MEDICATIONS GIVEN IN THE EMERGENCY DEPARTMENT:  Medications   esmolol 20 mg/mL (BREVIBLOC) infusion 100 mL (300 mcg/kg/min × 65.8 kg Intravenous Rate/Dose Change 2/4/23 0201)   niCARdipine 40 mg in 200 mL NS (CARDENE) infusion (2.5 mg/hr Intravenous New Bag 2/4/23 0207)   iopamidol (ISOVUE-370) solution 75 mL (75 mLs Intravenous Given 2/4/23 0100)   fentaNYL (PF) (SUBLIMAZE) injection 50 mcg (50 mcg Intravenous Given 2/4/23 0156)       NEW PRESCRIPTIONS STARTED AT TODAY'S ED VISIT:  New Prescriptions    No medications on file       HPI     Patient information obtained from: patient and her family member    Use of Interpretor: Yes (Phone) - Language Yudithong    Shelly Becerril is a 73 year old female with a pertinent history of HTN, dissecting aneurysm of ascending thoracis aorta with s/p repair, s/p aortic valve replacement, upper GI bleed, and chronic anticoagulation on Coumadin who presents to this ED by wheelchair for  evaluation of chest pain, abdominal pain, shortness of breath.    The patient reports chest pain ongoing since ~2000 2/3/23. She states it radiates some to her back and also to her abdomen. She endorses associated shortness of breath, also since 2000. The patient endorses compliance with her coumadin lately. She denies having any symptoms earlier this week including any fevers or cough. She denies ever having this chest pain in the past. Patient denies additional medical concerns or complaints at this time.        REVIEW OF SYSTEMS:  Review of Systems   Constitutional: Negative for fever, malaise  HEENT: Negative runny nose, sore throat, ear pain, neck pain  Respiratory: Negative for cough, congestion. Positive for shortness of breath.  Cardiovascular: Negative for leg edema. Positive for chest pain.  Gastrointestinal: Negative for abdominal distention, constipation, vomiting, nausea, diarrhea. Positive for abdominal pain.  Genitourinary: Negative for dysuria and hematuria.   Integument: Negative for rash, skin breakdown  Neurological: Negative for paresthesias, weakness, headache.  Musculoskeletal: Negative for joint pain, joint swelling. Positive for back pain.      All other systems reviewed and are negative.          MEDICAL HISTORY     Past Medical History:   Diagnosis Date     Anticoagulated on Coumadin      Aortic aneurysm (H)      Hypertension 12/19/2019       Past Surgical History:   Procedure Laterality Date     CARDIAC SURGERY  04/2018    aortic valve replacement, aortic root reconstruction     [place] ESOPHAGOGASTRODUODENOSCOPY TRANSORAL DIAGNOSTIC N/A 4/6/2021    Procedure: ESOPHAGOGASTRODUODENOSCOPY (EGD) WITH BIOPSY.;  Surgeon: Joey Garcia MD;  Location: [facility];  Service: Gastroenterology       Social History     Tobacco Use     Smoking status: Never     Smokeless tobacco: Never   Substance Use Topics     Alcohol use: Never     Drug use: Never       ARTHRITIS PAIN RELIEF, ACETAM, 650 mg CR  tablet  atorvastatin (LIPITOR) 40 MG tablet  lisinopril (ZESTRIL) 40 MG tablet  metoprolol succinate ER (TOPROL-XL) 50 MG 24 hr tablet  mirtazapine (REMERON) 15 MG tablet            PHYSICAL EXAM     BP (!) 193/99   Pulse 81   Temp 98.2  F (36.8  C) (Oral)   Resp 14   Wt 65.8 kg (145 lb)   SpO2 93%   BMI 28.32 kg/m        PHYSICAL EXAM:     General: Patient appears well, nontoxic, comfortable. She is not diaphoretic.   HEENT: Moist mucous membranes,  No head trauma.  No midline neck pain.  Cardiovascular: Normal rate, normal rhythm, no extremity edema.  No appreciable murmur.  Normal peripheral pulses.  Respiratory: No signs of respiratory distress, lungs are clear to auscultation bilaterally with no wheezes rhonchi or rales.  Abdominal: Soft, nontender, nondistended, no palpable masses, no guarding, no rebound  Musculoskeletal: Full range of motion of joints, no deformities appreciated. She has normal pulses peripherally.   Neurological: Alert and oriented, grossly neurologically intact.  Psychological: Normal affect and mood.  Integument: No rashes appreciated. She has no mottling.       RESULTS       Labs Ordered and Resulted from Time of ED Arrival to Time of ED Departure   COMPREHENSIVE METABOLIC PANEL - Abnormal       Result Value    Sodium 139      Potassium 3.8      Chloride 101      Carbon Dioxide (CO2) 28      Anion Gap 10      Urea Nitrogen 21.4      Creatinine 1.18 (*)     Calcium 9.7      Glucose 115 (*)     Alkaline Phosphatase 166 (*)     AST 34      ALT 18      Protein Total 8.5 (*)     Albumin 4.4      Bilirubin Total 0.5      GFR Estimate 49 (*)    INR - Abnormal    INR 1.26 (*)    CBC WITH PLATELETS AND DIFFERENTIAL - Abnormal    WBC Count 11.7 (*)     RBC Count 4.59      Hemoglobin 12.9      Hematocrit 40.9      MCV 89      MCH 28.1      MCHC 31.5      RDW 13.2      Platelet Count 187      % Neutrophils 71      % Lymphocytes 23      % Monocytes 5      % Eosinophils 1      % Basophils 0       % Immature Granulocytes 0      NRBCs per 100 WBC 0      Absolute Neutrophils 8.3      Absolute Lymphocytes 2.7      Absolute Monocytes 0.6      Absolute Eosinophils 0.1      Absolute Basophils 0.0      Absolute Immature Granulocytes 0.1      Absolute NRBCs 0.0     ISTAT CREATININE POCT - Abnormal    Creatinine POCT 1.3 (*)     GFR, ESTIMATED POCT 44 (*)    TROPONIN T, HIGH SENSITIVITY - Normal    Troponin T, High Sensitivity 14     NT PROBNP INPATIENT - Normal    N terminal Pro BNP Inpatient 459     ISTAT CREATININE POCT   TYPE AND SCREEN, ADULT    ABO/RH(D) O POS      Antibody Screen Negative      SPECIMEN EXPIRATION DATE [id number]     ABO/RH TYPE AND SCREEN       CTA Chest Abdomen Pelvis w Contrast   Final Result   IMPRESSION:   1.  Type B aortic dissection.   2.  Aneurysmal descending thoracic aorta.   3.  Aortic valve replacement and graft repair ascending thoracic aorta.   4.  Enlarged, multinodular thyroid gland.      5.  Results called to Dr. Ramos at 0116                           PROCEDURES:  Procedures:  Procedures       I, Obdulia Rodriguez am serving as a scribe to document services personally performed by Gerardo Ramos DO, based on my observations and the provider's statements to me.  I, Gerardo Ramos DO, attest that Obdulia Rodriguez is acting in a scribe capacity, has observed my performance of the services and has documented them in accordance with my direction.    Gerardo Ramos DO  Emergency Medicine  [facility] EMERGENCY DEPARTMENT     Gerardo Ramos DO  02/04/23 0247

## 2023-02-04 NOTE — PROGRESS NOTES
SURGICAL ICU PROGRESS NOTE  02/04/2023      PRIMARY TEAM: Vascular Surgery   PRIMARY PHYSICIAN: Dr. Patterson   REASON FOR CRITICAL CARE ADMISSION: SBP monitoring   ADMITTING PHYSICIAN: Dr. Barton      ASSESSMENT:  Shelly Becerril is a 73 year old female with PMH of HTN, type A aortic dissection, s/p aortic root repair and aortic valve replacement in 2018, on chronic anticoagulation with apixaban, GI bleed 2/2 H. 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 She was admitted to the SICU for BP monitoring and management.    PLAN:    - Esmolol, nicardipine gtts currently off  - Amlodipine decreased to 5mg   - Coreg decreased to 12.5mg BID  - ECHO- EF 60-65%, bioprosthetic aortic valve with normal dopplers, thoracic aortic aneurysm dissection c/w CTA findings  - Repeat CTA today  - 500 LR bolus  - Black placed     Neurological:  # Acute pain   - Monitor neurological status. Delirium preventions and precautions.   - Oriented to self and place (hospital). Not oriented to name of hospital or month/year  - Pain: scheduled tylenol, PRN oxycodone, robaxin, dilaudid                            Pulmonary:   - Supplemental oxygen to keep saturation above 92%  - Incentive spirometer every 15- 30 minutes when awake     Cardiovascular:      #Type B aortic dissection in setting of known thoracic and abdominal aortic aneursym   # Hx type A aortic dissection, s/p aortic root reconstruction and aortic valve replacement, 2018  # Hypertensive crisis   # HTN  # HLD   - PTA regimen is lisinopril 40mg, metoprolol 50mg- holding  - Arrived on esmolol at 300 and nicardipine at 2.5, became hypotensive to 79/50 in early AM  - Monitor hemodynamic status.   - Acute type B Aortic Dissection protocol    - Esmolol gtt- currently off   - Nicardipine gtt- currently off   - Amlodipine decreased to 5mg   - Coreg decreased to 12.5mg  BID  - PTA atorvastatin   - ECHO- EF 60-65%, bioprosthetic aortic valve with normal dopplers, thoracic aortic aneurysm dissection c/w CTA findings  - Repeat CTA chest/abdomen/pelvis     Gastroenterology/Nutrition:  # Hx UGI bleed   - PTA omeprazole 40mg     # Protein calorie deficit malnutrition   - Regular diet   - Monitor PO intake     Renal/Fluids/Electrolytes:  # Acute kidney injury   # Lactic acidosis  - Cr downtrended to 1.12 (from 1.3). Lactic acid 3.3, bolused 1L LR around 5am  - 500 LR bolus  - Black in   - Monitor I/O  - Repeat lactic acid, BMP      Endocrine:  - BG appropriate  - Continue to monitor      ID:  # Leukocytosis, resolved  - WBC 9.8, down from 11.7  - No S/S infection     Heme:       - Hemoglobin 11.6, down from 12.9 but likely dilutional in setting of gtts, all cell lines down. No S/S bleed  - Transfuse if hgb <7.0 or signs/symptoms of hypoperfusion. Monitor and trend.     # Hx chronic anticoagulation  - Holding PTA apixaban  - Heparin gtt, low dose      Musculoskeletal:  # Weakness and deconditioning of critical illness   - Physical and occupational therapy consult      Skin:  - no acute concerns, ICU skin protocols      General Cares/Prophylaxis:    DVT Prophylaxis: Heparin gtt   GI Prophylaxis: PPI  Restraints: Restraints for medical healing needed: NO     Lines/ tubes/ drains:  - 3 PIV  - Black     Disposition:  - Surgical ICU.      Patient seen, findings and plan discussed with surgical ICU staff.     Sofy Sumner, MS4    ====================================    TODAY'S SUBJECTIVE/INTERVAL HISTORY:   Hypotensive in morning.     Pt complains of abdominal pain. Denies numbness or tingling in feet. No BM or void.     OBJECTIVE:     Temp:  [96.7  F (35.9  C)-98.2  F (36.8  C)] 96.7  F (35.9  C)  Pulse:  [69-84] 69  Resp:  [12-33] 12  BP: ()/() 79/50  SpO2:  [85 %-100 %] 100 %  Resp: 12      I/O last 3 completed shifts:  In: 1155.53 [I.V.:1155.53]  Out: -     General/Neuro:  Drowsy, but wakes to stimulation. Oriented to self. Knows location is hospital, but not orientation to name of hospital, month or year.   CV: RRR  Neuro: Upper motor neuro facial palsy, inability to move tongue to the left side. Rest of cranial nerves intact. Power 5/5 in BUE/BLE.   Pupils: Reactive, symmetrical  Pulm: Breathing comfortably with NC on. Lungs clear to auscultation.   Abd: Normoactive bowel sounds. Abdomen soft, non-tender, and non-distended.   Extremities: No edema. Dorsalis pedis pulses not palpable, but feet warm  Skin: Warm and well-perfused.    LABS:   Arterial Blood Gases   No lab results found in last 7 days.  Complete Blood Count   Recent Labs   Lab 02/04/23 0423 02/04/23  0019   WBC 9.8 11.7*   HGB 11.6* 12.9    187     Basic Metabolic Panel  Recent Labs   Lab 02/04/23 0421 02/04/23  0319 02/04/23  0048 02/04/23  0019     --   --  139   POTASSIUM 3.8  --   --  3.8   CHLORIDE 105  --   --  101   CO2 23  --   --  28   BUN 21.3  --   --  21.4   CR 1.12*  --  1.3* 1.18*   * 141*  --  115*     Liver Function Tests  Recent Labs   Lab 02/04/23 0421 02/04/23  0019   AST 29 34   ALT 13 18   ALKPHOS 150* 166*   BILITOTAL 0.5 0.5   ALBUMIN 3.9 4.4   INR 1.20* 1.26*     Pancreatic Enzymes  No lab results found in last 7 days.  Coagulation Profile  Recent Labs   Lab 02/04/23 0421 02/04/23  0019   INR 1.20* 1.26*   PTT 27  --          IMAGING:   Recent Results (from the past 24 hour(s))   CTA Chest Abdomen Pelvis w Contrast    Narrative    EXAM: CTA CHEST ABDOMEN PELVIS W CONTRAST  LOCATION: [facility]  DATE/TIME: 2/4/2023 12:59 AM    INDICATION: CHEST PAIN WITH HX OF AORTA REPAIR  COMPARISON: 04/06/2021  TECHNIQUE: CT angiogram chest abdomen pelvis during arterial phase of injection of IV contrast. 2D and 3D MIP reconstructions were performed by the CT technologist. Dose reduction techniques were used.   CONTRAST: ISOVUE 370 75ML    FINDINGS:   CT ANGIOGRAM  CHEST, ABDOMEN, AND PELVIS: Postsurgical change of aortic valve replacement and graft repair of the ascending thoracic aorta. Aneurysmal dilatation distal aortic arch and descending thoracic aorta. Descending thoracic aorta measures up to   5.3 cm. Type B aortic dissection involving the descending thoracic aorta. Extending to just above the origin of the celiac artery. Small outpouching of contrast along the posterior aspect of the descending thoracic aorta series 5 image 73 may be a focal   penetrating ulcer. Moderate narrowing origin of the celiac artery again noted. Mild dilatation superior mesenteric artery beyond origin similar. Mild narrowing origin left renal artery. Abdominal aorta mildly aneurysmal, 3.3 cm. Right brachiocephalic,   left common carotid and left subclavian artery origins patent.    LUNGS AND PLEURA: Mild basilar atelectasis. 3 mm nodule right upper lobe series 6 image 111.    MEDIASTINUM/AXILLAE: Enlarged, multinodular thyroid gland. Median sternotomy.    CORONARY ARTERY CALCIFICATION: Moderate.    HEPATOBILIARY: Hepatic steatosis. Probable cholelithiasis.    PANCREAS: Normal.    SPLEEN: Normal.    ADRENAL GLANDS: Normal.    KIDNEYS/BLADDER: Mild left hydroureteronephrosis.    BOWEL: Normal caliber. Normal appendix.    LYMPH NODES: Normal.    PELVIC ORGANS: Normal.    MUSCULOSKELETAL: Median sternotomy. Degenerative change osseous structures.      Impression    IMPRESSION:  1.  Type B aortic dissection.  2.  Aneurysmal descending thoracic aorta.  3.  Aortic valve replacement and graft repair ascending thoracic aorta.  4.  Enlarged, multinodular thyroid gland.    5.  Results called to Dr. Ramos at 0116

## 2023-02-04 NOTE — PHARMACY-ADMISSION MEDICATION HISTORY
Admission Medication History Completed by Pharmacy    See [facility] Admission Navigator for allergy information, preferred outpatient pharmacy, prior to admission medications and immunization status.     Medication History Sources:     Surescripts fill history    Changes made to PTA medication list (reason):    Added: apixaban, omeprazle    Deleted: None    Changed: None    Additional Information:    No patient interview     Prior to Admission medications    Medication Sig Last Dose Taking? Auth Provider Long Term End Date   apixaban ANTICOAGULANT (ELIQUIS) 5 MG tablet Take 5 mg by mouth 2 times daily  Yes Unknown, Entered By History     atorvastatin (LIPITOR) 40 MG tablet Take 40 mg by mouth daily  Yes Unknown, Entered By History Yes    lisinopril (ZESTRIL) 40 MG tablet Take 40 mg by mouth daily  Yes Unknown, Entered By History Yes    metoprolol succinate ER (TOPROL XL) 50 MG 24 hr tablet Take 50 mg by mouth daily  Yes Unknown, Entered By History Yes    omeprazole (PRILOSEC) 40 MG DR capsule Take 40 mg by mouth daily  Yes Unknown, Entered By History     ARTHRITIS PAIN RELIEF, ACETAM, 650 mg CR tablet [ARTHRITIS PAIN RELIEF, ACETAM, 650 MG CR TABLET] Take 650 mg by mouth every 8 (eight) hours as needed for pain (knee pain).    Provider, Historical     mirtazapine (REMERON) 15 MG tablet [MIRTAZAPINE (REMERON) 15 MG TABLET] Take 15 mg by mouth at bedtime.    Provider, Historical         Date completed: 02/04/23    Medication history completed by: Xavier Rosa [facility]

## 2023-02-04 NOTE — CONSULTS
"  SURGICAL ICU ADMISSION NOTE  02/04/2023      PRIMARY TEAM: Vascular Surgery   PRIMARY PHYSICIAN: Dr. Patterson   REASON FOR CRITICAL CARE ADMISSION: SBP monitoring   ADMITTING PHYSICIAN: Dr. Barton   Date of Service (when I saw the patient): 02/04/2023    ASSESSMENT:  Shelly Becerril is a 73 year old female who was admitted on 2/4/2023 with a type B aortic dissection. She presented to the OSH in HTN emergency with SBP over 200s. On admission she was completely neurologically intact. Left DP doppler signal present and right PT doppler signal present. Not palpable pedal pulses but feet warm and well perfused. SBP, MAP and HR were controlled on esmolol and nicardipine gtts. Starting oral agents and weaning nicardipine gtt first then esmolol as able. Called daughter for update with no answer.     PLAN:    Neurological:  # Acute pain   - Monitor neurological status. Delirium preventions and precautions.   - Pain: scheduled tylenol, PRN oxycodone, robaxin, dilaudid       Pulmonary:   - Supplemental oxygen to keep saturation above 92 %.  - Incentive spirometer every 15- 30 minutes when awake.    Cardiovascular:    #Type B aortic dissection   # hx of aortic root reconstruction 2018  # hx of aortic valve replacement 2018  # known thoracic and abdominal AA   # hypertensive crisis   # HTN  # HLD   - Monitor hemodynamic status.   - Acute type B Aortic Dissection protocol   - Arrived on esmolol at 300 and nicardipine at 2.5 continue these  - wean nicardipine first then esmolol   - started Norvasc 10 PO and Carvedilol 25 BID   - on home lisinopril, metoprolol-- holding   - started home statin   - anticoagulated on coumadin at home   - Repeat EKG and troponin  - ECHO ordered for AM   - repeat CTA chest/abdomen/pelvis at 6AM   - OSH CT \"Postsurgical change of aortic valve replacement and graft repair of the ascending thoracic aorta. Aneurysmal dilatation distal aortic arch and descending thoracic aorta. Descending thoracic aorta " "measures up to 5.3 cm. Type B aortic dissection involving the descending thoracic aorta. Extending to just above the origin of the celiac artery. Small outpouching of contrast along the posterior aspect of the descending thoracic aorta series 5 image 73 may be a focal penetrating ulcer. Moderate narrowing origin of the celiac artery again noted. Mild dilatation superior mesenteric artery beyond origin similar. Mild narrowing origin left renal artery. Abdominal aorta mildly aneurysmal, 3.3 cm. Right brachiocephalic, left common carotid and left subclavian artery origins patent.\"    Gastroenterology/Nutrition:  # h/o UGI bleed   # Protein calorie deficit malnutrition   - regular diet     Fluids/Electrolytes:   - no IVF for now, but 1L bolus prior to CTA in AM     Renal:  # Acute kidney injury   - Cr 1.3 baseline 1   - Will continue to monitor intake and output.    Endocrine:  - Continue to monitor   -      ID:  # Leukocytosis   - WBC 11.7   - afebrile no indications for antibiotics.   - repeat labs in AM      Heme:     - Hemoglobin 12.9   - Transfuse if hgb <7.0 or signs/symptoms of hypoperfusion. Monitor and trend.   - home anticogulated on coumadin INR 1.26   - heparin gtt, low dose     Musculoskeletal:  # Weakness and deconditioning of critical illness   - Physical and occupational therapy consult     Skin:  - no acute concerns, ICU skin protocols     General Cares/Prophylaxis:    DVT Prophylaxis: Heparin gtt   GI Prophylaxis: Not indicated  Restraints: Restraints for medical healing needed: NO    Lines/ tubes/ drains:  - 3 PIV    Disposition:  - Surgical ICU.     Patient seen, findings and plan discussed with surgical ICU staff, Dr. Barton.      Lucy Madrigal MD  - - - - - - - - - - - - - - - - - - - - - - - - - - - - - - - - - - - - - - - - - - - - - -   HISTORY PRESENTING ILLNESS:     72 yo female with HTN, h/o ascending aortic dissection s/p aortic root reconstruction and aortic valve replacement in " 2018, h/o UGI bleed, on coumadin, who presented with chest, back and abdominal pain since 8PM 2/3/23 to an outside ED. At the time she was having nausea and SOB. On arrival to our until she denied SOB. Was having improved, but present pain. Continued to feel weak all over but able to move all extremities and had full strength. Denies headache or vision changes. Denies numbness/tingling. No issues eating/drinking. No current nausea or abdominal pain. Voiding without issue. Last BM yesterday was normal without blood. She only recalls that she has had chest surgery before and takes a medication for HTN and HLD. Cannot recall further details of her medical history. History was obtained through an in person [employer] .     Never smoker, never drinker.        REVIEW OF SYSTEMS: 10 point ROS neg other than the symptoms noted above in the HPI.    PAST MEDICAL HISTORY:   Past Medical History:   Diagnosis Date     Anticoagulated on Coumadin      Aortic aneurysm (H)      Hypertension 12/19/2019       SURGICAL HISTORY:   Past Surgical History:   Procedure Laterality Date     CARDIAC SURGERY  04/2018    aortic valve replacement, aortic root reconstruction     [place] ESOPHAGOGASTRODUODENOSCOPY TRANSORAL DIAGNOSTIC N/A 4/6/2021    Procedure: ESOPHAGOGASTRODUODENOSCOPY (EGD) WITH BIOPSY.;  Surgeon: Joey Garcia MD;  Location: [facility];  Service: Gastroenterology       SOCIAL HISTORY:   Social History     Socioeconomic History     Marital status:    Tobacco Use     Smoking status: Never     Smokeless tobacco: Never   Substance and Sexual Activity     Alcohol use: Never     Drug use: Never     FAMILY HISTORY: No bleeding/clotting disorders nor problems with anesthesia.     ALLERGIES:   No Known Allergies    MEDICATIONS:  [COMPLETED] fentaNYL (PF) (SUBLIMAZE) injection 50 mcg  [COMPLETED] iopamidol (ISOVUE-370) solution 75 mL    ARTHRITIS PAIN RELIEF, ACETAM, 650 mg CR tablet, [ARTHRITIS PAIN RELIEF, ACETAM, 650  MG CR TABLET] Take 650 mg by mouth every 8 (eight) hours as needed for pain (knee pain).   atorvastatin (LIPITOR) 40 MG tablet, Take 1 tablet (40 mg) by mouth every evening  lisinopril (ZESTRIL) 40 MG tablet, Take 1 tablet (40 mg) by mouth daily  metoprolol succinate ER (TOPROL-XL) 50 MG 24 hr tablet, Take 1 tablet (50 mg) by mouth daily  mirtazapine (REMERON) 15 MG tablet, [MIRTAZAPINE (REMERON) 15 MG TABLET] Take 15 mg by mouth at bedtime.       PHYSICAL EXAMINATION:  Temp:  [98  F (36.7  C)-98.2  F (36.8  C)] 98  F (36.7  C)  Pulse:  [72-84] 77  Resp:  [14-33] 16  BP: (101-237)/() 106/61  SpO2:  [85 %-100 %] 97 %  General: alert, oriented, pleasant    HEENT: trachea midline Cns intact, 1 not assessed, pupils ERR  Neuro: A&Ox3, NAD   Pulm/Resp: Clear breath sounds bilaterally without rhonchi, crackles or wheeze,  breathing non-labored, RA   CV: RRR by palpation and monitor, sternotomy scar, well healed   Abdomen: Soft, non-distended, non-tender, no rebound tenderness or guarding, no masses  Incisions/Skin: No noted abrasions, WWP   MSK/Extremities: no peripheral edema, moving all extremities, peripheral pulses intact assessed via doppler, calves equal, extremities well perfused    LABS: Reviewed.   Arterial Blood Gases   No lab results found in last 7 days.  Complete Blood Count   Recent Labs   Lab 02/04/23 0423 02/04/23 0019   WBC 9.8 11.7*   HGB 11.6* 12.9    187     Basic Metabolic Panel  Recent Labs   Lab 02/04/23 0421 02/04/23  0319 02/04/23  0048 02/04/23  0019     --   --  139   POTASSIUM 3.8  --   --  3.8   CHLORIDE 105  --   --  101   CO2 23  --   --  28   BUN 21.3  --   --  21.4   CR 1.12*  --  1.3* 1.18*   * 141*  --  115*     Liver Function Tests  Recent Labs   Lab 02/04/23 0421 02/04/23  0019   AST 29 34   ALT 13 18   ALKPHOS 150* 166*   BILITOTAL 0.5 0.5   ALBUMIN 3.9 4.4   INR  --  1.26*     Pancreatic Enzymes  No lab results found in last 7 days.  Coagulation  Profile  Recent Labs   Lab 02/04/23  0019   INR 1.26*       IMAGING:  Recent Results (from the past 24 hour(s))   CTA Chest Abdomen Pelvis w Contrast    Narrative    EXAM: CTA CHEST ABDOMEN PELVIS W CONTRAST  LOCATION: [facility]  DATE/TIME: 2/4/2023 12:59 AM    INDICATION: CHEST PAIN WITH HX OF AORTA REPAIR  COMPARISON: 04/06/2021  TECHNIQUE: CT angiogram chest abdomen pelvis during arterial phase of injection of IV contrast. 2D and 3D MIP reconstructions were performed by the CT technologist. Dose reduction techniques were used.   CONTRAST: ISOVUE 370 75ML    FINDINGS:   CT ANGIOGRAM CHEST, ABDOMEN, AND PELVIS: Postsurgical change of aortic valve replacement and graft repair of the ascending thoracic aorta. Aneurysmal dilatation distal aortic arch and descending thoracic aorta. Descending thoracic aorta measures up to   5.3 cm. Type B aortic dissection involving the descending thoracic aorta. Extending to just above the origin of the celiac artery. Small outpouching of contrast along the posterior aspect of the descending thoracic aorta series 5 image 73 may be a focal   penetrating ulcer. Moderate narrowing origin of the celiac artery again noted. Mild dilatation superior mesenteric artery beyond origin similar. Mild narrowing origin left renal artery. Abdominal aorta mildly aneurysmal, 3.3 cm. Right brachiocephalic,   left common carotid and left subclavian artery origins patent.    LUNGS AND PLEURA: Mild basilar atelectasis. 3 mm nodule right upper lobe series 6 image 111.    MEDIASTINUM/AXILLAE: Enlarged, multinodular thyroid gland. Median sternotomy.    CORONARY ARTERY CALCIFICATION: Moderate.    HEPATOBILIARY: Hepatic steatosis. Probable cholelithiasis.    PANCREAS: Normal.    SPLEEN: Normal.    ADRENAL GLANDS: Normal.    KIDNEYS/BLADDER: Mild left hydroureteronephrosis.    BOWEL: Normal caliber. Normal appendix.    LYMPH NODES: Normal.    PELVIC ORGANS: Normal.    MUSCULOSKELETAL:  Median sternotomy. Degenerative change osseous structures.      Impression    IMPRESSION:  1.  Type B aortic dissection.  2.  Aneurysmal descending thoracic aorta.  3.  Aortic valve replacement and graft repair ascending thoracic aorta.  4.  Enlarged, multinodular thyroid gland.    5.  Results called to Dr. Ramos at 3051

## 2023-02-04 NOTE — PLAN OF CARE
Admitted/transferred from: [facility]   Reason for admission/transfer: Type B dissection  2 RN skin assessment: completed by: Cassy RN, Karin RN  Result of skin assessment and interventions/actions: No interventions  Height, weight, drug calc weight: Done  Patient belongings (see Flowsheet): No belongings    Major Shift Events:  Patient arrived to  after 0300 via EMS  Neuro: Alert, disoriented to time, able to make needs known, is Hmong speaking. Pupils equal and reactive. Moving all extremities equally  Cardiac: SBP goal < 120, MAP goal < 80, HR goal < 70. Currently being managed with esmolol gtt at 250 mcg/kg/min. Nicardipine drip stopped at 0633  Respiratory: Shortness of breath reported, LS clear on 3L NC  GI/: Purewick in place, has not voided since arrival to unit  Skin: Intact, pedal pulses not palpable, CMS intact, doppler present in room     Plan: Plan for repeat CTA, continue with plan of care    For vital signs and complete assessments, please see documentation flowsheets.     ?

## 2023-02-04 NOTE — PROGRESS NOTES
SICU brief progress note    A 73 years old female pateitn who presented for aortic dissection management, and was started on Heparin gtt per her treatment plan at 5:33 AM. Patient had a bolus of heparin at 7:44 AM, and heparin gtt rate increased to (1000 unite/hr) at 7:58 AM.  PTT check at 11:59 result was 239. Heparin infusion was stopped and PTT check was ordered at 1:42 PM.     Patient was seen at bedside, using the [employer] . She reported improve in her back pain, denies any nausea, vomiting or chest pain. She is alert and oriented.    /72   Pulse 80   Temp 98.7  F (37.1  C) (Oral)   Resp 17   Wt 57.3 kg (126 lb 4.8 oz)   SpO2 97%   BMI 24.67 kg/m      Exam:   General: Alert, orinted   CVS: RRR  Pulm: NLB  Neuro:  Power: 5/5 BUE               5/5 BLE     Sensation: Intact on the dorsum of foot bilaterally                     Intact on the forehead bilaterally                     Intact on both hands     Cranial nerves: Grossly intact except mild facial drop on the left side and inability to move tongue toward the left. No tongue deviation. Negative pronator drift. Pupils are symmetrical and reactive to light.   Occular muscle intact         Extremities: Warm, Left DP doppler signal present and right PT doppler signal present    A&P:    A 73 years old female patient with history of CVA , left facial drop and dysphagia in  2021, presented with aortic dissection type B. This morning patient had extra heparin bolus outside the protocol parameters. PTT was 239, and trending down. Also patient heart rate > 70, and had robust response to  Co-reg this morning, per vascular team can tolerate HR < 80 while amlodipine taking full effect. And van give one dose of 5 mg IV Metoprolol.     Plan:     - Heparin gtt stopped  - Follow GTT  - Re-evaluate patient neuro status after 2 hours.     Patient was seen and examined with critical care fellow Dr. Shelia Bowen.

## 2023-02-04 NOTE — PLAN OF CARE
"Chief Complaint   Patient presents with     Conjunctivitis       Initial /85  Pulse 133  Temp 99  F (37.2  C) (Tympanic)  Wt 111 lb (50.3 kg)  SpO2 100% Estimated body mass index is 26.31 kg/(m^2) as calculated from the following:    Height as of 11/29/16: 4' 6\" (1.372 m).    Weight as of 11/29/16: 109 lb 2 oz (49.5 kg).  Medication Reconciliation: complete       Yelena Hawkins MA      " D/I: Patient on unit 4A Surgical ICU for medical management of AAA descending.  Neuro- Meliza speaking. Oriented to person, general time/place. Slight L facial droop with smile, unable to move tongue to left.  Family reports this is baseline.  Flat affect.  Family reports that patient sleeps a lot at home and affect is flat.   CV- Hemodynamically within MD parameters.  Esmolol and Nicardipine off since this am. Heparin currently off.  Unable to dopple DPs.  MD aware.  Feet slightly cool, denies numbness, tingling. Good strength in all 4 extremities.      Pulm- 2L/NC. Lungs essentially clear.     GI-Tolerating small amts of food.  No BM or gas.        -Black placed with good urine output.     Gtts-Heparin off. Will adjust as ordered by PTT result.    Skin-Intact  Pain- Tylenol given for back pain. Pt states back pain is better.   See flow sheets for further interventions and assessments.  A: Stable. CTA completed this am.   P: Continue to monitor pt closely. Notify MD of significant changes.

## 2023-02-04 NOTE — LETTER
2023      To:  Whom it may concern:    From:  [facility]    Subject: Patient Death Notification      It is with sincere regret that we have to inform you that Shelly Becerril (: 1950), a patient who received care from the [facility], passed away on 2023.    We value and appreciate partnering with you on taking care of the family of Ms. Becerril and would gladly provide you with any relevant information regarding Shelly Becerril's care while at our hospital, as well as support for her family members in this time. We can assist in connecting you with the providers that were part of your patient's care team.     If you have questions or would like to discuss this information further, please call [phone] to be directed to No att. providers found, the physician covering this patient at the time of their death.    Thank you.      Aaron Vinson MD  Department of Surgery  [facility]

## 2023-02-05 NOTE — PLAN OF CARE
Major Shift Events:  Hmong speaking- family present this afternoon. Ate 50% of meal. Oriented x4 (supported by family), but drowsy. Neuro intact, strong . PERRL.   DP pulses intermittently present with doppler. PT present with doppler. Radial pulses palpable.   2L NC. No c/o SOB. Back pain that was present this AM resolved this afternoon. C/O mid back pain around 2000 because she was laying on her back and requested being turned to side. She stated this back pain/soreness is different from this morning/admission.    GOAL: HR <70; SBP <120; MAP <80. PRN metoprolol available. Not given as did not sustain above parameters for a long period of time. On PO coreg.     Plan: Monitor neuro/BP closely.  For vital signs and complete assessments, please see documentation flowsheets.       Goal Outcome Evaluation:      Plan of Care Reviewed With: patient    Overall Patient Progress: no changeOverall Patient Progress: no change    Outcome Evaluation: Remains off gtt for BP parameters.

## 2023-02-05 NOTE — PROVIDER NOTIFICATION
MD Notification    Notified Person: SICU    Notification Date/Time: 2/5/2023 1:50 AM     Notification Interaction: Called    Purpose of Notification: Pt is seeing people in her room.  called for assessment & states patient is scared and not sure where she is. Directable to stay in bed, but not sleeping. No PRNs available.    Orders Received: 10 mg melatonin.    Comments: Cares clustered for remainder of night to promote sleep. Scheduled tylenol given early to prevent reawakening.     _______    MD Notification    Notified Person: SICU    Notification Date/Time: 2/5/2023 03:30 AM     Notification Interaction: Called    Purpose of Notification: Urine output minimal overnight. Urine character has not changed, no change in vitals.    Orders Received: Continue to monitor    Comments: BALTAZAR

## 2023-02-05 NOTE — PROGRESS NOTES
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 She was admitted to the SICU for BP monitoring and management.    CHANGES and MAJOR EVENTS TODAY:   -- Melatonin overnight for mild delirium   -- Improving pain this am   -- Increase amlodipine to 10mg daily   -- Will discuss PO anticoagulation and transfer with vascular surgery team   -- Stop prn pain meds (not used)    -- Continue heparin drip per vascular surg   -- OK to transfer to floor per vascular surgery     PLAN:    Neurological:  # Acute pain   - Monitor neurological status. Delirium preventions and precautions.   - Oriented to self and place (hospital). 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   - Acute type B Aortic Dissection protocol                - Esmolol gtt- currently off               - Nicardipine gtt- currently off               - Amlodipine increased to 10mg                - Coreg 12.5mg BID  - PTA atorvastatin   - ECHO- EF 60-65%, bioprosthetic aortic valve with normal dopplers, thoracic aortic aneurysm dissection c/w CTA findings  - Repeat CTA chest/abdomen/pelvis 2/4: Stable dissection      Gastroenterology/Nutrition:  # Hx UGI bleed   - PTA omeprazole 40mg      # Protein calorie deficit malnutrition   - Regular diet   - Monitor PO intake     Renal/Fluids/Electrolytes:  # Acute kidney injury   # Lactic acidosis  - Cr downtrended   - Black in   - Monitor I/O  - Repeat lactic acid, BMP      Endocrine:  - BG appropriate  - Continue to monitor      ID:  # Leukocytosis, resolved  - WBC 9.8, down from 11.7  - No S/S infection      Heme:     # Anemia of critical illness    - Hemoglobin 11.6, down from 12.9 but likely dilutional in setting of gtts, all cell lines down. No S/S bleed  - Transfuse if hgb <7.0 or signs/symptoms of hypoperfusion. Monitor and trend.      # Hx chronic anticoagulation  - Holding PTA apixaban  - Heparin gtt, low dose      Musculoskeletal:  # Weakness and deconditioning of critical illness   - Physical and occupational therapy consult      Skin:  - no acute concerns, ICU skin protocols   - diligent cares to prevent skin breakdown and wound formation.      General Cares/Prophylaxis:    DVT Prophylaxis: Heparin gtt   GI Prophylaxis: PPI  Restraints: Restraints for medical healing needed: NO     Lines/ tubes/ drains:  - 3 PIV  - Black     Disposition:  - Surgical ICU       Patient seen, findings and plan discussed with surgical ICU staff, Dr. Rosado.    Time spent on this Encounter   Billing:  I spent 45 minutes at bedside and on the inpatient unit today managing the critical care of Shelly Bceerril in relation to the issues listed in this note. Critical care time was spent outside of procedures.       Ashutosh Copeland PA-C    ====================================  INTERVAL HISTORY: Improving hemodynamics. Stable on current PO regimen. Will discuss transfer today.      ROS: Some delirium overnight, denies pain. No SOB/WOB. No abdominal pain. No chest pain, back pain, no abdominal pain today. No sensory or motor changes.     OBJECTIVE:   1. VITAL SIGNS:   Temp:  [97.8  F (36.6  C)-98.7  F (37.1  C)] 97.8  F (36.6  C)  Pulse:  [59-80] 70  Resp:  [10-31] 15  BP: ()/(52-77) 130/77  FiO2 (%):  [2 %] 2 %  SpO2:  [93 %-100 %] 93 %  FiO2 (%): 2 %  Resp: 15      2. INTAKE/ OUTPUT:   I/O last 3 completed shifts:  In: 1156.47 [P.O.:345; I.V.:311.47; IV Piggyback:500]  Out: 2240 [Urine:2240]    3. PHYSICAL EXAMINATION:  General: Sleepy, flat affect   HEENT: Mucous membranes pink, moist, atraumatic.   Neuro: Using [employer] interpretor; AO to self, city. Unsure of exact time or reason for admission. PERRL 3mm. EOMI. Slight left facial droop with baseline inability to move tongue to left side. FSC x 4. VALENTIN equally. 5/5 x 4.   Pulm/Resp: Clear breath sounds bilaterally without rhonchi, crackles or wheezes. Breathing appears non-labored.   CV: RRR with clear S1, S2. +2 continuous systolic murmur appreciated. Limbs warm. Unable to palpate DP pulse.   Abdomen: Soft, non-distended, non-tender, no rebound tenderness or guarding, no masses  : + dunbar catheter in place, urine yellow and clear  Incisions/Skin: warm, dry    4. INVESTIGATIONS:   Arterial Blood Gases   No lab results found in last 7 days.  Complete Blood Count   Recent Labs   Lab 02/05/23  0321 02/04/23  1840 02/04/23  0423 02/04/23  0019   WBC 6.8 7.7 9.8 11.7*   HGB 10.1* 10.5* 11.6* 12.9   * 141* 170 187     Basic Metabolic Panel  Recent Labs   Lab 02/05/23  0321 02/04/23  1646 02/04/23  1126 02/04/23  0421    138 138 141   POTASSIUM 3.8 4.2  4.2 4.3 3.8   CHLORIDE 107 105 104 105   CO2 24 22 24 23   BUN 14.4 17.1 17.3 21.3   CR 0.97* 1.02* 0.98* 1.12*   * 97 104* 136*     Liver Function Tests  Recent Labs   Lab 02/04/23  0421 02/04/23  0019   AST 29 34   ALT 13 18   ALKPHOS 150* 166*   BILITOTAL 0.5 0.5    ALBUMIN 3.9 4.4   INR 1.20* 1.26*     Pancreatic Enzymes  No lab results found in last 7 days.  Coagulation Profile  Recent Labs   Lab 02/05/23  0321 02/04/23  1646 02/04/23  1352 02/04/23  1200 02/04/23  1126 02/04/23  0421 02/04/23  0019   INR  --   --   --   --   --  1.20* 1.26*   PTT 87* 37 130* 217*   < > 27  --     < > = values in this interval not displayed.         5. RADIOLOGY:   Recent Results (from the past 24 hour(s))   CTA Chest Abdomen Pelvis w Contrast   Result Value    Radiologist flags Thyroid nodule    Narrative    Exam: Computed tomographic angiography of the chest, abdomen and  pelvis without and with contrast including 3D reformations dated  2/4/2023 9:59 AM    Clinical information: 73 year old female?with PMH of HTN, type A  aortic dissection, s/p aortic root repair and aortic valve replacement  in 2018, on chronic anticoagulation with?apixaban, GI bleed 2/2 H.  pylori duodenal ulcer, admitted from outside hospital with new type B  aortic dissection in setting of known thoraco-abdominal aortic  aneurysm present since at least 2019. She was admitted to the SICU for  BP monitoring and management.    Technique: Axial images through the chest and abdomen obtained before  the administration of intravenous contrast media and following the  injection of contrast media  in the arterial phase. Source images  reviewed as well as 3D and multi-planar reconstructions at a 3D  workstation.    Contrast: iopamidol (ISOVUE-370) solution 100 mL       DLP: 655 mGy*cm    Comparison: 4/6/2021.    Findings:      Thoracic aortic diameters:      Sinuses of Valsalva: 3.0 cm    Sinotubular ridge: 4.2 cm    Ascending aorta:  3.8 cm    Arch: 5.5 cm    Proximal descending thoracic aorta: 5.2 cm    Mid descending thoracic aorta: 4.6 cm    Descending thoracic aorta at the diaphragm: 3.8 cm     Infrarenal abdominal aorta: 2.5 cm    Bifurcation: 1.7 cm.     There is normal branching pattern of the great vessels.  Postsurgical  changes of aortic valve replacement and graft repair of previous type  a thoracic aortic dissection. There is a type B aortic dissection  involving the descending thoracic aorta extending to the level of the  origin of the celiac origin. There is pooling of contrast within the  false lumen. There is adherent thrombus within the true lumen  involving approximately 25% of the circumference of the descending  thoracic aorta. Origins of the brachiocephalic artery, left common  carotid artery and left subclavian artery show no focal abnormality.  The proximal pulmonary vasculature  appears normal.     The celiac axis, SMA and HOWARD are patent . There is severe narrowing of  the origin of the celiac artery with poststenotic ectasia measuring up  to 9 mm in diameter. Mild narrowing at the origin of the SMA with  poststenotic ectasia. Moderate to severe narrowing at the origin of  the HOWARD. The renal arteries are patent bilaterally. Moderate narrowing  at the origin of the left renal artery.    Aneurysmal dilation of the suprarenal abdominal aorta measuring up to  4.7 cm. Mild ectasia of the infrarenal abdominal aorta. The bilateral  common iliac arteries are normal in caliber measuring up to 1.4 cm  bilaterally. The bilateral external iliac arteries are also patent and  normal in caliber.    Chest and abdomen:     Multinodular thyroid. No hilar, mediastinal or axillary  lymphadenopathy is seen. No focal parenchymal abnormalities are  identified. Mild bibasilar atelectasis. 3 mm average diameter  pulmonary nodule in the peripheral right upper lobe (series 7 image  117).    The spleen, liver, adrenal glands, pancreas, and kidneys show no focal  abnormalities. Degenerative changes of the spine. No acute or  aggressive osseous lesions.      Impression    Impression:    1. Type B thoracic aortic dissection extending to the level of the  origin of the celiac artery which arises from the true lumen. There is  pooling  of contrast within the false lumen indicative of patency  without appreciable focal dissection origin.  2. 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  3. Postoperative changes of previous type A thoracic aortic dissection  and aortic valve replacement.  4. Multinodular thyroid. Further assessment can be performed with  thyroid ultrasound.      [Consider Follow Up: Thyroid nodule]    This report will be copied to the [facility] to ensure a  provider acknowledges the finding. [facility] is available Monday  through Friday 8am-3:30 pm.            I have personally reviewed the examination and initial interpretation  and I agree with the findings.    RAVEN SMITH MD         SYSTEM ID:  [id number]       =========================================

## 2023-02-05 NOTE — PROGRESS NOTES
"Brief Care Plan Note     Brief SICU note to document care plan changes and updates. Please see daily progress note from earlier today for full assessment and care plans.     Interval history: Patient evaluated at 1200 to assess vital signs and correlate changes made to PO medications/doses earlier today. At time of evaluation, patient notably uncomfortable, leaning forward in bedside chair and moaning. Attempt made by RN to reach interpretor services earlier without success. This provider contacted interpretor services via [employer] and waited >25 minutes before interpretor services reported \"no [facility] interpretors available at this time\".     Son-in-law was thus contacted to assist in interval history intake. Per conversation at bedside, patient reporting new onset chest pain extending into mid-lower back region. Patient notably reporting no pain this am or earlier from this episode. Additionally, patient had refused PO anti-HTN meds earlier this am reporting she wanted to \"finish her breakfast\" before taking medications. Unfortunately, brief period of SBP over goal d/t time delayed receiving meds.     Given concern for known aortic dissection and acute pain, will proceed with repeat CTA C/A/P with contrast to rule out expanding dissection. Additionally, send for STAT troponin, lactate and EKG in meantime.     Plan discussed with family and patient via phone.   Plan discussed with SICU fellow, as well as vascular surgery fellow, who were all in agreement with assessment and plan above.     Plan:   - Follow up repeat CTA   - Continue ICU care until >12hr timeframe with vitals within goal   - Follow up EKG, trop, lactate   - Continue aggressive hemodynamic management/monitoring for goal per vascular surgery    - continue PO regimen with IV prns to maintain SBP < 120 and HR < 70      Ashutosh Copeland PA-C  02/05/23  12:38 PM    Additional critical care time spent assessing, managing interval care developments: 30 " minutes. Critical care time spent outside of procedures and different from daily cares billed previously.

## 2023-02-05 NOTE — PROGRESS NOTES
ICU End of Shift Summary. See flowsheets for vital signs and detailed assessment.    Changes this shift: Reporting increased fear around midnight. Feels unsafe in room, wants staff to stay nearby as she hears people walking around her room and banging things. Reassured via , still anxious and using call light to call staff into room infrequently. PRN melatonin given to facilitate sleep, appeared to sleep well after getting. Rhythm sinus, cardiac goals met w/ heparin gtt continuing. Remains on room air. Having little urine output, see provider notification note.    Plan: Continue to maintain stable cardiac parameters. Continue to trend PTT values.

## 2023-02-05 NOTE — PROGRESS NOTES
VASCULAR SURGERY PROGRESS NOTE    Ms. Becerril is a 74yo woman with a history of a type A dissection s/p aortic root reconstruction and aortic valve replacement in 2018 who presented with a type B dissection without malperfusion.    Adequate BP and HR control on PO medications until this morning. She refused her PO meds after breakfast and had systolics in the 150s and 160s from about 9-11. Took her PO meds at 1030 and SBP down to 120s. Chest pain much improved/resolved. Tolerating diet.    Vitals reviewed.    On exam, sitting comfortably in chair. On room air. Abdomen soft and nontender. Palpable and symmetric radial pulses.     Labs reviewed. Lactate 1.7. Hgb 10.1. Cr 0.97.    Ms. Becerril is a 74yo woman with a type B dissection with adequate BP and HR control on PO meds when taken    - continue PO regimen with IV prns to maintain SBP < 120 and HR < 70  - continue heparin, will transition to PO anticoagulation when able  - dispo: given hypertension this morning, continue ICU care and close hemodynamic monitoring    Lora Gamboa MD  02/05/23  11:32 AM

## 2023-02-06 NOTE — PLAN OF CARE
Goal Outcome Evaluation:    [id number]:  Major Shift Events: Pt in SR, gave 1 time dose of hydralyzine to keep SBP < 120. Afebrile. On RA, although requires 1-2L NC while asleep. Has been complaining of L sided flank pain that radiates to her abdomen. Vascular surgery notified of this. Giving tylenol on a schedule. Voiding spontaneously after dunbar removal. No BM. Poor appetite. Heparin gtt at 450. Family updated at the bedside.    Plan: Transfer to   when bed available.     For vital signs and complete assessments, please see documentation flowsheets.

## 2023-02-06 NOTE — PROGRESS NOTES
ICU End of Shift Summary. See flowsheets for vital signs and detailed assessment.    Changes this shift: Appears neurologically intact. Unable to utilize [employer]  services d/t lack of H[place] availability. Following gestures, opens eyes to voice. Sinus rhythm in 60s, no PRNs given. Hep gtt continues. 1 L NC for sleeping. No BM, dunbar in place with low output. Staff encouraging movement in bed, pt also moving self intermittently. Appeared to sleep well.    Plan: Transfer orders to , transfer when bed available.

## 2023-02-06 NOTE — PROGRESS NOTES
02/06/23 1135   Appointment Info   Signing Clinician's Name / Credentials (OT) Mana Houston OTR/L   Rehab Comments (OT) strict vital parameters: BP <120, HR < 70, MAP < 80       Present yes   Language   (Hmong via Ipad )   Living Environment   People in Home child(cristopher), adult;other (see comments)  (daughter and son-in-law)   Current Living Arrangements house   Transportation Anticipated family or friend will provide   Living Environment Comments Pt reports living in house with daughter and son-in-law and reports ~3-4 stairs inside however per pt 'I don't do stairs, they lift me.' Will clarify and attempt to continue obtaining home set-up and PLOF when family at bedside.   Self-Care   Usual Activity Tolerance moderate   Current Activity Tolerance fair   Regular Exercise No   Equipment Currently Used at Home walker, standard   Fall history within last six months no   Activity/Exercise/Self-Care Comment Pt reports receiving assist with ADL tasks from family at baseline, utilizes walker for mobility.   Instrumental Activities of Daily Living (IADL)   IADL Comments Per pt report, receives assist for all IADL tasks from daughter including home mgmt, cooking, cleaning, transportation, etc.   General Information   Onset of Illness/Injury or Date of Surgery 02/04/23   Referring Physician Lucy Madrigal MD   Patient/Family Therapy Goal Statement (OT) Did not state   Additional Occupational Profile Info/Pertinent History of Current Problem Per EMR, Shelly Becerril is a 73 year old female with PMH of HTN, type A aortic dissection, s/p aortic root repair and aortic valve replacement in 2018, on chronic anticoagulation with apixaban, GI bleed 2/2 H. 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 She was admitted to the SICU for BP monitoring and management.   Existing  Precautions/Restrictions fall;cardiac   Limitations/Impairments safety/cognitive   Left Upper Extremity (Weight-bearing Status) full weight-bearing (FWB)   Right Upper Extremity (Weight-bearing Status) full weight-bearing (FWB)   Left Lower Extremity (Weight-bearing Status) full weight-bearing (FWB)   Right Lower Extremity (Weight-bearing Status) full weight-bearing (FWB)   General Observations and Info Activity: [facility] Adult ICU Early Mobility Guidelines   Cognitive Status Examination   Orientation Status person   Cognitive Status Comments Cognition appears grossly intact, able to simple commands from Th however presents with flat affect and decreased motivation; Will continue to monitor   Visual Perception   Visual Impairment/Limitations WFL   Impact of Vision Impairment on Function (Vision) Denies acute vision changes, reports no reading glasses   Sensory   Sensory Quick Adds sensation intact   Pain Assessment   Patient Currently in Pain Yes, see Vital Sign flowsheet  (reports back pain)   Posture   Posture forward head position   Posture Comments while sitting at EOB   Range of Motion Comprehensive   General Range of Motion bilateral upper extremity ROM WFL   Strength Comprehensive (MMT)   Comment, General Manual Muscle Testing (MMT) Assessment B UE strength grossly deconditioned, formal MMT not assessed this date   Muscle Tone Assessment   Muscle Tone Quick Adds No deficits were identified   Coordination   Upper Extremity Coordination No deficits were identified   Bed Mobility   Bed Mobility supine-sit   Comment (Bed Mobility) min A supine>sit EOB   Transfers   Transfers sit-stand transfer;toilet transfer   Sit-Stand Transfer   Sit/Stand Transfer Comments CGA STS from EOB + FWW   Toilet Transfer   Type (Toilet Transfer) sit-stand   [place] Level (Toilet Transfer) minimum assist (75% patient effort)   Toilet Transfer Comments per clinical judgement   Balance   Balance Assessment sitting balance: static    Balance Comments SBA-CGA static sitting balance at EOB   Activities of Daily Living   BADL Assessment/Intervention bathing;lower body dressing;toileting   Bathing Assessment/Intervention   [place] Level (Bathing) moderate assist (50% patient effort);verbal cues;set up   Comment, (Bathing) per clinical judgement   Lower Body Dressing Assessment/Training   Comment, (Lower Body Dressing) per clinical judgement   [place] Level (Lower Body Dressing) moderate assist (50% patient effort)   Toileting   Comment, (Toileting) per clinical judgement   [place] Level (Toileting) minimum assist (75% patient effort);set up   Clinical Impression   Criteria for Skilled Therapeutic Interventions Met (OT) Yes, treatment indicated   OT Diagnosis Decreased ADL IND/safety, cardiovascular endurance, functional mobility, overall activity tolerance   OT Problem List-Impairments impacting ADL problems related to;activity tolerance impaired;balance;range of motion (ROM);strength;pain   Assessment of Occupational Performance 5 or more Performance Deficits   Identified Performance Deficits standing ADL tolerance, functional mobility, muscular strength/endurance, leisure, activity tolerance, cognition, at risk for deconditioning   Planned Therapy Interventions (OT) ADL retraining;IADL retraining;balance training;cognition;ROM;strengthening;transfer training;progressive activity/exercise   Clinical Decision Making Complexity (OT) low complexity   Anticipated Equipment Needs Upon Discharge (OT) other (see comments)  (TBD)   Risk & Benefits of therapy have been explained evaluation/treatment results reviewed;care plan/treatment goals reviewed;participants included;patient   Clinical Impression Comments Pt will benefit from continued skilled OT/CR during IP stay to progress ADL IND/safety, cardiovascular endurance, and return to PLOF.   OT Total Evaluation Time   OT Eval, Low Complexity Minutes (36289) 6   OT Goals   Therapy  Frequency (OT) 6 times/wk   OT Predicted Duration/Target Date for Goal Attainment 02/18/23   OT Goals Toilet Transfer/Toileting;Cognition;Cardiac Phase 1;Lower Body Dressing   OT: Lower Body Dressing Minimal assist;within precautions   OT: Toilet Transfer/Toileting Supervision/stand-by assist;toilet transfer;cleaning and garment management   OT: Cognitive Patient/caregiver will verbalize understanding of cognitive assessment results/recommendations as needed for safe discharge planning   OT: Understanding of cardiac education to maximize quality of life, condition management, and health outcomes Patient;Caregiver;Verbalize;Demonstrate   OT: Perform aerobic activity with stable cardiovascular response intermittent;10 minutes;15 minutes;ambulation;NuStep   OT: Functional/aerobic ambulation tolerance with stable cardiovascular response in order to return to home and community environment Supervision/SBA;Assistive device;Standard walker;200 feet;Greater than 300 feet   OT: Navigation of stairs simulating home set up with stable cardiovascular response in order to return to home and community environment Minimal assist;4 stairs;Assistive device   OT Discharge Planning   OT Plan OT/CR: monitor cognition, progress endurance/ambulation, encourage up in chair, standing ADLs, toileting, asess further PT needs   OT Discharge Recommendation (DC Rec) home with assist;home with outpatient cardiac rehab;Transitional Care Facility   OT Rationale for DC Rec Pt below baseline function for ADL tasks, mobility, overall activity tolerance however may be nearing PLOF status. Per pt report, receives 24/7 assist from daughter for ADL/IADL tasks at home and utilizes walker for all mobility. Anticipate pt will progress to home with assist and OP CR to progress cardiovascular endurance and IND/safety. If limited family support, may benefit from rehab stay at TCU. Will monitor and update.   OT Brief overview of current status CGA-min A + FWW  for transfers and short in-room mobility, min A bed mobility   Total Session Time   Total Session Time (sum of timed and untimed services) 6

## 2023-02-06 NOTE — CONSULTS
Cardiothoracic Surgery Consultation         Shelly Becerril   1950   MRN: [id number]           Cardiothoracic Consult Note   Reason for Consult: To establish care and consider future aortic repair in setting of Type B aortic dissection with previous ascending aortic repair with root reconstruction and bioprosthetic AV in 2018   Consulting Cardiothoracic Surgeon: Dr. Omega Briceno          Assessment and Plan:     Visit completed with the assistance of  with patient and daughter.    Shelly Becerril is a 73 year old Hmong female with a history of ascending aortic aneurysm s/p ascending repair with aortic root reconstruction, and bioprosthetic aortic valve in 2018, chronic type B aortic dissection, and known extent 2 thoracoabdominal aneurysm admitted with acute on chronic type B dissection. Meeting HR and BP with oral medications, off antihypertensive drips for > 48 hours. Aneurysm has grown 7-8 mm since the CTA on 4/6/2021. CVTS was consulted to establish care and consider future aortic repair in setting of growing Type B dissection with previous ascending aortic root reconstruction. Pt has complained of chest and back discomfort since admission and continues to complain of this today.     - Recommend surgery given ongoing chest pain. Family wants to discuss and will let us know.  - Preop orders in place under signed and held if patient elects to proceed with surgical intervention. Case would be Wednesday 2/8 at 07:30.   - NPO at midnight, type and screen ordered.    Primary care provider: Erica Canada     Thank you for the opportunity to participate in the care of Shelly Becerril.  Patient and plan discussed with attending.    BOUCHRA Mobley, ACNPC-AG, CCRN  Nurse Practitioner  Cardiothoracic Surgery  Pager: [phone]    * a total of 60 minutes was spent on this consult, including coordination of care, review of lab and imaging results,  patient communication and education, and discussion of care plan  with primary team and surgeon.         History of Present Illness:     Shelly Becerril is a 73 year old female who speaks Hmong with a history of ascending aortic aneurysm s/p ascending repair with aortic root reconstruction, and bioprosthetic aortic valve in 2018, chronic type B aortic dissection, HTN, HLD, UGI bleed on chronic anticoagulation who was admitted 2/4/2023 from an outside hospital with chest, abdominal and back pain 2/3/2023. She was found to have a Type B aortic dissection and transferred to [facility]. She was admitted to the ICU and treated with esmolol and nicardipine for HR and B/P control. These were weaned off as her B/p and HR are well controlled on amlodipine and Coreg.     A repeat Chest/Abd/Pelvis CT done 2/5 showed interval increase in ratio of false lumen to true lumen size in patient's known type B thoracic aortic dissection. There has been minimal interval increase in size of aneurysmal degeneration of the descending thoracic aorta (although within the range of error). 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 Moderate narrowing of the origin of the left renal artery and SMA. Ascending aorta 3.9 cm, arch 5.5 cm, proximal descending thoracic aorta 5.4 cm. Please see CT results for further descending aorta diameters.       Home Meds:  Medications Prior to Admission   Medication Sig Dispense Refill Last Dose     apixaban ANTICOAGULANT (ELIQUIS) 5 MG tablet Take 5 mg by mouth 2 times daily        atorvastatin (LIPITOR) 40 MG tablet Take 40 mg by mouth daily        lisinopril (ZESTRIL) 40 MG tablet Take 40 mg by mouth daily        metoprolol succinate ER (TOPROL XL) 50 MG 24 hr tablet Take 50 mg by mouth daily        omeprazole (PRILOSEC) 40 MG DR capsule Take 40 mg by mouth daily        ARTHRITIS PAIN RELIEF, ACETAM, 650 mg CR tablet [ARTHRITIS PAIN RELIEF, ACETAM, 650 MG CR TABLET] Take 650 mg by mouth every 8 (eight) hours as needed for pain (knee pain).         mirtazapine (REMERON) 15  MG tablet [MIRTAZAPINE (REMERON) 15 MG TABLET] Take 15 mg by mouth at bedtime.           Allergies:  No Known Allergies    PMH:  Past Medical History:   Diagnosis Date     Anticoagulated on Coumadin      Aortic aneurysm (H)      Hypertension 12/19/2019       PSH:  Past Surgical History:   Procedure Laterality Date     CARDIAC SURGERY  04/2018    aortic valve replacement, aortic root reconstruction     MT ESOPHAGOGASTRODUODENOSCOPY TRANSORAL DIAGNOSTIC N/A 4/6/2021    Procedure: ESOPHAGOGASTRODUODENOSCOPY (EGD) WITH BIOPSY.;  Surgeon: Joey Garcia MD;  Location: [facility];  Service: Gastroenterology       FH:  No family history on file.    SH:  Social History     Socioeconomic History     Marital status:    Tobacco Use     Smoking status: Never     Smokeless tobacco: Never   Substance and Sexual Activity     Alcohol use: Never     Drug use: Never                Review of Systems:    ROS: 10 point ROS neg other than the symptoms noted above in the HPI.         Physical Exam:   Temp:  [97.6  F (36.4  C)-98.9  F (37.2  C)] 97.6  F (36.4  C)  Pulse:  [56-89] 75  Resp:  [13-18] 14  BP: ()/(45-97) 104/77  SpO2:  [90 %-98 %] 93 %  Gen: NAD, resting comfortably in bed, conversational  Skin: no rashes or bruises, warm, dry  HEENT: normocephalic, atraumatic cranium, EOMI, sclerae anicteric. Oral mucosa pink and moist, midline trachea  Lungs: CTA in all fields, no wheezing or rhonchi. Previous sternotomy scar.  CV: RRR, S1S2 normal, no murmur. Radial pulses and DP pulses symmetric. No dependent edema.   Abd: no scars, positive normal pitched bowel sounds, overall soft and non distended, nontender, no hepatosplenomegaly, no masses/guarding/rebound tenderness.   Musculoskeletal: grossly intact, strength 5/5 upper and lower extremities  Neuro: AOx3, CN II-VII grossly intact, sensation/motor intact in upper and lower extremities  Mental: normal mood and affect, regular rate of speech           LABS:      BMP  Recent Labs   Lab 02/06/23  0326 02/05/23  0321 02/04/23  1646 02/04/23  1126    141 138 138   POTASSIUM 3.5 3.8 4.2  4.2 4.3   CHLORIDE 107 107 105 104   FAVIOLA 8.2* 8.8 8.9 8.7*   CO2 24 24 22 24   BUN 12.5 14.4 17.1 17.3   CR 0.97* 0.97* 1.02* 0.98*   GLC 97 108* 97 104*     CBC  Recent Labs   Lab 02/06/23  0326 02/05/23  0321 02/04/23  1840 02/04/23 0423   WBC 6.4 6.8 7.7 9.8   RBC 3.47* 3.62* 3.73* 4.18   HGB 9.8* 10.1* 10.5* 11.6*   HCT 32.1* 32.8* 33.5* 37.9   MCV 93 91 90 91   MCH 28.2 27.9 28.2 27.8   MCHC 30.5* 30.8* 31.3* 30.6*   RDW 13.5 13.2 13.3 13.2   * 145* 141* 170     INR  Recent Labs   Lab 02/04/23  0421 02/04/23  0019   INR 1.20* 1.26*      Hepatic Panel  Recent Labs   Lab 02/06/23  0326 02/04/23  0421 02/04/23  0019   AST 27 29 34   ALT 15 13 18   ALKPHOS 107* 150* 166*   BILITOTAL 0.5 0.5 0.5   ALBUMIN 3.1* 3.9 4.4            Imaging:     Chest/Abd/Pelvis CT 2/5/2023: Impression:   1. Interval increase in ratio of false lumen to true lumen size in  patient's known type B thoracic aortic dissection. There has been  minimal interval increase in size of aneurysmal degeneration of the  descending thoracic aorta (although within the range of error).  2. 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 Moderate  narrowing of the origin of the left renal artery and SMA.  3. Postoperative changes of ascending aorta and aortic valve  replacement.  4. Multinodular thyroid. Consider thyroid ultrasound for further  Characterization.    Chest/Abd/Pelvis CT 2/4/2023: Impression:   1. Type B thoracic aortic dissection extending to the level of the  origin of the celiac artery which arises from the true lumen. There is  pooling of contrast within the false lumen indicative of patency  without appreciable focal dissection origin.  2. 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  3. Postoperative changes of previous type A thoracic aortic  dissection  and aortic valve replacement.  4. Multinodular thyroid. Further assessment can be performed with  thyroid ultrasound.    Echocardiogram 2/4/2023: Interpretation Summary  Left ventricular size, wall motion and function are normal. The ejection  fraction is 60-65%.  Global right ventricular function is normal.  Bioprosthetic aortic valve; normal doppler assessment (MG 11mmHg, PV 2.4m/s)  The inferior vena cava is normal.  History of ascending aorta repair with valved graft per chart review.  Ascending aorta is mildly dilated, 3.9cm.  Dilated descending thoracic aorta with dissection flap as noted on CT chest.  No pericardial effusion.     This study was compared with the study from 11/5/21. No change in ventricular  function or aortic valve parameters.            Problem List:     Patient Active Problem List   Diagnosis     Mood disorder (H)     Benign essential hypertension     Hx of medication noncompliance     Urinary incontinence     Ascending aortic aneurysm     UGIB (upper gastrointestinal bleed)     Anticoagulated on Coumadin     H/O aortic valve replacement     Hx of repair of dissecting aneurysm of ascending thoracic aorta     Anemia due to blood loss, acute     Duodenal ulcer due to Helicobacter pylori     Iron deficiency anemia, unspecified     Right-sided lacunar infarction (H)     Dysphasia     Middle cerebral artery stenosis, bilateral     Normocytic anemia     Aortic dissection (H)

## 2023-02-06 NOTE — PLAN OF CARE
Major Shift Events: alert and oriented x4, calm and cooperative, denies pain. Metoprolol given X1 and labetalol given X1, for HR less than 70, systolic BP less than 120 and Map less than 80. Weak pulses in lower extremities. Afebrile. Sating adequately on room air, lung sounds clear. Tolerating regular diet, voids in bathroom. No BM. Skin intact. Heprin @450, not therapeutic, awaiting recheck results.   Plan: CVTS consulted for surgical intervention, give antihypertensives as able   For vital signs and complete assessments, please see documentation flowsheets.

## 2023-02-06 NOTE — PROGRESS NOTES
Vascular Surgery Progress Note    Seen with [employer] interpretor via iPad. Patient feeling well this morning. Denies chest, back, abdominal, or leg pain. Tolerating regular diet. Has not yet been out of bed. HR and BP controlled on current PO regimen with hydralazine x1 and labetalol x2 in the last 24 h    /62   Pulse 64   Temp 98.8  F (37.1  C) (Oral)   Resp 16   Ht 1.524 m (5')   Wt 57.3 kg (126 lb 4.8 oz)   SpO2 96%   BMI 24.67 kg/m      Resting comfortably in bed, no acute distress  Alert and appropriate  Nonlabored breathing on room air  Abdomen soft, nontender, nondistended  Black catheter in place  Feet warm, mild edema  Palpable radial and femoral pulses bilaterally, Multiphasic bilateral DP and PT signals    Na 140  K 3.5  Cr 0.97  WBC 6.4  Hgb 9.8, stable  Plts 134    CTA from yesterday reviewed which shows early remodeling of the acute type B dissection in the descending thoracic aorta. Extent 2 thoracoabdominal aneurysm is stable since admission. Proximal descending thoracic aorta measures 5.9 cm transversely by my measurements, 5.0 cm at the supraceliac aorta, and 3.0 cm at the renals. 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      A/P: 73 y.o. female with history of ascending aortic aneurysm s/p ascending repair with aortic root reconstruction, and bioprosthetic aortic valve in 2018, chronic type B aortic dissection, and known extent 2 thoracoabdominal aneurysm admitted with acute on chronic type B dissection.   Meeting HR and BP with oral medications, off antihypertensive drips for > 48 hours.   Aneurysm has grown 7-8 mm since the CTA on 4/6/2021.      - continue PO regimen with IV prns to maintain SBP < 120 and HR < 70  - continue heparin, will transition to PO anticoagulation when able  - regular diet  - cardiothoracic surgery consult to establish care and discuss options for future aortic repair    Dispo: transfer to     Discussed with Dr. Quinteros.    Lisandro Lemus,  MD

## 2023-02-06 NOTE — PLAN OF CARE
Major Shift Events:  Neuro: A/O x 2. Moves all extremities, follows commands. Hmong speaking. Patient c/o back pain this morning, relieved with tylenol and ambulating to chair. About 1215, patient notably more uncomfortable, c/o pain in back. Attempted to reach , was on hold >20 minutes. Called son-in-law to help translate. Per son-in-law patient reporting pain in chest radiating to back. Patient placed back in bed, stat EKG obtained, troponin sent, stat CT ordered. All tests negative, CT unchanged. Pain improved this afternoon.  Cardiovascular: NSR 60-70s. SBP goal <120, HR goal <70. PRN metorolol, labetalol, and hydralazine given to maintain. Patient refused morning meds (including bp meds) this morning until after she ate breakfast, which was at 1030. By this time -150s. SICU aware, norvasc increased and morning BP meds given. SBP <120 by 1130. Patient educated on importance of medication regimen and timing.  Pulmonary: Room air most of day. NC 1L when sleeping. Lung sounds clear.  GI/Nutrition: Regular diet, fair appetite. No BM.  /Electrolytes: Black in place with adequate output. 1L LR given.  Musc/Skin: No skin concerns. Up to chair for ~2 hours with one person assist this morning, tolerated well.  Lines/Drains: PIV x 3. Black      Plan: Transfer to floor. Maintain SBP <120, HR <70. Social work consult placed to assist with travel visas for patient's children in [place].     For vital signs and complete assessments, please see documentation flowsheets.

## 2023-02-07 NOTE — PROGRESS NOTES
CVTS  Patient seen and examined.  Full consult note pending.   Communication was via [employer] interpretor.  The patient states that she has been having increasing chest pain radiating to her mid back.  She was admitted with hypertensive emergency.  Blood pressure is better controlled, but she still complains of chest and back pain.  CT shows 6.0cm aneurysm of proximal descending thoracic aorta.  Aorta appears normal size at left subclavian and just proximal to celiac artery.     I had a barbie discussion with the patient and her daughter.  I explained that her risk of rupture and mortality is likely 100% within 4-6 weeks without surgical repair.  I explained that risk with surgical repair is likely 20% risk of mortality / stroke.  She and her daughter would like to discuss with her extended family in [place] and [place].  I explained that I would plan to do surgery within next few days if she is agreeable.  Plan for surgery would be thoraco-abdominal repair, spinal drain, double lumen endotrachial tube.    Omega Briceno  Cardiothoracic surgery  [phone]

## 2023-02-07 NOTE — PLAN OF CARE
Major Shift Events: Confused at times, having visual hallucinations.  called 5 times unsuccessfully. Staff member on unit Hmong speaking and able to translate at this time. PRNs required to meet vital parameters, BP and HR increase when awake. Temp 100.2 with doris hugger set on high, removed at this time. Complaining of severe abdominal pain, PRN oxycodone ordered. Poor appetite, ate some food brought by daughter. Swallowing pills whole, one at a time. Walking with assist of one to bathroom with adequate urine output. Heparin drip at 600 with 1 therapeutic PTT.  Plan: CVTS consult  For vital signs and complete assessments, please see documentation flowsheets.

## 2023-02-07 NOTE — PROGRESS NOTES
"Brief Vascular Surgery Note    Was contacted by nursing as Ms. Becerril had questions about when she is having the surgery. Her impression was that she was going to surgery today.   Discussion took place with [facility]  via iPhone and we dialoged for 25minutes.    Ms. Becerril noted that she has chest, back and knee pain. She has had back and knee pain for a few years and has not been able to consistently walk in the last 2-3 years. She had \"knee injection\" but did not provide adequate relief and was wondering if we could help her regain function in her lower extremtiies. Historically, physical therapy has helped but insufficient to be consistently active. She also noted that since her stroke (11/2021), she has had difficulty expressing herself with the exact words as she did in the past. No acute stroke symptoms present at this time on exam. Subsequently, has been depressed, now wondering if there is anything we can help with. Ms. Becerril would like to have these active problems managed during this admission, including the surgery for her Type B dissection. The chest pain has remained consistent, noted that she has had chest pain since her admission in the hospital. CTA from 2/5 stable. We will discuss with Dr. Dixon on how to best coordinate and help Ms. Becerril.    Please call vascular surgery should questions arise.    Nayely Urrutia CNP  Vascular Surgery  Pager: [phone]  [email or web address]  Send message or 10 digit call back number Securely via [employer] with the [employer] Web Console (learn more here)     "

## 2023-02-07 NOTE — PLAN OF CARE
Goal Outcome Evaluation:      Plan of Care Reviewed With: patient    Overall Patient Progress: no changeOverall Patient Progress: no change    Outcome Evaluation: Ordered Ensure drinks per pt preference.

## 2023-02-07 NOTE — CONSULTS
"  Endocrinology Consult     Shelly Becerril MRN:[id number] YOB: 1950  Date of Admission:2/4/2023   Primary care provider: Erica Canada     Reason for visit: Aortic disection   Reason for Endocrine consult: \"hyperthyroid workup\"     HPI:  Ms. Becerril is a 73 y.o. female with history of ascending aortic aneurysm s/p ascending repair with aortic root reconstruction, and bioprosthetic aortic valve in 2018, chronic type B aortic dissection, known extent 2 thoracoabdominal aneurysm, history of upper GI bleed (2021), stroke (2021), chronically anticoagulated on warfarin for bioprosthetic aortic valve. She presented to the ED on 2/3/23 with acute chest, back, and abdominal pain with severe hypertension with systolic BP > 200. CTA identified acute type B dissection. She was admitted for further management. SBP, MAP and HR were controlled on esmolol and nicardipine gtts.    Per patient, no weight loss, heat intolerance, or anxiety. No difficulty swallowing or lying flat.Can feel her heart racing since her heart surgery in 2018. She has been taking her medications daily and has not missed doses. No family history of thyroid disorders. She has never been on medications for her thyroid in past.     Protein Bar  was used to obtain history and exam.    Relevant home meds  None    Relevant orders  None    Relevant labs   Latest Reference Range & Units Most Recent   TSH 0.30 - 4.20 uIU/mL 2.49  2/7/23 04:11   TSH 0.30 - 5.00 uIU/mL 0.13 (L)  11/4/21 20:06   (L): Data is abnormally low    Relevant studies  2/5/23 CTA Chest Abdomen  Multinodular thyroid. Consider thyroid ultrasound for further characterization.    ROS:  All 12 systems were reviewed and negative except as mentioned in HPI          Past Medical/Surgical History:       Past Medical History:   Diagnosis Date     Anticoagulated on Coumadin      Aortic aneurysm (H)      Hypertension 12/19/2019     Past Surgical History:   Procedure Laterality Date     " CARDIAC SURGERY  04/2018    aortic valve replacement, aortic root reconstruction     DE ESOPHAGOGASTRODUODENOSCOPY TRANSORAL DIAGNOSTIC N/A 4/6/2021    Procedure: ESOPHAGOGASTRODUODENOSCOPY (EGD) WITH BIOPSY.;  Surgeon: Joey Garcia MD;  Location: [facility];  Service: Gastroenterology             Allergies:     No Known Allergies          PTA Meds:     Prior to Admission medications    Medication Sig Last Dose Taking? Auth Provider Long Term End Date   apixaban ANTICOAGULANT (ELIQUIS) 5 MG tablet Take 5 mg by mouth 2 times daily  Yes Unknown, Entered By History     atorvastatin (LIPITOR) 40 MG tablet Take 40 mg by mouth daily  Yes Unknown, Entered By History Yes    lisinopril (ZESTRIL) 40 MG tablet Take 40 mg by mouth daily  Yes Unknown, Entered By History Yes    metoprolol succinate ER (TOPROL XL) 50 MG 24 hr tablet Take 50 mg by mouth daily  Yes Unknown, Entered By History Yes    omeprazole (PRILOSEC) 40 MG DR capsule Take 40 mg by mouth daily  Yes Unknown, Entered By History     ARTHRITIS PAIN RELIEF, ACETAM, 650 mg CR tablet [ARTHRITIS PAIN RELIEF, ACETAM, 650 MG CR TABLET] Take 650 mg by mouth every 8 (eight) hours as needed for pain (knee pain).    Provider, Historical     mirtazapine (REMERON) 15 MG tablet [MIRTAZAPINE (REMERON) 15 MG TABLET] Take 15 mg by mouth at bedtime.    Provider, Historical                Current Medications:     Current Facility-Administered Medications   Medication     acetaminophen (TYLENOL) tablet 650 mg     amLODIPine (NORVASC) tablet 10 mg     atorvastatin (LIPITOR) tablet 40 mg     carvedilol (COREG) tablet 12.5 mg     carvedilol (COREG) tablet 37.5 mg     glucose gel 15-30 g    Or     dextrose 50 % injection 25-50 mL    Or     glucagon injection 1 mg     heparin infusion 25,000 units in D5W 250 mL ANTICOAGULANT     hydrALAZINE (APRESOLINE) injection 5 mg     labetalol (NORMODYNE/TRANDATE) injection 10 mg     Lidocaine (LIDOCARE) 4 % Patch 1 patch     lidocaine patch  in PLACE     magnesium oxide (MAG-OX) tablet 400 mg     melatonin tablet 10 mg     methocarbamol (ROBAXIN) tablet 500 mg     metoprolol (LOPRESSOR) injection 5 mg     mirtazapine (REMERON) tablet TABS 15 mg     naloxone (NARCAN) injection 0.2 mg    Or     naloxone (NARCAN) injection 0.4 mg    Or     naloxone (NARCAN) injection 0.2 mg    Or     naloxone (NARCAN) injection 0.4 mg     ondansetron (ZOFRAN ODT) ODT tab 4 mg    Or     ondansetron (ZOFRAN) injection 4 mg     oxyCODONE IR (ROXICODONE) half-tab 2.5-5 mg     pantoprazole (PROTONIX) EC tablet 40 mg     senna-docusate (SENOKOT-S/PERICOLACE) 8.6-50 MG per tablet 2 tablet     sodium chloride (PF) 0.9% PF flush 10 mL             Family History:     No family history on file.    No family history of thyroid disease.      Social History:     Social History     Tobacco Use     Smoking status: Never     Smokeless tobacco: Never   Substance Use Topics     Alcohol use: Never               Physical Exam:     /73   Pulse 74   Temp 99.1  F (37.3  C) (Oral)   Resp 16   Ht 1.524 m (5')   Wt 59.6 kg (131 lb 6.3 oz)   SpO2 91%   BMI 25.66 kg/m      General: NAD  HEENT: Enlarged thyroid  Chest: Equal movement bilaterally  Cardio: Regular rate, warm and well-perfused  MSK: No pedal edema  Skin: No rash noted  Neuro: AAOx3, neuro exam grossly nonfocal  Psych: Calm        Labs:     Recent Results (from the past 24 hour(s))   Partial thromboplastin time    Collection Time: 02/06/23 10:54 AM   Result Value Ref Range    aPTT 41 (H) 22 - 38 Seconds   Partial thromboplastin time    Collection Time: 02/06/23  5:23 PM   Result Value Ref Range    aPTT 45 (H) 22 - 38 Seconds   Partial thromboplastin time    Collection Time: 02/07/23 12:30 AM   Result Value Ref Range    aPTT 58 (H) 22 - 38 Seconds   Lactic acid whole blood    Collection Time: 02/07/23  4:11 AM   Result Value Ref Range    Lactic Acid 0.9 0.7 - 2.0 mmol/L   CBC with platelets    Collection Time: 02/07/23  4:11 AM    Result Value Ref Range    WBC Count 6.8 4.0 - 11.0 10e3/uL    RBC Count 3.77 (L) 3.80 - 5.20 10e6/uL    Hemoglobin 10.6 (L) 11.7 - 15.7 g/dL    Hematocrit 34.5 (L) 35.0 - 47.0 %    MCV 92 78 - 100 fL    MCH 28.1 26.5 - 33.0 pg    MCHC 30.7 (L) 31.5 - 36.5 g/dL    RDW 13.9 10.0 - 15.0 %    Platelet Count 149 (L) 150 - 450 10e3/uL   Magnesium    Collection Time: 02/07/23  4:11 AM   Result Value Ref Range    Magnesium 1.9 1.7 - 2.3 mg/dL   Phosphorus    Collection Time: 02/07/23  4:11 AM   Result Value Ref Range    Phosphorus 3.8 2.5 - 4.5 mg/dL   Comprehensive metabolic panel    Collection Time: 02/07/23  4:11 AM   Result Value Ref Range    Sodium 140 136 - 145 mmol/L    Potassium 4.0 3.4 - 5.3 mmol/L    Chloride 106 98 - 107 mmol/L    Carbon Dioxide (CO2) 24 22 - 29 mmol/L    Anion Gap 10 7 - 15 mmol/L    Urea Nitrogen 13.0 8.0 - 23.0 mg/dL    Creatinine 1.10 (H) 0.51 - 0.95 mg/dL    Calcium 8.7 (L) 8.8 - 10.2 mg/dL    Glucose 119 (H) 70 - 99 mg/dL    Alkaline Phosphatase 119 (H) 35 - 104 U/L    AST 33 10 - 35 U/L    ALT 18 10 - 35 U/L    Protein Total 6.5 6.4 - 8.3 g/dL    Albumin 3.2 (L) 3.5 - 5.2 g/dL    Bilirubin Total 0.6 <=1.2 mg/dL    GFR Estimate 53 (L) >60 mL/min/1.73m2   TSH with free T4 reflex    Collection Time: 02/07/23  4:11 AM   Result Value Ref Range    TSH 2.49 0.30 - 4.20 uIU/mL   Partial thromboplastin time    Collection Time: 02/07/23  6:59 AM   Result Value Ref Range    aPTT 67 (H) 22 - 38 Seconds                Assessment and Plan:   Shelly Becerril is a 73 year old female with PMHx of HTN, stroke (2021), ascending aortic aneurysm s/p ascending repair with aortic root reconstruction, and bioprosthetic aortic valve in 2018, and known extent 2 thoracoabdominal aneurysm admitted with acute on chronic type B dissection and SBP >200. Multinodular thyroid firtst noted on CTA in 2019. Seen again this hospital admission. Not taking any thyroid medications. No family history of thyroid disease. Patient  does not endorse any weight loss, heat intolerance or anxiety and TSH 2.49 (wnl), 0.13 in 2021 with no follow-up T4.    Plan:  -Non-urgent outpatient follow-up  -Endocrinology will sign off. Please reach out with any questions.    Discussed with attending  Evelyn Sheets, MS4

## 2023-02-07 NOTE — PROGRESS NOTES
"CLINICAL NUTRITION SERVICES - ASSESSMENT NOTE     Nutrition Prescription    RECOMMENDATIONS FOR MDs/PROVIDERS TO ORDER:  Encourage PO.    Malnutrition Status:    Severe malnutrition in the context of acute on chronic illness    Recommendations already ordered by Registered Dietitian (RD):  Supplements: Ensure Enlive (vary flavors) @ 10am & 2pm    Placed some \"likes\" in [employer] meal ordering system    Future/Additional Recommendations:  -Monitor tolerance and use of Ensure drinks.   -Monitor wt and PO trends.     REASON FOR ASSESSMENT  Shelly Becerril is a/an 73 year old female assessed by the dietitian for Admission Nutrition Risk Screen for positive    NUTRITION HISTORY  Pt assessed by Clinical Nutrition 11/7/2021 for \"Admission Nutrition Risk Screen for wt loss and decreased po intake.\" At that time, pt had suffered a CVA and was working with SLP on swallowing for dysphagia. Pt declined supplements at that time.    No food allergies noted per chart review.    Met with pt at bedside using iPad HOSTING . Pt reports eating <50% usual PO over past ~5-6 weeks. She shares she's constipated at baseline and normally has a BM every 10 days or so. She denies using bowel medications, supplements, or vitamin/minerals at home.     CURRENT NUTRITION ORDERS  Diet: Regular  Intake/Tolerance: Pt c/o severe abdominal pain and poor appetite noted but ate some food brought by pt's daughter per RN chart note this morning. Pt reports last BM was yesterday. She is interested in trying Ensure drinks as she really wants to get better and regain strength. Writer encouraged protein intake and reviewed and encouraged high-protein sources that pt likes (chicken, pork, fish, tofu, soy milk). Poor appetite and nausea seems like her biggest barriers to eating at this time when writer was interviewing her. She denies current abdominal pain. Her family has been bringing her food from home, but she's only had bites of rice, vegetables, and " "sips of broth.    LABS  Labs reviewed  Electrolytes  Potassium (mmol/L)   Date Value   02/07/2023 4.0   02/06/2023 3.5   02/05/2023 3.8   06/09/2022 3.9   11/06/2021 3.8   11/04/2021 3.7     Phosphorus (mg/dL)   Date Value   02/07/2023 3.8   02/06/2023 3.8   02/05/2023 4.0    Blood Glucose  Glucose (mg/dL)   Date Value   02/07/2023 119 (H)   02/06/2023 97   02/05/2023 108 (H)   02/04/2023 97   02/04/2023 104 (H)   06/09/2022 107   11/06/2021 82   11/04/2021 115   04/06/2021 100   04/05/2021 96     GLUCOSE BY METER POCT (mg/dL)   Date Value   02/04/2023 141 (H)   11/09/2021 75   11/08/2021 85   11/08/2021 102 (H)   11/06/2021 101 (H)     Hemoglobin A1C (%)   Date Value   11/04/2021 4.7    Inflammatory Markers  WBC Count (10e3/uL)   Date Value   02/07/2023 6.8   02/06/2023 6.4   02/05/2023 6.8     Albumin (g/dL)   Date Value   02/07/2023 3.2 (L)   02/06/2023 3.1 (L)   02/04/2023 3.9   06/09/2022 3.3 (L)   11/06/2021 3.3 (L)   04/06/2021 3.1 (L)      Magnesium (mg/dL)   Date Value   02/07/2023 1.9   02/06/2023 2.1   02/05/2023 2.0     Sodium (mmol/L)   Date Value   02/07/2023 140   02/06/2023 140   02/05/2023 141    Renal  Urea Nitrogen (mg/dL)   Date Value   02/07/2023 13.0   02/06/2023 12.5   02/05/2023 14.4   06/09/2022 28   11/06/2021 13   11/04/2021 20     Creatinine (mg/dL)   Date Value   02/07/2023 1.10 (H)   02/06/2023 0.97 (H)   02/05/2023 0.97 (H)     Additional  Triglycerides (mg/dL)   Date Value   06/09/2022 115   11/04/2021 273 (H)        MEDICATIONS  Medications reviewed  -Remeron  -Senna-docusate    ANTHROPOMETRICS  Height: 152.4 cm (5' 0\")  Most Recent Weight: 59.6 kg (131 lb 6.3 oz)    IBW: 45.5 kg  BMI: 25.66 kg/m2; Overweight BMI 25-29.9  Weight History: Limited recent wt hx below and in Care Everywhere.  Wt Readings from Last 20 Encounters:   02/07/23 59.6 kg (131 lb 6.3 oz)   02/03/23 65.8 kg (145 lb)   11/08/21 54.5 kg (120 lb 1.6 oz)   04/05/21 51.3 kg (113 lb)   01/03/20 63.5 kg (140 lb) "   Dosing Weight: 48 kg (adjusted, based on lowest/admit wt of 57.3 kg on 2/4 and IBW of 45.5 kg)    ASSESSED NUTRITION NEEDS  Estimated Energy Needs: 4797-2398 kcals/day (25 - 30 kcals/kg)  Justification: Maintenance  Estimated Protein Needs: 72-96 grams protein/day (1.5 - 2 grams of pro/kg)  Justification: Hypercatabolism with critical illness  Estimated Fluid Needs: 1 mL/kcal   Justification: Maintenance or Per provider pending fluid status    PHYSICAL FINDINGS  See malnutrition section below.  -Skin is thin, dry  -Nails, hair WNL for age    MALNUTRITION  % Intake: </= 50% for >/= 5 days (severe)  % Weight Loss: Unable to assess - limited wt hx  Subcutaneous Fat Loss: Facial: Mild, Upper arm:  Mild and Lower arm:  Mild  Muscle Loss: Temporal:  Mild, Scapular bone:  Mild, Thoracic region (clavicle, acromium bone, deltoid, trapezius, pectoral):  Moderate, Upper arm (bicep, tricep):  Moderate, Lower arm  (forearm):  Moderate, Dorsal hand:  Mild, Upper leg (quadricep, hamstring):  Moderate, Patellar region:  Moderate and Posterior calf:  Moderate - pt reports noticing muscle loss, particularly on BLE  Fluid Accumulation/Edema: None noted - pt denies any fluid around ankles at baseline as well  Malnutrition Diagnosis: Severe malnutrition in the context of acute on chronic illness    NUTRITION DIAGNOSIS  Inadequate oral intake related to poor appetite as evidenced by pt report of eating <50% of usual PO over past 5-6 days and signs of subcutaneous adipose and muscle loss on physical exam.      INTERVENTIONS  Implementation  -Nutrition Education (pt via iPad [email or web address] ): Provided education on RD role, role of NFPE. Discussed at length recommendations to increase protein intake to support LBM and strength, reviewed protein sources she enjoys (she shares she's picky when it comes to meats). Reviewed supplements available. Pt willing to try Ensure.   -Medical food supplement therapy     Goals  Patient to consume  % of nutritionally adequate meal trays TID, or the equivalent with supplements/snacks.     Monitoring/Evaluation  Progress toward goals will be monitored and evaluated per protocol.    Anne Glover RD, LD  Pager: [phone]

## 2023-02-07 NOTE — PRE-PROCEDURE
Vascular Surgery Attending Pre-procedure Note    I have seen and examined this patient and discussed her progress with Dr. Briceno. The patient has a history of type A dissection requiring root reconstruction and valve replacement in 2018. She presented with a new type B dissection on 2/4. She has been observed in the ICU setting for BP control but has experienced ongoing severe pain in the chest and back. Due to the ongoing pain despite BP control, Dr. Briceno has recommended intervention. The anatomy of the dissection is not amenable to TEVAR, therefore an open repair with replacement of the descending thoracic aorta is planned for tomorrow. Dr. Briceno has requested that the vascular surgery team be present to assist with the operation.    Esperanza Dixon MD

## 2023-02-07 NOTE — PROGRESS NOTES
Vascular Surgery Progress Note    Seen with [employer] interpretor via iPhone. Patient feeling well this morning. Denies chest, back, abdominal, or leg pain on this morning's exam. Tolerating regular diet. Out of bed with assistance and walker. HR and BP mostly well controlled with one episode of /64 at 3:30am and 2 episodes of hypotension 86/50 at 6am. Required 1 dose of PRN Labetalol and 1 dose of Metoprolol overnight.     BP (!) 86/50   Pulse 63   Temp 98.6  F (37  C) (Oral)   Resp 16   Ht 1.524 m (5')   Wt 59.6 kg (131 lb 6.3 oz)   SpO2 96%   BMI 25.66 kg/m      Resting comfortably in bed, no acute distress  Alert and appropriate  Nonlabored breathing on room air  Abdomen soft, nontender, nondistended  Feet warm, mild edema  Palpable radial and femoral pulses bilaterally, Multiphasic bilateral DP and PT signals    Na 140  K 4.0  Cr 110  WBC 6.8  Hgb 10.6  Plts 149    CTA from 2/5 reviewed which shows early remodeling of the acute type B dissection in the descending thoracic aorta. Extent 2 thoracoabdominal aneurysm is stable since admission. Proximal descending thoracic aorta measures 5.9 cm transversely by my measurements, 5.0 cm at the supraceliac aorta, and 3.0 cm at the renals. 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      A/P: 73 y.o. female with history of ascending aortic aneurysm s/p ascending repair with aortic root reconstruction, and bioprosthetic aortic valve in 2018, chronic type B aortic dissection, and known extent 2 thoracoabdominal aneurysm admitted with acute on chronic type B dissection.   Meeting HR and BP with oral medications, off antihypertensive drips for > 48 hours.   Aneurysm has grown 7-8 mm since the CTA on 4/6/2021.    - up tittrate PO regimen with IV prns to maintain SBP < 120 and HR < 70  - continue heparin, will transition to PO anticoagulation when able  - hyperthyroid workup, consult endocrine  - regular diet  - cardiothoracic surgery consult to  establish care and discuss options for future aortic repair    Dispo: transfer to LIZEBTH Urrutia CNP  Vascular Surgery  Pager: [phone]  [email or web address]  Send message or 10 digit call back number Securely via [employer] with the [employer] Web Console (learn more here)

## 2023-02-08 NOTE — BRIEF OP NOTE
"[facility]    Brief Operative Note    Pre-operative diagnosis: History of aortic arch repair [Z98.890]  Post-operative diagnosis Type B acute on chronic dissection    Procedure: Procedure(s):  Left thoracotomy, thoracoabdominal aortic aneurysm repair, repair with left heart bypass, open exposure of left femoral artery  Surgeon: Surgeon(s) and Role:     * Omega Briceno MD - Primary     * Mumtaz Montero MD - Assisting     * Lisandro Lemus MD - Resident - Assisting     * Malcolm Mary MD - Resident - Assisting     * Lora Gamboa MD - Fellow - Assisting     * Rodrigue Leos MD     * Esperanza Dixon MD  Anesthesia: General   Estimated Blood Loss: 1000 ml  Drains: 3 left pleural chest tubesSpecimens: * No specimens in log *  Findings:   see op note.  Complications: None.  Implants:   Implant Name Type Inv. Item Serial No.  Lot No. LRB No. Used Action   GRAFT VASCULAR GEL WEAVE ST 34MM [id number] - [id number] Graft GRAFT VASCULAR GEL WEAVE ST 34MM [id number] N/A 1 Implanted   GRAFT PTFE FELT 6X6\" [id number] - [id number] Graft GRAFT PTFE FELT 6X6\" [id number]  [employer] [id number] N/A 1 Implanted           "

## 2023-02-08 NOTE — PLAN OF CARE
"Neuro: A&Ox3-4. Initially on return from angio pt told RN she \"didn't know\" any of the answers to the orientation questions because she was \"too tired\". Impulsive when getting OOB to use bathroom d/t urgency w/ urination. Bed alarm on for safety. Follows commands. Pupils eq/reactive. Ambulates as Ax1 w/ walker.  Cardiac: SBP>120 w/ PRN labetalol 10 mg x2; 10 mg Hydralazine x1, 5 mg Metoprolol x1. No c/o pain overnight.  Respiratory: LS clear/dim, on 2L NC when sleeping  GI: Hypoactive bowel sounds. NPO since 0000 for cath lab this AM  : Voiding spontaneously on own, frequently unable to measure urine output amount d/t incontinence on way to toilet  LDAs: PIVx2  Integ: R radial IR access site. TR band removed   Tests/Procedures: Angio yesterday evening, returned to 4A at 2100  Misc: Hep gtt shut off around 2130 per Dr. Briceno    For vital signs and complete assessments, please see documentation flowsheets    Plan: monitor hemodynamic status, Cath lab this AM as a add on.    Problem: Hypertension Comorbidity  Goal: Blood Pressure in Desired Range  Outcome: Progressing  Intervention: Maintain Blood Pressure Management  Flowsheets  Taken 2/8/2023 0502  Medication Review/Management:    medications reviewed    dosing adjusted    provider consulted  Taken 2/8/2023 0400  Medication Review/Management: medications reviewed  Taken 2/8/2023 0000  Medication Review/Management: medications reviewed  Taken 2/7/2023 2100  Medication Review/Management: medications reviewed     Goal Outcome Evaluation:                        "

## 2023-02-08 NOTE — H&P
CV ICU H&P    CO-MORBIDITIES:   Patient Active Problem List   Diagnosis     Mood disorder (H)     Benign essential hypertension     Hx of medication noncompliance     Urinary incontinence     Ascending aortic aneurysm     UGIB (upper gastrointestinal bleed)     Anticoagulated on Coumadin     H/O aortic valve replacement     Hx of repair of dissecting aneurysm of ascending thoracic aorta     Anemia due to blood loss, acute     Duodenal ulcer due to Helicobacter pylori     Iron deficiency anemia, unspecified     Right-sided lacunar infarction (H)     Dysphasia     Middle cerebral artery stenosis, bilateral     Normocytic anemia     Aortic dissection (H)       ASSESSMENT: Shelly Becerril is a 73 year old female with PMH of ascending aortic aneurysm s/p ascending repair with aortic root reconstruction, bioprosthetic aortic valve in 2018 on warfarin, UGIB (2021), chronic type B aortic dissection, 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 Briceno on 2/8/23.    Intraoperative management:  2.5 L crystalloid  600 Cellsaver  4 PRBCs  2K Kcentra  2g Fybrega      PLAN:  Neuro/ pain/ sedation:  #Acute postoperative pain  #Sedation  #Depression  - Monitor neurological status. Notify the MD for any acute changes in exam.  - Scheduled: Tylenol  - PRN: Tylenol, oxycodone, Dilaudid  - Gtt: propofol, fentanyl  - Hold PTA mirtazapine  -Lumbar drain- Keep pressure <10    Pulmonary care:   #Mechanical ventilation  Resp: 16     - Goal SpO2 > 92%  - Encourage IS q15-30 minutes when awake.  - Wean to extubation     Cardiovascular:    #Cardiogenic shock  #HTN  #Ascending aortic aneurysm s/p ascending repair with aortic root reconstruction  #Bioprosthetic aortic valve in 2018 on warfarin  Pre CPBP:  Post CPBP:  - Epi, NE, Vaso gtts for inotropic and pressor support, wean as able  - Nicardipine gtt for hypertension, wean as able   - Goal MAP >65-85 -130,  monitor hemodynamics  - Hold PTA metoprolol succinate 50 mg, lisinopril 40 mg  - Hold PTA atorvastatin 40 mg  - ASA: start tomorrow    GI care / Nutrition:   #Hx UGIB  - NPO, bedside swallow eval once extubated  - PPI  - Bowel regimen: MiraLAX, senna    Renal / Fluids / Electrolytes:   BL creat appears to be ~ 0.9-1.0  - Strict I/O, daily weights, avoid/limit nephrotoxins  - Replete lytes PRN per protocol    Endocrine:    #Stress hyperglycemia  Preop A1c 4.7  - Insulin gtt  - Goal BG <180 for optimal healing    ID / Antibiotics:  #Stress induced leukocytosis  - To complete perioperative regimen  - Monitor fever curve, WBC, and inflammatory markers as appropriate    Heme:     #Acute blood loss anemia  #Acute blood loss thrombocytopenia  No s/sx active bleeding  - CBC   - Hgb goal > 7.0    MSK / Skin:  #Sternotomy  #Surgical Incision  - Sternal precautions, postop incision management protocol  - PT/OT/CR    Prophylaxis:     - Mechanical DVT ppx  - Chemical DVT ppx: start SQH tomorrow  - PPI    Lines / Tubes / Drains:  - ETT  - RIJ CVC, PA catheter  - Arterial Line  - CTs x3  - Black  - NG    Disposition:  - CVICU    Discussed with CVTS fellow      ====================================    HPI:   Shelly Becerril is a 73 year old female with PMH of ascending aortic aneurysm s/p ascending repair with aortic root reconstruction, bioprosthetic aortic valve in 2018 on warfarin, chronic type B dissection, hx UGIB (2021), who presented to the ED on 2/3/23 with type B dissection, 6.0 cm aneurysm proximal descending thoracic aorta. She was medically managed in the SICU for initial treatment and underwent left thoracotomy, thoracoabdominal aortic repair with left heart bypass on 2/8/23 by Dr. Briceno for definitive management. She presented to the CVICU intubated and sedated post-operatively.      PAST MEDICAL HISTORY:   Past Medical History:   Diagnosis Date     Anticoagulated on Coumadin      Aortic aneurysm (H)      Hypertension  12/19/2019       PAST SURGICAL HISTORY:   Past Surgical History:   Procedure Laterality Date     CARDIAC SURGERY  04/2018    aortic valve replacement, aortic root reconstruction     [place] ESOPHAGOGASTRODUODENOSCOPY TRANSORAL DIAGNOSTIC N/A 4/6/2021    Procedure: ESOPHAGOGASTRODUODENOSCOPY (EGD) WITH BIOPSY.;  Surgeon: Joey Garcia MD;  Location: [facility];  Service: Gastroenterology       FAMILY HISTORY: History reviewed. No pertinent family history.    SOCIAL HISTORY:   Social History     Tobacco Use     Smoking status: Never     Smokeless tobacco: Never   Substance Use Topics     Alcohol use: Never         OBJECTIVE:  1. VITAL SIGNS:   Temp:  [97.6  F (36.4  C)-98.7  F (37.1  C)] 98.2  F (36.8  C)  Pulse:  [59-85] 71  Resp:  [14-25] 16  BP: ()/() 128/77  SpO2:  [91 %-100 %] 95 %    Resp: 16        2. INTAKE/ OUTPUT:   I/O last 3 completed shifts:  In: 565.6 [P.O.:460; I.V.:105.6]  Out: -       3. PHYSICAL EXAMINATION:   General: sedated  Neuro: sedated  Resp: intubated, ventilated  CV: S1, S2, RRR, no m/r/g   Abdomen: Soft, non-distended, non-tender  Incisions: CDI  Extremities: warm and well perfused  CT: To suction, serosang output, no airleak, crepitus      4. INVESTIGATIONS:   Arterial Blood Gases   Recent Labs   Lab 02/08/23  1449 02/08/23  1414 02/08/23  1400 02/08/23  1346   PH 7.46* 7.51* 7.50* 7.54*   PCO2 28* 28* 28* 25*   PO2 342* 464* 385* 469*   HCO3 20* 22 22 21     Complete Blood Count   Recent Labs   Lab 02/08/23  1449 02/08/23  1414 02/08/23  1403 02/08/23  1400 02/08/23  1307 02/08/23  0412 02/07/23  0411 02/06/23  0326 02/05/23  0321   WBC  --   --   --   --   --  6.2 6.8 6.4 6.8   HGB 8.1* 8.3* 7.3* 7.9*   < > 10.7* 10.6* 9.8* 10.1*   PLT  --   --   --   --   --  156 149* 134* 145*    < > = values in this interval not displayed.     Basic Metabolic Panel  Recent Labs   Lab 02/08/23  1449 02/08/23  1414 02/08/23  1403 02/08/23  1400 02/08/23  1307 02/08/23  0412 02/07/23  0411  02/06/23  0326 02/05/23  0321    140 140 139   < > 138 140 140 141   POTASSIUM 3.8 3.5 4.1 4.2   < > 3.8 4.0 3.5 3.8   CHLORIDE  --   --   --   --   --  104 106 107 107   CO2  --   --   --   --   --  22 24 24 24   BUN  --   --   --   --   --  12.7 13.0 12.5 14.4   CR  --   --   --   --   --  0.95 1.10* 0.97* 0.97*   * 182* 216* 190*   < > 113* 119* 97 108*    < > = values in this interval not displayed.     Liver Function Tests  Recent Labs   Lab 02/08/23  0412 02/07/23  0411 02/06/23  0326 02/04/23  0421 02/04/23  0019   AST 61* 33 27 29 34   ALT 33 18 15 13 18   ALKPHOS 141* 119* 107* 150* 166*   BILITOTAL 0.7 0.6 0.5 0.5 0.5   ALBUMIN 3.5 3.2* 3.1* 3.9 4.4   INR 0.99  --   --  1.20* 1.26*     Pancreatic Enzymes  No lab results found in last 7 days.  Coagulation Profile  Recent Labs   Lab 02/08/23  0412 02/07/23  0659 02/07/23  0030 02/06/23  1723 02/04/23  1126 02/04/23  0421 02/04/23  0019   INR 0.99  --   --   --   --  1.20* 1.26*   PTT 31 67* 58* 45*   < > 27  --     < > = values in this interval not displayed.         5. RADIOLOGY:   Recent Results (from the past 24 hour(s))   Cardiac Catheterization    Narrative      1st Mrg lesion is 90% stenosed.    2nd Mrg lesion is 90% stenosed.    Ost Cx to Mid Cx lesion is 80% stenosed.    1st Diag lesion is 40% stenosed.    3rd Diag lesion is 50% stenosed.    Mid LAD-1 lesion is 50% stenosed.    Mid LAD-2 lesion is 50% stenosed.    2nd Sept lesion is 50% stenosed.     Significant disease of proximal to mid circumflex and ostial part of OM1   and OM2.  Non-obstructive disease in LAD and RCA. Left main is free of disease.         =========================================

## 2023-02-08 NOTE — ANESTHESIA PROCEDURE NOTES
Airway       Patient location during procedure: OR       Procedure Start/Stop Times: 2/8/2023 9:36 AM  Staff -        Anesthesiologist:  Nasir Walker MD       Resident/Fellow: Ne Bhakta MD       Performed By: with residents       Procedure performed by resident/fellow/CRNA in presence of a teaching physician.    Consent for Airway        Urgency: elective  Indications and Patient Condition       Indications for airway management: heidi-procedural       Induction type:intravenous       Mask difficulty assessment: 1 - vent by mask    Final Airway Details       Final airway type: endotracheal airway       Successful airway: ETT - double lumen left  Endotracheal Airway Details        Cuffed: yes       Successful intubation technique: direct laryngoscopy       DL Blade Type: MAC 3       Grade View of Cords: 1       Adjucts: stylet       Position: Right       Measured from: lips       Secured at (cm): 29       Bite block used: None       ETT Double lumen (fr): 35    Post intubation assessment        Placement verified by: capnometry, equal breath sounds and chest rise        Number of attempts at approach: 1       Number of other approaches attempted: 0       Secured with: pink tape       Ease of procedure: easy       Dentition: Intact    Medication(s) Administered   Medication Administration Time: 2/8/2023 9:36 AM    Additional Comments       Routine intubation.

## 2023-02-08 NOTE — OP NOTE
Date of procedure: February 8, 2023    Preop Dx: Acute type B aortic dissection with intractable pain    Postop Dx: same     Procedure:   1. Open exposure of left femoral artery for device delivery  2. Assistance with thoracic and abdominal aortic exposure  3. Open repair of thoracoabdominal aortic dissection using 34 mm woven dacron graft; creation of distal anastomosis     Co-Surgeons: SARAH Dixon MD, Omega Briceno MD; Mumtaz Montero MD. Please see separate dictation note from Dr. Briceno for further details     Assistant: Lora Gamboa MD: Lisandro Lemus MD     Anesthesia: general     Complications: None     EBL: per Dr. Briceno    Specimens: none     Indications: This 73 year old YO female has a history of type A dissection requiring root reconstruction and valve replacement in 2018. She presented with a new type B dissection on 2/4/2023. She was observed in the ICU setting for BP control but experienced ongoing severe pain in the chest and back. Due to the ongoing pain despite BP control, Dr. Briceno has recommended intervention. The anatomy of the dissection is not amenable to TEVAR, therefore an open repair with replacement of the descending thoracic aorta was scheduled. Dr. Briceno has requested that the vascular surgery team be present to assist with the operation.    Findings: A spinal drain was placed preoperatively by the anesthesia team. Dissection of the thoracoabdominal aorta was repaired with a 34 mm Dacron woven tube graft under left heart bypass. The proximal anastomosis was created just distal to the left subclavian artery, and the distal anastomosis was created in the supraceliac aorta. The supraceliac aorta was exposed through the retroperitonel plane. Multiple backbleeding intercostal arteries were oversewn.      Description of operation: The patient was brought to the operating room and a spinal drain was placed by the anesthesia team. After a satisfactory level of geeral anesthesia, the  patient was placed in a right lateral decubitus position with the hips rotated posteriorly. The patient's left chest, abdomen and bilateral groins were prepped and draped in the usual sterile fashion. A time out was called. We began by exposing the left common femoral artery for placement of the arterial canula during left heart bypass. This was performed while the cardiac surgery team made a thoracoabdominal incision though the 4th interspace; please see Dr. Briceno's separate dictation for details. The left femoral artery was exposed in the femoral sheath through a transverse incision. The artery was dissected circumferentially and encircled with a vessiloop. Next, we performed the exposure of the distal thoracic and supraceliac abdominal aorta through the previously made thoracoabdominal incision. The left external abdominal oblique, internal abdominal oblique and anterior rectus were opened, and the transversus abdominus was divided to enter the retroperitoneal space. The peritoneum was stripped off o the anterior and lateral abdominal wall and down from the diaphragm. The diaphragm was divided in radial fashion to expose the aorta. The petritoneal contents were retracted inferiorly, and the aorta was exposed to the level of the celaic artery. The aorta was dissected circumferentially and encircled with an umbilical tape. The cardiac surgery team exposed the aortic arch and eft subclavian artery. The patient was then plade on left heart bypass by the cardiac team, using the left pulmonary vein and left common femoral artery for cannulation sites. The aorta was then clamped between the left common carotid and left subclavian arteries. The cardiac surgery team performed the proximal ansstomosis, again described in detail in Dr. Briceno's separate dictation. After the anastomosis was completed, the distal Dacron graft was clamped and a clamp was applied to the supraceliac aorta. The thoracic aorta was then opened  longitudinally, and the vascular surgery team ligated numerous back-bleeding intercostal arterial orifices with 2-0 silk sutures. The vascular surgery team then performed the distal anastomosis. This was performed using felt pledget rings on the inside and outside of the aortic wall using 4-0 prolene sutures. The distal end of the graft was then transected and anastomosed to the pledgeted aorta using 3-0 prolene suture. The graft and distal aorta were thoroughly flushed before the final sutures were tied. The graft was de-aired before the clamps were removed. After the graft and aorta were reopened, there was a strong pulse in the aorta distal to the graft. Additional sutures were required in the proximal anastomosis to control bleeding, and additional intercostal arteries were oversewn. After meticulous hemostasis was assured, the left heart bypass was terminated, with closure of the cannulation sites in the left pulmonary vein and left common femoral vein. Three separate chest tubes were placed in the left pleural space, and the chest and abdominal incisions were closed; please see Dr. Briceno's separate dictation for details. The patient was warmed and transferred to the ICU in satisfactory condition. I was present, scrubbed and supervised all key and critical portions associated with the vascular surgery participation in this case.      SARAH Dixon MD  Professor of Surgery  Division of Vascular Surgery

## 2023-02-08 NOTE — PROGRESS NOTES
Vascular surgery paged requesting additional or change in frequency of PRN BP meds d/t patients SBP remaining above 120 after receiving 10 mg labetalol     PRN hydralazine order changed from 5 mg to 10 mg

## 2023-02-08 NOTE — ANESTHESIA PREPROCEDURE EVALUATION
Anesthesia Pre-Procedure Evaluation    Patient: Shelly Becerril   MRN: [id number] : 1950        Procedure : Procedure(s):  redo sternotomy total arch replacement WITH FROZEN ELEPHANT TRUNK, Thoraflex 4 arm graft any ASSOCIATED PROCEDURES          Past Medical History:   Diagnosis Date     Anticoagulated on Coumadin      Aortic aneurysm (H)      Hypertension 2019      Past Surgical History:   Procedure Laterality Date     CARDIAC SURGERY  2018    aortic valve replacement, aortic root reconstruction     [place] ESOPHAGOGASTRODUODENOSCOPY TRANSORAL DIAGNOSTIC N/A 2021    Procedure: ESOPHAGOGASTRODUODENOSCOPY (EGD) WITH BIOPSY.;  Surgeon: Joey Garcia MD;  Location: [facility];  Service: Gastroenterology      No Known Allergies   Social History     Tobacco Use     Smoking status: Never     Smokeless tobacco: Never   Substance Use Topics     Alcohol use: Never      Wt Readings from Last 1 Encounters:   23 59.6 kg (131 lb 6.3 oz)        Anesthesia Evaluation   Pt has had prior anesthetic. Type: General.    No history of anesthetic complications       ROS/MED HX  ENT/Pulmonary:    (-) tobacco use, asthma and COPD   Neurologic: Comment: CVA in 2019 per patient and daughter, weakness in legs? Also bMR imanging for dysarthria in , R Int capsule, micro hemorrages throughout    (+) CVA,     Cardiovascular: Comment: H/o type A aortic dissection s/p aortic root reconstruction and aortic valve replacement in     - Now presented with type B aortic dissection, anuerysmal change needing thoraco resection    Found to have sig LAD and circ disease on cath    (+) Dyslipidemia hypertension--CAD ---Taking blood thinners (On heparin) Previous cardiac testing   Echo: Date: 2023 Results:  Left ventricular size, wall motion and function are normal. The ejection  fraction is 60-65%.  Global right ventricular function is normal.  Bioprosthetic aortic valve; normal doppler assessment (MG 11mmHg, PV  2.4m/s)  The inferior vena cava is normal.  History of ascending aorta repair with valved graft per chart review.  Ascending aorta is mildly dilated, 3.9cm.  Dilated descending thoracic aorta with dissection flap as noted on CT chest.  No pericardial effusion.  Stress Test: Date: Results:    ECG Reviewed: Date: 2/7/2023 Results:  Sinus rhythm  Cath:  Date: 2/2023 Results:  ? 1st Mrg lesion is 90% stenosed.  ? 2nd Mrg lesion is 90% stenosed.  ? Ost Cx to Mid Cx lesion is 80% stenosed.  ? 1st Diag lesion is 40% stenosed.  ? 3rd Diag lesion is 50% stenosed.  ? Mid LAD-1 lesion is 50% stenosed.  ? Mid LAD-2 lesion is 50% stenosed.  ? 2nd Sept lesion is 50% stenosed.      METS/Exercise Tolerance:     Hematologic: Comments: Mild Thrombocytopenia, was on AC w/ coumain for stroke risk presumably vs prior bioAVR. Looks like tranisitioned to eliquis in 2021? INR nl this AM    (+) anemia,     Musculoskeletal:    (-) arthritis   GI/Hepatic: Comment: H/o UGI bleed.      Renal/Genitourinary:     (+) renal disease, type: ARF,     Endo:  - neg endo ROS     Psychiatric/Substance Use:    (-) psychiatric history, alcohol abuse history and chronic opioid use history   Infectious Disease:  - neg infectious disease ROS     Malignancy:       Other:            Physical Exam    Airway        Mallampati: III   TM distance: < 3 FB   Neck ROM: full   Mouth opening: < 3 cm    Respiratory Devices and Support         Dental       (+) Completely normal teeth      Cardiovascular   cardiovascular exam normal          Pulmonary   pulmonary exam normal            Other findings: 5/5 UE , elbow flex/ext, leg ankle flex/ext all 5/5 bilaterally. Difficult to assess with language barrier     OUTSIDE LABS:  CBC:   Lab Results   Component Value Date    WBC 6.8 02/07/2023    WBC 6.4 02/06/2023    HGB 10.6 (L) 02/07/2023    HGB 9.8 (L) 02/06/2023    HCT 34.5 (L) 02/07/2023    HCT 32.1 (L) 02/06/2023     (L) 02/07/2023     (L) 02/06/2023      BMP:   Lab Results   Component Value Date     02/07/2023     02/06/2023    POTASSIUM 4.0 02/07/2023    POTASSIUM 3.5 02/06/2023    CHLORIDE 106 02/07/2023    CHLORIDE 107 02/06/2023    CO2 24 02/07/2023    CO2 24 02/06/2023    BUN 13.0 02/07/2023    BUN 12.5 02/06/2023    CR 1.10 (H) 02/07/2023    CR 0.97 (H) 02/06/2023     (H) 02/07/2023    GLC 97 02/06/2023     COAGS:   Lab Results   Component Value Date    PTT 67 (H) 02/07/2023    INR 1.20 (H) 02/04/2023    FIBR 467 02/04/2023     POC: No results found for: BGM, HCG, HCGS  HEPATIC:   Lab Results   Component Value Date    ALBUMIN 3.2 (L) 02/07/2023    PROTTOTAL 6.5 02/07/2023    ALT 18 02/07/2023    AST 33 02/07/2023    ALKPHOS 119 (H) 02/07/2023    BILITOTAL 0.6 02/07/2023     OTHER:   Lab Results   Component Value Date    LACT 0.9 02/07/2023    A1C 4.7 11/04/2021    FAVIOLA 8.7 (L) 02/07/2023    PHOS 3.8 02/07/2023    MAG 1.9 02/07/2023    LIPASE 34 04/05/2021    TSH 2.49 02/07/2023       Anesthesia Plan    ASA Status:  4   NPO Status:  NPO Appropriate    Anesthesia Type: General.     - Airway: ETT   Induction: Intravenous, Propofol.   Maintenance: Balanced.   Techniques and Equipment:     - Lines/Monitors: 2nd IV, Arterial Line, Central Line, CVP, PAC, ELVIA            ELVIA Absolute Contra-indication: NONE            ELVIA Relative Contra-indication: NONE     - Blood: Cell Saver, T&S, T&C, Blood in Room     - Drips/Meds: Norepi, Vasopressin, Epinephrine     Consents    Anesthesia Plan(s) and associated risks, benefits, and realistic alternatives discussed. Questions answered and patient/representative(s) expressed understanding.     - Discussed: Risks, Benefits and Alternatives for BOTH SEDATION and the PROCEDURE were discussed     - Discussed with:  Patient, , Other (See Comment)      - Specific Concerns: risk of mace, stroke, b/i/nerve damage and headache from spinal drain, blood tx, positioning injury, post op intubation, ELVIA  complications to UGI system, all discussed via interprter with patient and daughter.     - Extended Intubation/Ventilatory Support Discussed: Yes.      - Patient is DNR/DNI Status: No    Use of blood products discussed: Yes.     - Discussed with: Patient.     - Consented: consented to blood products            Reason for refusal: other.     Postoperative Care    Pain management: IV analgesics, Oral pain medications, Multi-modal analgesia, Peripheral nerve block (Continuous).   PONV prophylaxis: Background Propofol Infusion, Dexamethasone or Solumedrol     Comments:    Other Comments: INR nl, plt nl, spinal drain pre induction..             Andrés Siddiqi MD

## 2023-02-08 NOTE — ANESTHESIA PROCEDURE NOTES
Arterial Line Procedure Note    Pre-Procedure   Staff -        Anesthesiologist:  Nasir Walker MD       Resident/Fellow: Ne Bhakta MD       Performed By: resident and with residents       Procedure performed by resident/fellow/CRNA in presence of a teaching physician.         Location: OR       Pre-Anesthestic Checklist: patient identified, IV checked, risks and benefits discussed, informed consent, monitors and equipment checked, pre-op evaluation and at physician/surgeon's request  Timeout:       Correct Patient: Yes        Correct Procedure: Yes        Correct Site: Yes        Correct Position: Yes   Line Placement:   This line was placed Pre Induction starting at 2/8/2023 9:20 AM and ending at 2/8/2023 9:30 AM  Procedure   Procedure: arterial line       Laterality: right       Insertion site: axillary.  Sterile Prep        Standard elements of sterile barrier followed       Skin prep: Chloraprep  Insertion/Injection        Technique: ultrasound guided        1. Ultrasound was used to evaluate the access site.       2. Artery evaluated via ultrasound for patency/adequacy.       3. Using real-time ultrasound the needle/catheter was observed entering the artery/vein.       Catheter Type/Size: 20 G, 12 cm  Narrative         Secured by: suture       Tegaderm dressing used.       Complications: None apparent,        Arterial waveform: Yes        IBP within 10% of NIBP: Yes

## 2023-02-08 NOTE — PLAN OF CARE
ICU End of Shift Summary. See flowsheets for vital signs and detailed assessment.    Changes this shift: 1500 - 1900 events  VSS. HR <70 and SBP <120. One occurrence of 125 but scheduled coreg given and resolved back to within parameters. Daughter informed writer that the pt and her family would like to move forward with surgery. Angiogram being done this evening. Cardiology fellow at bedside to obtain consents. Pt c/o unchanged chest and back pain. PT reports that prn oxy did not relieve pain then refused scheduled tylenol.  Ipad malfunctioning and staff unable to resolve issue.  dual phone used.     Plan: angiogram tonight and aortic repair tomorrow. Prn's available for VS out of parameters.         Goal Outcome Evaluation:      Plan of Care Reviewed With: patient, child    Overall Patient Progress: no changeOverall Patient Progress: no change    Outcome Evaluation: VSS. PT and family decided to go forward with lissa. Angio planned for tonight.

## 2023-02-08 NOTE — CONSULTS
Endocrinology Consult     Shelly Becerril MRN:[id number] YOB: 1950  Date of Admission:2/4/2023   Primary care provider: Erica Canada     Reason for visit: Aortic disection   Reason for Endocrine consult: thyroid nodules         Assessment and Plan:   Shelly Becerril is a 73 year old female with PMHx of Type B Aortic Dissection here for further evaluation. Endocrinology consulted for further evaluation of multinodular thyroid gland    1) Multinodular thyroid  - Multinodular thyroid present since at least 2021 based on imaging  - Would recommend ultrasound of thyroid gland to further characterize nodules and risk stratify. All guidelines for nodule risk are based on ultrasound imaging so this is the modality of choice.   - Difficult to discern individual nodules vs heterogenous thyroid gland on CT scan.   - Ultrasound can be done inpatient vs outpatient, but would recommend follow-up in endocrine clinic for further management    Endocrinology will sign off at this time.  Please reconsult with further questions  Ana Maria Christy MD            HPI:  Shelly Becerril is a 73 year old female with PMHx of Type B aortic dissection presenting with HTN emergency. She was seen bedside with her daughter present and discussion via SMRxT  on iPad. Endocrinology consulted for management of thyroid nodules.     The thyroid nodules were noted incidentally on CT scan completed 2/5/2023. This was initially noted back in Nov 2021 as well. I do not see any dedicated thyroid ultrasound imaging in our system or care everywhere.    Of note, patient currently complains of intermittent chest pain, fatigue, some shortness of breath.   Denies signs/symptoms of hypo/hyperthyroidism including hot/cold intolerance, diarrhea/constipation, major changes in hair skin or nails, tremor, palpations.  Denies any difficulty moving air in or out, difficulty swallowing, enlargement of neck/thyroid, family history of thyroid cancer, exposure  to radiation, hoarseness or weight loss.   Notes no family history of thyroid disease    Most recent TFTs completed this admission reveal TSH within normal range.       ROS:  All 12 systems were reviewed and negative except as mentioned in HPI    Past Medical/Surgical History:  Past Medical History:   Diagnosis Date     Anticoagulated on Coumadin      Aortic aneurysm (H)      Hypertension 12/19/2019     Past Surgical History:   Procedure Laterality Date     CARDIAC SURGERY  04/2018    aortic valve replacement, aortic root reconstruction     DE ESOPHAGOGASTRODUODENOSCOPY TRANSORAL DIAGNOSTIC N/A 4/6/2021    Procedure: ESOPHAGOGASTRODUODENOSCOPY (EGD) WITH BIOPSY.;  Surgeon: Joey Garcia MD;  Location: [facility];  Service: Gastroenterology       Allergies:  No Known Allergies    PTA Meds:  Prior to Admission medications    Medication Sig Last Dose Taking? Auth Provider Long Term End Date   apixaban ANTICOAGULANT (ELIQUIS) 5 MG tablet Take 5 mg by mouth 2 times daily  Yes Unknown, Entered By History     atorvastatin (LIPITOR) 40 MG tablet Take 40 mg by mouth daily  Yes Unknown, Entered By History Yes    lisinopril (ZESTRIL) 40 MG tablet Take 40 mg by mouth daily  Yes Unknown, Entered By History Yes    metoprolol succinate ER (TOPROL XL) 50 MG 24 hr tablet Take 50 mg by mouth daily  Yes Unknown, Entered By History Yes    omeprazole (PRILOSEC) 40 MG DR capsule Take 40 mg by mouth daily  Yes Unknown, Entered By History     ARTHRITIS PAIN RELIEF, ACETAM, 650 mg CR tablet [ARTHRITIS PAIN RELIEF, ACETAM, 650 MG CR TABLET] Take 650 mg by mouth every 8 (eight) hours as needed for pain (knee pain).    Provider, Historical     mirtazapine (REMERON) 15 MG tablet [MIRTAZAPINE (REMERON) 15 MG TABLET] Take 15 mg by mouth at bedtime.    Provider, Historical          Current Medications:   Current Facility-Administered Medications   Medication     acetaminophen (TYLENOL) tablet 650 mg     amLODIPine (NORVASC) tablet 10 mg      atorvastatin (LIPITOR) tablet 40 mg     carvedilol (COREG) tablet 37.5 mg     glucose gel 15-30 g    Or     dextrose 50 % injection 25-50 mL    Or     glucagon injection 1 mg     heparin infusion 25,000 units in D5W 250 mL ANTICOAGULANT     hydrALAZINE (APRESOLINE) injection 5 mg     labetalol (NORMODYNE/TRANDATE) injection 10 mg     Lidocaine (LIDOCARE) 4 % Patch 1 patch     lidocaine patch in PLACE     melatonin tablet 10 mg     methocarbamol (ROBAXIN) tablet 500 mg     metoprolol (LOPRESSOR) injection 5 mg     mirtazapine (REMERON) tablet TABS 15 mg     naloxone (NARCAN) injection 0.2 mg    Or     naloxone (NARCAN) injection 0.4 mg    Or     naloxone (NARCAN) injection 0.2 mg    Or     naloxone (NARCAN) injection 0.4 mg     ondansetron (ZOFRAN ODT) ODT tab 4 mg    Or     ondansetron (ZOFRAN) injection 4 mg     oxyCODONE IR (ROXICODONE) half-tab 2.5-5 mg     pantoprazole (PROTONIX) EC tablet 40 mg     senna-docusate (SENOKOT-S/PERICOLACE) 8.6-50 MG per tablet 2 tablet     sodium chloride (PF) 0.9% PF flush 10 mL       Family History:  No family history on file.    Social History:  Social History     Tobacco Use     Smoking status: Never     Smokeless tobacco: Never   Substance Use Topics     Alcohol use: Never         Physical Examination:  CONSTITUTIONAL: ill appearing frail elderly woman, coughing in chair  ENT: normocephalic, no visual evidence of trauma, normal nose and oral mucosa  EYES: conjunctivae and sclerae normal, no exophthalmos or proptosis  THYROID:  Visibly enlarged thyroid gland palpated R>L, approx 1.5x ULN  LUNGS: no audible wheeze, no visible retractions or increased work of breathing  EXTREMITIES: no hand tremors  NEUROLOGY: cranial nerves grossly intact with no obvious deficit.  SKIN:  no visualized skin lesions or rash, no edema visualized  PSYCH: calm    Endocrine Labs:  TSH   Date Value Ref Range Status   02/07/2023 2.49 0.30 - 4.20 uIU/mL Final   11/04/2021 0.13 (L) 0.30 - 5.00 uIU/mL  Final     No results found for: T4

## 2023-02-08 NOTE — ANESTHESIA PROCEDURE NOTES
"Lumbar drain Procedure Note    Pre-Procedure   Staff -        Anesthesiologist:  Nasir Walker MD       Resident/Fellow: Talib Henson MD       Performed By: anesthesiologist and with fellow       Procedure performed by resident/fellow/CRNA in presence of a teaching physician.         Location: OR       Pre-Anesthestic Checklist: patient identified, IV checked, risks and benefits discussed, informed consent, monitors and equipment checked, pre-op evaluation, at physician/surgeon's request and post-op pain management  Timeout:       Correct Patient: Yes        Correct Procedure: Yes        Correct Site: Yes        Correct Position: Yes   Procedure Documentation  Procedure: lumbar drain       Patient Position: sitting       Skin prep: Chloraprep       Insertion Site: L3-4. (midline approach).       Needle Gauge: 14.        Needle Length (Inches): 5        : Tuohy.       Introducer used       # of attempts: 3 and  # of redirects:     Assessment/Narrative         Paresthesias: No.       LP fluid removal amount (ml): 10       CSF fluid: clear.       Opening pressure was 17 cmH2O while  Sitting.       Comments:  Loss at 5 cm. Catheter left at 13 cm at skin. Opening pressure 17 mm Hg. CSF clear.      FOR [facility] ([facility] [place]) ONLY:   Pain Team Contact information: please page the Pain Team Via [employer]. Search \"Pain\". During daytime hours, please page the attending first. At night please page the resident first.    "

## 2023-02-08 NOTE — BRIEF OP NOTE
"[facility]    Brief Operative Note    Pre-operative diagnosis: Type B aortic dissection, Thoracoabdominal aortic aneurysm    Post-operative diagnosis Same as pre-operative diagnosis    Procedure:  1. Open exposure of the left femoral artery for bypass cannulation  2. Thoracic and abdominal aortic exposure  3. Open repair of thoracoabdominal aortic aneurysmal dissection with 34 mm dacron graft    Surgeon: Surgeon(s) and Role:     * Omega Briceno MD - Primary     * Mumtaz Montero MD - Assisting     * Lisandro Lemus MD - Resident - Assisting     * Lora Gamboa MD - Fellow - Assisting     * Rodrigue eLos MD     * Esperanza Dixon MD  Anesthesia: General   Estimated Blood Loss: see op note    Drains:  34 F chest tubes x3 in L chest  Specimens: * No specimens in log *  Findings: Dense adhesions between lung and aorta. 34 mm dacron graft    Complications: None.  Implants:   Implant Name Type Inv. Item Serial No.  Lot No. LRB No. Used Action   GRAFT VASCULAR GEL WEAVE ST 34MM [id number] - [id number] Graft GRAFT VASCULAR GEL WEAVE ST 34MM [id number] N/A 1 Implanted   GRAFT PTFE FELT 6X6\" [id number] - [id number] Graft GRAFT PTFE FELT 6X6\" [id number]  [employer] [id number] N/A 1 Implanted           "

## 2023-02-08 NOTE — ANESTHESIA PROCEDURE NOTES
Arterial Line Procedure Note    Pre-Procedure   Staff -        Anesthesiologist:  Nasir Walker MD       Performed By: anesthesiologist       Location: OR       Pre-Anesthestic Checklist: patient identified, IV checked, risks and benefits discussed, informed consent, monitors and equipment checked, pre-op evaluation and at physician/surgeon's request  Timeout:       Correct Patient: Yes        Correct Procedure: Yes        Correct Site: Yes        Correct Position: Yes   Line Placement:   This line was placed Post Induction  Procedure   Procedure: arterial line       Laterality: right       Insertion Site: radial.  Sterile Prep        Standard elements of sterile barrier followed       Skin prep: Chloraprep  Insertion/Injection        Technique: ultrasound guided and Seldinger Technique        1. Ultrasound was used to evaluate the access site.       2. Artery evaluated via ultrasound for patency/adequacy.       3. Using real-time ultrasound the needle/catheter was observed entering the artery/vein.       Catheter Type/Size: 20 G, 12 cm  Narrative         Secured by: other       Biopatch and Tegaderm dressing used.       Complications: None apparent,        Arterial waveform: Yes        IBP within 10% of NIBP: Yes

## 2023-02-08 NOTE — OP NOTE
OPERATIVE DATE: 2/8/2023    PRE-OPERATIVE DIAGNOSIS:  1. Type B aortic dissection  2. Previous type A aortic dissection repair  Patient Active Problem List   Diagnosis     Mood disorder (H)     Benign essential hypertension     Hx of medication noncompliance     Urinary incontinence     Ascending aortic aneurysm     UGIB (upper gastrointestinal bleed)     Anticoagulated on Coumadin     H/O aortic valve replacement     Hx of repair of dissecting aneurysm of ascending thoracic aorta     Anemia due to blood loss, acute     Duodenal ulcer due to Helicobacter pylori     Iron deficiency anemia, unspecified     Right-sided lacunar infarction (H)     Dysphasia     Middle cerebral artery stenosis, bilateral     Normocytic anemia     Aortic dissection (H)     POST-OPERATIVE DIAGNOSIS:  1. Type B aortic dissection  2. Previous type A aortic dissection repair  Patient Active Problem List   Diagnosis     Mood disorder (H)     Benign essential hypertension     Hx of medication noncompliance     Urinary incontinence     Ascending aortic aneurysm     UGIB (upper gastrointestinal bleed)     Anticoagulated on Coumadin     H/O aortic valve replacement     Hx of repair of dissecting aneurysm of ascending thoracic aorta     Anemia due to blood loss, acute     Duodenal ulcer due to Helicobacter pylori     Iron deficiency anemia, unspecified     Right-sided lacunar infarction (H)     Dysphasia     Middle cerebral artery stenosis, bilateral     Normocytic anemia     Aortic dissection (H)     PROCEDURE:  1. Left thoracotomy  2. Thoracoabdominal aortic aneurysm repair - 34mm Gelweave interposition graft from just distal to left subclavian artery to just proximal to celiac artery  3. Left femoral artery exposure  4. Repair of left femoral artery  5. Transesophageal echocardiography  6. Spinal drain placement    SURGEON: Omega Briceno MD    COSURGEON: Dr. Dominguez Dixon MD    ASSISTANT: Mumtaz Montero MD; Malcolm Mary MD; Lora Gamboa  MD; Lisandro Lemus MD    ANESTHESIA: ADRIANO    ESTIMATED BLOOD LOSS: 1000cc    OPERATIVE FINDINGS:  1. Thoracoabdominal aneurysm with acute type B dissection    INDICATIONS:  Ms. JAYESH HOOVER is a 73 year old female admitted with thoracoabdominal aneurysm and acute type B aortic dissection.  We were asked to evaluate for surgical repair.  Risks and benefits of the operation were explained to the patient and their family including, but not limited to, bleeding, infection, stroke and even death.  They understood these risks and agreed to proceed electively.    OPERATIVE REPORT:  The patient was transferred to the operating room and positioned supine on the OR table.  General anesthesia was initiated by the anesthesia team.  Endotracheal intubation, spinal drain, and central venous access was performed by anesthesia.  The patients neck, chest, abdomen and bilateral lower extremities were clipped, prepped and draped in sterile fashion.  A pre-procedure time-out was performed confirming the correct patient, correct site and correct procedure.    Left thoracotomy was made through the fifth intercostal space.  The incision was extended onto the abdomen.  The descending thoracic aorta was identified at the level of the diaphragm.  The diaphragm was incised.  The descending aorta was isolated just proximal to the celiac artery.  Next we worked for several minutes to free lung adhesions to the chest wall and large aortic aneurysm.  After completion of this we gained proximal control of the aortic arch just distal to left subclavian.  Patient was then heparinized with 400 mg/kg IV heparin.  The left femoral artery was cannulated with a 15 Maltese arterial cannula.  The left inferior pulmonary vein was cannulated with a 28 Maltese right angle venous cannula.  Left heart bypass was initiated with good flows.  Next the aortic arch was crossclamped and the descending aorta was crossclamped.  The aorta was incised.  Proximal anastomosis  was created using a 34 mm Gelweave graft, felt strips and a running 3-0 Prolene.  The graft was crossclamped and the area cross-clamp was removed.  Next intercostal perforators were oversewed with 2-0 silk sutures.  Distal anastomosis was then completed by Dr. Dixon and his team.  An end-to-end fashion using running 3-0 Prolene.  Please refer to his operative report for details of his portion of the case.  After completion anastomosis the graft was de-aired.  Cross-clamp removed.  Patient was then weaned from left heart bypass.  Remaining pump volume was returned via the arterial cannula.  Heparin was reversed with protamine.  The cannulas were removed and the sites were made hemostatic with Prolene sutures.  Next two 32 [place] straight chest tubes and one 28 [place] right angle chest tube warm placed.  The diaphragm was reapproximated using 0 Ethibond sutures.  The #5 Ethibond were used to reapproximate the rib space.  1 stainless steel sternal wire was used approximate the costal cartilage.  The incision was closed in layers using absorbable sutures.    The patient was then transferred from the operating bed to an ICU bed and transferred to the ICU in critical, but stable, condition.    All needle, sponge and instrument counts were correct at the end of the case.    Omega Briceno  Cardiothoracic Surgery  Pager: [phone]

## 2023-02-08 NOTE — ANESTHESIA PROCEDURE NOTES
Central Line/PA Catheter Placement    Pre-Procedure   Staff -        Anesthesiologist:  Nasir Walker MD       Resident/Fellow: Ne Bhakta MD       Performed By: resident and with residents       Procedure performed by resident/fellow/CRNA in presence of a teaching physician.         Location: OR       Pre-Anesthestic Checklist: patient identified, IV checked, site marked, risks and benefits discussed, informed consent, monitors and equipment checked, pre-op evaluation and at physician/surgeon's request  Timeout:       Correct Patient: Yes        Correct Procedure: Yes        Correct Site: Yes        Correct Position: Yes        Correct Laterality: Yes   Line Placement:   This line was placed Post Induction    Procedure   Procedure: central line       Laterality: right       Insertion Site: internal jugular.       Patient Position: Trendelenburg  Sterile Prep        All elements of maximal sterile barrier technique followed       Patient Prep/Sterile Barriers: draped, hand hygiene, gloves , hat , mask , draped, gown, sterile gel and probe cover       Skin prep: Chloraprep  Insertion/Injection        Technique: ultrasound guided and Seldinger Technique        1. Ultrasound was used to evaluate the access site.       2. Vein evaluated via ultrasound for patency/adequacy.       3. Using real-time ultrasound the needle/catheter was observed entering the artery/vein.       Introducer Type: 9 Fr, 2-lumen MAC        Type: PA/CVC with Introducer       Catheter Size: 9 Fr       Catheter Length: 11.5       Number of Lumens: double lumen       PA Catheter Type: CCO            Withdrawn and Locked at cm: 50  Narrative         Secured by: suture       Tegaderm and Biopatch dressing used.       Complications: None apparent,        blood aspirated from all lumens,        All lumens flushed: Yes       Verification method: Ultrasound, Placement to be verified post-op and ELVIA

## 2023-02-08 NOTE — ANESTHESIA PROCEDURE NOTES
Perioperative ELVIA Procedure Note    Staff -        Anesthesiologist:  Nasir Walker MD       Resident/Fellow: Talib Hensno MD       Performed By: anesthesiologist and with fellow       Procedure performed by resident/fellow/CRNA in presence of a teaching physician.    Preanesthesia Checklist:  Patient identified, IV assessed, risks and benefits discussed, monitors and equipment assessed, procedure being performed at surgeon's request and anesthesia consent obtained.    ELVIA Probe Insertion  Probe Number: x8  Probe Status PRE Insertion: NO obvious damage  Probe type:  Adult 3D  Bite block used:   Soft  Insertion Technique: Easy, no oropharyngeal manipulation  Insertion complications: None obvious  Billing Report:ELVIA report by Anesthesiologist (See Separate Report note)  Probe Status POST Removal: NO obvious damage    ELVIA Report  General Procedure Information  Fellow/Resident Interpretation: I personally reviewed the images and the fellow/resident interpretation.  I agree with the fellow/resident interpretation as amended by myself.   Images for this study have been archived.  Modalities: 2D, 3D, CW Doppler, PW Doppler and Color flow mapping  Diagnostic indications for ELVIA:   Thoracic aorta dissection  Thoracic aneurysm, unruptured  Echocardiographic and Doppler Measurements  Right Ventricle:  Cavity size normal.    Hypertrophy not present.   Thrombus not present.    Global function normal.     Left Ventricle:  Cavity size normal.    Hypertrophy not present.   Thrombus not present.   Global Function normal.   Ejection Fraction 60%.      Ventricular Regional Function:  1- Basal Anteroseptal:  normal  2- Basal Anterior:  normal  3- Basal Anterolateral:  normal  4- Basal Inferolateral:  normal  5- Basal Inferior:  normal  6- Basal Inferoseptal:  normal  7- Mid Anteroseptal:  normal  8- Mid Anterior:  normal  9- Mid Anterolateral:  normal  10- Mid Inferolateral:  normal  11- Mid Inferior:  normal  12- Mid  Inferoseptal:  normal  13- Apical Anterior:  normal  14- Apical Lateral:  normal  15- Apical Inferior:  normal  16- Apical Septal:  normal  17- Bertrand:  normal    Valves  Aortic Valve: Annulus bioprosthetic.  Stenosis mild.  Area: 1.69 cm .  Mean Gradient: 15 mmHg.  Regurgitation +1.  Leaflets normal.  Leaflet motions normal.    Mitral Valve: Annulus normal.  Stenosis not present.  Regurgitation +2.  Leaflets normal.  Leaflet motions normal.    Tricuspid Valve: Annulus normal.  Stenosis not present.  Regurgitation +2.  Leaflets normal.  Leaflet motions normal.    Pulmonic Valve: Annulus normal.  Stenosis not present.  Regurgitation +1.      Aorta: Ascending Aorta: Size normal.  Dissection not present.  Plaque thickness less than 3 mm.  Mobile plaque not present.    Aortic Arch: Size normal.    Dissection not present.   Plaque thickness less than 3 mm.   Mobile plaque not present.    Descending Aorta: Size aneurysmal.   Diameter 4.74 cm.   Dissection present.   Plaque thickness less than 3 mm.   Mobile plaque not present.      Right Atrium:  Size normal.   Spontaneous echo contrast not present.   Thrombus not present.   Tumor not present.   Device not present.     Left Atrium: Size normal.  Spontaneous echo contrast not present.  Thrombus not present.  Tumor not present.  Device not present.    Left atrial appendage normal.   Other Atria Findings:  ZIA velocity 16.7  Atrial Septum: Intra-atrial septal morphology normal.       Ventricular Septum: Intra-ventricular septum morphology normal.        Other Findings:   Pericardium:  normal. Pleural Effusion:  none. Pulmonary Arteries:  normal. Pulmonary Venous Flow:  blunted (decreased) systolic flow. No coronary sinus catheter present.    Post Intervention Findings  Procedure(s) performed:  Other (see comments) (descending thoracic aorta). Global function:  Unchanged. Regional wall motion: Unchanged. Surgeon(s) notified of all postintervention findings: Yes.                  Echocardiogram Comments  Echocardiogram comments: Pre CPB:   LV normal size and function, EF 55-60%, no RWMA's. RV normal size and function. Prior ascending aortic graft with bioprosthetic valve, mild AS, mean gradient 15 mm Hg, YAMILET 1.69; indexed 1.1; trace central AI. Mild MR with central jet from A2/P2. Mild TR. Trace PI. Known descending thoracic aortic aneurysm 4.7cm with dissection flap noted at level of proximal descending aorta extending down to transgastric views, spontaneous echo contrast noted in descending thoracic aorta. Left pleural effusion. No pericardial effusion. No intracardiac thrombus. No PFO by CFD. All findings communicated with surgeon.    Post CPB:  S/p Type B dissection repair 34 mm Dacron woven tube graft (proximal anastomosis was created just distal to the left subclavian artery and the distal anastomosis was created in the supraceliac aorta). LV function, normal EF 55-60%, no RWMA's. RV function mildly reduced. Mild AS, trace AI. Mild MR. Mild TR. Trace PI. No PFO by CFD. No pericardial effusion. No pleural effusion. No intracardiac thrombus. All findings communicated with surgeon..

## 2023-02-09 NOTE — PROGRESS NOTES
Overnight patient vasoplegic - required 2 L LR, 250 Albumin and increase of pressors to 0.1 levo, .1epi, and 4 Vaso. CT output slowed, but Hgb down to 8 so transfused 1 unit PRBCs. Pressor requirements coming down over last few hours. CVTS fellow updated.    Ashutosh Alvarado MD  [facility]

## 2023-02-09 NOTE — ANESTHESIA CARE TRANSFER NOTE
Patient: Shelly Becerril    Procedure: Procedure(s):  Left thoracotomy, thoracoabdominal aortic aneurysm repair, repair with left heart bypass, open exposure of left femoral artery       Diagnosis: History of aortic arch repair [Z98.890]  Diagnosis Additional Information: No value filed.    Anesthesia Type:   General     Note:    Oropharynx: endotracheal tube in place and ventilatory support  Level of Consciousness: iatrogenic sedation    Level of Supplemental Oxygen (L/min / FiO2): 80  Independent Airway: airway patency not satisfactory and stable  Dentition: dentition unchanged  Vital Signs Stable: post-procedure vital signs reviewed and stable  Report to RN Given: handoff report given  Patient transferred to: ICU    ICU Handoff: Call for PAUSE to initiate/utilize ICU HANDOFF, Identified Patient, Identified Responsible Provider, Reviewed the Pertinent Medical History, Discussed Surgical Course, Reviewed Intra-OP Anesthesia Management and Issues during Anesthesia, Set Expectations for Post Procedure Period and Allowed Opportunity for Questions and Acknowledgement of Understanding      Vitals:  Vitals Value Taken Time   BP     Temp 34.7  C (94.46  F) 02/08/23 1851   Pulse 54 02/08/23 1851   Resp 12 02/08/23 1851   SpO2 96 % 02/08/23 1851   Vitals shown include unvalidated device data.    Electronically Signed By: Ne Bhakta MD  February 8, 2023  6:53 PM

## 2023-02-09 NOTE — PROGRESS NOTES
Vascular surgery note    POD#0 s/p type B aortic dissection and thoracoabdominal aneurysm repair.    /77   Pulse 56   Temp (!) 94.5  F (34.7  C)   Resp 12   Ht 1.524 m (5')   Wt 56 kg (123 lb 7.3 oz)   SpO2 96%   BMI 24.11 kg/m      Intubated, sedated  Serosang drainage in chest tubes  L chest dressing c/d/i. L groin intact.  Feet warm, monophasic bilat DP/PT doppler signals    Labs reviewed    Plan:  Goal -130  ICP goal10  Wean sedation for motor exam tonight. Please page if any concerns for paralysis  Remaining care per CVTS team    Lisandro Lemus MD

## 2023-02-09 NOTE — PROGRESS NOTES
CLINICAL NUTRITION SERVICES - BRIEF NOTE    Received provider consult for nutrition education with comments post op cardiovascular surgery (automatic consult on post-op order set). S/p left thoracotomy with thoracoabdominal aortic aneurysm repair with left heart bypass on 2/8. Nutrition education not indicated.    RD will follow per LOS protocol or if re-consulted.     Carlita Tyler MS, RD, LD  4E (CVICU) RD pager: [phone]  Ascom: [phone]  Weekend/Holiday RD pager: [phone]

## 2023-02-09 NOTE — PROGRESS NOTES
"VASCULAR SURGERY PROGRESS NOTE    Subjective:  Intubated, sedated Hmong speaking only. Obtained a head nod \"yes\" on light sedation pending sedation lift for a full neuro exam. On Vasopressin, Norepinephrine and Epinephrine for blood pressure support. Required multiple blood products overnight for ongoing chest tube output.    Objective:  Intake/Output Summary (Last 24 hours) at 2/9/2023 0859  Last data filed at 2/9/2023 0845  Gross per 24 hour   Intake 9060.44 ml   Output 2257 ml   Net 6803.44 ml     Labs:  ROUTINE IP LABS (Last four results)  BMP  Recent Labs   Lab 02/09/23  0819 02/09/23  0656 02/09/23  0604 02/09/23  0415 02/09/23  0312 02/09/23  0301 02/08/23  2339 02/08/23 2328 02/08/23  1834 02/08/23  1826 02/08/23  1732 02/08/23  1307 02/08/23  0412   NA  --   --   --   --   --  140  --  139  --  142 141   < > 138   POTASSIUM  --   --   --   --   --  4.4  --  4.5  --  4.2 4.0   < > 3.8   CHLORIDE  --   --   --   --   --  113*  --  111*  --  114*  --   --  104   FAVIOLA  --   --   --   --   --  8.0*  --  7.9*  --  8.3*  --   --  8.8   CO2  --   --   --   --   --  20*  --  19*  --  17*  --   --  22   BUN  --   --   --   --   --  16.4  --  14.8  --  13.4  --   --  12.7   CR  --   --   --   --   --  1.15*  --  1.06*  --  0.94  --   --  0.95   * 86 92 127*   < > 133*   < > 143*   < > 140* 128*   < > 113*    < > = values in this interval not displayed.     CBC  Recent Labs   Lab 02/09/23  0301 02/08/23 2329 02/08/23  2139 02/08/23  1826 02/08/23  1644 02/08/23  1640 02/08/23  1612   WBC 8.8  --   --  10.0  --  9.9 10.2   RBC 2.78*  --   --  3.57*  --  2.49* 2.91*   HGB 8.0* 9.1* 10.5* 10.4*   < > 7.2* 8.2*   HCT 24.7*  --   --  31.4*  --  22.7* 26.5*   MCV 89  --   --  88  --  91 91   MCH 28.8  --   --  29.1  --  28.9 28.2   MCHC 32.4  --   --  33.1  --  31.7 30.9*   RDW 15.0  --   --  14.6  --  14.8 14.7   *  --  135* 130*  --  157 53*    < > = values in this interval not displayed.     INR  Recent " Labs   Lab 02/09/23  0301 02/08/23  1826 02/08/23  1640 02/08/23  1616   INR 1.23* 1.49* 1.32* 1.87*     PHYSICAL EXAM:  /77   Pulse 73   Temp 99.3  F (37.4  C)   Resp 12   Ht 1.524 m (5')   Wt 62.5 kg (137 lb 12.6 oz)   SpO2 97%   BMI 26.91 kg/m    General: The patient is intubated and sedated.  Psych: Unable to assess  Skin: Color appropriate for race, warm, dry.  Respiratory: Intubated, mechanical breaths  GI:  Abdomen soft, nontender to light palpation.  Incision: Left groin incision intact, no hematoma, no swelling. Left chest dressing clean, dry, intact.   Extremities: Feet warm, monophasic bilat DP/PT doppler signals, generalized pitting edema noted.     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     - Goal -130  - ICP goal10  - Wean sedation for motor exam. Please page if any concerns for paralysis  - Remaining care per CVTS team      Discussed pt history, exam, assessment and plan with Dr. Dixon of the vascular surgery service, who is in agreement with the above.    Nayely Urrutia, [facility]  Vascular Surgery  Pager: [phone]  [email or web address]  Send message or 10 digit call back number Securely via iXpert with the iXpert Web Console (learn more here)

## 2023-02-09 NOTE — PLAN OF CARE
Goal Outcome Evaluation:      Plan of Care Reviewed With: patient    Overall Patient Progress: no changeOverall Patient Progress: no change    Outcome Evaluation: MAEx4. Pressor needs the same. Recieved 750mL LR.    Major Shift Events: Sedation off most of the am. MAEx4, only dorsiflexed feet. Followed commands with [facility] . Now sedated with propofol and fentanyl. Epi, levo, and vaso rates consistent throughout shift. MAP>75 and -140s per vascular. Received a total of 3 250mL LR boluses for low CVP. Flotrack set up to monitor hemodynamics q1hr. Pressure supported 8/5 on the vent this am for 1.5 hours. Keep intubated overnight per CVTS.       Plan: Try to get her to lift legs with PM neuro assessment. Wean pressors as tolerated. Monitor lumbar drain. SBT tomorrow and possibly extubate.    For vital signs and complete assessments, please see documentation flowsheets.

## 2023-02-09 NOTE — PLAN OF CARE
Major Shift Events: Pt arrived from OR 1820 on 0.03 epi. Continued to dump from CT [id number] with increased pressor needs. 300ml out from CT [id number] with escalating pressor needs and CVP remaining 11-12. Frequent stripping of CT done d/t large thick clots in all chest tubes. Given total 3L LR and 250ml albumin. CT dressing saturated x3- changed and reinforced with pressure dressing. Concern for tamponade. hgb sent at 2130 was 10.5 and 2330 9.1. Asked MD about coag recheck; plan for panel in AM, told would assess sooner if needed to give blood.   Epi up to 0.15, levo up to 0.15, vaso 4. Lactic 2--> 1.3 this AM.   hgb 8.0 this AM- 1u PRBC.     Neuro/Pain: Tmax 99.1.Sedated on propofol 40 and fent 50mcg most of night. Sedation vacation Withdraws in BLE and moves BUE weakly. Tracking with eyes, nods yes to pain when asked with phone .    Cardiac: CT x3 with 1140 out total. Bedside echo done at 0400 demonstrated small pericardial effusion with no tamponade. Sinus rhythm rates 60-80bpm, rare PVC.   MAP goal 65-80 -130 per vascular. CVP 8-12 down to 6 this AM.   Lumbar drain in place: Per verbal discussion with Vascular and CVTS Goal <10- draining to maintain </= 10. Has been 10-14. Have drained 10-20ml/hr avg.  Vascular following- fleeting DP pulses difficult to hear with doppler. PT pulses present, popliteal present. Vascular at bedside this evening and aware. Extremities cool/dry.  Respiratory: 8.0 ETT 22@lip. CMV 12/400/40/5- ABGs stable. Weak cough with suction- thick bloody secretions. CXR with L sided atelectasis vs pleural effusions.   GI: OG @ 60 (xray confimed) clamped. BS hypoactive. Insulin gtt 1unit/hr.  : Black with low UOP throughout the night avg 20ml/hr. Creat 1.15 this AM.   Skin: L abd incision, L pleural CT sites, L groin  LDA/GTT: 3 arterial lines- R femoral transducing- titrating gtts accordingly. R axillary art line correlating to R femoral. R radial arterial line in place-  underdamped; only MAP correlates.   RIJ swan through Double Lumen MAC. PIV x2.     Labs: hgb 8.0 this AM; plt 107. INR 1.23      Plan: Neuro/vasc checks, vent wean? Needs [facility] [place]   For vital signs and complete assessments, please see documentation flowsheets.      Goal Outcome Evaluation:      Plan of Care Reviewed With: patient    Overall Patient Progress: improvingOverall Patient Progress: improving    Outcome Evaluation: Fluid resuscitated overnight, unstable hemodynamics throughout the night. VALENTIN weakly/withdrawal at 0400 with sedation vacation

## 2023-02-09 NOTE — PRE-PROCEDURE
GENERAL PRE-PROCEDURE:   Procedure:  Coronary angiogram  Date/Time:  2/7/2023 6:48 PM    Written consent obtained?: Yes    Risks and benefits: Risks, benefits and alternatives were discussed    Consent given by:  Patient  Patient states understanding of procedure being performed: Yes    Patient's understanding of procedure matches consent: Yes    Procedure consent matches procedure scheduled: Yes    Expected level of sedation:  Moderate  Appropriately NPO:  Yes  Mallampati  :  Grade 2- soft palate, base of uvula, tonsillar pillars, and portion of posterior pharyngeal wall visible  Lungs:  Lungs clear with good breath sounds bilaterally  Heart:  Normal heart sounds and rate  History & Physical reviewed:  History and physical reviewed and no updates needed  Statement of review:  I have reviewed the lab findings, diagnostic data, medications, and the plan for sedation    Plan of care discussed with Dr. Dutton who agrees with the above assessment and plan.    Darrian Starks MD  Cardiology Fellow

## 2023-02-09 NOTE — PROGRESS NOTES
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 Briceno on 2/8/23.     CO-MORBIDITIES:   Dissection of thoracoabdominal aorta (H)  (primary encounter diagnosis)  Coronary artery disease involving native coronary artery of native heart without angina pectoris      24-hour events:  Got 3L LR, 250 Albumin, up to 0.1 NE and Epi and 4 Vaso, 1 unit blood, UOP 30/hr, moved all extremities. Lumbar drain needs ICP <10. Lactate improved.    Today's Changes:  - Decrease VT to 350 ml  - 250 LR bolus  - Flotrac  - Quantra today  - Wean pressors as able, NE and vaso down first  - MAP goal >75, and SBP <140.   - Lumbar drain protocol  - Echo negative  - Start PTA mirtazapine  - Wean sedation, PST, trial extubation    Neuro/ pain/ sedation:  #Depression  #Acute postoperative pain  #Sedation  - Monitor neuro status. Notify MD for acute changes  - Following commands in all extremities but decreased in lowers  - Pain: Scheduled: Tylenol.  PRN: Tylenol, oxycodone, Dilaudid  - Off prop and fentanyl. Could do Precedex for extubation  - Lumbar drain: keep ICP <10. Talking to vascular about lumbar drain protocol  - Start PTA mirtazapine    Pulmonary care:   #Mechanical ventilation  Vent Mode: CMV/AC  (Continuous Mandatory Ventilation/ Assist Control)  FiO2 (%): 40 %  Resp Rate (Set): 12 breaths/min  Tidal Volume (Set, mL): 400 mL  PEEP (cm H2O): 5 cmH2O  Resp: 15  - Goal SpO2 > 92%  - Encourage IS q15-30 minutes when awake.  - Decrease VT to 350 ml. PST today and trial extubation    Cardiovascular:     #Cardiogenic shock  #HTN  #Ascending aortic aneurysm s/p ascending repair with aortic root reconstruction  #Bioprosthetic aortic valve in 2018  Pre CPBP: LV/RV normal, 55-60%. Mild MR w/ central jet from A2/P2. Mild TR.  Post CPBP: LV normal, 55-60%. RV function mildly reduced. Mild AS, trace AI. Mild MR. Mild TR.  - Vasopressors down to Epi 0.05, NE 0.06, Vaso 3. Wean NE and vaso first  - Monitor for goal MAP >75, and SBP <140.   - Flotrac, Quantra  - Echo for possible pericardial effusion  - Hold PTA metoprolol succinate 50 mg, lisinopril 40 mg  - Hold PTA atorvastatin 40 mg  - ASA 81 mg    GI care / Nutrition:   - NPO, bedside swallow eval once extubated  - PPI  - Bowel regimen: MiraLAX, senna    Renal / Fluids / Electrolytes:   BL creat appears to be ~ 0.9-1.0  - Strict I/O, daily weights, avoid/limit nephrotoxins  - Replete lytes PRN per protocol    Endocrine:    #Stress hyperglycemia  Preop A1c 4.7  - Med SSI  - Goal BG <180 for optimal healing    ID / Antibiotics:  #Stress induced leukocytosis  - To complete perioperative regimen  - Monitor fever curve, WBC, and inflammatory markers as appropriate    Heme:     #Acute blood loss anemia  #Acute blood loss thrombocytopenia  No s/sx active bleeding  - CBC daily, Hgb goal > 7.0  - Hgb 8  - Quantra  - Hold SQH in possible bleeding state until get Echo back  - Not on warfarin for valve; look into PTA apixaban    MSK / Skin:  #Sternotomy  #Surgical Incision  - Sternal precautions, postop incision management protocol  - PT/OT/CR    Prophylaxis:     - Mechanical DVT ppx  - Chemical DVT ppx: hold SQH  - PPI    Lines / Tubes / Drains:  - ETT  - RIJ CVC, PA catheter  - Arterial Lines - remove R femoral and R axillary  - CTs x3  - Black  - NG    Disposition:  - CVICU    Patient seen, findings and plan discussed with CVICU staff.    Valdo Patel MD  Anesthesiology, PGY-2/CA-1  Ascom *[phone]     ====================================    TODAY'S  PROGRESS  SUBJECTIVE  Intubated, sedated. Decreased vasopressor requirements.    OBJECTIVE  1. VITAL SIGNS  Temp:  [94.5  F (34.7  C)-99.5  F (37.5  C)] 99.1  F (37.3  C)  Pulse:  [52-85] 73  Resp:  [12-27] 15  MAP:  [63 mmHg-86 mmHg] 75 mmHg  Arterial Line BP: (104-147)/(40-59) 138/45  FiO2 (%):  [40 %-60 %] 40 %  SpO2:  [91 %-100 %] 92 %  Vent Mode: CMV/AC  (Continuous Mandatory Ventilation/ Assist Control)  FiO2 (%): 40 %  Resp Rate (Set): 12 breaths/min  Tidal Volume (Set, mL): 400 mL  PEEP (cm H2O): 5 cmH2O  Resp: 15      2. INTAKE/ OUTPUT  I/O last 3 completed shifts:  In: 8987.18 [I.V.:3893.18; Other:600; NG/GT:120; IV Piggyback:2000]  Out: 2162 [Urine:866; Drains:96; Chest Tube:1200]    3. PHYSICAL EXAMINATION  General: sedated  Neuro: sedated. Following commands in all 4 extremities with decreased in lowers  Resp: intubated, ventilated  CV: S1, S2, RRR, no m/r/g   Abdomen: Soft, non-distended, non-tender  Incisions: CDI  Extremities: warm and well perfused  CT: To suction, serosang output and clots, no airleak, crepitus    4. INVESTIGATIONS  Arterial Blood Gases   Recent Labs   Lab 02/09/23  0808 02/09/23  0301 02/08/23  2330 02/08/23  2138   PH 7.33* 7.36 7.37 7.35   PCO2 39 39 38 37   PO2 129* 100 113* 165*   HCO3 20* 22 22 21     Complete Blood Count   Recent Labs   Lab 02/09/23  0937 02/09/23  0301 02/08/23  2329 02/08/23  2139 02/08/23  1826 02/08/23  1644 02/08/23  1640   WBC 12.6* 8.8  --   --  10.0  --  9.9   HGB 9.4* 8.0* 9.1* 10.5* 10.4*   < > 7.2*   PLT 92* 107*  --  135* 130*  --  157    < > = values in this interval not displayed.     Basic Metabolic Panel  Recent Labs   Lab 02/09/23  0942 02/09/23  0819 02/09/23  0656 02/09/23  0604 02/09/23  0312 02/09/23  0301 02/08/23  2339 02/08/23  2328 02/08/23  1834 02/08/23  1826 02/08/23  1732 02/08/23  1307 02/08/23  0412   NA  --   --   --   --   --  140  --  139  --  142 141   < > 138   POTASSIUM  --   --   --   --   --  4.4  --  4.5  --  4.2 4.0    < > 3.8   CHLORIDE  --   --   --   --   --  113*  --  111*  --  114*  --   --  104   CO2  --   --   --   --   --  20*  --  19*  --  17*  --   --  22   BUN  --   --   --   --   --  16.4  --  14.8  --  13.4  --   --  12.7   CR  --   --   --   --   --  1.15*  --  1.06*  --  0.94  --   --  0.95   * 130* 86 92   < > 133*   < > 143*   < > 140* 128*   < > 113*    < > = values in this interval not displayed.     Liver Function Tests  Recent Labs   Lab 02/09/23 0301 02/08/23 2328 02/08/23 1826 02/08/23  1640 02/08/23  1616 02/08/23  0540 02/08/23  0412 02/07/23  0411   * 89*  --   --   --   --  61* 33   ALT 43* 34 30  --   --   --  33 18   ALKPHOS 51 52 53  --   --   --  141* 119*   BILITOTAL 0.9 1.0 1.5*  --   --   --  0.7 0.6   ALBUMIN 2.4* 1.8* 1.9*  --   --   --  3.5 3.2*   INR 1.23*  --  1.49* 1.32* 1.87*   < > 0.99  --     < > = values in this interval not displayed.     Pancreatic Enzymes  No lab results found in last 7 days.  Coagulation Profile  Recent Labs   Lab 02/09/23 0301 02/08/23 1826 02/08/23  1640 02/08/23  1616   INR 1.23* 1.49* 1.32* 1.87*   PTT 37 72* 44* 38     Lactate  Invalid input(s): LACTATE    5. RADIOLOGY  Recent Results (from the past 24 hour(s))   XR Chest Port 1 View    Narrative    EXAM: XR CHEST PORT 1 VIEW  2/8/2023 7:12 PM      HISTORY: Post Op CVTS Surgery    COMPARISON: CT chest/abdomen/pelvis 2/5/2023    FINDINGS: Single view of the chest. Post surgical change in the chest  with median sternotomy wires and several discontinuous sternotomy  fixation wires. Additional wires projecting over the left upper  quadrant. Endotracheal tube tip is approximately 4 cm from the darling.  Prosthetic aortic valve. Right IJ central venous catheter tip is in  the proximal right pulmonary artery. Two left apical chest tubes and  the left basilar chest tube. Enteric tube tip and sidehole projected  over the stomach. Trachea is midline. Cardiac silhouette is enlarged  and somewhat obscured  by diffuse mixed interstitial and airspace  opacities in left upper lobe. The left lung base is relatively clear.  No pleural effusion. Small left apical pneumothorax. No right-sided  pleural effusion or pneumothorax. Several acute left lateral rib  fractures. Subcutaneous emphysema in the left axilla. The upper  abdomen is unremarkable.      Impression    IMPRESSION:   1. Post surgical changes of left thoracotomy and thoracoabdominal  aortic repair.  2. Endotracheal tube tip is approximately 4 cm from the darling. Right  IJ [place] catheter tip is in the distal right pulmonary artery.  3. Small right apical pneumothorax.   4. Mixed interstitial airspace opacities throughout the left upper  lung field, atelectasis and/or edema.    I have personally reviewed the examination and initial interpretation  and I agree with the findings.    PRATIK RESTREPO MD         SYSTEM ID:  [id number]   XR Abdomen Port 1 View    Narrative    EXAM: XR ABDOMEN PORT 1 VIEW  2/8/2023 7:15 PM     HISTORY:  OG       TECHNIQUE: Single frontal radiograph of the abdomen    COMPARISON:  CT chest, abdomen and pelvis 2/5/2023.    FINDINGS:   Portable AP supine radiograph the abdomen. Gastric tube tip and  sidehole project over the expected location of the stomach. Left  basilar chest tube. Contrast visualized within several nondistended  loops of bowel. Nonobstructive bowel gas pattern. No pneumatosis,  portal venous gas. Severe degenerative changes of the lower lumbar  spine. Please refer to same-day chest radiograph for further details  regarding the visualized lung bases.      Impression    IMPRESSION: Gastric tube tip and sidehole project over the expected  location of the stomach. Nonobstructive bowel gas pattern.     I have personally reviewed the examination and initial interpretation  and I agree with the findings.    PRATIK RESTREPO MD         SYSTEM ID:  [id number]   XR Chest Port 1 View    Impression    RESIDENT PRELIMINARY  INTERPRETATION  Impression:   1. Support devices in stable position as noted above.  2. Increased patchy and consolidative opacities throughout the left  lung.  3. Likely small bilateral pleural effusions.

## 2023-02-10 NOTE — PROCEDURES
Small Bowel Feeding Tube Placement Assessment  Reason for Feeding Tube Placement: administration of nutrition and medication  Cortrak Start Time: 1345   Cortrak End Time: 1400  Medicine Delivered During Procedure: lubricating jelly  Placement Successful: yes, presumed post-pyloric (pending AXR)    Procedure Complications: difficulty passing OGT; OGT removed   Final Placement Jefe at exit of nare: 83 cm  Face to Face time with patient: 15 minutes    Bridle Placement:   Reason for bridle placement: securement of FT   Medicine delivered during procedure: lubricating jelly   Procedure: Successful  Location of top of clip on FT: @ 84 cm marker   Condition of nose/skin at time of bridle placement: Unremarkable   Face to Face time with patient: <5 minutes.    Carlita Tyler, MS, RD, LD  4E (CVICU) RD pager: [phone]  Ascom: [phone]  Weekend/Holiday RD pager: [phone]

## 2023-02-10 NOTE — PROCEDURES
[facility]    Arterial line placement    Date/Time: 2/10/2023 2:54 PM  Performed by: Valdo Patel MD  Authorized by: Valdo Patel MD       UNIVERSAL PROTOCOL   Site Marked: Yes  Prior Images Obtained and Reviewed:  Yes  Required items: Required blood products, implants, devices and special equipment available    Patient identity confirmed:  Verbally with patient  Patient was reevaluated immediately before administering moderate or deep sedation or anesthesia  Confirmation Checklist:  Relevant allergies, procedure was appropriate and matched the consent or emergent situation, correct equipment/implants were available and patient's identity using two indicators  Time out: Immediately prior to the procedure a time out was called    Universal Protocol: the Joint Commission Universal Protocol was followed    Preparation: Patient was prepped and draped in usual sterile fashion    Indication: hemodynamic monitoring  Location:Location: Left axillary.         ANESTHESIA    Anesthesia: Local infiltration  Local Anesthetic:  Lidocaine 1% without epinephrine  Anesthetic Total (mL):  2      SEDATION    Patient Sedated: No      PROCEDURE DETAILS    Needle Gauge:  18  Seldinger technique: Seldinger technique used    Number of Attempts:  1  Post-procedure:  Line sutured      PROCEDURE    Patient Tolerance:  Patient tolerated the procedure well with no immediate complications  Length of time physician/provider present for 1:1 monitoring during sedation: 0

## 2023-02-10 NOTE — PROCEDURES
Date: 02/10/23    Procedure: Bronchoscopy  Consent: Written, family  Indication: Mucous plugging, difficulty weaning from vent  Monitoring: EKG, blood pressure, pulse oximetry  Anesthesia:  Topical lidocaine  Physician: Performed under CVICU attending supervision, Dr. Cuevas   Complications: none  Entry: A bronchoscope was lubricated and introduced through the indwelling ETT in the standard fashion.  Specimens: Left lung BAL sent for culture and gram stain  Findings:  Small amount of old blood suctioned from left lung specifically left lower lobe. Mucous seen and suctioned from right lung. No active bleeding or purulent fluid noted.      Stephanie Corrales DO  ACTA Fellow

## 2023-02-10 NOTE — PROGRESS NOTES
Overnight no acute events.   Neuro exam stable, patient weak but able to move all extremities with sedation vacation.   Pressor requirements stable for MAP>75. ROXANNE numbers consistent.   500mL LR bolus given for low urine output with good fair response.   Lumbar drain used multiple times throughout the night to keep ICP<10.   Chest tube output minimal.

## 2023-02-10 NOTE — PROGRESS NOTES
"Bronchoscopy Risk Assessment Guidelines      A. Patient symptoms to consider when assessing pulmonary TB risk are:    I. Cough greater than 3 weeks; and fever, hemoptysis, pleuritic chest    pain, weight loss greater than 10 lbs, night sweats, fatigue, infiltrates on    upper lobes or superior segments of lower lobes, cavitation on chest    x-ray.   B. Patient risk factors to consider when assessing pulmonary TB risk are:    I. Exposure to known TB case, foreign-born persons (within 5 years of    arrival to [place]), residence in a crowded setting (correctional facility,     long-term care center, etc.), persons with HIV or immunosuppression.    Patients with symptoms and risk factors should generally be considered \"suspect risk\" and bronchoscopies should be performed in airborne precautions.    This patient has NO KNOWN RISK of Tuberculosis (proceed with bronchoscopy)    Specimens sent: yes  Complications: None  Scope used: slim #33  Attending Physician: Dr. Mason Flores, RT on 2/10/2023 at 2:30 PM  "

## 2023-02-10 NOTE — PHARMACY-CONSULT NOTE
Pharmacy Tube Feeding Consult    Medication reviewed for administration by feeding tube and for potential food/drug interactions.    Recommendation: No changes are needed at this time.     Pharmacy will continue to follow as new medications are ordered.    Hesham Glover, PharmD, BCPS

## 2023-02-10 NOTE — CONSULTS
Care Management Initial Consult    General Information  Assessment completed with: Care Team Member, TAN-chart review,    Type of CM/SW Visit: Initial Assessment    Primary Care Provider verified and updated as needed:     Readmission within the last 30 days: no previous admission in last 30 days         Advance Care Planning:          Communication Assessment  Patient's communication style: spoken language (non-English)    Hearing Difficulty or Deaf: no   Wear Glasses or Blind: no    Cognitive  Cognitive/Neuro/Behavioral: .WDL except  Level of Consciousness: lethargic  Arousal Level: opens eyes spontaneously  Orientation: other (see comments) (CHERRI)  Mood/Behavior: calm  Best Language:  (CHERRI)  Speech: endotracheal tube    Living Environment:   People in home: child(cristopher), adult     Current living Arrangements: house      Able to return to prior arrangements: yes     Family/Social Support:  Care provided by: child(cristopher)  Provides care for: no one     Children          Description of Support System: Involved, Supportive       Current Resources:   Patient receiving home care services: No     Community Resources:    Equipment currently used at home: walker, standard  Supplies currently used at home:      Employment/Financial:  Employment Status:          Financial Concerns: No concerns identified   Referral to Financial Worker: No     Lifestyle & Psychosocial Needs:  Social Determinants of Health     Tobacco Use: Low Risk      Smoking Tobacco Use: Never     Smokeless Tobacco Use: Never     Passive Exposure: Not on file   Alcohol Use: Not on file   Financial Resource Strain: Not on file   Food Insecurity: Not on file   Transportation Needs: Not on file   Physical Activity: Not on file   Stress: Not on file   Social Connections: Not on file   Intimate Partner Violence: Not on file   Depression: Not on file   Housing Stability: Not on file     Functional Status:  Prior to admission patient needed assistance:   Dependent  ADLs:: Bathing, Ambulation-walker  Dependent IADLs:: Cleaning, Cooking, Laundry, Shopping, Transportation     Mental Health Status:  Mental Health Status: No Current Concerns       Chemical Dependency Status:  Chemical Dependency Status: No Current Concerns                  Additional Information:  Pt underwent left thoracotomy with thoracoabdominal aortic repair with Dr. Briceno on 2/8/23.  Pt is in ICU vented and sedated.    RNCC called pt dtr to complete elevated risk score assessment but dtr were not available.  Assessment completed per chart review and report.  Pt lives with her dtr and son in-law.  Pt uses walker for mobility, family assist with most of her cares and transport.  PT/OT are following.  RNCC will cont to follow up the plan of care and assist with any care coordination assistance needs.    Tomasa Valdez RN, PHN, BSN  4A and 4E/ ICU  Care Coordinator  Phone: [phone]  Pager: [phone]    To contact the weekend RNCC  [place] ([phone]) Saturday and Sunday   Units: 4A, 4C, 4E, 5A and 5B- Pager [phone]   Units: 6A, 6B- Pager [phone]   Unit : 6C, 6D- pager [phone]   Units: 7A, 7B, 7C, 7D, and 5C- Pager [phone]

## 2023-02-10 NOTE — PROGRESS NOTES
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 Briceno on 2/8/23.     CO-MORBIDITIES:   Dissection of thoracoabdominal aorta (H)  (primary encounter diagnosis)  Coronary artery disease involving native coronary artery of native heart without angina pectoris      24-hour events:  - Got 500 LR overnight, 500 LR in morning.   - Increased pressors to NE 0.12, Epi 0.06, Vaso 4.      Today's Changes:  - Remove femoral A-line  - 500 ml bolus  - MAP goal >75, and SBP <140.   - Lumbar drain protocol: clamp for 24 hours, consider pulling after that  - Wean sedation, PST, trial extubation  - At 1000 (2/10) examined patient with vascular team and noticed she was unable to move her lower extremities, had weakened  strength b/l, dusky discoloration in the hands R>L. Changed goals to MAP >90, added Angiotensin 2, 2u pRBC, stress steroids hydrocortisone 50 mg q6h  - Had multiphasic Doppler flow in right and left ulnar artery and brachial arch  - Per vascular, remove arterial line from right axillary line and place new left axillary arterial line    Neuro/ pain/ sedation:  #Depression  #Acute postoperative pain  #Sedation  - Monitor neuro status. Notify MD for acute changes  - Pain: Schedule  Tylenol.  PRN: Tylenol, oxycodone, Dilaudid  - Off prop and fentanyl  - Lumbar drain: keep ICP <10 . Talk to vasc about claming.   - PTA mirtazapine  #Weakness  Examined patient with vascular team, pt unable to move b/l lower extremities and no sensation. Dusky discoloration in the hands R>L. Able to  hands  b/l but weaker than yesterday. Difficult to arouse despite off sedation for 2+ hours. Unable to move her lower extremities, had weakened  strength b/l,  Changed goals to MAP >90, added Angiotensin 2, 2u pRBC, stress steroids hydrocortisone 50 mg q6h  - Had multiphasic Doppler flow in right and left ulnar artery and brachial arch  - Per vascular, requested to remove arterial line from right axillary line and place new arterial line in left arm      Pulmonary care:   #Mechanical ventilation  Vent Mode: CMV/AC  (Continuous Mandatory Ventilation/ Assist Control)  FiO2 (%): 50 %  Resp Rate (Set): 14 breaths/min  Tidal Volume (Set, mL): 350 mL  PEEP (cm H2O): 5 cmH2O  Pressure Support (cm H2O): 8 cmH2O  Resp: 14  - Goal SpO2 > 92%  - Encourage IS q15-30 minutes when awake.  - PST today and trial extubation    Cardiovascular:    #Cardiogenic shock  #HTN  #Ascending aortic aneurysm s/p ascending repair with aortic root reconstruction  #Bioprosthetic aortic valve in 2018  Pre CPBP: LV/RV normal, 55-60%. Mild MR w/ central jet from A2/P2. Mild TR.  Post CPBP: LV normal, 55-60%. RV function mildly reduced. Mild AS, trace AI. Mild MR. Mild TR.  - Vasopressors down to Epi 0.05, NE 0.06, Vaso 3. Wean NE and vaso first  - Monitor for goal MAP >75, and SBP <140.    - Flotrac, Quantra  - Echo for possible pericardial effusion  - Hold PTA metoprolol succinate 50 mg, lisinopril 40 mg  - Hold PTA atorvastatin 40 mg  - Talk to vascular about liberalizing MAP goals  - ASA 81 mg    GI care / Nutrition:   - NPO, bedside swallow eval once extubated. NJ today.  - PPI  - Bowel regimen: MiraLAX, senna    Renal / Fluids / Electrolytes:   BL creat appears to be ~ 0.9-1.0  - Strict I/O, daily weights, avoid/limit nephrotoxins  - Replete lytes PRN per protocol    Endocrine:    #Stress hyperglycemia  Preop A1c 4.7  - Med SSI  - Goal BG <180 for optimal healing  - Hydrocortisone 50 mg q6h    ID / Antibiotics:  #Stress induced leukocytosis  - To  complete perioperative regimen  - Monitor fever curve, WBC, and inflammatory markers as appropriate    Heme:     #Acute blood loss anemia  #Acute blood loss thrombocytopenia  No s/sx active bleeding  - CBC daily, Hgb goal > 7.0  - Hgb 8  - Quantra  - Hold SQH in possible bleeding state until get Echo back  - Not on warfarin for valve; look into PTA apixaban    MSK / Skin:  #Sternotomy  #Surgical Incision  - Sternal precautions, postop incision management protocol  - PT/OT/CR    Prophylaxis:     - Mechanical DVT ppx  - Chemical DVT ppx: SQH  - PPI    Lines / Tubes / Drains:  - ETT  - RIJ CVC, PA catheter  - Arterial Lines - left axillary  - CTs x3  - Black  - NG    Disposition:  - CVICU  discontinue yash    Patient seen, findings and plan discussed with CVICU staff and CT surgeons and fellow.    Valdo Patel MD  Anesthesiology, PGY-2/CA-1  Ascom *[phone]     ====================================    TODAY'S PROGRESS  SUBJECTIVE  Intubated, sedated. Decreased vasopressor requirements.    OBJECTIVE  1. VITAL SIGNS  Temp:  [97.7  F (36.5  C)-99.5  F (37.5  C)] 98.3  F (36.8  C)  Pulse:  [71-88] 74  Resp:  [14-21] 14  MAP:  [63 mmHg-85 mmHg] 76 mmHg  Arterial Line BP: (111-148)/(23-51) 136/44  FiO2 (%):  [50 %] 50 %  SpO2:  [90 %-100 %] 96 %  Vent Mode: CMV/AC  (Continuous Mandatory Ventilation/ Assist Control)  FiO2 (%): 50 %  Resp Rate (Set): 14 breaths/min  Tidal Volume (Set, mL): 350 mL  PEEP (cm H2O): 5 cmH2O  Pressure Support (cm H2O): 8 cmH2O  Resp: 14      2. INTAKE/ OUTPUT  I/O last 3 completed shifts:  In: 2633.06 [I.V.:1703.06; NG/GT:180; IV Piggyback:750]  Out: 976 [Urine:636; Drains:150; Chest Tube:190]    3. PHYSICAL EXAMINATION  General: sedated  Neuro: sedated. Following commands in all 4 extremities with decreased in lowers  Resp: intubated, ventilated  CV: S1, S2, RRR, no m/r/g   Abdomen: Soft, non-distended, non-tender  Incisions: CDI  Extremities: warm and well perfused  CT: To suction, serosang output  and clots, no airleak, crepitus    4. INVESTIGATIONS  Arterial Blood Gases   Recent Labs   Lab 02/10/23  0357 02/09/23 1953 02/09/23  1018 02/09/23  0808   PH 7.33* 7.30* 7.31* 7.33*   PCO2 39 40 41 39   PO2 81 79* 95 129*   HCO3 21 19* 20* 20*     Complete Blood Count   Recent Labs   Lab 02/10/23  0357 02/09/23  1741 02/09/23  0937 02/09/23 0301 02/08/23 2329 02/08/23  2139 02/08/23  1826   WBC 14.0*  --  12.6* 8.8  --   --  10.0   HGB 8.2* 9.3* 9.4* 8.0*   < > 10.5* 10.4*   *  --  92* 107*  --  135* 130*    < > = values in this interval not displayed.     Basic Metabolic Panel  Recent Labs   Lab 02/10/23  0827 02/10/23  0409 02/10/23  0357 02/10/23  0005 02/09/23 1956 02/09/23 1741 02/09/23 0312 02/09/23 0301 02/08/23  2339 02/08/23 2328 02/08/23  1834 02/08/23  1826   NA  --   --  142  --   --   --   --  140  --  139  --  142   POTASSIUM  --   --  4.5  --   --  4.5  --  4.4  --  4.5  --  4.2   CHLORIDE  --   --  110*  --   --   --   --  113*  --  111*  --  114*   CO2  --   --  19*  --   --   --   --  20*  --  19*  --  17*   BUN  --   --  23.7*  --   --   --   --  16.4  --  14.8  --  13.4   CR  --   --  1.24*  --   --   --   --  1.15*  --  1.06*  --  0.94   * 135* 157* 136*   < >  --    < > 133*   < > 143*   < > 140*    < > = values in this interval not displayed.     Liver Function Tests  Recent Labs   Lab 02/10/23  0357 02/09/23  0301 02/08/23 2328 02/08/23  1826 02/08/23  1640 02/08/23  1616 02/08/23  0540 02/08/23  0412   * 100* 89*  --   --   --   --  61*   ALT 51* 43* 34 30  --   --   --  33   ALKPHOS 65 51 52 53  --   --   --  141*   BILITOTAL 0.5 0.9 1.0 1.5*  --   --   --  0.7   ALBUMIN 2.2* 2.4* 1.8* 1.9*  --   --   --  3.5   INR  --  1.23*  --  1.49* 1.32* 1.87*   < > 0.99    < > = values in this interval not displayed.     Pancreatic Enzymes  No lab results found in last 7 days.  Coagulation Profile  Recent Labs   Lab 02/09/23  0301 02/08/23  1826 02/08/23  1640  02/08/23  1616   INR 1.23* 1.49* 1.32* 1.87*   PTT 37 72* 44* 38     Lactate  Invalid input(s): LACTATE    5. RADIOLOGY  Recent Results (from the past 24 hour(s))   XR Chest Port 1 View    Narrative    EXAM: XR CHEST PORT 1 VIEW  2/8/2023 7:12 PM      HISTORY: Post Op CVTS Surgery    COMPARISON: CT chest/abdomen/pelvis 2/5/2023    FINDINGS: Single view of the chest. Post surgical change in the chest  with median sternotomy wires and several discontinuous sternotomy  fixation wires. Additional wires projecting over the left upper  quadrant. Endotracheal tube tip is approximately 4 cm from the darling.  Prosthetic aortic valve. Right IJ central venous catheter tip is in  the proximal right pulmonary artery. Two left apical chest tubes and  the left basilar chest tube. Enteric tube tip and sidehole projected  over the stomach. Trachea is midline. Cardiac silhouette is enlarged  and somewhat obscured by diffuse mixed interstitial and airspace  opacities in left upper lobe. The left lung base is relatively clear.  No pleural effusion. Small left apical pneumothorax. No right-sided  pleural effusion or pneumothorax. Several acute left lateral rib  fractures. Subcutaneous emphysema in the left axilla. The upper  abdomen is unremarkable.      Impression    IMPRESSION:   1. Post surgical changes of left thoracotomy and thoracoabdominal  aortic repair.  2. Endotracheal tube tip is approximately 4 cm from the darling. Right  IJ Laurel-Clarisa catheter tip is in the distal right pulmonary artery.  3. Small right apical pneumothorax.   4. Mixed interstitial airspace opacities throughout the left upper  lung field, atelectasis and/or edema.    I have personally reviewed the examination and initial interpretation  and I agree with the findings.    PRATIK RESTREPO MD         SYSTEM ID:  [id number]   XR Abdomen Port 1 View    Narrative    EXAM: XR ABDOMEN PORT 1 VIEW  2/8/2023 7:15 PM     HISTORY:  OG       TECHNIQUE: Single frontal  radiograph of the abdomen    COMPARISON:  CT chest, abdomen and pelvis 2/5/2023.    FINDINGS:   Portable AP supine radiograph the abdomen. Gastric tube tip and  sidehole project over the expected location of the stomach. Left  basilar chest tube. Contrast visualized within several nondistended  loops of bowel. Nonobstructive bowel gas pattern. No pneumatosis,  portal venous gas. Severe degenerative changes of the lower lumbar  spine. Please refer to same-day chest radiograph for further details  regarding the visualized lung bases.      Impression    IMPRESSION: Gastric tube tip and sidehole project over the expected  location of the stomach. Nonobstructive bowel gas pattern.     I have personally reviewed the examination and initial interpretation  and I agree with the findings.    PRATIK RESTREPO MD         SYSTEM ID:  [id number]   XR Chest Port 1 View    Impression    RESIDENT PRELIMINARY INTERPRETATION  Impression:   1. Support devices in stable position as noted above.  2. Increased patchy and consolidative opacities throughout the left  lung.  3. Likely small bilateral pleural effusions.

## 2023-02-10 NOTE — PROGRESS NOTES
"CLINICAL NUTRITION SERVICES - BRIEF NOTE      Reason for RD note: Provider order - \"Registered Dietitian to Assess and Order TF per Medical Nutrition Therapy Recommendations\"    New Findings/Chart Review:  -Pt s/p left thoracotomy with thoracoabdominal aortic aneurysm repair with left heart bypass.   -Pt remains intubated and sedated.    Interventions:  -Once FT placement verified by AXR, recommend initiating EN:  Novasource Renal @ 30 ml/hr (720 ml) + 1 pkt Prosource TF20 provides 1520 kcal (30 kcal/kg), 85 g pro (1.7 g/kg), 132 g CHO, 516 ml free water, and 0 g fiber daily.    - Initiate @ 15 ml/hr and advance by 15 ml q8hr as tolerated  - Do not start or advance until lytes (Mg++,K+) WNL and phos>2.0  - Recommend 30 ml q4hr fluid flushes for tube patency. Additional fluids and/or adjustments per MD.    - Order multivitamin/mineral (15 ml/day via FT) to help ensure micronutrient needs being met with suspected hypermetabolic demands and potential interruptions to TF infusions.  - Elevated HOB with gastric feeds     Nutrition will follow per protocol or sooner if consulted.    Carlita Tyler, MS, RD, LD  4E (CVICU) RD pager: [phone]  Ascom: [phone]  Weekend/Holiday RD pager: [phone]  "

## 2023-02-10 NOTE — CONSULTS
Neurocritical Care Consultation    Reason for critical care admission: Status post thoracoabdominal aneurysm repair   Admitting Team: ADELAIDA  Date of Service:  02/10/2023  Date of Admission:  2/4/2023  Hospital Day: 7    Assessment/Plan  Shelly Becerril is a 73 year old female with a past medical including ascending aortic aneurysm repair with aortic root reconstruction and bioprosthetic aortic valve replacement in 2018 who presented to [facility] ED on 2/3/2023 for evaluation and management of chest pain.  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  Per medical record review on admission she was neurologically intact. She was transferred to [facility] on 2/4/2023 for surgical consideration. On 2/8/2023 she underwent left thoracotomy for repair of the thoracoabdominal aortic aneurysm.    On 2/10/2023 approximately 10 AM staff noted she was not moving her bilateral lower extremities.  Per vascular surgery resident the patient had not demonstrated hip flexion since OR.  After the reports of not moving bilateral lower extremities this morning MAP goal was raised to greater than 90 and lumbar drain was increased.  NCC was consulted on 2/10/2023 for additional concerns regarding spinal ischemia status post thoracoabdominal aneurysm repair.    Neuro  #Concern for spinal ischemia status post thoracoabdominal aneurysm repair   -When able recommend MRI Thoracic and Lumbar spine without contrast   -Agree with MAP goal > 90, lumbar drain   -Neurochecks every 2 hours     NCC will continue to follow for exam and imaging. [place] at [phone] with any additional questions or concerns.     TIME SPENT ON THIS ENCOUNTER   I spent 50 minutes of critical care time on the unit/floor managing the care of Shelly Becerril excluding time performing procedures. Upon evaluation, this patient had a high probability of imminent or  life-threatening deterioration due to concern for spinal cord ischemia status post thoracoabdominal aneurysm repair, which required my direct attention, intervention, and personal management. Greater than 50% of my time was spent at the bedside counseling the patient and/or coordinating care including chart review, history, exam, documentation, and further activities per this note. I have personally reviewed the following data/imaging over the past 24 hours.     The patient was discussed with the NCC attending, Dr. Feliz, and he is in agreement with the assessment and plan.    BOUCHRA Littlejohn, CNP  Neurocritical Care *[id number]    History of Present Illness:  Shelly Becerril is a 73 year old female with a past medical including ascending aortic aneurysm repair with aortic root reconstruction and bioprosthetic aortic valve replacement in 2018 who presented to [facility] ED on 2/3/2023 for evaluation and management of chest pain.  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  Per medical record review on admission she was neurologically intact.  She was transferred to [facility] on 2/4/2023 for surgical consideration and on 2/8/2023 she underwent left thoracotomy for repair of the thoracoabdominal aortic aneurysm.    On 2/10/2023 approximately 10 AM staff noted she was not moving her bilateral lower extremities.  Per vascular surgery resident the patient had not demonstrated hip flexion since OR.  After the reports of not moving bilateral lower extremities this morning MAP goal was raised to greater than 90 and lumbar drain was increased.  NCC was consulted on 2/10/2023 for additional concerns regarding spinal ischemia status post thoracoabdominal aneurysm repair.      No Known Allergies    Current Medications:    acetaminophen  975 mg Oral Q8H     aspirin  81 mg Oral or NG Tube Daily     [Held by provider]  atorvastatin  40 mg Oral Daily     hydrocortisone sodium succinate PF  50 mg Intravenous Q6H     insulin aspart  1-6 Units Subcutaneous Q4H     lidocaine (viscous)  5 mL Topical Once     mirtazapine  15 mg Oral or Feeding Tube At Bedtime     multivitamins w/minerals  15 mL Per Feeding Tube Daily     mupirocin  0.5 g Both Nostrils BID     pantoprazole  40 mg Oral or NG Tube Daily    Or     pantoprazole  40 mg Oral Daily     polyethylene glycol  17 g Oral Daily     protein modular  1 packet Per Feeding Tube Daily     senna-docusate  1 tablet Oral BID     sodium chloride (PF)  3 mL Intracatheter Q8H       PRN Medications:  [START ON 2/11/2023] acetaminophen, bisacodyl, calcium gluconate, calcium gluconate, calcium gluconate, dextrose, dextrose, glucose **OR** dextrose **OR** glucagon, fentaNYL, hydrALAZINE, HYDROmorphone **OR** HYDROmorphone, lidocaine 4%, lidocaine (buffered or not buffered), magnesium hydroxide, methocarbamol, naloxone **OR** naloxone **OR** naloxone **OR** naloxone, ondansetron **OR** ondansetron, oxyCODONE **OR** oxyCODONE, prochlorperazine **OR** prochlorperazine, BETA BLOCKER NOT PRESCRIBED, sodium chloride (PF)    Infusions:    angiotensin II (GIAPREZA) ADULT infusion 2.5mg/250 mL NS 15 ng/kg/min (02/10/23 1714)     dexmedetomidine       dextrose       dextrose       EPINEPHrine Stopped (02/10/23 1108)     fentaNYL Stopped (02/10/23 0908)     norepinephrine Stopped (02/10/23 1143)     propofol Stopped (02/10/23 0952)     BETA BLOCKER NOT PRESCRIBED       vasopressin 2 Units/hr (02/10/23 1700)       Physical Examination:  Vitals: BP (!) 102/31   Pulse 85   Temp 97.7  F (36.5  C)   Resp 17   Ht 1.524 m (5')   Wt 62.5 kg (137 lb 12.6 oz)   SpO2 94%   BMI 26.91 kg/m    General: Adult female patient, lying in bed, critically-ill,  HEENT: Normocephalic, atraumatic, no icterus, oral cavity/oropharynx pink and moist  Cardiac: Appears adequately perfused.   Pulm: Synchronous with ventilator.    Abdomen: Non-distended.  Extremities: Warm, generalized edema. Distal extremities appear adequately perfused.  Skin: No obvious rash or lesion on exposed skin.  Psych: Calm and cooperative.  Neuro:  Mental status: Awake, alert, attentive. Follows commands. Exam is limited by ETT. Granddaughter is at bedside serving as .   Cranial nerves: Face appears symmetric with exam limited by ETT.   Motor: Normal bulk and tone. No abnormal movements. BUE move to command, weak with left slightly weaker than right. No movement of bilateral lower extremities. LLE hypo-reflexic, RLE hyper-reflexic.    Sensory: No movement to pain in BLE, she tells granddaughter she can feel bilateral lower extremity painful stimuli.   Coordination: Deferred.   Gait: Deferred.    Labs and Imaging:    Results for orders placed or performed during the hospital encounter of 02/04/23 (from the past 24 hour(s))   Blood gas venous with oxyhemoglobin   Result Value Ref Range    pH Venous 7.27 (L) 7.32 - 7.43    pCO2 Venous 45 40 - 50 mm Hg    pO2 Venous 35 25 - 47 mm Hg    Bicarbonate Venous 21 21 - 28 mmol/L    FIO2 50     Oxyhemoglobin Venous 59 (L) 70 - 75 %    Base Excess/Deficit (+/-) -5.7 -7.7 - 1.9 mmol/L   Blood gas arterial with oxyhemoglobin   Result Value Ref Range    pH Arterial 7.30 (L) 7.35 - 7.45    pCO2 Arterial 40 35 - 45 mm Hg    pO2 Arterial 79 (L) 80 - 105 mm Hg    Bicarbonate Arterial 19 (L) 21 - 28 mmol/L    Oxyhemoglobin Arterial 93 92 - 100 %    Base Excess/Deficit (+/-) -6.5 -9.0 - 1.8 mmol/L    FIO2 50     Anmol's Test Artline    Glucose by meter   Result Value Ref Range    GLUCOSE BY METER POCT 134 (H) 70 - 99 mg/dL   Blood gas venous with oxyhemoglobin   Result Value Ref Range    pH Venous 7.29 (L) 7.32 - 7.43    pCO2 Venous 44 40 - 50 mm Hg    pO2 Venous 35 25 - 47 mm Hg    Bicarbonate Venous 21 21 - 28 mmol/L    FIO2 50     Oxyhemoglobin Venous 60 (L) 70 - 75 %    Base Excess/Deficit (+/-) -5.1 -7.7 - 1.9 mmol/L    Glucose by meter   Result Value Ref Range    GLUCOSE BY METER POCT 136 (H) 70 - 99 mg/dL   XR Chest Port 1 View    Narrative    Exam: XR CHEST PORT 1 VIEW, 2/10/2023 1:20 AM    Comparison: 2/9/2023    History: chest tubes s/p thoracoabdominal aortic aneurysm repair    Findings:  Portable AP view of the chest. Endotracheal tube tip projects over the  midthoracic trachea. Enteric tube tip projects on the field of view.  Right IJ [place]-Clarisa catheter tip projects in stable position. Stable  left chest tubes. Mediastinal drains. Intact median sternotomy wires.  Discontinuous sternotomy fixation wires.     Trachea is midline. Cardiomediastinal silhouette is stable. No  significant change in appearance of the patchy and consolidative  opacities throughout the left lung. No appreciable pneumothorax.  Stable small bilateral pleural effusions. Unchanged subcutaneous  emphysema in the left axilla. Visualized upper abdomen is  unremarkable. Unchanged left lateral rib fracture deformities.      Impression    Impression:   1. Support devices in stable position as noted above.  2. Unchanged patchy and consolidative opacities throughout the left  lung.  3. Stable small bilateral pleural effusions.    I have personally reviewed the examination and initial interpretation  and I agree with the findings.    ISABEL ESCALERA MD         SYSTEM ID:  [id number]   Hemoglobin A1c   Result Value Ref Range    Hemoglobin A1C 5.5 <5.7 %   Blood gas arterial with oxyhemoglobin   Result Value Ref Range    pH Arterial 7.33 (L) 7.35 - 7.45    pCO2 Arterial 39 35 - 45 mm Hg    pO2 Arterial 81 80 - 105 mm Hg    Bicarbonate Arterial 21 21 - 28 mmol/L    Oxyhemoglobin Arterial 95 92 - 100 %    Base Excess/Deficit (+/-) -5.0 -9.0 - 1.8 mmol/L    FIO2 50     Anmol's Test Artline    Ionized Calcium   Result Value Ref Range    Calcium Ionized 4.7 4.4 - 5.2 mg/dL   Comprehensive metabolic panel   Result Value Ref Range    Sodium 142 136 - 145 mmol/L     Potassium 4.5 3.4 - 5.3 mmol/L    Chloride 110 (H) 98 - 107 mmol/L    Carbon Dioxide (CO2) 19 (L) 22 - 29 mmol/L    Anion Gap 13 7 - 15 mmol/L    Urea Nitrogen 23.7 (H) 8.0 - 23.0 mg/dL    Creatinine 1.24 (H) 0.51 - 0.95 mg/dL    Calcium 7.9 (L) 8.8 - 10.2 mg/dL    Glucose 157 (H) 70 - 99 mg/dL    Alkaline Phosphatase 65 35 - 104 U/L     (H) 10 - 35 U/L    ALT 51 (H) 10 - 35 U/L    Protein Total 4.6 (L) 6.4 - 8.3 g/dL    Albumin 2.2 (L) 3.5 - 5.2 g/dL    Bilirubin Total 0.5 <=1.2 mg/dL    GFR Estimate 46 (L) >60 mL/min/1.73m2   CBC with platelets   Result Value Ref Range    WBC Count 14.0 (H) 4.0 - 11.0 10e3/uL    RBC Count 2.83 (L) 3.80 - 5.20 10e6/uL    Hemoglobin 8.2 (L) 11.7 - 15.7 g/dL    Hematocrit 25.4 (L) 35.0 - 47.0 %    MCV 90 78 - 100 fL    MCH 29.0 26.5 - 33.0 pg    MCHC 32.3 31.5 - 36.5 g/dL    RDW 15.7 (H) 10.0 - 15.0 %    Platelet Count 113 (L) 150 - 450 10e3/uL   Magnesium   Result Value Ref Range    Magnesium 2.6 (H) 1.7 - 2.3 mg/dL   Phosphorus   Result Value Ref Range    Phosphorus 6.0 (H) 2.5 - 4.5 mg/dL   Lactic acid whole blood   Result Value Ref Range    Lactic Acid 1.1 0.7 - 2.0 mmol/L   Blood gas venous with oxyhemoglobin   Result Value Ref Range    pH Venous 7.30 (L) 7.32 - 7.43    pCO2 Venous 45 40 - 50 mm Hg    pO2 Venous 34 25 - 47 mm Hg    Bicarbonate Venous 22 21 - 28 mmol/L    FIO2 50     Oxyhemoglobin Venous 58 (L) 70 - 75 %    Base Excess/Deficit (+/-) -4.1 -7.7 - 1.9 mmol/L   Glucose by meter   Result Value Ref Range    GLUCOSE BY METER POCT 135 (H) 70 - 99 mg/dL   Glucose by meter   Result Value Ref Range    GLUCOSE BY METER POCT 125 (H) 70 - 99 mg/dL   Blood gas venous with oxyhemoglobin   Result Value Ref Range    pH Venous 7.32 7.32 - 7.43    pCO2 Venous 45 40 - 50 mm Hg    pO2 Venous 31 25 - 47 mm Hg    Bicarbonate Venous 23 21 - 28 mmol/L    FIO2 50     Oxyhemoglobin Venous 53 (L) 70 - 75 %    Base Excess/Deficit (+/-) -3.2 -7.7 - 1.9 mmol/L   Prepare red blood cells  (unit)   Result Value Ref Range    Blood Component Type Red Blood Cells     Product Code Q7666M82     Unit Status Transfused     Unit Number [id number]     CROSSMATCH Compatible     CODING SYSTEM [id number]     ISSUE DATE AND TIME [id number]     UNIT ABO/RH O+     UNIT TYPE ISBT 5100    Prepare red blood cells (unit)   Result Value Ref Range    Blood Component Type Red Blood Cells     Product Code M0483N20     Unit Status Transfused     Unit Number [id number]     CROSSMATCH Compatible     CODING SYSTEM [id number]     ISSUE DATE AND TIME [id number]     UNIT ABO/RH O+     UNIT TYPE ISBT 5100    US Lower Extremity Arterial Duplex Bilateral    Narrative    BILATERAL LOWER EXTREMITY DUPLEX ARTERIAL ULTRASOUND 2/10/2023 11:28  AM    CLINICAL HISTORY: s/p thoracoabdominal aortic repair with left heart  bypass. Has very cold and purple extremities.    COMPARISONS: CTA 2/5/2023..    REFERRING PROVIDER: SANDRA NIETO MD    TECHNIQUE: Grayscale, color Doppler, Doppler waveform ultrasound  evaluation of bilateral external iliac arteries through anterior and  posterior tibial arteries.    FINDINGS:  RIGHT:       COMMON FEMORAL ARTERY: 86 cm/s, triphasic       PROFUNDUS FEMORAL ARTERY: 86 cm/s, triphasic         SUPERFICIAL FEMORAL ARTERY, origin: 110 cm/s, triphasic       SUPERFICIAL FEMORAL ARTERY, mid: 208 cm/s, triphasic, 1.89  velocity       SUPERFICIAL FEMORAL ARTERY, distal: 75 cm/s, triphasic         POPLITEAL ARTERY: 77 cm/s, triphasic         POSTERIOR TIBIAL ARTERY, ankle: 32 cm/s, biphasic       ANTERIOR TIBIAL ARTERY, ankle: 14 cm/s, saw toothed biphasic    LEFT:       COMMON FEMORAL ARTERY: 243 cm/s, triphasic       PROFUNDUS FEMORAL ARTERY: 127 cm/s, triphasic         SUPERFICIAL FEMORAL ARTERY, proximal: 190 cm/s, triphasic         SUPERFICIAL FEMORAL ARTERY, mid: 175 cm/s, triphasic       SUPERFICIAL FEMORAL ARTERY, distal: 64 cm/s, triphasic         POPLITEAL ARTERY: 65 cm/s, triphasic          POSTERIOR TIBIAL ARTERY, ankle: 105 cm/s, triphasic       ANTERIOR TIBIAL ARTERY, ankle: 44 cm/s, triphasic      Impression    IMPRESSION:  1. RIGHT:        A. Elevated velocity in the mid superficial femoral artery to 208  cm/s. 50-75% diameter stenosis by velocity criteria, but the velocity  ratio is less than 2 and the artery fills [id number] in color  Doppler image. Likely less than 50% diameter stenosis.       B. Anterior tibial artery disease.     2. LEFT:       A. Elevated common femoral artery velocity to 243 cm/s, etiology  not demonstrated.       B. Otherwise negative lower extremity duplex arterial ultrasound.      Guidelines:  [facility] duplex criteria for lower limb arterial  occlusive disease  -Percent stenosis- Normal (1-19%): Peak systolic velocity (cm/s):  <150, End-diastolic velocity (cm/s): <40, Velocity ratio (Vr): <1.5,  Distal arterial waveform: Triphasic    -Percent stenosis- 20-49%: Peak systolic velocity (cm/s): 150-200,  End-diastolic velocity (cm/s): <40, Velocity ratio (Vr): 1.5-2.0,  Distal arterial waveform: Triphasic    -Percent stenosis- 50-75%: Peak systolic velocity (cm/s): 200-300,  End-diastolic velocity (cm/s): <90, Velocity ratio (Vr): 2.0-3.9,  Distal arterial waveform: Poststenotic turbulence distal to stenosis,  monophasic distal waveform    -Percent stenosis- >75%: Peak systolic velocity (cm/s): >300,  End-diastolic velocity (cm/s): <90, Velocity ratio (Vr): >4.0, Distal  arterial waveform: Dampened distal waveform and low PSV/EDV* in the  stenosis    -Percent stenosis- Occlusion: Absent flow by color Doppler/pulsed  Doppler spectral analysis; length of occlusion estimated from distance  between exit and reentry collateral arteries  *PSV = peak systolic velocity, EDV = end-diastolic velocity  http://link.reyes.com/chapter/10.1007/[email or web address]_23/fulltext  html    I have personally reviewed the examination and initial interpretation  and I agree  with the findings.    JON RMAOS MD         SYSTEM ID:  [id number]   US Upper Ext Arterial Duplex Right Port    Narrative    ULTRASOUND UPPER EXTREMITY ARTERIAL DUPLEX RIGHT 2/10/2023 11:31 AM    CLINICAL HISTORY: s/p thoracoabdominal aortic repair with left heart  bypass. Has very cold and purple hands.. Previous right radial  catheter.    COMPARISONS: None available.    REFERRING PROVIDER: SANDRA NIETO    TECHNIQUE: Right arm arteries evaluated with color Doppler and  spectral pulsed wave Doppler ultrasound.    FINDINGS: RIGHT:  Subclavian artery, proximal: 49/0 cm/s, biphasic  Subclavian artery, mid: 91/0 cm/s, triphasic  Subclavian artery, proximal: 92/0 cm/s, biphasic  Subclavian artery, distal: 202/0 cm/s, triphasic    Axillary artery: 118/0 cm/s, triphasic    Brachial artery, proximal: 188/0 cm/s, triphasic  Brachial artery, mid: 207/0 cm/s, triphasic  Brachial artery, antecubital fossa; 132/0 cm/s, triphasic    Radial artery, origin: 95/0 cm/s, triphasic  Radial artery, proximal forearm: occluded  Radial artery, mid forearm: occluded  Radial artery, wrist: occluded    Ulnar artery, origin: 62/0 cm/s, triphasic  Ulnar artery, wrist: 8 cm/s, faintly pulsatile      Impression    IMPRESSION:  1. 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    2. Right ulnar artery slow faintly pulsatile flow at the wrist.    JON RAMOS MD         SYSTEM ID:  [id number]   US Upper Extremity Arterial Duplex Left    Narrative    ULTRASOUND UPPER EXTREMITY ARTERIAL DUPLEX LEFT 2/10/2023 11:33 AM    CLINICAL HISTORY: cold right hand, eval central vessels for art line  options.     COMPARISONS: None available.    REFERRING PROVIDER: SANDRA NIETO    TECHNIQUE: Left subclavian and axillary arteries evaluated with  grayscale, color Doppler, and spectral pulsed wave Doppler ultrasound.    FINDINGS: LEFT:  Subclavian artery, medial: 129/0 cm/s, triphasic, 8.5 mm  Subclavian artery, mid: 136/0 cm/s, triphasic, 6.6  mm  Subclavian artery, distal: 115/0 cm/s, triphasic, 6.6 mm    Axillary artery: 109/0 cm/s, triphasic, 4.9 mm      Impression    IMPRESSION: Patent left subclavian and axillary arteries with  measurements as in the report.    JON RAMOS MD         SYSTEM ID:  [id number]   Glucose by meter   Result Value Ref Range    GLUCOSE BY METER POCT 128 (H) 70 - 99 mg/dL   Blood gas arterial with oxyhemoglobin   Result Value Ref Range    pH Arterial 7.33 (L) 7.35 - 7.45    pCO2 Arterial 40 35 - 45 mm Hg    pO2 Arterial 66 (L) 80 - 105 mm Hg    Bicarbonate Arterial 21 21 - 28 mmol/L    Oxyhemoglobin Arterial 91 (L) 92 - 100 %    Base Excess/Deficit (+/-) -4.3 -9.0 - 1.8 mmol/L    FIO2 60     Anmol's Test Artline    Blood gas venous with oxyhemoglobin   Result Value Ref Range    pH Venous 7.31 (L) 7.32 - 7.43    pCO2 Venous 45 40 - 50 mm Hg    pO2 Venous 33 25 - 47 mm Hg    Bicarbonate Venous 23 21 - 28 mmol/L    FIO2 60     Oxyhemoglobin Venous 58 (L) 70 - 75 %    Base Excess/Deficit (+/-) -3.3 -7.7 - 1.9 mmol/L   CT Head w/o Contrast    Narrative    CT HEAD W/O CONTRAST 2/10/2023 1:03 PM    History: S/p thoracoabdominal aortic repair with left heart bypass,  now with generalized weakness LE worsen than UE, not moving legs, c/f  stroke     Comparison: 11/5/2021 brain MR, 11/4/2021 head CT    Technique: Using multidetector thin collimation helical acquisition  technique, axial, coronal and sagittal CT images from the skull base  to the vertex were obtained without intravenous contrast.   (topogram) image(s) also obtained and reviewed.    Findings:     There is no acute loss of gray-white differentiation, intracranial  hemorrhage, mass effect or midline shift. Prominent appearing focus of  periventricular hypoattenuation abutting the right lateral ventricle  is unchanged in appearance. Extensive degree of confluent  periventricular hypoattenuation. Nonspecific calcifications of the  basal ganglia bilaterally. Mild degree of  cerebral parenchymal volume  loss. Atherosclerotic calcifications of the vertebral arteries,  basilar artery and carotid siphons. Orbits are unremarkable.  Visualized portions of the paranasal sinuses and mastoid air cells are  relatively clear.      Impression    Impression:    1. No acute intracranial pathology.  2. Extensive hypoattenuation throughout the periventricular white  matter most likely represents the sequelae of chronic small vessel  ischemic disease.  3. 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    I have personally reviewed the examination and initial interpretation  and I agree with the findings.    UYEN BOWEN MD         SYSTEM ID:  [id number]   CT Chest w/o Contrast    Narrative    EXAMINATION: CT CHEST W/O CONTRAST, 2/10/2023 1:04 PM    CLINICAL HISTORY: S/p thoracoabdominal aortic repair with left heart  bypass, weakness this morning    COMPARISON: CT to 2/5/2023    TECHNIQUE: Helical CT of the chest without contrast. Coronal, sagittal  and axial MIP reformatted images obtained and reviewed.     CONTRAST: None    FINDINGS:    Lines and tubes: Right IJ [place]-Clarisa catheter tip is in the right main  pulmonary artery. Endotracheal tube tip is just above the level of the  darling. Enteric tube is at least at the level of the stomach. 2  apically oriented chest tubes and one basilar oriented chest tube in  place. Right midline catheter terminates in the proximal subclavian  vein.    Lungs: The central tracheal bronchial tree is patent with a small  amount of secretions/debris or along the endotracheal tube. The  segmental and subsegmental bronchi in the posterior lower lobes and  abruptly as they enter the consolidative opacity, likely due to mucous  plugging... Bilateral pleural effusions, right greater than left with  small hemothorax component left base. Associated bilateral compressive  atelectasis adjacent to the pleural effusions. The atelectasis on the  left  silhouettes the tortuous descending thoracic aorta. Small left  pneumothorax. No right pneumothorax.     Nodules:  -No enlarging or new suspicious pulmonary nodules.    CHEST: Heart size is normal. No pericardial effusion. Aortic valve  prosthesis. New aortic stent graft at the apex of the aortic arch and  the site of prior ascending aortic aneurysm. Aneurysmal ascending  thoracic aorta measuring 4.3 cm in diameter is 2-D transaxial view  proximal to the newly placed stent graft. The descending thoracic  aorta is difficult to accurately measure on this noncontrast exam,  however does not appear significantly changed in size compared to  prior. Intramural hematoma component along the descending aorta.  Moderate atherosclerotic calcifications of the coronary arteries. The  pulmonary artery are normal in caliber. Enlarged heterogeneous thyroid  with numerous calcifications. Esophagus unremarkable. No appreciable  thoracic lymphadenopathy, however it is difficult to assess on this  noncontrast exam. No periaortic hyperdensities are well-defined fluid  collections to suggest aortic aneurysm rupture or pseudoaneurysm.    Upper abdomen: Limited evaluation of the upper abdomen does not  demonstrate any acute findings. Vicarious excretion of contrast into  the gallbladder. Focal calcification in the posterior left lobe of the  liver. Partially visualized thoracolumbar thoracoabdominal aortic  graft.    Bones: No suspicious osseous lesions. Degenerative changes of the  spine. Transverse and longitudinal median sternotomy wires are intact.  Acute nondisplaced anterolateral left fourth rib fracture, minimally  displaced left fifth, displaced left sixth, and nondisplaced left  seventh rib fractures. There is approximately 6 mm posterior and 1.2  cm lateral displacement of the anterior sixth rib fracture fragment  relative to its anteriormost aspect of the posterior fragment.    Soft tissue: Subcutaneous emphysema about the left  chest wall. Mild  soft tissue edema in the posterior chest wall bilaterally.      Impression    IMPRESSION:   1. Expected postsurgical changes of ascending aortic and  thoracoabdominal aortic aneurysm repairs, with small left  hydropneumothorax, and small right pleural effusion. Multiple  left-sided chest tubes are in place as described above.  2. No evidence for aortic rupture or pseudoaneurysm on this  noncontrast exam but exam is limited due to lack of IV contrast.  Intramural hematoma along the descending aorta and small left  hemothorax. No emergent findings in the chest or abdomen, but close  follow up suggested.  3. Bibasilar compressive atelectasis adjacent to the pleural  effusions, left greater than right, with areas suspicious for mucous  plugging bilaterally. Pulmonary toilet/bronchoscopy to the left lower  lobe may be helpful  4. Posteriorly displaced left sixth rib fracture and  nondisplaced/minimally displaced left fourth, fifth, and seventh rib  fractures as described above.  5. 4.3 cm ascending aortic aneurysm in the 2-D transaxial view  proximal to the newly placed stent graft.   6. Heterogeneous enlarged thyroid with scattered calcifications.    I have personally reviewed the examination and initial interpretation  and I agree with the findings.    ANITA DE LA ROSA MD         SYSTEM ID:  [id number]   Prepare red blood cells (unit)   Result Value Ref Range    Blood Component Type Red Blood Cells     Product Code [id number]     Unit Status Transfused     Unit Number [id number]     CROSSMATCH Compatible     CODING SYSTEM [id number]     ISSUE DATE AND TIME [id number]     UNIT ABO/RH O+     UNIT TYPE ISBT 5100    CBC with platelets   Result Value Ref Range    WBC Count 12.6 (H) 4.0 - 11.0 10e3/uL    RBC Count 3.50 (L) 3.80 - 5.20 10e6/uL    Hemoglobin 10.5 (L) 11.7 - 15.7 g/dL    Hematocrit 31.2 (L) 35.0 - 47.0 %    MCV 89 78 - 100 fL    MCH 30.0 26.5 - 33.0 pg    MCHC 33.7 31.5 - 36.5 g/dL    RDW 15.2  (H) 10.0 - 15.0 %    Platelet Count 82 (L) 150 - 450 10e3/uL   Blood gas venous with oxyhemoglobin   Result Value Ref Range    pH Venous 7.31 (L) 7.32 - 7.43    pCO2 Venous 47 40 - 50 mm Hg    pO2 Venous 36 25 - 47 mm Hg    Bicarbonate Venous 23 21 - 28 mmol/L    FIO2 70     Oxyhemoglobin Venous 66 (L) 70 - 75 %    Base Excess/Deficit (+/-) -3.0 -7.7 - 1.9 mmol/L   Blood gas arterial with oxyhemoglobin   Result Value Ref Range    pH Arterial 7.33 (L) 7.35 - 7.45    pCO2 Arterial 42 35 - 45 mm Hg    pO2 Arterial 65 (L) 80 - 105 mm Hg    Bicarbonate Arterial 22 21 - 28 mmol/L    Oxyhemoglobin Arterial 91 (L) 92 - 100 %    Base Excess/Deficit (+/-) -3.6 -9.0 - 1.8 mmol/L    FIO2 70     Anmol's Test Artline    XR Abdomen Port 1 View    Impression    RESIDENT PRELIMINARY INTERPRETATION  IMPRESSION:   1. Enteric tube seen with the distal tip projecting over the distal  duodenum.  2. Nonobstructive bowel gas pattern.    Blood gas arterial with oxyhemoglobin   Result Value Ref Range    pH Arterial 7.31 (L) 7.35 - 7.45    pCO2 Arterial 43 35 - 45 mm Hg    pO2 Arterial 70 (L) 80 - 105 mm Hg    Bicarbonate Arterial 22 21 - 28 mmol/L    Oxyhemoglobin Arterial 92 92 - 100 %    Base Excess/Deficit (+/-) -4.6 -9.0 - 1.8 mmol/L    FIO2 80     Anmol's Test Artline        All relevant imaging and laboratory values personally reviewed.

## 2023-02-10 NOTE — PLAN OF CARE
Major Shift Events:  All sedation off at 1000. At 1000 pt noted to have cool dusky R hand with absence of radial pulse. Arterial ultrasounds done of all extremities. Also at ~1000 pt noted to have neuro change with inability to move bilateral feet and R hand. MAP goal increased, angiotensin II and stress dose steroids started. Lumbar drain with increased drainage of 20cc allowed per vascular surgery at 1100. Head and Chest CT done. NeuroCrit consulted.Total of 3 units RBCs given. Bronchoscopy done this afternoon. Increased FiO2 and PEEP requirements throughout shift. New L axillary A line place, R axillary and L femoral A lines removed. 20 mg IV lasix given due to marginal urine output. NJ placed, TF started.     Labs/Protocols: See results review. No electrolyte replacement needed this shift. 3 units RBCs given for Hgb 8.2 and neuro change, goal Hgb >10.   Neuro: Lethargic throughout shift. PERRLA. Follows commands, Hmong speaking, phone  used throughout shift. Able to move bilateral upper extremities. No movement or response to stimuli to bilateral lower extremities. Vascular surgery and Neurocrit following. Lumbar drain remains in place, draining 10cc Q 1 hour if pressure >10 per vascular surgery. Afebrile.   Cardiac: SR, HR 70s-80s. MAP goal >90 with SBP <160. Doppler pulses throughout except to R radial, MD aware, ulnar pulse present. Q 4 hour FICKs with continuous FloTrac monitoring. CVP 9-12, PA: 30s/20s, SVO2: 50s-60s, CI: 3s, SVR: 0507-0258.   Respiratory: CMV 80%, 350, 14, 10. Bronch done this shift. Chest tubes x 3, pleural, all to -20 suction with minimal serosanguinous output.   GI/: NJ with TF. Bowel sounds active. LBM unknown. Black in place. One time dose of 20mg IV lasix with good response.   Skin: L chest/abdomen incision, dressing CDI. L groin incision, RENITA. Dusky/purple to R fingers. Pale/cool with bruising throughout.   LDAs: R internal jugular MAC with swan. L axillary A line. R  and L PIV.   GTTs: Vaso and angiotensin II.   Diet: TF goal of 30cc/hr with 30cc water Q 4 hours, TF currently at 15cc/hr.   Activity: Bedrest due to medical condition. Repositioned Q 2 hours for skin integrity.   Teaching: Patient and family educated on patient condition, cares throughout shift and plan of care.         Plan: Continue to closely monitor neurovascular status. Wean pressors as able to achieve MAP goal.    For vital signs and complete assessments, please see documentation flowsheets.     Goal Outcome Evaluation:      Plan of Care Reviewed With: patient, family, child, grandchild(cristopher), significant other    Overall Patient Progress: decliningOverall Patient Progress: declining

## 2023-02-10 NOTE — PROGRESS NOTES
VASCULAR SURGERY PROGRESS NOTE    Subjective:  ong  via iPad. Intubated, lifted sedation, light head nod to yes. Light hand squeeze no movement in lower extremities, generalized weakness. Last movement of feet at 0400. Right thumb and digits with dusky discoloration.     Objective:  Intake/Output Summary (Last 24 hours) at 2/9/2023 0859  Last data filed at 2/9/2023 0845  Gross per 24 hour   Intake 9060.44 ml   Output 2257 ml   Net 6803.44 ml     Labs:  ROUTINE IP LABS (Last four results)  BMP  Recent Labs   Lab 02/10/23  0827 02/10/23  0409 02/10/23  0357 02/10/23  0005 02/09/23  1956 02/09/23 1741 02/09/23  0312 02/09/23  0301 02/08/23 2339 02/08/23 2328 02/08/23  1834 02/08/23  1826   NA  --   --  142  --   --   --   --  140  --  139  --  142   POTASSIUM  --   --  4.5  --   --  4.5  --  4.4  --  4.5  --  4.2   CHLORIDE  --   --  110*  --   --   --   --  113*  --  111*  --  114*   FAVIOLA  --   --  7.9*  --   --   --   --  8.0*  --  7.9*  --  8.3*   CO2  --   --  19*  --   --   --   --  20*  --  19*  --  17*   BUN  --   --  23.7*  --   --   --   --  16.4  --  14.8  --  13.4   CR  --   --  1.24*  --   --   --   --  1.15*  --  1.06*  --  0.94   * 135* 157* 136*   < >  --    < > 133*   < > 143*   < > 140*    < > = values in this interval not displayed.     CBC  Recent Labs   Lab 02/10/23  0357 02/09/23  1741 02/09/23  0937 02/09/23  0301 02/08/23 2329 02/08/23  2139 02/08/23  1826   WBC 14.0*  --  12.6* 8.8  --   --  10.0   RBC 2.83*  --  3.26* 2.78*  --   --  3.57*   HGB 8.2* 9.3* 9.4* 8.0*   < > 10.5* 10.4*   HCT 25.4*  --  29.0* 24.7*  --   --  31.4*   MCV 90  --  89 89  --   --  88   MCH 29.0  --  28.8 28.8  --   --  29.1   MCHC 32.3  --  32.4 32.4  --   --  33.1   RDW 15.7*  --  14.6 15.0  --   --  14.6   *  --  92* 107*  --  135* 130*    < > = values in this interval not displayed.     INR  Recent Labs   Lab 02/09/23  0301 02/08/23  1826 02/08/23  1640 02/08/23  1616   INR 1.23* 1.49*  1.32* 1.87*     PHYSICAL EXAM:  BP (!) 102/31   Pulse 87   Temp 98.3  F (36.8  C) (Oral)   Resp 15   Ht 1.524 m (5')   Wt 62.5 kg (137 lb 12.6 oz)   SpO2 (!) 89%   BMI 26.91 kg/m    General: The patient is intubated and sedated.  Psych: Unable to assess  Skin: Color appropriate for race, warm, dry.  Respiratory: Intubated, mechanical breaths  GI:  Abdomen soft, nontender to light palpation.  Incision: Left groin incision intact, no hematoma, no swelling. Left chest dressing clean, dry, intact.   Extremities: Right ulnar doppler signal strong biphasic. Palmar doppler signal strong, biphasic. Faint radial  doppler signal monophasic. Dusky digits and thumb. Feet warm, monophasic bilat DP/PT doppler signals, generalized pitting edema noted.     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     - Goal , MAP 90  - ICP goal10  - head CT to assess for new events  - right upper extremity duplex with thrombosed radial  - remove arterial line from right axillary  - daily sedation wean for motor exam. Please page if any concerns for paralysis  - Remaining care per CVTS team      Discussed pt history, exam, assessment and plan with Dr. Dixon of the vascular surgery service, who is in agreement with the above.    Nayely Urrutia, [facility]  Vascular Surgery  Pager: [phone]  [email or web address]  Send message or 10 digit call back number Securely via [employer] with the Vocera Web Console (learn more here)

## 2023-02-11 NOTE — PROGRESS NOTES
Physician Attestation   I saw this patient with the resident and agree with the resident/fellow's findings and plan of care as documented in the note.      Key findings: Briefly this is a 73 year old woman with a past medical including ascending aortic aneurysm repair with aortic root reconstruction and bioprosthetic aortic valve replacement in 2018 who presented to [facility] ED on 2/3/2023 for evaluation and management of chest pain.  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 Post op noted to have b/l lower extremities weakness. On exam, she has normal bulk and tone. No abnormal movements. BUE move to command, weak with left slightly weaker than right. No movement of bilateral lower extremities. LLE hypo-reflexic, RLE hyper-reflexic. Highest in ddx is spinal cord ischemia. 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   -will recommend MRI T/L with dwi when able, currently has a PA catether  -Lumbar drain management per CVICU  -agree with increase MAP goal >85, SBP <160  -PT/OT when able    Ene Feliz MD  Date of Service (when I saw the patient): 02/11/23    Neurocritical Care Progress Note    Reason for critical care admission: LE weakness  Admitting Team: CVTS  Date of Service:  02/11/2023  Date of Admission:  2/4/2023  Hospital Day: 8    Assessment/Plan  Shelly Becerril is a 73 year old female with a past medical including ascending aortic aneurysm repair with aortic root reconstruction and bioprosthetic aortic valve replacement in 2018 who presented to [facility] ED on 2/3/2023 for evaluation and management of chest pain.  Emergency department evaluation revealed a type B aortic dissection involving the  descending thoracic aorta extending to just above the origin of the celiac artery.  Per medical record review on admission she was neurologically intact. She was transferred to [facility] on 2/4/2023 for surgical consideration. On 2/8/2023 she underwent left thoracotomy for repair of the thoracoabdominal aortic aneurysm.     On 2/10/2023 approximately 10 AM staff noted she was not moving her bilateral lower extremities.  Per vascular surgery resident the patient had not demonstrated hip flexion since OR.  After the reports of not moving bilateral lower extremities this morning MAP goal was raised to greater than 90 and lumbar drain was increased.  NCC was consulted on 2/10/2023 for additional concerns regarding spinal ischemia status post thoracoabdominal aneurysm repair. Discussed with primary team today that swan catheter must stay in place, so MRI will be deferred.     Overnight events: Stable remains the same this morning. Lumbar drain with roughly 10cc/hr output. Propofol ggt @ 15. MAP>90 maintained with pressors x 2, plan to wean off today.     Neuro  #Concern for spinal ischemia status post thoracoabdominal aneurysm repair   - MRI Thoracic and Lumbar spine without contrast when able   - Agree with MAP goal > 90 until etiology confirmed on MRI  - Continue lumbar drain, continue management as is until MRI is done   - Neurochecks every 2 hours      NCC will continue to follow for exam and imaging. Please call use at [phone] with any additional questions or concerns.     Clinically Significant Risk Factors              # Hypoalbuminemia: Lowest albumin = 1.8 g/dL at 2/8/2023 11:28 PM, will monitor as appropriate   # Thrombocytopenia: Lowest platelets = 81 in last 2 days, will monitor for bleeding         # Overweight: Estimated body mass index is 26.91 kg/m  as calculated from the following:    Height as of this encounter: 1.524 m (5').    Weight as of this encounter: 62.5 kg (137 lb 12.6 oz).   # Severe Malnutrition:  based on nutrition assessment         The patient was seen and discussed with the NCC attending, Dr. Feliz.    Abad Lorenzo MD  Neurology PGY-3    24 Hour Vital Signs Summary:  Temp: 97.6  F (36.4  C) Temp  Min: 97.6  F (36.4  C)  Max: 98.9  F (37.2  C)  Resp: 19 Resp  Min: 14  Max: 20  SpO2: 98 % SpO2  Min: 89 %  Max: 100 %  Pulse: 80 Pulse  Min: 71  Max: 113    No data recorded  BP: (!) 102/31 (MD notified) Systolic (24hrs), Av , Min:102 , Max:102   Diastolic (24hrs), Av, Min:31, Max:31      Respiratory monitoring:   Vent Mode: CMV/AC  (Continuous Mandatory Ventilation/ Assist Control)  FiO2 (%): (S) 80 %  Resp Rate (Set): 14 breaths/min  Tidal Volume (Set, mL): 350 mL  PEEP (cm H2O): 10 cmH2O  Resp: 19      I/O last 3 completed shifts:  In: 2267.42 [I.V.:634.92; NG/GT:465]  Out: 2030 [Urine:1690; Drains:160; Chest Tube:180]    Current Medications:    acetaminophen  975 mg Oral Q8H     aspirin  81 mg Oral or NG Tube Daily     [Held by provider] atorvastatin  40 mg Oral Daily     hydrocortisone sodium succinate PF  50 mg Intravenous Q6H     insulin aspart  1-6 Units Subcutaneous Q4H     lidocaine (viscous)  5 mL Topical Once     mirtazapine  15 mg Oral or Feeding Tube At Bedtime     multivitamins w/minerals  15 mL Per Feeding Tube Daily     mupirocin  0.5 g Both Nostrils BID     pantoprazole  40 mg Oral or NG Tube Daily    Or     pantoprazole  40 mg Oral Daily     polyethylene glycol  17 g Oral Daily     protein modular  1 packet Per Feeding Tube Daily     senna-docusate  1 tablet Oral BID     sodium chloride (PF)  3 mL Intracatheter Q8H       PRN Medications:  acetaminophen, bisacodyl, calcium gluconate, calcium gluconate, calcium gluconate, dextrose, dextrose, glucose **OR** dextrose **OR** glucagon, fentaNYL, hydrALAZINE, HYDROmorphone **OR** HYDROmorphone, lidocaine 4%, lidocaine (buffered or not buffered), magnesium hydroxide, methocarbamol, naloxone **OR** naloxone **OR** naloxone **OR**  naloxone, ondansetron **OR** ondansetron, oxyCODONE **OR** oxyCODONE, prochlorperazine **OR** prochlorperazine, BETA BLOCKER NOT PRESCRIBED, sodium chloride (PF)    Infusions:    angiotensin II (GIAPREZA) ADULT infusion 2.5mg/250 mL NS 7.5 ng/kg/min (02/11/23 0700)     dexmedetomidine       dextrose       dextrose       EPINEPHrine Stopped (02/10/23 1108)     norepinephrine 0.01 mcg/kg/min (02/11/23 0700)     propofol 5 mcg/kg/min (02/11/23 0700)     BETA BLOCKER NOT PRESCRIBED       vasopressin 2 Units/hr (02/11/23 0700)       No Known Allergies    Physical Examination:  Vitals: BP (!) 102/31   Pulse 80   Temp 97.6  F (36.4  C) (Axillary)   Resp 19   Ht 1.524 m (5')   Wt 62.5 kg (137 lb 12.6 oz)   SpO2 98%   BMI 26.91 kg/m    General: Adult female patient, lying in bed, critically-ill,  HEENT: Normocephalic, atraumatic, no icterus, oral cavity/oropharynx pink and moist  Cardiac: Appears adequately perfused.   Pulm: Synchronous with ventilator.   Abdomen: Non-distended.  Extremities: Warm, generalized edema. Distal extremities appear adequately perfused.  Skin: No obvious rash or lesion on exposed skin.  Psych: Calm and cooperative.  Neuro:  Mental status: Awake, alert, attentive. Follows commands. Exam is limited by ETT. Granddaughter is at bedside serving as .   Cranial nerves: Face appears symmetric with exam limited by ETT.   Motor: Normal bulk and tone. No abnormal movements. BUE move to command, weak with left slightly weaker than right. No movement of bilateral lower extremities. LLE hypo-reflexic, RLE hyper-reflexic.    Sensory: No movement to pain in BLE, she tells granddaughter she can feel bilateral lower extremity painful stimuli.   Coordination: Deferred.   Gait: Deferred.    Labs and Imaging: All relevant imaging and laboratory values personally reviewed.

## 2023-02-11 NOTE — PLAN OF CARE
Major Shift Events:  Shift 2066-4045    ong , Daughter and granddaughter at bedside until 2100. Granddaughter able to translate.     Neuro: Beginning of the shift patient was alert, following commands, moving upper extremities(right side more than left) PERRLA. Squeezing hands bilateral. Unable to move bilateral lower extremities, not withdrawing from pain. Right hand remains dusky and cool. Doppler for all pulses. Lumbar drain remains in place, draining 10cc Q 1 hour if pressure >10 per vascular surgery. Afebrile.   2120 - patient was found pulling at ETT, tube remained in place however saturation dropped to below 80%. RRT called. Patient re intubated, 22 @ lips. Propofol started at 15. Decreased to 5 by end of shift.     Cardiac: SR, HR 80-90. MAP goal >90 with SBP <160. Angiotensin II, vasopressin and Levophed titrated to maintain MAP goal.  [place] at 54, Q4 FICKs with continuous FloTrac monitoring.   CVP 9-10  PA 32/20, 34/22, 34/20  SvO2 65, 62, 65  See flow sheet for complete ROXANNE calculations.     Resp: CMV settings overnight, FiO2 80%, PEEP 10, RR 14, . Coarse to dim lung sounds. Creamy secretions. Chest tubes x3, minimal serosanguinous output.     GI/: 1 loose BM for shift, passing gas. Tube feeds increased to goal of 30mL/hr via NJ, Q4 30 flushes. Blood sugars 143, 161, 155. Black in place with adequate urine output, slow decrease in hourly output closer to shift change, averaging 35-40mL/hour since 0300.     AM Hgb 11.1, goal >10    Plan: Q2 neuro checks, MRI today, continue POC. Wean oxygen and pressors as able.     For vital signs and complete assessments, please see documentation flowsheets.      Goal Outcome Evaluation:      Plan of Care Reviewed With: patient, child, grandchild(cristopher)    Overall Patient Progress: no change    Outcome Evaluation: Patient attempted to self extubate-requiring re-intubation due to desaturation/ET movement. Remains on 3 pressors to maintain MAP >90, low  dose propofol. Unable to move lower extremities.    Problem: Plan of Care - These are the overarching goals to be used throughout the patient stay.    Goal: Optimal Comfort and Wellbeing  Intervention: Monitor Pain and Promote Comfort  Recent Flowsheet Documentation  Taken 2/11/2023 0400 by Cookie Morelos RN  Pain Management Interventions:    care clustered    distraction    pillow support provided    quiet environment facilitated    relaxation techniques promoted    repositioned    rest  Taken 2/11/2023 0000 by Cookie Morelos RN  Pain Management Interventions:    care clustered    distraction    pillow support provided    quiet environment facilitated    relaxation techniques promoted    repositioned    rest  Taken 2/10/2023 2000 by Cookie Morelos RN  Pain Management Interventions:    care clustered    distraction    pillow support provided    quiet environment facilitated    relaxation techniques promoted    repositioned    rest     Problem: Hypertension Comorbidity  Goal: Blood Pressure in Desired Range  Intervention: Maintain Blood Pressure Management  Recent Flowsheet Documentation  Taken 2/11/2023 0400 by Cookie Morelos RN  Medication Review/Management:    medications reviewed    high-risk medications identified    dosing adjusted    provider consulted  Taken 2/11/2023 0000 by Cookie Morelos RN  Medication Review/Management:    medications reviewed    high-risk medications identified    dosing adjusted    provider consulted  Taken 2/10/2023 2000 by Cookie Morelos RN  Medication Review/Management:    medications reviewed    high-risk medications identified    dosing adjusted    provider consulted

## 2023-02-11 NOTE — PROGRESS NOTES
Vascular Surgery Progress Note    Self-extubated overnight, reintubated. Slight improvement in FiO2 and pressor requirements. No lower extremity motor function however slight toe movement withdrawing to pain.    BP (!) 102/31   Pulse 83   Temp 98.3  F (36.8  C) (Axillary)   Resp 21   Ht 1.524 m (5')   Wt 62.5 kg (137 lb 12.6 oz)   SpO2 97%   BMI 26.91 kg/m      I/O last 3 completed shifts:  In: 2267.42 [I.V.:634.92; NG/GT:465]  Out: 2030 [Urine:1690; Drains:160; Chest Tube:180]    Intubated, follows commands to nod, and squeeze both hands.   0/5 strength with plantar/dorsiflexion, withdraws toes to pain bilaterally  Palpable right radial pulse, hands warm  Monophasic DP/PT doppler signals bilaterally  Thoracoabdominal incision and left groin incisions c/d/i    Labs reviewed.    A/P: 73 y.o. POD#3 s/p open repair of type B dissection and thoracoabdominal aneurysm c/b likely spinal cord ischemia and lower extremity paralysis.    - continue spinal cord protective measures, MAP > 90, CSF pressure < 10, Hgb > 10  - spinal drain for CSF pressure < 10 mmHg as above  - appreciate neuro critcare, T&L spine MRI when able  - remaining care per CVTS/CVICU    D/w Dr. Du Lemus MD

## 2023-02-11 NOTE — PROGRESS NOTES
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 Briceno on 2/8/23.     CO-MORBIDITIES:   Dissection of thoracoabdominal aorta (H)  (primary encounter diagnosis)  Coronary artery disease involving native coronary artery of native heart without angina pectoris      24-hour events:  - Self extubated, desaturated to 70s, anesthesia re-intubated  - Started stress dosed steroids and angiotensin 2 for to get MAPs >90  - sliding scale/p 3u pRBC to keep Hgb >10 per protocol  - Neurocrit consulted, recommend MRI L & T without without contrast & q2h neurochecks  - New left axillary A-line, remove R axillary A-line    Today's Changes:  - Holding off on MRI T&L as only diagnostic, won't , would require removing [place], and prefer not to transport patient in her current condition  - FiO2 to 60%  - Ang 2 and NE weaned off, on Vaso      Neuro/ pain/ sedation:  #Depression  #Acute postoperative pain  #Sedation  - Monitor neuro status. Notify MD for acute changes  - Pain: Schedule  Tylenol.  PRN: Tylenol, oxycodone, Dilaudid  - Off prop and fentanyl  - Lumbar drain: keep ICP <10. Keep drain another 1-2 days   - PTA mirtazapine  #Weakness  Examined patient with vascular team, unable to move b/l LE, no sensation on 2/10 noticed at 10 AM. Dusky discoloration in the hands R>L. Had multiphasic Doppler flow in right and left ulnar artery and brachial arch. On 2/11 able to /follow commands in b/l UE but no LE movement/sensation  - Goals MAP >90, SBP <160. Angiotensin 2 and stress steroids  hydrocortisone 50 mg q6h  - Per vascular, placed new arterial line in left axillary and removed right axillary  - Holding off on MRI T&L as only diagnostic, won't , would require removing [place], and prefer not to transport patient in her current condition  - Restraint [facility]    Pulmonary care:   #Mechanical ventilation  Vent Mode: CMV/AC  (Continuous Mandatory Ventilation/ Assist Control)  FiO2 (%): 60 %  Resp Rate (Set): 14 breaths/min  Tidal Volume (Set, mL): 350 mL  PEEP (cm H2O): 10 cmH2O  Resp: 20  - FiO2 to 60%  - Goal SpO2 > 92%  - Encourage IS q15-30 minutes when awake.  - PST today and trial extubation    Cardiovascular:    #Cardiogenic shock  #HTN  #Ascending aortic aneurysm s/p ascending repair with aortic root reconstruction  #Bioprosthetic aortic valve in 2018  Pre CPBP: LV/RV normal, 55-60%. Mild MR w/ central jet from A2/P2. Mild TR.  Post CPBP: LV normal, 55-60%. RV mildly reduced. Mild AS, trace AI. Mild MR. Mild TR.  - Ang 2 and NE weaned off, on Vaso  - Monitor for goal MAP >90, SBP <160     - Hold PTA metoprolol succinate 50 mg, lisinopril 40 mg, atorvastatin 40 mg  - ASA 81 mg    GI care / Nutrition:   - NPO, bedside swallow eval once extubated.   - NJ placed 2/11, TF at goal of 30cc/hr with 30cc water q4h. If lactate increases, stop TF  - PPI  - Bowel regimen: MiraLAX, senna    Renal / Fluids / Electrolytes:   BL creat appears to be ~ 0.9-1.0  - Strict I/O, daily weights, avoid/limit nephrotoxins  - Replete lytes PRN per protocol    Endocrine:    #Stress hyperglycemia  Preop A1c 4.7  - Discontinued Med SSI, change to insulin gtt  - Goal BG <180 for optimal healing  - Hydrocortisone 50 mg q6h for BP goal    ID / Antibiotics:  #Stress induced leukocytosis  - To complete perioperative regimen  - Monitor fever curve, WBC, and inflammatory markers as appropriate    Heme:     #Acute blood loss anemia  #Acute blood loss thrombocytopenia  No s/sx active bleeding  - CBC daily, Hgb goal  >10 and transfuse   - Hgb 11  - Not on warfarin for valve; look into PTA apixaban  - Continue holding SQH  - Neurocrit: when pull drain, want platelets >100 otherwise transfuse    MSK / Skin:  #Sternotomy  #Surgical Incision  - Sternal precautions, postop incision management protocol  - PT/OT/CR    Prophylaxis:     - Mechanical DVT ppx  - Chemical DVT ppx: no SQH  - PPI    Lines / Tubes / Drains:  - ETT  - RIJ CVC, PA catheter  - Arterial Lines - left axillary  - CTs x3  - Black  - NG  - restraint mitts    Disposition:  - CVICU  discontinue yash    Patient seen, findings and plan discussed with CVICU staff and CT surgeons and fellow.    Valdo Patel MD  Anesthesiology, PGY-2/CA-1  Ascom *[id number]     ====================================    TODAY'S PROGRESS  SUBJECTIVE  Intubated, sedated. Decreased vasopressor requirements.    OBJECTIVE  1. VITAL SIGNS  Temp:  [97.1  F (36.2  C)-98.9  F (37.2  C)] 98.3  F (36.8  C)  Pulse:  [] 75  Resp:  [14-22] 20  MAP:  [78 mmHg-108 mmHg] 92 mmHg  Arterial Line BP: (117-182)/(31-76) 151/56  FiO2 (%):  [60 %-85 %] 60 %  SpO2:  [90 %-100 %] 96 %  Vent Mode: CMV/AC  (Continuous Mandatory Ventilation/ Assist Control)  FiO2 (%): 60 %  Resp Rate (Set): 14 breaths/min  Tidal Volume (Set, mL): 350 mL  PEEP (cm H2O): 10 cmH2O  Resp: 20      2. INTAKE/ OUTPUT  I/O last 3 completed shifts:  In: 2267.42 [I.V.:634.92; NG/GT:465]  Out: 2030 [Urine:1690; Drains:160; Chest Tube:180]    3. PHYSICAL EXAMINATION  General: sedated  Neuro: sedated. Following commands in all 4 extremities with decreased in lowers  Resp: intubated, ventilated  CV: S1, S2, RRR, no m/r/g   Abdomen: Soft, non-distended, non-tender  Incisions: CDI  Extremities: warm and well perfused  CT: To suction, serosang output and clots, no airleak, crepitus    4. INVESTIGATIONS  Arterial Blood Gases   Recent Labs   Lab 02/11/23  1213 02/11/23  0336 02/10/23  2041 02/10/23  1812   PH 7.41 7.35 7.34* 7.31*   PCO2 39 42 43 43    PO2 75* 119* 85 70*   HCO3 24 23 23 22     Complete Blood Count   Recent Labs   Lab 02/11/23  0332 02/10/23  1600 02/10/23  0357 02/09/23  1741 02/09/23  0937   WBC 10.1 12.6* 14.0*  --  12.6*   HGB 11.1* 10.5* 8.2* 9.3* 9.4*   PLT 81* 82* 113*  --  92*     Basic Metabolic Panel  Recent Labs   Lab 02/11/23  1212 02/11/23  0812 02/11/23  0351 02/11/23  0332 02/10/23  0409 02/10/23  0357 02/09/23  1956 02/09/23 1741 02/09/23 0312 02/09/23 0301 02/08/23 2339 02/08/23 2328   NA  --   --   --  140  --  142  --   --   --  140  --  139   POTASSIUM  --   --   --  4.4  --  4.5  --  4.5  --  4.4  --  4.5   CHLORIDE  --   --   --  109*  --  110*  --   --   --  113*  --  111*   CO2  --   --   --  20*  --  19*  --   --   --  20*  --  19*   BUN  --   --   --  33.6*  --  23.7*  --   --   --  16.4  --  14.8   CR  --   --   --  1.19*  --  1.24*  --   --   --  1.15*  --  1.06*   * 198* 155* 188*   < > 157*   < >  --    < > 133*   < > 143*    < > = values in this interval not displayed.     Liver Function Tests  Recent Labs   Lab 02/11/23  0332 02/10/23  0357 02/09/23  0301 02/08/23  2328 02/08/23  1826 02/08/23  1640 02/08/23  1616   * 136* 100* 89*  --   --   --    ALT 59* 51* 43* 34 30  --   --    ALKPHOS 159* 65 51 52 53  --   --    BILITOTAL 0.8 0.5 0.9 1.0 1.5*  --   --    ALBUMIN 2.4* 2.2* 2.4* 1.8* 1.9*  --   --    INR  --   --  1.23*  --  1.49* 1.32* 1.87*     Pancreatic Enzymes  No lab results found in last 7 days.  Coagulation Profile  Recent Labs   Lab 02/09/23  0301 02/08/23  1826 02/08/23  1640 02/08/23  1616   INR 1.23* 1.49* 1.32* 1.87*   PTT 37 72* 44* 38     Lactate  Invalid input(s): LACTATE    5. RADIOLOGY  Recent Results (from the past 24 hour(s))   XR Chest Port 1 View    Narrative    EXAM: XR CHEST PORT 1 VIEW  2/8/2023 7:12 PM      HISTORY: Post Op CVTS Surgery    COMPARISON: CT chest/abdomen/pelvis 2/5/2023    FINDINGS: Single view of the chest. Post surgical change in the chest  with  median sternotomy wires and several discontinuous sternotomy  fixation wires. Additional wires projecting over the left upper  quadrant. Endotracheal tube tip is approximately 4 cm from the darling.  Prosthetic aortic valve. Right IJ central venous catheter tip is in  the proximal right pulmonary artery. Two left apical chest tubes and  the left basilar chest tube. Enteric tube tip and sidehole projected  over the stomach. Trachea is midline. Cardiac silhouette is enlarged  and somewhat obscured by diffuse mixed interstitial and airspace  opacities in left upper lobe. The left lung base is relatively clear.  No pleural effusion. Small left apical pneumothorax. No right-sided  pleural effusion or pneumothorax. Several acute left lateral rib  fractures. Subcutaneous emphysema in the left axilla. The upper  abdomen is unremarkable.      Impression    IMPRESSION:   1. Post surgical changes of left thoracotomy and thoracoabdominal  aortic repair.  2. Endotracheal tube tip is approximately 4 cm from the darling. Right  IJ [place] catheter tip is in the distal right pulmonary artery.  3. Small right apical pneumothorax.   4. Mixed interstitial airspace opacities throughout the left upper  lung field, atelectasis and/or edema.    I have personally reviewed the examination and initial interpretation  and I agree with the findings.    PRATIK RESTREPO MD         SYSTEM ID:  [id number]   XR Abdomen Port 1 View    Narrative    EXAM: XR ABDOMEN PORT 1 VIEW  2/8/2023 7:15 PM     HISTORY:  OG       TECHNIQUE: Single frontal radiograph of the abdomen    COMPARISON:  CT chest, abdomen and pelvis 2/5/2023.    FINDINGS:   Portable AP supine radiograph the abdomen. Gastric tube tip and  sidehole project over the expected location of the stomach. Left  basilar chest tube. Contrast visualized within several nondistended  loops of bowel. Nonobstructive bowel gas pattern. No pneumatosis,  portal venous gas. Severe degenerative changes of  the lower lumbar  spine. Please refer to same-day chest radiograph for further details  regarding the visualized lung bases.      Impression    IMPRESSION: Gastric tube tip and sidehole project over the expected  location of the stomach. Nonobstructive bowel gas pattern.     I have personally reviewed the examination and initial interpretation  and I agree with the findings.    PRATIK RESTREPO MD         SYSTEM ID:  [id number]   XR Chest Port 1 View    Impression    RESIDENT PRELIMINARY INTERPRETATION  Impression:   1. Support devices in stable position as noted above.  2. Increased patchy and consolidative opacities throughout the left  lung.  3. Likely small bilateral pleural effusions.

## 2023-02-11 NOTE — INTERIM SUMMARY
Interval Note Summary    During routine bedside exam at 1310, patient withdrew to pain in feet bilaterally multiple times. Called patient's nurse in to witness this, as this is reported to be the first time she has had lower extremity response to any stimulus since approximately 4 AM on 2/10 (~30 hours ago). When using , patient still not able to follow commands in lower extremities, but consistently moving and following commands in her b/l upper extremities.      Valdo Patel MD  Anesthesiology Resident, CA-1, PGY-2

## 2023-02-12 NOTE — ANESTHESIA POSTPROCEDURE EVALUATION
Patient: Shelly Becerril    Procedure: Procedure(s):  Left thoracotomy, thoracoabdominal aortic aneurysm repair, repair with left heart bypass, open exposure of left femoral artery       Anesthesia Type:  General    Note:  Disposition: ICU            ICU Sign Out: Anesthesiologist/ICU physician sign out WAS performed   Postop Pain Control:    PONV:    Neuro/Psych:             Sign Out: Acceptable/Baseline neuro status   Airway/Respiratory:             Sign Out: AIRWAY IN SITU/Resp. Support; Acceptable/Baseline resp. status               Airway in situ/Resp. Support: ETT   CV/Hemodynamics: Uneventful            Sign Out: Acceptable CV status; No obvious hypovolemia; No obvious fluid overload   Other NRE:    DID A NON-ROUTINE EVENT OCCUR?            Last vitals:  Vitals:    02/12/23 1200 02/12/23 1300 02/12/23 1330   BP:      Pulse: 80 75 75   Resp: 19 21    Temp: 36.7  C (98  F)     SpO2: 92% 97% 96%       Electronically Signed By: Zakia Camejo MD  February 12, 2023  2:18 PM

## 2023-02-12 NOTE — PROGRESS NOTES
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 Briceno on 2/8/23.    24-hour events:  - Moving toes to pain    Today's Changes:  - Holding off on MRI T&L as only diagnostic, won't , would require removing [place], and prefer not to transport patient in her current condition  - Recheck hgb in afternoon   - Precedex gtt for agitaiton  - Wean vent as able     Neuro/ pain/ sedation:  #Depression  #Acute postoperative pain  #Sedation  - Monitor neuro status. Notify MD for acute changes  - Pain: Schedule  Tylenol.  PRN: Tylenol, oxycodone, Dilaudid  - Lumbar drain: keep ICP <10. Keep drain another 1-2 days   - PTA mirtazapine  #Weakness  Examined patient with vascular team, unable to move b/l LE, no sensation on 2/10 noticed at 10 AM. Dusky discoloration in the hands R>L. Had multiphasic Doppler flow in right and left ulnar artery and brachial arch. On 2/11 able to /follow commands in b/l UE but no LE movement/sensation  - Goals MAP >90, SBP <160. Angiotensin 2 and stress steroids hydrocortisone 50 mg q6h  - Per vascular, placed new arterial line in left axillary and removed right axillary  - Holding off on MRI T&L as only diagnostic, won't , would require removing [place], and prefer not to transport patient in her current condition  - Restraint Mitts  - Precedex gtt      Pulmonary care:   #Mechanical ventilation  #Pneumonia, hospital acquired   Vent Mode: CMV/AC  (Continuous Mandatory Ventilation/ Assist Control)  FiO2 (%): 50 %  Resp Rate (Set): 14 breaths/min  Tidal Volume (Set, mL): 350 mL  PEEP (cm H2O): 10 cmH2O  Resp: 22  - FiO2 to 50%  - Goal SpO2 > 92%  - Encourage IS q15-30 minutes when awake.  - PST today and trial extubation  - If decompensates, broaden zosyn to meropenem      Cardiovascular:    #Cardiogenic shock  #HTN  #Ascending aortic aneurysm s/p ascending repair with aortic root reconstruction  #Bioprosthetic aortic valve in 2018  Pre CPBP: LV/RV normal, 55-60%. Mild MR w/ central jet from A2/P2. Mild TR.  Post CPBP: LV normal, 55-60%. RV mildly reduced. Mild AS, trace AI. Mild MR. Mild TR.  - Ang 2 and NE weaned off, on Vaso  - Monitor for goal MAP >90, SBP <160     - Hold PTA metoprolol succinate 50 mg, lisinopril 40 mg, atorvastatin 40 mg  - ASA 81 mg    GI care / Nutrition:   - NJ placed 2/11, TF at goal of 30cc/hr with 30cc water q4h. If lactate increases, stop TF  - PPI  - Bowel regimen: MiraLAX, senna  - Phosphorous 1.6 today     Renal / Fluids / Electrolytes:   #Hypokalememia   BL creat appears to be ~ 0.9-1.0  - Strict I/O, daily weights, avoid/limit nephrotoxins  - Replete lytes PRN per protocol    Endocrine:    #Stress hyperglycemia  Preop A1c 4.7  - Discontinued Med SSI, change to insulin gtt  - Goal BG <180 for optimal healing  - Hydrocortisone 50 mg q6h for BP goal   - 72 hour stop time     ID / Antibiotics:  #Stress induced leukocytosis  - To complete perioperative regimen  - Monitor fever curve, WBC, and inflammatory markers as appropriate  - Zosyn (7 day course)    Heme:     #Acute blood loss anemia  #Acute blood loss thrombocytopenia  No s/sx active bleeding  - CBC daily, Hgb goal >10 and transfuse   - Hgb   - Not on warfarin for valve; look into PTA apixaban  - Continue holding SQH  - Neurocrit: when pull drain, want platelets >100 otherwise  transfuse  - Hgb check in PM     MSK / Skin:  #Sternotomy  #Surgical Incision  - Sternal precautions, postop incision management protocol  - PT/OT/CR    Prophylaxis:     - Mechanical DVT ppx  - Chemical DVT ppx: SQH  - PPI    Lines / Tubes / Drains:  - ETT  - RIJ CVC, PA catheter  - Arterial Lines - left axillary  - CTs x3  - Black  - NG  - restraint mitts    Disposition:  - CVICU    Patient seen, findings and plan discussed with CVICU staff and CT surgeons and fellow.    Valdo Alonso MD  Anesthesiology, PGY-3/CA-2  Ascom *[id number]     ====================================    TODAY'S PROGRESS  SUBJECTIVE  Intubated, sedated. Decreased vasopressor requirements.    OBJECTIVE  1. VITAL SIGNS  Temp:  [97.1  F (36.2  C)-98.6  F (37  C)] 98  F (36.7  C)  Pulse:  [73-98] 96  Resp:  [15-23] 22  MAP:  [70 mmHg-116 mmHg] 105 mmHg  Arterial Line BP: (123-173)/(39-77) 158/69  FiO2 (%):  [40 %-60 %] 50 %  SpO2:  [92 %-99 %] 97 %  Vent Mode: CMV/AC  (Continuous Mandatory Ventilation/ Assist Control)  FiO2 (%): 50 %  Resp Rate (Set): 14 breaths/min  Tidal Volume (Set, mL): 350 mL  PEEP (cm H2O): 10 cmH2O  Resp: 22      2. INTAKE/ OUTPUT  I/O last 3 completed shifts:  In: 1908.12 [I.V.:853.12; NG/GT:395]  Out: 2065 [Urine:1785; Drains:130; Chest Tube:150]    3. PHYSICAL EXAMINATION  General: sedated  Neuro: sedated. Following commands in all 4 extremities with decreased in lowers  Resp: intubated, ventilated  CV: S1, S2, RRR, no m/r/g   Abdomen: Soft, non-distended, non-tender  Incisions: CDI  Extremities: warm and well perfused  CT: To suction, serosang output and clots, no airleak, crepitus    4. INVESTIGATIONS  Arterial Blood Gases   Recent Labs   Lab 02/12/23  0346 02/11/23  1950 02/11/23  1213 02/11/23  0336   PH 7.47* 7.44 7.41 7.35   PCO2 37 39 39 42   PO2 72* 68* 75* 119*   HCO3 27 27 24 23     Complete Blood Count   Recent Labs   Lab 02/12/23  0347 02/11/23  0332 02/10/23  1600 02/10/23  0357   WBC 10.3 10.1 12.6* 14.0*    HGB 9.8* 11.1* 10.5* 8.2*   PLT 92* 81* 82* 113*     Basic Metabolic Panel  Recent Labs   Lab 02/12/23  0546 02/12/23  0350 02/12/23  0347 02/12/23  0303 02/11/23  0351 02/11/23  0332 02/10/23  0409 02/10/23  0357 02/09/23  1956 02/09/23  1741 02/09/23 0312 02/09/23 0301   NA  --   --  143  --   --  140  --  142  --   --   --  140   POTASSIUM  --   --  3.0*  --   --  4.4  --  4.5  --  4.5  --  4.4   CHLORIDE  --   --  108*  --   --  109*  --  110*  --   --   --  113*   CO2  --   --  22  --   --  20*  --  19*  --   --   --  20*   BUN  --   --  29.2*  --   --  33.6*  --  23.7*  --   --   --  16.4   CR  --   --  0.81  --   --  1.19*  --  1.24*  --   --   --  1.15*   * 140* 155* 153*   < > 188*   < > 157*   < >  --    < > 133*    < > = values in this interval not displayed.     Liver Function Tests  Recent Labs   Lab 02/12/23  0347 02/11/23  0332 02/10/23  0357 02/09/23  0301 02/08/23  2328 02/08/23  1826 02/08/23  1640 02/08/23  1616   * 247* 136* 100*   < >  --   --   --    ALT 59* 59* 51* 43*   < > 30  --   --    ALKPHOS 151* 159* 65 51   < > 53  --   --    BILITOTAL 0.6 0.8 0.5 0.9   < > 1.5*  --   --    ALBUMIN 2.3* 2.4* 2.2* 2.4*   < > 1.9*  --   --    INR  --   --   --  1.23*  --  1.49* 1.32* 1.87*    < > = values in this interval not displayed.     Pancreatic Enzymes  No lab results found in last 7 days.  Coagulation Profile  Recent Labs   Lab 02/09/23  0301 02/08/23  1826 02/08/23  1640 02/08/23  1616   INR 1.23* 1.49* 1.32* 1.87*   PTT 37 72* 44* 38     Lactate  Invalid input(s): LACTATE    5. RADIOLOGY  Recent Results (from the past 24 hour(s))   XR Chest Port 1 View    Narrative    EXAM: XR CHEST PORT 1 VIEW  2/8/2023 7:12 PM      HISTORY: Post Op CVTS Surgery    COMPARISON: CT chest/abdomen/pelvis 2/5/2023    FINDINGS: Single view of the chest. Post surgical change in the chest  with median sternotomy wires and several discontinuous sternotomy  fixation wires. Additional wires projecting  over the left upper  quadrant. Endotracheal tube tip is approximately 4 cm from the darling.  Prosthetic aortic valve. Right IJ central venous catheter tip is in  the proximal right pulmonary artery. Two left apical chest tubes and  the left basilar chest tube. Enteric tube tip and sidehole projected  over the stomach. Trachea is midline. Cardiac silhouette is enlarged  and somewhat obscured by diffuse mixed interstitial and airspace  opacities in left upper lobe. The left lung base is relatively clear.  No pleural effusion. Small left apical pneumothorax. No right-sided  pleural effusion or pneumothorax. Several acute left lateral rib  fractures. Subcutaneous emphysema in the left axilla. The upper  abdomen is unremarkable.      Impression    IMPRESSION:   1. Post surgical changes of left thoracotomy and thoracoabdominal  aortic repair.  2. Endotracheal tube tip is approximately 4 cm from the darling. Right  IJ [place]-Clarisa catheter tip is in the distal right pulmonary artery.  3. Small right apical pneumothorax.   4. Mixed interstitial airspace opacities throughout the left upper  lung field, atelectasis and/or edema.    I have personally reviewed the examination and initial interpretation  and I agree with the findings.    PRATIK RESTREPO MD         SYSTEM ID:  [id number]   XR Abdomen Port 1 View    Narrative    EXAM: XR ABDOMEN PORT 1 VIEW  2/8/2023 7:15 PM     HISTORY:  OG       TECHNIQUE: Single frontal radiograph of the abdomen    COMPARISON:  CT chest, abdomen and pelvis 2/5/2023.    FINDINGS:   Portable AP supine radiograph the abdomen. Gastric tube tip and  sidehole project over the expected location of the stomach. Left  basilar chest tube. Contrast visualized within several nondistended  loops of bowel. Nonobstructive bowel gas pattern. No pneumatosis,  portal venous gas. Severe degenerative changes of the lower lumbar  spine. Please refer to same-day chest radiograph for further details  regarding the  visualized lung bases.      Impression    IMPRESSION: Gastric tube tip and sidehole project over the expected  location of the stomach. Nonobstructive bowel gas pattern.     I have personally reviewed the examination and initial interpretation  and I agree with the findings.    PRATIK RESTREPO MD         SYSTEM ID:  [id number]   XR Chest Port 1 View    Impression    RESIDENT PRELIMINARY INTERPRETATION  Impression:   1. Support devices in stable position as noted above.  2. Increased patchy and consolidative opacities throughout the left  lung.  3. Likely small bilateral pleural effusions.

## 2023-02-12 NOTE — PROGRESS NOTES
Neurocritical Care Progress Note    Reason for critical care admission: LE weakness  Admitting Team: ADELAIDA  Date of Service:  02/12/2023  Date of Admission:  2/4/2023  Hospital Day: 9    Assessment/Plan  Shelly Becerril is a 73 year old female with a past medical including ascending aortic aneurysm repair with aortic root reconstruction and bioprosthetic aortic valve replacement in 2018 who presented to [facility] ED on 2/3/2023 for evaluation and management of chest pain.  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  Per medical record review on admission she was neurologically intact. She was transferred to [facility] on 2/4/2023 for surgical consideration. On 2/8/2023 she underwent left thoracotomy for repair of the thoracoabdominal aortic aneurysm.     On 2/10/2023 approximately 10 AM staff noted she was not moving her bilateral lower extremities.  Per vascular surgery resident the patient had not demonstrated hip flexion since OR.  After the reports of not moving bilateral lower extremities MAP goal was raised to greater than 90 and lumbar drain was increased.  NCC was consulted on 2/10/2023 for additional concerns regarding spinal ischemia status post thoracoabdominal aneurysm repair.     Overnight events: No acute events. Strongly moving BUE today, no movement of BLE.     Neuro  #Concern for spinal ischemia status post thoracoabdominal aneurysm repair   - MRI Thoracic and Lumbar spine without contrast when able   - Agree with MAP goal > 90, will aim for at least 5 days  - Continue lumbar drain, will aim for at least 5 days   - Neurochecks every 2 hours      NCC will continue to follow for exam and imaging. Please call use at [phone] with any additional questions or concerns.     The patient was seen and discussed with the NCC attending, Dr. Feliz.    Susannah Mccormick, APRN, CNP  Neurocritical Care  *[phone]    TIME SPENT ON THIS ENCOUNTER   I spent 30 minutes of critical care time on the unit/floor managing the care of Shelly Becerril excluding time performing procedures. Upon evaluation, this patient had a high probability of imminent or life-threatening deterioration due to concern for spinal cord ischemia status post thoracoabdominal aneurysm repair, which required my direct attention, intervention, and personal management. Greater than 50% of my time was spent at the bedside counseling the patient and/or coordinating care including chart review, history, exam, documentation, and further activities per this note. I have personally reviewed the following data/imaging over the past 24 hours.    24 Hour Vital Signs Summary:  Temp: 98  F (36.7  C) Temp  Min: 97.1  F (36.2  C)  Max: 98.6  F (37  C)  Resp: 21 Resp  Min: 15  Max: 23  SpO2: 96 % SpO2  Min: 92 %  Max: 99 %  Pulse: 96 Pulse  Min: 73  Max: 98    No data recorded   No data recorded.  No data recorded.    Respiratory monitoring:   Vent Mode: CMV/AC  (Continuous Mandatory Ventilation/ Assist Control)  FiO2 (%): 50 %  Resp Rate (Set): 14 breaths/min  Tidal Volume (Set, mL): 350 mL  PEEP (cm H2O): 10 cmH2O  Resp: 21      I/O last 3 completed shifts:  In: 2308.49 [I.V.:1013.49; NG/GT:575]  Out: 2680 [Urine:2300; Drains:170; Chest Tube:210]    Current Medications:    acetaminophen  650 mg Oral or Feeding Tube Q8H     aspirin  81 mg Oral or NG Tube Daily     [Held by provider] atorvastatin  40 mg Oral Daily     heparin ANTICOAGULANT  5,000 Units Subcutaneous Q8H     hydrocortisone sodium succinate PF  50 mg Intravenous Q6H     mirtazapine  15 mg Oral or Feeding Tube At Bedtime     multivitamins w/minerals  15 mL Per Feeding Tube Daily     mupirocin  0.5 g Both Nostrils BID     pantoprazole  40 mg Oral or Feeding Tube Daily     piperacillin-tazobactam  3.375 g Intravenous Q6H     polyethylene glycol  17 g Oral Daily     potassium & sodium phosphates  2 packet Oral  or Feeding Tube Q4H     protein modular  1 packet Per Feeding Tube Daily     senna-docusate  1 tablet Oral BID     sodium chloride (PF)  3 mL Intracatheter Q8H       PRN Medications:  acetaminophen, bisacodyl, calcium gluconate, calcium gluconate, calcium gluconate, dextrose, dextrose, glucose **OR** dextrose **OR** glucagon, fentaNYL, hydrALAZINE, HYDROmorphone **OR** HYDROmorphone, lidocaine 4%, lidocaine (buffered or not buffered), magnesium hydroxide, methocarbamol, naloxone **OR** naloxone **OR** naloxone **OR** naloxone, ondansetron **OR** ondansetron, oxyCODONE **OR** oxyCODONE, prochlorperazine **OR** prochlorperazine, BETA BLOCKER NOT PRESCRIBED, sodium chloride (PF)    Infusions:    angiotensin II (GIAPREZA) ADULT infusion 2.5mg/250 mL NS Stopped (02/11/23 1301)     dextrose       dextrose       EPINEPHrine Stopped (02/10/23 1108)     insulin regular 0.5 Units/hr (02/12/23 0700)     norepinephrine Stopped (02/12/23 0400)     propofol 20 mcg/kg/min (02/12/23 0707)     BETA BLOCKER NOT PRESCRIBED       vasopressin Stopped (02/12/23 0700)       No Known Allergies    Physical Examination:  Vitals: BP (!) 102/31   Pulse 96   Temp 98  F (36.7  C) (Axillary)   Resp 21   Ht 1.524 m (5')   Wt 67 kg (147 lb 11.3 oz)   SpO2 96%   BMI 28.85 kg/m    General: Adult female patient, lying in bed, critically-ill,  HEENT: Normocephalic, atraumatic, no icterus, oral cavity/oropharynx pink and moist  Cardiac: Appears adequately perfused.   Pulm: Synchronous with ventilator.   Abdomen: Non-distended.  Extremities: Warm, generalized edema. Distal extremities appear adequately perfused.  Skin: No obvious rash or lesion on exposed skin.  Psych: Calm and cooperative.  Neuro:  Mental status: Awake, alert, attentive. Regards, shakes head no when asked to move but is moving BUE strongly, spontaneously. Exam is limited by ETT and language barrier.   Cranial nerves: Face appears symmetric with exam limited by ETT.   Motor:  Normal bulk and tone. No abnormal movements. Strongly moving BUE. No movement of bilateral lower extremities.   Sensory: No movement to pain in BLE.  Coordination: Deferred.   Gait: Deferred.    Labs and Imaging: All relevant imaging and laboratory values personally reviewed.

## 2023-02-12 NOTE — PLAN OF CARE
Major Shift Events:    Neuro: Interacts with family. Follows commands to BUE, stronger in RUE. Slight toe flicker/withdrawl in BLE to deep pain, no gross movement, team aware. Became increasingly agitated overnight. Despite mitts, continues to attempt to pull on ETT. Attempted pain meds, but agitation persisted, discussed with CVTS moonlighter, okay to do low dose propofol to keep patient calm. Lumbar drain per order, pressures 12-17 overnight.   Cardiac: HR 90s with occ PVC. Map>90 for spinal cord perfusion. On and off pressors overnight. CVP clotted off, unable to obtain reading. PA pressures 30s-10s. Pulses via doppler.   GI: NJT, TF at goal, standard flushes. Insulin drip at low dose.   : dunbar with adequate UOP.  Skin: thoracotomy site approximated with small area under L breast with skin tear vs blister. R and L groin sites WDL. Preventative mepi to coccyx. CTx3, dsg changed by day shift.  Lines: RIJ swan with clotted CVP- site noted to be edematous, team aware, no change overnight. PIVx2, L axillary line.  Activity: bedrest, turn q2hrs. Bilat soft mitts dt to pulling at lines/ETT.     Plan: continue MAP goal> 90, lumbar drain mgmt. Monitor closely and notify MD of changes/concerns.  For vital signs and complete assessments, please see documentation flowsheets.          Problem: Plan of Care - These are the overarching goals to be used throughout the patient stay.    Goal: Plan of Care Review  Description: The Plan of Care Review/Shift note should be completed every shift.  The Outcome Evaluation is a brief statement about your assessment that the patient is improving, declining, or no change.  This information will be displayed automatically on your shift note.  2/12/2023 0024 by Kay Garcia RN  Outcome: Progressing  Flowsheets (Taken 2/12/2023 0024)  Outcome Evaluation: low dose propofol for agitation, MAP goal >90, on minimal pressors  Plan of Care Reviewed With: patient  Overall Patient Progress:  no change  2/12/2023 0024 by Kay Garcia, RN  Outcome: Progressing  Flowsheets (Taken 2/12/2023 0024)  Outcome Evaluation: low dose propofol for agitation, MAP goal >90, on minimal pressors  Plan of Care Reviewed With: patient  Overall Patient Progress: no change

## 2023-02-12 NOTE — PLAN OF CARE
"N: Propofol turned off this morning. Eyes open. Interacting with family, per them \"pt oriented\". Follow commands BUE. Slightly stronger RUE. Now slight toe flicker bilateral feet with deep pain to toes otherwise no other gross movement. Denies pain. Lumbar drain per order. Pressures 7-15,   C: NSR, rare PVC. MAP > 90 for spinal cord perfusion. Extreme fluctuations in pressor requirements, trend with drop in blood pressure with turns to right. CVP 8 this morning prior to clotting. PAPs 30-40ss/10-20s. q4 FICKS. Echo ordered. q4 pulses - doppler. Extremities warmer this evening, still pale.   R: CMV 40% 14/350/P10. Per CVTS, not to wean peep today. Lungs clear, diminished in bases. Scant thick white sputum from ETT. 3 left chest tubes to -20 suction, small serosang output.   GI: NJT, TF at goal. Standard FWF. Multiple loose stools this AM. Insulin gtt started  : dunbar, adequate urine output   SKIN: Thoracotomy incision approximated - small area under left breast superficial opening skin tear vs blister. Right and left groins sites WDL. Preventative mepi to coccyx  ACCESS: RIJ swan - CVP clotted, site noted to edematous puffy soft feeling at 1600 - no change in color or temperature, CVTS at bedside to assess - US ordered. PIV X2, L axillary art line  ACTIVITY: Bedrest, turn q2. Bilateral soft mitts applied for reaching for ETT/lines.  SOCIAL: Family at bedside, updated by RN and MD. SW consult placed per family request for care resources.     PLAN: Continue MAP goal > 90, lumbar drain management. Monitor closely and notify MD of changes/concerns.   " Never

## 2023-02-12 NOTE — PROGRESS NOTES
Vascular Surgery Progress Note    Some agitation overnight. Off pressors briefly this morning, back on low dose levophed. Decreasing FiO2 requirement. Tolerating tube feeds at goal rate.   RN and CVICU teams report she consistently withdraws her toes to pain through the night and early this morning, until she required sedation for agitation.    BP (!) 102/31   Pulse 84   Temp 98.2  F (36.8  C) (Axillary)   Resp 19   Ht 1.524 m (5')   Wt 67 kg (147 lb 11.3 oz)   SpO2 96%   BMI 28.85 kg/m        Appropriate urine output.  110 mL total chest tube output in the last 24.    I/O last 3 completed shifts:  In: 2308.49 [I.V.:1013.49; NG/GT:575]  Out: 2680 [Urine:2300; Drains:170; Chest Tube:210]    Intubated, sedated, does not follow commands, no withdrawal to pain in any extremity.  Hands warm, palpable ulnar pulses  Left thoracoabdominal incision and left groin incision clean dry and intact with skin glue  Feet warm, monophasic Doppler symptoms in the bilateral DP and PT arteries    K3.0  Creatinine 0.1, improved  Mg 2.7  Phos 1.6  Lactate 1.6    WBC 10.3  Hgb 9.8    Sputum culture from 2/10/2023 growing Enterobacter cloacae    A/P: 73 y.o. POD#4 s/p open repair of type B dissection and thoracoabdominal aneurysm, concern for spinal cord ischemia and lower extremity paralysis although able to withdraw toes to pain as of yesterday and this morning.      - continue spinal cord protective measures, MAP > 90, CSF pressure < 10, Hgb > 10  - spinal drain for CSF pressure < 10 mmHg as above  - appreciate neuro critcare, T&L spine MRI when able  - remaining care per CVTS/CVICU, repleting Phos and Mg, tube feeds. Weaning vent. Tube feeds at goal.     D/w Dr. Du Lemus MD

## 2023-02-13 NOTE — PLAN OF CARE
Goal Outcome Evaluation:    Neuro: Weaning precedex today - calm at 0.2 with oxy PRNs given for comfort. Pt alert to drowsy. Restraints remain for safety. Lumbar drain in place - draining 10cc for pressure >10. Pt able to move/squeeze/thumbs up on upper extremities. Pt withdrawing intermittently on lowers with weaning sedations - 1400: pt able to move L toes/foot - team aware. 1500: family at bedside - pt nodded yes to sensation in both lower extremities.  CV: HR 80-100s. Tmax: 99s. SBP goal <160; MAP goal >90. Levo titrated if needed to keep MAP goal. Hydralazine given to keep SBP goal. Chest tubes x3.  Pulm: CMV settings. PS 8/8 today. Thick creamy inline secretions, thin oral secretions.  GI: Rectal tube remains. TF at goal, switching per orders after current bag done.   : Urinary catheter. Adequate output.  IV/Gtt: Precedex. Insulin discontinued. Cade removed - MAC left in place.   Flotrack discontinued.  Potassium supplemented.     WOC at bedside for back redness - ordered to leave RENITA; L breast also looked at by WOC - instructed to use Vaseline guaze and mepilex; change daily.  Social work at bedside with family per their request this evening.  Plan: Extubate when able/ready per all teams approval.    Will continue to monitor for safety and comfort.    Alvina Fagan RN

## 2023-02-13 NOTE — PLAN OF CARE
N: Propofol changed to precedex. Pt remained agitated this morning - attempting to bite and pull off mittens, reach for tubes and swing at staff. Attempted ipad  without behavior change. Calmed down with family present. Interacting with family, appears to be oriented to place/situation. Follow commands with BUE. Endorses sensation in BLE however since 1600, no toe flicker to painful stim. Vascular and CVTS aware. No change in interventions. Continue lumbar drain per order. Again agitated with family gone this evening, resulting in hypertension. Increase in precedex to max and PRN dilaudid without effect. Pt bit off mitten and attempted to self extubate. Soft mitt restraints changed to bilateral wrist restraints and propfol started.   C: NSR, occasional PVCs. Briefly on pressors throughout day to maintain MAP > 90 for spinal cord perfusion. PRN hydralazine given for SBP > 160, unchanged with treating agitation. CVP 11, PAPs remain 30-40s/10s. Echo completed. Doppler pulses q2. Extremities warm. 1U RBC ordered  for Hgb > 10.   R: CMV 50% 14/350/P10. Per CVTS, not to wean peep today. Lungs clear, diminished in bases. Scant thick white sputum from ETT. 3 left chest tubes to -20 suction, small serosang output.   GI: NJT, TF at goal. Standard FWF. Large loose stools this AM - rectal tube placed. Insulin gtt   : dunbar, adequate urine output. Electrolytes replaced per protocol.   SKIN: Thoracotomy incision approximated - small area under left breast superficial opening skin tear vs blister. Right and left groins sites WDL. Preventative mepi to coccyx. Medial back non blanchable redness.   ACCESS: RIJ swan/MAC, PIV, L axillary art line  ACTIVITY: Bedrest, turn q2. Bilateral soft mitts applied for reaching for ETT/lines.  SOCIAL: Family at bedside, updated by RN.     PLAN: Continue MAP goal > 90, lumbar drain management. Monitor closely and notify MD of changes/concerns

## 2023-02-13 NOTE — PLAN OF CARE
Major Shift Events:  Shift 1250-1234     Hmong , Daughter and granddaughter at bedside. Granddaughter able to translate.      Neuro: Patient restless, agitated at the beginning of the shift. Moving upper extremities, not withdrawing from BLE. Restraints ordered for patient safety-patient tries to pull at ET tube. Low dose propofol, turned off at 0300. Precedex gtt running at 0.7mcg/kg/hr. Doppler for all pulses. Lumbar drain remains in place, draining 10cc Q 1 hour if pressure >10 per vascular surgery. Tmax 37.2.      Cardiac: SR, HR 70-80s. Occasional PVCs. MAP goal >90 with SBP <160. Off pressors since 2000. PRN hydralazine given once for SBP >160.  [place] at 54, Q8 FICKs with continuous FloTrac monitoring.   CVP 9-10  PA 38/22, 36/20  SvO2 72, 71  See flow sheet for complete ROXANNE calculations.      Resp: CMV settings overnight, FiO2 50%, PEEP 10, RR 14, . Clear/dim lung sounds. Creamy secretions. Chest tubes x3 to -20 suction, minimal serosanguinous output.      GI/: Rectal tube in place. Tube feeds at goal of 30mL/hr via NJ, Q4 30 flushes. Insulin gtt at 0.5. Black in place with adequate urine output.      AM Hgb 11.3, goal >10  AM potassium 3.4 - replacement given      Plan: Q2 neuro checks, MRI when swan can be removed, continue POC. Wean oxygen as able.      For vital signs and complete assessments, please see documentation flowsheets.       Goal Outcome Evaluation:      Plan of Care Reviewed With: patient, child, grandchild(cristopher)    Overall Patient Progress: no change    Outcome Evaluation: low dose propofol and precedex for restlessness and agitation. MAP goal >90 off pressors overnight. CMV settings unchanged    Problem: Plan of Care - These are the overarching goals to be used throughout the patient stay.    Goal: Absence of Hospital-Acquired Illness or Injury  Intervention: Prevent Skin Injury  Recent Flowsheet Documentation  Taken 2/13/2023 0400 by Cookie Morelos, RN  Body Position:     turned    right    upper extremity elevated    lower extremity elevated    head repositioned, right  Taken 2/13/2023 0200 by Cookie Morelos, RN  Body Position:    turned    left    head repositioned, left    legs elevated    heels elevated    upper extremity elevated  Taken 2/13/2023 0000 by Cookie Morelos, BRIONNA  Body Position:    turned    right    side-lying    upper extremity elevated    lower extremity elevated    head repositioned, right  Taken 2/12/2023 2200 by Cookie Morelos, RN  Body Position:    turned    left    head repositioned, left    legs elevated    upper extremity elevated  Taken 2/12/2023 2000 by Cookie Morelos, RN  Body Position:    turned    right    side-lying    upper extremity elevated    lower extremity elevated

## 2023-02-13 NOTE — PROGRESS NOTES
CLINICAL NUTRITION SERVICES - BRIEF NOTE      Reason for RD note: Transitioning off of renal TF formula    New Findings/Chart Review:  -Pt started on Novasource Renal on 2/10  -K+/Phos have now been WNL or low    Interventions:  -GOAL TF: TwoCal HN @ 30 mL/hr (720 mL/day) + 1 pkt Prosource TF20 to provide 1520 kcals (30 kcal/kg/day), 80 g PRO (1.6 g/kg/day), 504 mL H2O, 158 g CHO and 4 g Fiber daily.  -Continue daily MVI    Nutrition will follow per protocol or sooner if consulted.    Carlita Tyler, MS, RD, LD  4E (CVICU) RD pager: [phone]  Ascom: [phone]  Weekend/Holiday RD pager: [phone]

## 2023-02-13 NOTE — PROGRESS NOTES
Neurocritical Care Progress Note    Reason for critical care admission: LE weakness  Admitting Team: ADELAIDA  Date of Service:  02/13/2023  Date of Admission:  2/4/2023  Hospital Day: 10    Assessment/Plan  Shelly Becerril is a 73 year old female with a past medical including ascending aortic aneurysm repair with aortic root reconstruction and bioprosthetic aortic valve replacement in 2018 who presented to [facility] ED on 2/3/2023 for evaluation and management of chest pain.  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  Per medical record review on admission she was neurologically intact. She was transferred to [facility] on 2/4/2023 for surgical consideration. On 2/8/2023 she underwent left thoracotomy for repair of the thoracoabdominal aortic aneurysm.     On 2/10/2023 approximately 10 AM staff noted she was not moving her bilateral lower extremities.  Per vascular surgery resident the patient had not demonstrated hip flexion since OR.  After the reports of not moving bilateral lower extremities MAP goal was raised to greater than 90 and lumbar drain was increased.  NCC was consulted on 2/10/2023 for additional concerns regarding spinal ischemia status post thoracoabdominal aneurysm repair.     Neuro  #Concern for spinal ischemia status post thoracoabdominal aneurysm repair   - MRI Thoracic and Lumbar spine without contrast when able   - Agree with MAP goal > 90, will aim for goal through 2/15  - Continue lumbar drain, will aim for goal through 2/15   - PT/OT as able   - Neurochecks every 2 hours      NCC will continue to follow for exam and imaging. Please call use at [phone] with any additional questions or concerns.     The patient was seen and discussed with the NCC attending, Dr. Feliz.    Susannah Mccormick, BOUCHRA, CNP  Neurocritical Care *[id number]    TIME SPENT ON THIS ENCOUNTER   I spent 30 minutes of  critical care time on the unit/floor managing the care of Shelly Becerril excluding time performing procedures. Upon evaluation, this patient had a high probability of imminent or life-threatening deterioration due to concern for spinal cord ischemia status post thoracoabdominal aneurysm repair, which required my direct attention, intervention, and personal management. Greater than 50% of my time was spent at the bedside counseling the patient and/or coordinating care including chart review, history, exam, documentation, and further activities per this note. I have personally reviewed the following data/imaging over the past 24 hours.    24 Hour Vital Signs Summary:  Temp: 98.2  F (36.8  C) Temp  Min: 97.7  F (36.5  C)  Max: 99  F (37.2  C)  Resp: 15 Resp  Min: 15  Max: 22  SpO2: 97 % SpO2  Min: 92 %  Max: 99 %  Pulse: 69 Pulse  Min: 67  Max: 100    No data recorded  BP: (!) 165/70 Systolic (24hrs), Av , Min:137 , Max:165   Diastolic (24hrs), Av, Min:57, Max:70    Respiratory monitoring:   Vent Mode: CMV/AC  (Continuous Mandatory Ventilation/ Assist Control)  FiO2 (%): 50 %  Resp Rate (Set): 14 breaths/min  Tidal Volume (Set, mL): 350 mL  PEEP (cm H2O): 10 cmH2O  Resp: 15    I/O last 3 completed shifts:  In: 3020.09 [I.V.:1360.09; NG/GT:440]  Out: 2545 [Urine:2255; Drains:150; Chest Tube:140]    Current Medications:    acetaminophen  650 mg Oral or Feeding Tube Q8H     aspirin  81 mg Oral or NG Tube Daily     [Held by provider] atorvastatin  40 mg Oral Daily     heparin ANTICOAGULANT  5,000 Units Subcutaneous Q8H     mirtazapine  15 mg Oral or Feeding Tube At Bedtime     multivitamins w/minerals  15 mL Per Feeding Tube Daily     mupirocin  0.5 g Both Nostrils BID     pantoprazole  40 mg Oral or Feeding Tube Daily     piperacillin-tazobactam  4.5 g Intravenous Q6H     polyethylene glycol  17 g Oral Daily     protein modular  1 packet Per Feeding Tube Daily     senna-docusate  1 tablet Oral BID     sodium  chloride (PF)  3 mL Intracatheter Q8H     PRN Medications:  acetaminophen, bisacodyl, calcium gluconate, calcium gluconate, calcium gluconate, dextrose, dextrose, glucose **OR** dextrose **OR** glucagon, fentaNYL, hydrALAZINE, HYDROmorphone **OR** HYDROmorphone, lidocaine 4%, lidocaine (buffered or not buffered), magnesium hydroxide, methocarbamol, naloxone **OR** naloxone **OR** naloxone **OR** naloxone, ondansetron **OR** ondansetron, oxyCODONE **OR** oxyCODONE, prochlorperazine **OR** prochlorperazine, BETA BLOCKER NOT PRESCRIBED, sodium chloride (PF)    Infusions:    dexmedetomidine 0.7 mcg/kg/hr (02/13/23 0700)     dextrose       dextrose       insulin regular 0.5 Units/hr (02/13/23 0700)     norepinephrine Stopped (02/12/23 2010)     propofol Stopped (02/13/23 0315)     BETA BLOCKER NOT PRESCRIBED       vasopressin Stopped (02/12/23 1105)     No Known Allergies    Physical Examination:  Vitals: BP (!) 165/70   Pulse 69   Temp 98.2  F (36.8  C)   Resp 15   Ht 1.524 m (5')   Wt 67.8 kg (149 lb 7.6 oz)   SpO2 97%   BMI 29.19 kg/m    General: Adult female patient, lying in bed, critically-ill,  HEENT: Normocephalic, atraumatic, no icterus, oral cavity/oropharynx pink and moist  Cardiac: Appears adequately perfused.   Pulm: Synchronous with ventilator.   Abdomen: Non-distended.  Extremities: Warm, generalized edema. Distal extremities appear adequately perfused.  Skin: No obvious rash or lesion on exposed skin.  Psych: Calm and cooperative.  Neuro:  Mental status: Awake, alert, attentive. Following commands with BUE. Exam is limited by ETT and language barrier.   Cranial nerves: Face appears symmetric with exam limited by ETT.   Motor: Normal bulk and tone. No abnormal movements. No movement of bilateral lower extremities. BLE reflexes: left 1+, right 2+.   Sensory: No movement to pain in BLE.  Coordination: Deferred.   Gait: Deferred.    Labs and Imaging: All relevant imaging and laboratory values  personally reviewed.

## 2023-02-13 NOTE — PROGRESS NOTES
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 Briceno on 2/8/23.    24-hour events:  - Agitated and restless overnight, started Precedex 0.7, not withdrawing from b/l LEs during sedation but does withdraw occasionally during sedation holidays  - Insulin 0.5, good UOP, rectal tube in place    Today's Changes:  - Holding off on MRI T&L as only diagnostic, won't , would require removing [place], and prefer not to transport patient in her current condition  - Restart Med sliding scale insulin, discontinue insulin gtt  - Transition from Zosyn to ceftriaxone  - Precedex gtt for agitation, wean for neuro exams  - Wean vent as able, PST today    Neuro/ pain/ sedation:  #Depression  #Acute postoperative pain  #Sedation  - Monitor neuro status. Notify MD for acute changes  - Pain: Schedule  Tylenol.  PRN: Tylenol, oxycodone, Dilaudid  - Lumbar drain: keep ICP <10. Keep drain another 1-2 days   - PTA mirtazapine  #Weakness, c/f spinal ischemia s/p thoracoabdominal aneurysm repair  Examined patient with vascular team on 2/10, unable to move b/l LE, no sensation, dusky discoloration in the hands R>L, had multiphasic Dopplers in right and left ulnar artery and brachial arch. On 2/11 able to /follow commands in b/l UE and started withdrawing in LE. Intermittently withdrawing on 2/12-2/13, harder  when on sedation.   - Per vascular and neuro, goals MAP >90, SBP <160  - Weaned off Angiotensin 2 and all pressors since 2/12, s/p 3-day course of hydrocortisone 50 mg q6h  - Vascular is managing lumbar drain, goal ICP <10, will have at least 5 days.   - Neuro consulted, rec MRI T/L w/o contrast. Holding off on MRI T&L as only diagnostic, won't , would require removing [place], and prefer not to transport patient in her current condition  - Hgb goal >10 per protocol  - Restraint Mitts (pulling at ETT)  - Precedex gtt if agitation, wean for neuro exams  #Hx stroke (2019)  - Chronic dysarthria and dysphagia, s/p warfarin transitioned to Eliquis in 2021, unclear if taking  - Question of residual LE weakness PTA    Pulmonary care:   #Mechanical ventilation  #Pneumonia, hospital acquired   Vent Mode: (S) CPAP/PS  (Continuous positive airway pressure with Pressure Support)  FiO2 (%): 50 %  Resp Rate (Set): 14 breaths/min  Tidal Volume (Set, mL): 350 mL  PEEP (cm H2O): (S) 8 cmH2O  Pressure Support (cm H2O): 8 cmH2O  Resp: 15  - FiO2 50%  - Goal SpO2 > 92%  - Encourage IS q15-30 minutes when awake.  - PST today and trial extubation  - S/p bronch 2/11, small old blood LLL, small mucus b/l suctioned  - Resp Cx positive for Enterobacter cloacae. Zosyn started 2/12, culture susceptibilities so transition to ceftriaxone 2/13. If decompensates, broaden Zosyn to meropenem    Cardiovascular:    #Cardiogenic shock  #HTN  #Ascending aortic aneurysm s/p ascending repair with aortic root reconstruction  #Bioprosthetic aortic valve in 2018  Pre CPBP: LV/RV normal, 55-60%. Mild MR w/ central jet from A2/P2. Mild TR.  Post CPBP: LV normal, 55-60%. RV mildly reduced. Mild AS, trace AI. Mild MR. Mild TR.  - Weaned off AT2, vaso, NE  - Monitor for goal MAP >90, SBP <160 through 2/15  - PRN Hydralazine for SBP >160  - Hold PTA metoprolol succinate 50 mg, lisinopril 40 mg, atorvastatin 40 mg  - ASA 81 mg    GI care / Nutrition:    - TF at goal of 30cc/hr. If lactate increases, stop TF  - PPI  - Bowel regimen: MiraLAX, senna, hold with diarrhea  - Rectal tube    Renal / Fluids / Electrolytes:   #Hypokalememia   BL creat appears to be ~ 0.9-1.0  - Strict I/O, daily weights, avoid/limit nephrotoxins  - Replete lytes PRN per protocol  - Redraw    Endocrine:    #Stress hyperglycemia  Preop A1c 4.7  - Discontinued Med SSI, change to insulin gtt. Switch back to Med SSI  - Goal BG <180 for optimal healing  - S/p 3-day hydrocortisone 50 mg q6h for BP goal    ID / Antibiotics:  #Stress induced leukocytosis  #Enterobacter cloacae (2/10- )  - Monitor fever curve, WBC, and inflammatory markers as appropriate  - Resp Cx positive for Enterobacter cloacae, Zosyn 2/11-2/18, susceptible. If decompensates, broaden Zosyn to meropenem   - WBC stable at 8.1    Heme:     #Acute blood loss anemia  #Acute blood loss thrombocytopenia  No s/sx active bleeding  - CBC daily, Hgb goal >10, transfuse if <10   - Hgb stable at 11.3  - Neurocrit: when pull drain, want platelets >100 otherwise transfuse  - Hgb check in PM   - SQH    MSK / Skin:  #Sternotomy  #Surgical Incision  - Sternal precautions, postop incision management protocol  - PT/OT/CR    Prophylaxis:     - Mechanical DVT ppx  - Chemical DVT ppx: SQH  - PPI    Lines / Tubes / Drains:  - ETT  - RIJ CVC, PA catheter  - Arterial Lines - left axillary  - CTs x3  - Black  - [place]    Disposition:  - CVICU    Patient seen, findings and plan discussed with CVICU staff and CT surgeons and fellow.    Valdo Patel MD  Anesthesiology Resident, CA-1, PGY-2  Ascom *[id number]     ====================================    TODAY'S PROGRESS  SUBJECTIVE  Agitated and restless overnight, started Precedex 0.7    OBJECTIVE  1. VITAL SIGNS  Temp:  [97.7  F (36.5  C)-99  F (37.2  C)] 98.4  F (36.9  C)  Pulse:  [] 87  Resp:  [15-22] 15  BP: (121-169)/(53-70) 121/53  MAP:  [70 mmHg-146 mmHg] 95 mmHg  Arterial Line BP: (101-186)/()  152/60  FiO2 (%):  [50 %] 50 %  SpO2:  [92 %-99 %] 96 %  Vent Mode: (S) CPAP/PS  (Continuous positive airway pressure with Pressure Support)  FiO2 (%): 50 %  Resp Rate (Set): 14 breaths/min  Tidal Volume (Set, mL): 350 mL  PEEP (cm H2O): (S) 8 cmH2O  Pressure Support (cm H2O): 8 cmH2O  Resp: 15      2. INTAKE/ OUTPUT  I/O last 3 completed shifts:  In: 3020.09 [I.V.:1360.09; NG/GT:440]  Out: 2545 [Urine:2255; Drains:150; Chest Tube:140]    3. PHYSICAL EXAMINATION  General: sedated  Neuro: sedated. Follows commands in UE, intermittently withdraws from pain in LE  Resp: intubated, ventilated  CV: S1, S2, RRR, no m/r/g   Abdomen: Soft, non-distended, non-tender  Incisions: CDI  Extremities: warm and well perfused  CT: To suction, serosang output and clots, no airleak, crepitus    4. INVESTIGATIONS  Arterial Blood Gases   Recent Labs   Lab 02/13/23  0341 02/12/23  1955 02/12/23  1130 02/12/23  0346   PH 7.49* 7.49* 7.47* 7.47*   PCO2 38 37 39 37   PO2 114* 94 72* 72*   HCO3 28 28 28 27     Complete Blood Count   Recent Labs   Lab 02/13/23  0341 02/12/23  2247 02/12/23  1604 02/12/23  0347 02/11/23  0332 02/10/23  1600   WBC 8.1  --   --  10.3 10.1 12.6*   HGB 11.3* 11.2* 8.9* 9.8* 11.1* 10.5*   *  --   --  92* 81* 82*     Basic Metabolic Panel  Recent Labs   Lab 02/13/23  1145 02/13/23  1136 02/13/23  1007 02/13/23  0855 02/13/23  0801 02/13/23  0603 02/13/23  0341 02/12/23  1608 02/12/23  1603 02/12/23  0350 02/12/23  0347 02/11/23  0351 02/11/23  0332 02/10/23  0409 02/10/23  0357   NA  --   --   --   --   --   --  146*  --   --   --  143  --  140  --  142   POTASSIUM  --  3.4  --  4.9  --   --  3.4  --  3.5  --  3.0*  --  4.4  --  4.5   CHLORIDE  --   --   --   --   --   --  111*  --   --   --  108*  --  109*  --  110*   CO2  --   --   --   --   --   --  23  --   --   --  22  --  20*  --  19*   BUN  --   --   --   --   --   --  24.4*  --   --   --  29.2*  --  33.6*  --  23.7*   CR  --   --   --   --   --   --   0.69  --   --   --  0.81  --  1.19*  --  1.24*   *  --  150*  --  149* 153* 178*   < >  --    < > 155*   < > 188*   < > 157*    < > = values in this interval not displayed.     Liver Function Tests  Recent Labs   Lab 02/13/23  0341 02/12/23  0347 02/11/23  0332 02/10/23  0357 02/09/23  0301 02/08/23  2328 02/08/23  1826 02/08/23  1640 02/08/23  1616   * 248* 247* 136* 100*   < >  --   --   --    ALT 68* 59* 59* 51* 43*   < > 30  --   --    ALKPHOS 161* 151* 159* 65 51   < > 53  --   --    BILITOTAL 0.5 0.6 0.8 0.5 0.9   < > 1.5*  --   --    ALBUMIN 2.4* 2.3* 2.4* 2.2* 2.4*   < > 1.9*  --   --    INR  --   --   --   --  1.23*  --  1.49* 1.32* 1.87*    < > = values in this interval not displayed.     Pancreatic Enzymes  No lab results found in last 7 days.  Coagulation Profile  Recent Labs   Lab 02/09/23  0301 02/08/23  1826 02/08/23  1640 02/08/23  1616   INR 1.23* 1.49* 1.32* 1.87*   PTT 37 72* 44* 38     Lactate  Invalid input(s): LACTATE    5. RADIOLOGY  Recent Results (from the past 24 hour(s))   XR Chest Port 1 View    Narrative    EXAM: XR CHEST PORT 1 VIEW  2/8/2023 7:12 PM      HISTORY: Post Op CVTS Surgery    COMPARISON: CT chest/abdomen/pelvis 2/5/2023    FINDINGS: Single view of the chest. Post surgical change in the chest  with median sternotomy wires and several discontinuous sternotomy  fixation wires. Additional wires projecting over the left upper  quadrant. Endotracheal tube tip is approximately 4 cm from the darling.  Prosthetic aortic valve. Right IJ central venous catheter tip is in  the proximal right pulmonary artery. Two left apical chest tubes and  the left basilar chest tube. Enteric tube tip and sidehole projected  over the stomach. Trachea is midline. Cardiac silhouette is enlarged  and somewhat obscured by diffuse mixed interstitial and airspace  opacities in left upper lobe. The left lung base is relatively clear.  No pleural effusion. Small left apical pneumothorax. No  right-sided  pleural effusion or pneumothorax. Several acute left lateral rib  fractures. Subcutaneous emphysema in the left axilla. The upper  abdomen is unremarkable.      Impression    IMPRESSION:   1. Post surgical changes of left thoracotomy and thoracoabdominal  aortic repair.  2. Endotracheal tube tip is approximately 4 cm from the darling. Right  IJ [place] catheter tip is in the distal right pulmonary artery.  3. Small right apical pneumothorax.   4. Mixed interstitial airspace opacities throughout the left upper  lung field, atelectasis and/or edema.    I have personally reviewed the examination and initial interpretation  and I agree with the findings.    PRATIK RESTREPO MD         SYSTEM ID:  [id number]   XR Abdomen Port 1 View    Narrative    EXAM: XR ABDOMEN PORT 1 VIEW  2/8/2023 7:15 PM     HISTORY:  OG       TECHNIQUE: Single frontal radiograph of the abdomen    COMPARISON:  CT chest, abdomen and pelvis 2/5/2023.    FINDINGS:   Portable AP supine radiograph the abdomen. Gastric tube tip and  sidehole project over the expected location of the stomach. Left  basilar chest tube. Contrast visualized within several nondistended  loops of bowel. Nonobstructive bowel gas pattern. No pneumatosis,  portal venous gas. Severe degenerative changes of the lower lumbar  spine. Please refer to same-day chest radiograph for further details  regarding the visualized lung bases.      Impression    IMPRESSION: Gastric tube tip and sidehole project over the expected  location of the stomach. Nonobstructive bowel gas pattern.     I have personally reviewed the examination and initial interpretation  and I agree with the findings.    PRATIK RESTREPO MD         SYSTEM ID:  [id number]   XR Chest Port 1 View    Impression    RESIDENT PRELIMINARY INTERPRETATION  Impression:   1. Support devices in stable position as noted above.  2. Increased patchy and consolidative opacities throughout the left  lung.  3. Likely small  bilateral pleural effusions.

## 2023-02-13 NOTE — PROGRESS NOTES
Vascular Surgery Progress Note    Ventilator wean to CPAP this morning. MAPs 70-90's and 's. Weaned off pressors. Intubated and sedated.   RN and CVICU teams report she consistently moves her upper extremities however does not withdraws her toes to pain. Spinal drain has been drained 9 times over the last 12 hours.     BP (!) 169/70   Pulse 86   Temp 97.8  F (36.6  C) (Axillary)   Resp 15   Ht 1.524 m (5')   Wt 67.8 kg (149 lb 7.6 oz)   SpO2 96%   BMI 29.19 kg/m        Appropriate urine output.  110 mL total chest tube output in the last 24.    I/O last 3 completed shifts:  In: 3020.09 [I.V.:1360.09; NG/GT:440]  Out: 2545 [Urine:2255; Drains:150; Chest Tube:140]    Intubated, sedated, does not follow commands, no withdrawal to pain in any extremity.  Hands warm, palpable ulnar pulses  Left thoracoabdominal incision and left groin incision clean dry and intact with skin glue  Feet warm, monophasic Doppler symptoms in the bilateral DP and PT arteries    K4.9  Creatinine 0.69  Mg 2.6  Phos 2.8  Lactate 1.5    WBC 8.1  Hgb 11.3    Sputum culture from 2/10/2023 growing Enterobacter cloacae    A/P: 73 y.o. 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      - continue spinal cord protective measures, MAP > 90, CSF pressure < 10, Hgb > 10  - spinal drain for CSF pressure < 10 mmHg as above  - appreciate neuro critcare, T&L spine MRI when able  - remaining care per CVTS/CVICU, repleting Phos and Mg, tube feeds. Weaning vent. Tube feeds at goal.     D/w Dr. Aldair Urrutia, CNP  Vascular Surgery  Pager: [phone]  [email or web address]  Send message or 10 digit call back number Securely via [employer] with the Vocera Web Console (learn more here)'

## 2023-02-13 NOTE — CONSULTS
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 Briceno on 2/8/23.    Areas Assessed:      Areas visualized during today's visit: Posterior surfaces     Pressure Injury Location: mid back    Last photo: 2/13  Wound type: Pressure Injury     Pressure Injury Stage: 1, hospital acquired      This is a Medical Device Related Pressure Injury (MDRPI) due to bunched linens  Wound history/plan of care:   Found by bedside RN 2/12    Wound base: 100 % non-blanchable, erythema and maroon     Palpation of the wound bed: normal      Drainage: none     Description of drainage: none     Measurements (length x width x depth, in cm) 4  x 10  x  0 cm      Tunneling N/A     Undermining N/A  Periwound skin: Intact      Color: normal and consistent with surrounding tissue      Temperature: normal   Odor: none  Pain: no grimacing or signs of discomfort, none  Pain intervention prior to dressing change: N/A  Treatment goal: Simplify plan of care  STATUS: initial assessment  Supplies ordered: at bedside    Treatment Plan:     Mid back wound(s): Daily  cleanse with saline and pat dry. May leave open to air. Ensure linens without wrinkles and no devices under patient.      Orders: Written    RECOMMEND PRIMARY TEAM ORDER: None, at this time  Education provided: importance of repositioning and plan of care  Discussed plan of care with: Nurse  WOC nurse follow-up plan: weekly  Notify WOC if wound(s) deteriorate.  Nursing to notify the Provider(s) and re-consult  the [facility] Nurse if new skin concern.    DATA:     Current support surface: Standard  Low air loss (KARLA pump, Isolibrium, Pulsate, skin guard, etc)  Containment of urine/stool: Incontinent pad in bed, Indwelling catheter and Internal fecal management  BMI: Body mass index is 29.19 kg/m .   Active diet order: None     Output: I/O last 3 completed shifts:  In: 3020.09 [I.V.:1360.09; NG/GT:440]  Out: 2545 [Urine:2255; Drains:150; Chest Tube:140]     Labs: Recent Labs   Lab 02/13/23  0341 02/10/23  1600 02/10/23  0357 02/09/23  0937 02/09/23  0301   ALBUMIN 2.4*   < > 2.2*  --  2.4*   HGB 11.3*   < > 8.2*   < > 8.0*   INR  --   --   --   --  1.23*   WBC 8.1   < > 14.0*   < > 8.8   A1C  --   --  5.5  --   --     < > = values in this interval not displayed.     Pressure injury risk assessment:   Sensory Perception: 2-->very limited  Moisture: 3-->occasionally moist  Activity: 1-->bedfast  Mobility: 2-->very limited  Nutrition: 3-->adequate  Friction and Shear: 2-->potential problem  Rasheed Score: 13    Ana Maria Daniels RN CWOCN   Pager no longer is use, please contact through [employer]   Anila group: [facility] Nurse  Dept. Office Number: [phone]

## 2023-02-13 NOTE — PROGRESS NOTES
Care Management Follow Up    Length of Stay (days): 9    Expected Discharge Date:       Concerns to be Addressed:       Patient plan of care discussed at interdisciplinary rounds: Yes    Anticipated Discharge Disposition:       Anticipated Discharge Services:    Anticipated Discharge DME:      Patient/family educated on Medicare website which has current facility and service quality ratings:    Education Provided on the Discharge Plan:    Patient/Family in Agreement with the Plan:      Referrals Placed by CM/SW:    Private pay costs discussed: Not applicable    Additional Information:  JASON received phone call from bedside RNAlvina asking SW to meet with pt family at bedside (daughter and granddaughter). Alvina explained that they were looking for some help with getting pt family to the [place] from [facility] to visit pt while sick.     SW met with pt, pt daughter and pt granddaughter at bedside to follow up on request. Pt is currently intubated so was not able to be a part of the conversation. Pt's daughter speaks Hmong, but granddaughter speaks both Hmong and English and interpreted during meeting. Granddaughter (Kelly) confirmed they were looking for assistance with expediting pt's children who live in [place] to come over and visit pt while in hospital. SW went over Expedited Visa template letter with [place] and pt daughter (Quintin) and what information would be needed for each family member that wanted to come visit. Kelly reported that she would work on it. Quintin also explained that she is pt's PCA when pt is out of hospital and provides pt with care. Quintin noted that she is unable to get paid while pt is in hospital and noted that pt wants Quintin to be present at the hospital most days. JASON acknowledged this and asked if pt was on waiver through [facility] or health insurance. JASON noted that typically PCAs cannot be paid while individuals are in the hospital d/t an issue with two entities double billing and that JASON was unaware of any  other way around that. JASON encouraged Quintin to reach out to pt's  or PCA agency to see if there was another way around this. Quintin also asked if there was any help with paying for parking as she was coming most days and is currently not being paid through work. JASON confirmed and will get Quintin a parking pass.     ADDENDUM 1630: JASON met with Kelly and Quintin at pt bedside again and provided them with monthly parking pass (pass#: [id number]) and explained how to use it. Kelly also reported that Quintin was planning to f/u with her family members re: the expedited visa letters later this evening when they were up and would get the information to JASON tomorrow.     Virginia Silveira, [facility]   [facility]

## 2023-02-14 NOTE — PROGRESS NOTES
Vascular Surgery Progress Note    MAPs 70-90's and 's. Weaned off pressors. Awake, moving upper extremities, strong  in right and left hands. Spinal drain has been drained 10 times over the last 11 hours.     /53   Pulse 120   Temp 99.9  F (37.7  C)   Resp 15   Ht 1.524 m (5')   Wt 67.8 kg (149 lb 7.6 oz)   SpO2 95%   BMI 29.19 kg/m      I/O last 3 completed shifts:  In: 1781.83 [I.V.:481.83; NG/GT:580]  Out: 2750 [Urine:1515; Drains:170; Stool:950; Chest Tube:115]    Intubated, awake, moving upper extremities spontaneously, strong , no withdrawal to pain in lower extremities.  Hands warm, palpable ulnar pulses. Doppler signals DPs and PTs  Left thoracoabdominal incision and left groin incision clean dry and intact with skin glue  Feet warm, monophasic Doppler symptoms in the bilateral DP and PT arteries    K 3.2  Creatinine 0.67  Mg 2.5  Phos 1.5  Lactate 1.4    WBC 10.8  Hgb 11.2    Sputum culture from 2/10/2023 growing Enterobacter cloacae    A/P: 73 y.o. 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      - continue spinal cord protective measures, MAP > 90, Hgb > 10, ICP < 10 with continuation of lumbar drain.  - will defer to neuro crit regarding duration of the above spinal protective measures. Neuro crit plans to continue through 2/15/23.  - T&L spine MRI when able  - remaining care per CVTS/CVICU, repleting Phos and Mg, tube feeds. Weaning vent. Tube feeds at goal.     D/w Dr. Yordy Urrutia, CNP  Vascular Surgery  Pager: [phone]  [email or web address]  Send message or 10 digit call back number Securely via [employer] with the Vocera Web Console (learn more here)'

## 2023-02-14 NOTE — PROGRESS NOTES
Care Management Follow Up    Length of Stay (days): 10    Expected Discharge Date:       Concerns to be Addressed:       Patient plan of care discussed at interdisciplinary rounds: Yes    Anticipated Discharge Disposition:       Anticipated Discharge Services:    Anticipated Discharge DME:      Patient/family educated on Medicare website which has current facility and service quality ratings:    Education Provided on the Discharge Plan:    Patient/Family in Agreement with the Plan:      Referrals Placed by CM/SW:    Private pay costs discussed: Not applicable    Additional Information:  JASON met with grandtr at bedside and she provided SW with the information for 5 family members in [place] who are requesting Expedited Visa Letters. SW drafted letters, paged CVTS for signatures, and updated tomorrow's SW with follow up.    Once provider signs all 5 letters, SW can contact pt's granddaughter Kelly [phone] and she can come get them to deliver to family.        Sonja Kim [facility]   4A and 4E Coverage  Ph: [phone]

## 2023-02-14 NOTE — PROGRESS NOTES
Vent Mode: CMV/AC  (Continuous Mandatory Ventilation/ Assist Control)  FiO2 (%): 35 %  Resp Rate (Set): 14 breaths/min  Tidal Volume (Set, mL): 350 mL  PEEP (cm H2O): 8 cmH2O  Pressure Support (cm H2O): 10 cmH2O  Resp: 19    Weaning 2/14    CPAP/PS 6544 - 0289. Small break from wean for Bronchoscopy.     CPT via [employer]. started today per MD order.    RT will follow.

## 2023-02-14 NOTE — PROGRESS NOTES
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 Briceno on 2/8/23.    24-hour events:  - Precedex weaned to 0.2  - Patient able to follow commands in b/l LE yesterday afternoon  - WOC nurse saw patient  - Switched Zosyn to ceftriaxone  - Med sliding scale insulin  - [place] removed  - Hydralazine 10 mg x2 for SBP >160    Today's Changes:  - CT Chest w/o contrast for worsening left hemithorax opacities, consider chest physio  - Pa/Fi ratio improved to 385 from 144 yesterday  - Attending-level discussion with neurocrit and vascular about who is managing Lumbar drain  - BMP recheck in PM        Neuro/ pain/ sedation:  #Depression  #Acute postoperative pain  #Sedation  - Monitor neuro status. Notify MD for acute changes  - Pain: Schedule  Tylenol.  PRN: Tylenol, oxycodone, Dilaudid  - PTA mirtazapine  #Weakness, c/f spinal ischemia s/p thoracoabdominal aneurysm repair  On 2/10 unable to move b/l LE, no sensation, dusky discoloration hands R>L, multiphasic Dopplers in right and left ulnar artery and brachial arch. On 2/11 able to /follow commands in b/l UE and started withdrawing in LE. Intermittently withdrawing on 2/12-2/13, harder when on sedation. Followed commands (move toes) in LE 2/13 afternoon for first time in 4 days. Continues withdrawing from pain b/l LE  - Per vascular and neurocrit, goals  MAP >90, SBP <160 through 2/15  - Weaned off Angiotensin 2 and all pressors since 2/12, s/p 3-days hydrocortisone 50 mg q6h  - Neurocrit is managing lumbar drain, goal ICP <10, through 2/15 but having attending-level discussion today with vascular and neurocrit to clarify who is managing drain  - Neuro consulted, rec MRI T/L w/o contrast. Can consider MRI T&L soon as becoming more stable  - Hgb goal >10 per protocol  - Restraint Mitts (pulling at ETT)  - Precedex gtt if agitation, wean for neuro exams  #Hx stroke (2019)  - Chronic dysarthria and dysphagia, s/p warfarin transitioned to Eliquis in 2021, unclear if taking  - Question of residual LE weakness PTA    Pulmonary care:   #Mechanical ventilation  #Pneumonia, hospital acquired   Vent Mode: CMV/AC  (Continuous Mandatory Ventilation/ Assist Control)  FiO2 (%): (S) 35 %  Resp Rate (Set): 14 breaths/min  Tidal Volume (Set, mL): 350 mL  PEEP (cm H2O): 8 cmH2O  Pressure Support (cm H2O): 8 cmH2O  Resp: 21  - Pa/Fi ratio improved to 385 from 144 yesterday  - Goal SpO2 > 92%  - Encourage IS q15-30 minutes when awake.  - PST today  - S/p bronch 2/11, small old blood LLL, small mucus b/l suctioned  - Resp Cx positive for Enterobacter cloacae. Zosyn 2/12-2/13, susceptibilities returned and switched ceftriaxone 2/13. If decompensates, broaden Zosyn to meropenem  - Worsening left opacities, considering hemothorax, atelectasis, edema, or worsening pneumonia so obtaining CT Chest w/o contrast to guide further management  - Consider chest physio    Cardiovascular:    #Cardiogenic shock  #HTN  #Ascending aortic aneurysm s/p ascending repair with aortic root reconstruction  #Bioprosthetic aortic valve in 2018  Pre CPBP: LV/RV normal, 55-60%. Mild MR w/ central jet from A2/P2. Mild TR.  Post CPBP: LV normal, 55-60%. RV mildly reduced. Mild AS, trace AI. Mild MR. Mild TR.  - Weaned off AT2, vaso, NE  - Monitor for goal MAP >90, SBP <160 through 2/15  - PRN Hydralazine for SBP  >160  - Hold PTA metoprolol succinate 50 mg, lisinopril 40 mg, atorvastatin 40 mg  - ASA 81 mg    GI care / Nutrition:   - TF at goal of 30cc/hr  - PPI  - Bowel regimen: MiraLAX, senna, hold with diarrhea  - Rectal tube    Renal / Fluids / Electrolytes:   #Hypokalememia   BL creat appears to be ~ 0.9-1.0  - Strict I/O, daily weights, avoid/limit nephrotoxins  - Replete lytes PRN per protocol  - Recheck BMP in PM    Endocrine:    #Stress hyperglycemia  Preop A1c 4.7  - Med SSI  - Goal BG <180 for optimal healing    ID / Antibiotics:  #Stress induced leukocytosis  #Enterobacter cloacae (2/10- )  - Monitor fever curve, WBC, and inflammatory markers as appropriate  - Resp Cx positive for Enterobacter cloacae. Zosyn 2/12-2/13, susceptibilities returned and switched ceftriaxone 2/13. CT chest w/o today  - If decompensates, broaden Zosyn to meropenem  - WBC 10.8    Heme:     #Acute blood loss anemia  #Acute blood loss thrombocytopenia  No s/sx active bleeding  - CBC daily, Hgb goal >10, transfuse if <10   - Hgb stable at 11.2  - Neurocrit: when pull drain, want platelets >100 otherwise transfuse  - SQH    MSK / Skin:  #Sternotomy  #Surgical Incision  - Sternal precautions, postop incision management protocol  - PT/OT/CR    Prophylaxis:     - Mechanical DVT ppx  - Chemical DVT ppx: SQH  - PPI    Lines / Tubes / Drains:  - ETT  - RIJ CVC  - Arterial Lines - left axillary  - CTs x3  - Black  - NJ    Disposition:  - CVICU    Patient seen, findings and plan discussed with CVICU staff and CT surgeons and fellow.    Valdo Patel MD  Anesthesiology Resident, CA-1, PGY-2  Ascom *[id number]     ====================================    TODAY'S PROGRESS  SUBJECTIVE  Intubated and sedated, Precedex weaned to 0.2. Hydralazing 10 mg x2 for SBP >160.    OBJECTIVE  1. VITAL SIGNS  Temp:  [97.9  F (36.6  C)-100.6  F (38.1  C)] 100.6  F (38.1  C)  Pulse:  [] 113  Resp:  [14-21] 21  BP: (180)/(85) 180/85  MAP:  [79 mmHg-124 mmHg] 118  mmHg  Arterial Line BP: (119-170)/(54-88) 169/83  FiO2 (%):  [35 %-50 %] 35 %  SpO2:  [94 %-96 %] 96 %  Vent Mode: CMV/AC  (Continuous Mandatory Ventilation/ Assist Control)  FiO2 (%): (S) 35 %  Resp Rate (Set): 14 breaths/min  Tidal Volume (Set, mL): 350 mL  PEEP (cm H2O): 8 cmH2O  Pressure Support (cm H2O): 8 cmH2O  Resp: 21      2. INTAKE/ OUTPUT  I/O last 3 completed shifts:  In: 1781.83 [I.V.:481.83; NG/GT:580]  Out: 2750 [Urine:1515; Drains:170; Stool:950; Chest Tube:115]    3. PHYSICAL EXAMINATION  General: sedated  Neuro: sedated. Follows commands in UE, withdraws from pain in LE  Resp: intubated, ventilated  CV: S1, S2, RRR, no m/r/g   Abdomen: Soft, non-distended, non-tender  Incisions: CDI  Extremities: warm and well perfused  CT: To suction, serosang output and clots, no airleak, crepitus    4. INVESTIGATIONS  Arterial Blood Gases   Recent Labs   Lab 02/14/23  0316 02/13/23  0341 02/12/23  1955 02/12/23  1130   PH 7.51* 7.49* 7.49* 7.47*   PCO2 38 38 37 39   PO2 154* 114* 94 72*   HCO3 30* 28 28 28     Complete Blood Count   Recent Labs   Lab 02/14/23  0324 02/13/23  0341 02/12/23  2247 02/12/23  1604 02/12/23  0347 02/11/23  0332   WBC 10.8 8.1  --   --  10.3 10.1   HGB 11.2* 11.3* 11.2* 8.9* 9.8* 11.1*    112*  --   --  92* 81*     Basic Metabolic Panel  Recent Labs   Lab 02/14/23  0824 02/14/23  0818 02/14/23  0324 02/14/23  0239 02/13/23  2303 02/13/23  2022 02/13/23  1811 02/13/23  1145 02/13/23  1136 02/13/23  0603 02/13/23 0341 02/12/23 0350 02/12/23 0347 02/11/23 0351 02/11/23  0332   NA  --   --  148*  --   --   --   --   --   --   --  146*  --  143  --  140   POTASSIUM  --  3.8 3.2*  --   --   --  3.8  --  3.4   < > 3.4   < > 3.0*  --  4.4   CHLORIDE  --   --  113*  --   --   --   --   --   --   --  111*  --  108*  --  109*   CO2  --   --  25  --   --   --   --   --   --   --  23  --  22  --  20*   BUN  --   --  25.5*  --   --   --   --   --   --   --  24.4*  --  29.2*  --  33.6*    CR  --   --  0.67  --   --   --   --   --   --   --  0.69  --  0.81  --  1.19*   *  --  135* 114* 120*   < >  --    < >  --    < > 178*   < > 155*   < > 188*    < > = values in this interval not displayed.     Liver Function Tests  Recent Labs   Lab 02/14/23  0324 02/13/23  0341 02/12/23  0347 02/11/23  0332 02/10/23  0357 02/09/23  0301 02/08/23  2328 02/08/23  1826 02/08/23  1640 02/08/23  1616   * 202* 248* 247*   < > 100*   < >  --   --   --    ALT 71* 68* 59* 59*   < > 43*   < > 30  --   --    ALKPHOS 170* 161* 151* 159*   < > 51   < > 53  --   --    BILITOTAL 0.4 0.5 0.6 0.8   < > 0.9   < > 1.5*  --   --    ALBUMIN 2.4* 2.4* 2.3* 2.4*   < > 2.4*   < > 1.9*  --   --    INR  --   --   --   --   --  1.23*  --  1.49* 1.32* 1.87*    < > = values in this interval not displayed.     Pancreatic Enzymes  No lab results found in last 7 days.  Coagulation Profile  Recent Labs   Lab 02/09/23  0301 02/08/23  1826 02/08/23  1640 02/08/23  1616   INR 1.23* 1.49* 1.32* 1.87*   PTT 37 72* 44* 38     Lactate  Invalid input(s): LACTATE    5. RADIOLOGY  Recent Results (from the past 24 hour(s))   XR Chest Port 1 View    Narrative    EXAM: XR CHEST PORT 1 VIEW  2/8/2023 7:12 PM      HISTORY: Post Op CVTS Surgery    COMPARISON: CT chest/abdomen/pelvis 2/5/2023    FINDINGS: Single view of the chest. Post surgical change in the chest  with median sternotomy wires and several discontinuous sternotomy  fixation wires. Additional wires projecting over the left upper  quadrant. Endotracheal tube tip is approximately 4 cm from the darling.  Prosthetic aortic valve. Right IJ central venous catheter tip is in  the proximal right pulmonary artery. Two left apical chest tubes and  the left basilar chest tube. Enteric tube tip and sidehole projected  over the stomach. Trachea is midline. Cardiac silhouette is enlarged  and somewhat obscured by diffuse mixed interstitial and airspace  opacities in left upper lobe. The left lung  base is relatively clear.  No pleural effusion. Small left apical pneumothorax. No right-sided  pleural effusion or pneumothorax. Several acute left lateral rib  fractures. Subcutaneous emphysema in the left axilla. The upper  abdomen is unremarkable.      Impression    IMPRESSION:   1. Post surgical changes of left thoracotomy and thoracoabdominal  aortic repair.  2. Endotracheal tube tip is approximately 4 cm from the darling. Right  IJ Bostic-Clarisa catheter tip is in the distal right pulmonary artery.  3. Small right apical pneumothorax.   4. Mixed interstitial airspace opacities throughout the left upper  lung field, atelectasis and/or edema.    I have personally reviewed the examination and initial interpretation  and I agree with the findings.    PRATIK RESTREPO MD         SYSTEM ID:  [id number]   XR Abdomen Port 1 View    Narrative    EXAM: XR ABDOMEN PORT 1 VIEW  2/8/2023 7:15 PM     HISTORY:  OG       TECHNIQUE: Single frontal radiograph of the abdomen    COMPARISON:  CT chest, abdomen and pelvis 2/5/2023.    FINDINGS:   Portable AP supine radiograph the abdomen. Gastric tube tip and  sidehole project over the expected location of the stomach. Left  basilar chest tube. Contrast visualized within several nondistended  loops of bowel. Nonobstructive bowel gas pattern. No pneumatosis,  portal venous gas. Severe degenerative changes of the lower lumbar  spine. Please refer to same-day chest radiograph for further details  regarding the visualized lung bases.      Impression    IMPRESSION: Gastric tube tip and sidehole project over the expected  location of the stomach. Nonobstructive bowel gas pattern.     I have personally reviewed the examination and initial interpretation  and I agree with the findings.    PRATIK RESTREPO MD         SYSTEM ID:  [id number]   XR Chest Port 1 View    Impression    RESIDENT PRELIMINARY INTERPRETATION  Impression:   1. Support devices in stable position as noted above.  2.  Increased patchy and consolidative opacities throughout the left  lung.  3. Likely small bilateral pleural effusions.

## 2023-02-14 NOTE — PROGRESS NOTES
"Bronchoscopy Risk Assessment Guidelines      A. Patient symptoms to consider when assessing pulmonary TB risk are:    I. Cough greater than 3 weeks; and fever, hemoptysis, pleuritic chest    pain, weight loss greater than 10 lbs, night sweats, fatigue, infiltrates on    upper lobes or superior segments of lower lobes, cavitation on chest    x-ray.   B. Patient risk factors to consider when assessing pulmonary TB risk are:    I. Exposure to known TB case, foreign-born persons (within 5 years of    arrival to [place]), residence in a crowded setting (correctional facility,     long-term care center, etc.), persons with HIV or immunosuppression.    Patients with symptoms and risk factors should generally be considered \"suspect risk\" and bronchoscopies should be performed in airborne precautions.    This patient has NO KNOWN RISK of Tuberculosis (proceed with bronchoscopy)    Specimens sent: yes  Complications: None  Scope used: #[id number] Slim  Attending Physician: Dr. Deric Rodriguez, RT on 2/14/2023 at 12:50 PM    "

## 2023-02-14 NOTE — PROGRESS NOTES
CLINICAL NUTRITION SERVICES - REASSESSMENT NOTE     Nutrition Prescription    RECOMMENDATIONS FOR MDs/PROVIDERS TO ORDER:  None at this time.    Malnutrition Status:    Moderate malnutrition in the context of acute on chronic illness.    Recommendations already ordered by Registered Dietitian (RD):  Continue current TF.  TwoCal HN @ 30 mL/hr (720 mL/day) + 1 pkt Prosource TF20 to provide 1520 kcals (30 kcal/kg/day), 80 g PRO (1.6 g/kg/day), 504 mL H2O, 158 g CHO and 4 g Fiber daily.     Future/Additional Recommendations:  Monitor TF tolerance, lytes and weights.  Monitor stools and possible need for fiber.     EVALUATION OF THE PROGRESS TOWARD GOALS   Diet: NPO  Nutrition Support:   2/10-13: Novasource Renal @ 30 ml/hr (720 ml) + 1 pkt Prosource TF20 provides 1520 kcal (30 kcal/kg), 85 g pro (1.7 g/kg), 132 g CHO, 516 ml free water, and 0 g fiber daily.     2/13-current: TwoCal HN @ 30 mL/hr (720 mL/day) + 1 pkt Prosource TF20 to provide 1520 kcals (30 kcal/kg/day), 80 g PRO (1.6 g/kg/day), 504 mL H2O, 158 g CHO and 4 g Fiber daily.     2/10-current: certavite    Intake:  From 2/10 - 2/13 daily average: 549 mL, 1,179 kcal and 70 g pro.  Meeting 94% of low end energy needs and 93% of low end energy needs.     NEW FINDINGS   Labs:  Na (H), Mg (H), Phos (L)   BUN (H), Alk (H), ALT (H), AST (H)  K was low but replaced this morning.    Medications:  Medications reviewed.    Weights:  02/13/23  67.8 kg (149 lb 7.6 oz) Bed scale   02/12/23 67 kg (147 lb 11.3 oz) Bed scale   02/09/23  62.5 kg (137 lb 12.6 oz) Bed scale   02/08/23  56 kg (123 lb 7.3 oz) Bed scale   02/07/23  59.6 kg (131 lb 6.3 oz) --   02/04/23  57.3 kg (126 lb 4.8 oz) Bed scale       MALNUTRITION  % Intake: Decreased intake does not meet criteria with TF.  % Weight Loss: None noted  Subcutaneous Fat Loss: Facial: Mild, Upper arm:  Mild and Lower arm:  Mild  Muscle Loss: Temporal:  Mild, Scapular bone:  Mild, Thoracic region (clavicle, acromium bone,  deltoid, trapezius, pectoral):  Moderate, Upper arm (bicep, tricep):  Moderate, Lower arm  (forearm):  Moderate, Dorsal hand:  Mild, Upper leg (quadricep, hamstring):  Moderate, Patellar region:  Moderate and Posterior calf:  Moderate - pt reports noticing muscle loss, particularly on BLE  Fluid Accumulation/Edema: None noted - pt denies any fluid around ankles at baseline as well  Malnutrition Diagnosis: Moderate malnutrition in the context of acute on chronic illness.    Previous Goals   Patient to consume % of nutritionally adequate meal trays TID, or the equivalent with supplements/snacks.  Evaluation: No longer applicable.    Previous Nutrition Diagnosis  Inadequate oral intake related to poor appetite as evidenced by pt report of eating <50% of usual PO over past 5-6 days and signs of subcutaneous adipose and muscle loss on physical exam.    Evaluation: No change    CURRENT NUTRITION DIAGNOSIS  Inadequate oral intake related to NPO status related to intubation as evidenced by need for TF to meet energy needs.      INTERVENTIONS  Implementation  None at this time.    Goals  Total avg nutritional intake to meet a minimum of 25 kcal/kg and 1.5 g PRO/kg daily (per dosing wt 56 kg).    Monitoring/Evaluation  Progress toward goals will be monitored and evaluated per protocol.    Ayesha Matt MS  Dietetic Intern

## 2023-02-14 NOTE — PROCEDURES
Procedures   ICU BRONCHOSCOPY    Procedure(s):    Bronchoscopy  Therapeutic suctioniong    Indication:  Left dulce thorax opacification    Procedure Details:     The bronchoscope was inserted through the mouth via ETT.    Airway Examination:      A complete airway examination was performed from the distal trachea to the subsegmental level in each lobe of both lungs.  Pertinent findings include mild hemorrhagic bruising of the right endoluminal bronchioles secondary to suctioning. No right sided mucus plugging or purulence.    Left main bronchus with thick hemorrhagic mucus plug suctioned cleared. Left lobe bronchus thick hemorrhagic mucus plugging suctioned clear. Left upper lobe bronchus with thick hemorrhagic mucus plugging suctioned. Left lingula with thick hemorrhagic mucus plug, nearly suctioned clear. Suctioned specimens sent for as bronchial specimen for culture, gram stain.                                Any disposable equipment was visually inspected and deemed to be intact immediately post procedure.      Recommendations:     -->  Follow up left lung culture, gram stain  -->  Repeat bronchoscopy in 12-24 hours    ==========================================================    Attending of Record:   Rodrigue Leos    Trainee(s) Present: Anders Patel MD; Shelia Bowen MD    Medications:    200 mcg fentanyl  20 mcg propofol bolus plus propofol gtt (see MAR)    Sedation Time: Total sedation time was 20 minutes of continuous bedside 1:1 monitoring.     Time Out:  Performed by verifying the correct patient, correct procedure, and ensuring that all equipment / support staff are present.     The patient's medical record has been reviewed.  The indication for the procedure was reviewed.  The necessary history and physical examination was performed and reviewed.  The risks, benefits and alternatives of the procedure were discussed with the the patient's representative (daughter) in detail and questions were answered to  the best of my ability.  Verbal consent was not obtained.  Written consent was obtained.  This procedure was deemed urgent.  The proposed procedure and the patient's identification were verified prior to the procedure by the physician and the nurse, technician, resident physician (resident / fellow).    Bronchoscopy Risk Assessment Guidelines:      A. Patient symptoms to consider when assessing pulmonary TB risk are:    I. Cough greater than 3 weeks; and fever, hemoptysis, pleuritic chest    pain, weight loss greater than 10 lbs, night sweats, fatigue, infiltrates on    upper lobes or superior segments of lower lobes, cavitation on chest    x-ray.   B. Patient risk factors to consider when assessing pulmonary TB risk are:    I. Exposure to known TB case, foreign-born persons (within 5 years of    arrival to [place]), residence in a crowded setting (correctional facility,     long-term care center, etc.), persons with HIV or immunosuppression.    A Tuberculosis risk assessment was performed:   This patient has NO KNOWN RISK of Tuberculosis (proceed with bronchoscopy)    The procedure was performed in a negative airflow room: No. The reason the patient could not be moved to a negative airflow room was patient was in ICU    The patient was assessed for the adequacy for the procedure and to receive medications.     Mental Status:  sedated  Airway examination:  ETT in place  Pulmonary:  Decreased breathsound throughout  CV:  Sinus tachycardia, on tele  ASA Grade:  (IV)  Severe systemic disease that is an incapacitating disease that is a constant threat to life    Immediately before administration of medications the patient was re-assessed for adequacy to receive sedatives including the heart rate, respiratory rate, mental status, oxygen saturation, blood pressure and adequacy of pulmonary ventilation. These same parameters were continuously monitored throughout the procedure.      Shelia Bowen MD

## 2023-02-14 NOTE — PLAN OF CARE
Goal Outcome Evaluation:         Major Shift Events:    N:Pt intermittently follows commands. Rass -2 - +1 on shift. . 1 dilauded and 5mg of oxy given for pain control. Precedex gtt .2mcg used for agitation. Precedex gtt stopped for neuro exam and needed to be restarted due to agitation.  Lumbar drain pressure 11-13 overnight and drained 10ml q1 per service.   C: ST 100s- 120s overnight. SBP goal <160 achieved with 10 hydralazine x1. Map goal >90 maintained w/o vasopressor support.   R: Intubated, Fio2 40%, tv 350, r14, peep 8. Spo2 95% overnight   GI/Gu: Black catheter in place, adequate UOP. Rectal tube in place for continuous loose stools.   Plan: work towards extubation   For vital signs and complete assessments, please see documentation flowsheets.

## 2023-02-15 NOTE — CONSULTS
Care Management Follow Up    Length of Stay (days): 11    Expected Discharge Date:  Unknown     Concerns to be Addressed: Community resources         Patient plan of care discussed at interdisciplinary rounds: Yes    Additional Information: JASON paged NP to ask which provider is available to sign the five letters for pt's family to request expedited visas from the [place]. JASON received call back that SW paged the wrong person. JASON paged Valdo Patel with [employer] who called back stating he can sign the letters. JASON completed the letters and printed them on letterhead. [facility] desk for Dr. Patel to sign when able.    JASON spoke with Kelly Banegas (granddaughter) via phone who reported SW can put the letters in the chart then she will get them from the bedside nurse when she is visiting.    JASON obtained signed letters and went to place them in the chart; JASON saw Kelly in the room visiting so handed them directly to her. Kelly expressed her appreciation and did not need anything else. JASON will close the consult.    Bebe Teixeira [facility]   Phone: [phone]

## 2023-02-15 NOTE — CONSULTS
[facility]     PM&R CONSULT        Consulting Provider: Aaron Vinson MD  Reason for Consult: Assessment of rehabilitation and PT needs for SCI  Location of Patient: 4214-01  Date of Encounter: 2/15/2023   Date of Admission: 2/4/2023        ASSESSMENT/PLAN:    Shelly Becerril is a 73 year old female with a history of ascending aortic dissection status-post aortic root reconstruction and aortic valve replacement (2018), history of UGI bleed (on coumadin), who initially presented to an OSH with complaints of chest, back, and abdominal pain, along with nausea and SOB. She was found to be in hypertensive emergency, with SBP above 200s. She was admitted to [facility] 2/4/2023 with type B aortic dissection involving the descending thoracic aorta, extending to just above the origin of the celiac artery. 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     - Currently still undergoing medical workup. Intubated with mild sedation. Continues to have elevated temps.     - Last OT evaluation was 2/6, suspect her level of function has changed since that evaluation. Would recommend having OT, PT, and SLP evaluate Ms. Becerril to assess her current level of function, and to continue to have her work with therapies as tolerated.     - PM&R will continue to follow.       Thank you for this interesting consult.     Please call for any questions. Pager number [phone].    Mary Vallecillo MD  Resident Physician, PGY-3  Physical Medicine & Rehabilitation  [facility]      HPI:    Shelly Becerril is a 73 year old female with a history of ascending aortic dissection status-post aortic root reconstruction and aortic valve replacement (2018), history of UGI bleed (on coumadin), who initially presented to an OSH with complaints of chest, back, and abdominal pain, along with nausea and SOB. She was  found to be in hypertensive emergency, with SBP above 200s. She was admitted to [facility] 2/4/2023 with type B aortic dissection involving the descending thoracic aorta, extending to just above the origin of the celiac artery. 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     As per primary team's note from 2/15, on 2/10, Ms. Becerril was unable to move bilateral lower extremities and had no sensation, along with dusky discoloration in bilateral hands, R>L. She has since been able to start wiggling her left toes, but still unable to move the RLE, and has decreased sensation in the right foot. Ms. Becerril continues to be intubated and mildly sedated. Most recently underwent bronch on 2/14 to remove mucus plug in L main bronchus, LLL, and HILLARY. Lumbar drain removed earlier today. Of note, she has had elevated temps, today 101.1F, tachycardic, and elevated BP.     In terms of mobility, Ms. Becerril was last assessed by OT on 2/6, and was noted to be CGA-Og +FWW for transfers for short in-room mobility, and min A for bed transfers. Since 2/6, she has functionally declined. Will need further OT assessment on what her current level of function is. Has not been evaluated by PT or SLP yet.     Ms. Becerril had several family members at bedside this afternoon. Ms. Becerril continued to endorse numbness in the right foot, and weakness in bilateral lower extremities. Discussed with Ms. Becerril and her family that therapies will continue to work with her to help gain a better understanding of what her current level of function is, prior to determining rehab discharge planning. Discussed with them that rehab team will continue to follow at this time. They are in agreement with plan.      NURSING REPORT:  Has been doing well, no agitation noted.    CURRENT PRECAUTIONS/RESTRICTIONS: In soft wrist restraints due to previously self extubating.      DVT PPX: SubQ heparin 5,000 units Q8H    PREVIOUS LEVEL OF FUNCTION: Prior to hospitalization, received 24/7 assistance from daughter for all ADL/IADLs.       CURRENT FUNCTION:   PT: Has not been evaluated.    OT 2/6: Last seen on 2/6 by OT, CGA-min A +FWW for transfers and short in-room mobility, min A bed transfer.    SLP: Has not evaluated.       LIVING SITUATION/SUPPORT:  Lives in a house with her adult daughter and son-in-law. 3-4 YOANNA the home, though family lifts Ms. Becerril to get up stairs.       PAST MEDICAL HISTORY:  Past Medical History:   Diagnosis Date     Anticoagulated on Coumadin      Aortic aneurysm (H)      Hypertension 12/19/2019           CURRENT MEDICATIONS:  Current Facility-Administered Medications   Medication     acetaminophen (TYLENOL) tablet 650 mg     acetaminophen (TYLENOL) tablet 650 mg     aspirin (ASA) chewable tablet 81 mg     [Held by provider] atorvastatin (LIPITOR) tablet 40 mg     bisacodyl (DULCOLAX) suppository 10 mg     dexmedetomidine (PRECEDEX) 400 mcg in 0.9% sodium chloride 100 mL     dextrose 10% infusion     dextrose 10% infusion     glucose gel 15-30 g    Or     dextrose 50 % injection 25-50 mL    Or     glucagon injection 1 mg     ertapenem (INVanz) 1 g vial to attach to  mL bag     fentaNYL (PF) (SUBLIMAZE) injection 25 mcg     fiber modular (BANATROL TF) packet 1 packet     heparin ANTICOAGULANT injection 5,000 Units     hydrALAZINE (APRESOLINE) injection 10-20 mg     HYDROmorphone (DILAUDID) injection 0.1 mg    Or     HYDROmorphone (DILAUDID) injection 0.2 mg     insulin aspart (NovoLOG) injection (RAPID ACTING)     lidocaine (LMX4) cream     lidocaine 1 % 0.1-1 mL     magnesium hydroxide (MILK OF MAGNESIA) suspension 30 mL     methocarbamol (ROBAXIN) tablet 500 mg     mirtazapine (REMERON) tablet TABS 15 mg     multivitamins w/minerals liquid 15 mL     naloxone (NARCAN) injection 0.2 mg    Or     naloxone (NARCAN) injection 0.4 mg    Or     naloxone  (NARCAN) injection 0.2 mg    Or     naloxone (NARCAN) injection 0.4 mg     norepinephrine (LEVOPHED) 16 mg in  mL infusion MAX CONC CENTRAL LINE     ondansetron (ZOFRAN ODT) ODT tab 4 mg    Or     ondansetron (ZOFRAN) injection 4 mg     oxyCODONE (ROXICODONE) tablet 5 mg    Or     oxyCODONE IR (ROXICODONE) tablet 10 mg     pantoprazole (PROTONIX) 2 mg/mL suspension 40 mg     polyethylene glycol (MIRALAX) Packet 17 g     potassium chloride (KLOR-CON) Packet 20 mEq     prochlorperazine (COMPAZINE) injection 5 mg    Or     prochlorperazine (COMPAZINE) tablet 5 mg     propofol (DIPRIVAN) infusion     protein modular (PROSOURCE TF20) packet 1 packet     Reason beta blocker order not selected     senna-docusate (SENOKOT-S/PERICOLACE) 8.6-50 MG per tablet 1 tablet     sodium chloride (NEBUSAL) 3 % neb solution 3 mL     sodium chloride (PF) 0.9% PF flush 3 mL     sodium chloride (PF) 0.9% PF flush 3 mL         EXAMINATION:  Vital signs:  Temp: 100.4  F (38  C) Temp src: Bladder BP: (!) 152/74 Pulse: 105   Resp: 14 SpO2: 96 % O2 Device: Mechanical Ventilator Oxygen Delivery: 2 LPM Height: 152.4 cm (5') Weight: 66.1 kg (145 lb 11.6 oz)  Estimated body mass index is 28.46 kg/m  as calculated from the following:    Height as of this encounter: 1.524 m (5').    Weight as of this encounter: 66.1 kg (145 lb 11.6 oz).    General: NAD, pleasant and cooperative   HEENT: ATNC, EOMI, PERRL, no discharge from nares or ears. NG tube in place. Intubated.   Pulmonary: Intubated, ventilated  Cardiovascular: RRR, radial pulses 2+ b/l, no murmur auscultated  Abdominal: soft, non-tender to palpation, bowel sounds present.   Extremities: Warm and well perfused in bilateral upper and lower extremities. No edema in bilateral lower extremities, no tenderness in calves.  MSK/neuro: Able to follow simple commands and answer simple questions with head nods. At least 3/5 strength in bilateral upper extremities. Sensation to light touch intact in  bilateral upper extremities. Decreased sensation over dorsum of right foot. 0/5 strength in RLE. Able to wiggle toes on the left, otherwise 0/5 strength in LLE.       LABS AND IMAGING:  Results for orders placed or performed during the hospital encounter of 02/04/23 (from the past 24 hour(s))   Phosphorus   Result Value Ref Range    Phosphorus 3.3 2.5 - 4.5 mg/dL   Basic metabolic panel   Result Value Ref Range    Sodium 150 (H) 136 - 145 mmol/L    Potassium 3.6 3.4 - 5.3 mmol/L    Chloride 115 (H) 98 - 107 mmol/L    Carbon Dioxide (CO2) 26 22 - 29 mmol/L    Anion Gap 9 7 - 15 mmol/L    Urea Nitrogen 24.8 (H) 8.0 - 23.0 mg/dL    Creatinine 0.64 0.51 - 0.95 mg/dL    Calcium 8.1 (L) 8.8 - 10.2 mg/dL    Glucose 139 (H) 70 - 99 mg/dL    GFR Estimate >90 >60 mL/min/1.73m2   Respiratory Aerobic Bacterial Culture with Gram Stain    Specimen: Lung, Left; Bronchial Alveolar Lavage   Result Value Ref Range    Culture Culture in progress     Culture 2+ Gram negative bacilli (A)     Gram Stain Result >25 PMNs/low power field (A)     Gram Stain Result 2+ Gram negative bacilli (A)    Glucose by meter   Result Value Ref Range    GLUCOSE BY METER POCT 136 (H) 70 - 99 mg/dL   Glucose by meter   Result Value Ref Range    GLUCOSE BY METER POCT 123 (H) 70 - 99 mg/dL   XR Chest Port 1 View    Narrative    EXAM: XR CHEST PORT 1 VIEW  2/15/2023 1:08 AM     HISTORY:  chest tubes s/p thoracoabdominal aortic aneurysm repair       COMPARISON:  2/14/2023    FINDINGS:     AP portable semiupright view of the chest. Endotracheal tube is seen  at the mid thoracic trachea, approximately 2 cm cephalad to the  darling. Right IJ sheath in similar position to prior. Stable 3  left-sided chest tubes. Postsurgical changes of the chest with intact  median sternotomy wires. Prosthetic cardiac valve. Enteric tube is  seen coursing into the field-of-view.    Trachea is midline. Decreased streaky pulmonary opacities in the right  lung base. Decreased aeration  of the left lung base. Cardiomediastinal  silhouette and pulmonary vasculature are within normal limits.  Continued retrocardiac opacification with silhouetting left  hemidiaphragm. Small left pleural effusion. No significant right-sided  pleural effusion. No appreciable pneumothorax.    No acute osseous abnormality. Redemonstration of left-sided rib  fractures. Visualized upper abdomen is unremarkable.        Impression    IMPRESSION:  1. Stable position of support devices, as above.  2. Decreased aeration of the left lung base. Persistent retrocardiac  opacification and silhouetting left hemidiaphragm, favoring  atelectasis.  3. Decreased right basilar streaky opacities.    I have personally reviewed the examination and initial interpretation  and I agree with the findings.    ISABEL ESCALERA MD         SYSTEM ID:  [id number]   Lactic acid whole blood   Result Value Ref Range    Lactic Acid 1.5 0.7 - 2.0 mmol/L   Blood gas arterial with oxyhemoglobin   Result Value Ref Range    pH Arterial 7.49 (H) 7.35 - 7.45    pCO2 Arterial 38 35 - 45 mm Hg    pO2 Arterial 79 (L) 80 - 105 mm Hg    Bicarbonate Arterial 29 (H) 21 - 28 mmol/L    Oxyhemoglobin Arterial 95 92 - 100 %    Base Excess/Deficit (+/-) 5.5 (H) -9.0 - 1.8 mmol/L    FIO2 35     Anmol's Test Artline    Glucose by meter   Result Value Ref Range    GLUCOSE BY METER POCT 125 (H) 70 - 99 mg/dL   Comprehensive metabolic panel   Result Value Ref Range    Sodium 150 (H) 136 - 145 mmol/L    Potassium 3.7 3.4 - 5.3 mmol/L    Chloride 115 (H) 98 - 107 mmol/L    Carbon Dioxide (CO2) 25 22 - 29 mmol/L    Anion Gap 10 7 - 15 mmol/L    Urea Nitrogen 25.8 (H) 8.0 - 23.0 mg/dL    Creatinine 0.67 0.51 - 0.95 mg/dL    Calcium 7.6 (L) 8.8 - 10.2 mg/dL    Glucose 141 (H) 70 - 99 mg/dL    Alkaline Phosphatase 186 (H) 35 - 104 U/L     (H) 10 - 35 U/L    ALT 84 (H) 10 - 35 U/L    Protein Total 5.2 (L) 6.4 - 8.3 g/dL    Albumin 2.3 (L) 3.5 - 5.2 g/dL    Bilirubin Total 0.4 <=1.2  mg/dL    GFR Estimate >90 >60 mL/min/1.73m2   CBC with platelets   Result Value Ref Range    WBC Count 11.6 (H) 4.0 - 11.0 10e3/uL    RBC Count 3.40 (L) 3.80 - 5.20 10e6/uL    Hemoglobin 10.1 (L) 11.7 - 15.7 g/dL    Hematocrit 31.9 (L) 35.0 - 47.0 %    MCV 94 78 - 100 fL    MCH 29.7 26.5 - 33.0 pg    MCHC 31.7 31.5 - 36.5 g/dL    RDW 15.8 (H) 10.0 - 15.0 %    Platelet Count 200 150 - 450 10e3/uL   Magnesium   Result Value Ref Range    Magnesium 2.6 (H) 1.7 - 2.3 mg/dL   Phosphorus   Result Value Ref Range    Phosphorus 3.1 2.5 - 4.5 mg/dL   Glucose by meter   Result Value Ref Range    GLUCOSE BY METER POCT 111 (H) 70 - 99 mg/dL   Bronchoscopy    Narrative    Aaron Vinson MD     2/15/2023 12:16 PM  [facility]    Procedure: Bronchoscopy    Date/Time: 2/15/2023 12:08 PM  Performed by: Aaron Vinson MD  Authorized by: Aaron Vinson MD       UNIVERSAL PROTOCOL   Site Marked: Yes  Prior Images Obtained and Reviewed:  Yes  Required items: Required blood products, implants, devices and special   equipment available    Patient identity confirmed:  Verbally with patient  Patient was reevaluated immediately before administering moderate or deep   sedation or anesthesia  Confirmation Checklist:  Patient's identity using two indicators, relevant   allergies, procedure was appropriate and matched the consent or emergent   situation and correct equipment/implants were available  Time out: Immediately prior to the procedure a time out was called    Universal Protocol: the Joint Commission Universal Protocol was followed    Preparation: Patient was prepped and draped in usual sterile fashion       ANESTHESIA    Anesthesia: See MAR for details  Local Anesthetic:  LET (lido,epi,tetracaine)      SEDATION  Patient Sedated: Yes    Sedation Type:  Moderate (conscious) sedation  Sedation:  Fentanyl and see MAR for details  Vital signs: Vital signs monitored during sedation       PROCEDURE  Describe Procedure: The bronchoscope was inserted through the mouth via   ETT.     Airway Examination:       A complete airway examination was performed from the distal trachea to the   subsegmental level in each lobe of both lungs.  Pertinent findings include   mild hemorrhagic bruising of the right endoluminal bronchioles secondary   to suctioning. No right sided mucus plugging or purulence.     Left main bronchus noted to be clear. Left lobe bronchus with thick   hemorrhagic mucus plugging suctioned clear. Left upper lobe bronchus with   thick hemorrhagic mucus plugging suctioned. Left lingula with thick   hemorrhagic mucus plug, suctioned clear. Some residual mucus noted. Some   irritation was noted alongside the left bronchial bifurcations.      Any disposable equipment was visually inspected and deemed to be intact   immediately post procedure.    Patient Tolerance:  Patient tolerated the procedure well with no immediate   complications  Length of time physician/provider present for 1:1 monitoring during   sedation: 20   Attending of Record:   Rodrigue Leos MD  Trainee(s) Present: Aaron Vinson MD; Shelia Bowen MD     Medications:    200 mcg fentanyl  6 mcg Versed bolus (see MAR)     Sedation Time: Total sedation time was 20 minutes of continuous bedside   1:1 monitoring.      Time Out:  Performed by verifying the correct patient, correct procedure,   and ensuring that all equipment / support staff are present.      The patient's medical record has been reviewed.  The indication for the   procedure was reviewed.  The necessary history and physical examination   was performed and reviewed.  The risks, benefits and alternatives of the   procedure were discussed with the the patient's representative (daughter)   in detail and questions were answered to the best of my ability.  Verbal   consent was not obtained.  Written consent was obtained.  This procedure   was deemed urgent.  The proposed  procedure and the patient's   identification were verified prior to the procedure by the physician and   the nurse, technician, resident physician (resident / fellow).     Bronchoscopy Risk Assessment Guidelines:                A. Patient symptoms to consider when assessing pulmonary TB risk are:  I. Cough greater than 3 weeks; and fever, hemoptysis, pleuritic chest   pain, weight loss greater than 10 lbs, night sweats, fatigue, infiltrates   on upper lobes or superior segments of lower lobes, cavitation on chest   x-ray.  B. Patient risk factors to consider when assessing pulmonary TB risk are:   I. Exposure to known TB case, foreign-born persons (within 5 years of   arrival to [place]), residence in a crowded setting (correctional facility,    long-term care center, etc.), persons with HIV or immunosuppression.     A Tuberculosis risk assessment was performed:   This patient has NO KNOWN RISK of Tuberculosis (proceed with bronchoscopy)     The procedure was performed in a negative airflow room: No. The reason the   patient could not be moved to a negative airflow room was patient was in   ICU     The patient was assessed for the adequacy for the procedure and to receive   medications.      Mental Status:  sedated  Airway examination:  ETT in place  Pulmonary:  Decreased breathsound throughout  CV:  Sinus tachycardia, on tele  ASA Grade:  (IV)  Severe systemic disease that is an incapacitating   disease that is a constant threat to life     Immediately before administration of medications the patient was   re-assessed for adequacy to receive sedatives including the heart rate,   respiratory rate, mental status, oxygen saturation, blood pressure and   adequacy of pulmonary ventilation. These same parameters were continuously   monitored throughout the procedure.   Glucose by meter   Result Value Ref Range    GLUCOSE BY METER POCT 129 (H) 70 - 99 mg/dL   XR Chest Port 1 View    Narrative    Exam: XR CHEST PORT 1 VIEW,  2/15/2023 3:47 PM    Comparison: Same day chest x-ray at 0047 hours    History: post-CT removal    Findings:  Single portable AP view of the chest the patient at 30 degrees.  Interval removal of two left chest tubes. There is a left chest tube  in stable position. Stable surgical changes of the chest with intact  median sternotomy wires. Enteric tube course of the stomach and out of  the field-of-view. Right IJ sheath catheter changed in position.  Prosthetic cardiac valve.    Trachea midline. Cardiac silhouette remains obscured. Similar streaky  right basilar opacities. Unchanged retrocardiac opacity. Mildly  improved aeration of the left lung. No appreciable pneumothorax. No  right pleural effusion. Known rib fractures. No new osseous  abnormality.      Impression    Impression:   1. Interval removal of two left chest tubes. Left basal chest tube in  stable position. No pneumothorax.  2. Improved aeration of left lung with residual opacification.  3. Similar right basilar streaky opacities, atelectasis/edema.    I have personally reviewed the examination and initial interpretation  and I agree with the findings.    PRATIK RESTREPO MD         SYSTEM ID:  [id number]   Glucose by meter   Result Value Ref Range    GLUCOSE BY METER POCT 96 70 - 99 mg/dL                 Patient was seen and discussed with staff attending, Dr. Lorenzo.    Total of 40 min spent in this encounter, more than 50% in counseling and coordination.

## 2023-02-15 NOTE — PROCEDURES
[facility]    Procedure: Bronchoscopy    Date/Time: 2/15/2023 12:08 PM  Performed by: Aaron Vinson MD  Authorized by: Aaron Vinson MD       UNIVERSAL PROTOCOL   Site Marked: Yes  Prior Images Obtained and Reviewed:  Yes  Required items: Required blood products, implants, devices and special equipment available    Patient identity confirmed:  Verbally with patient  Patient was reevaluated immediately before administering moderate or deep sedation or anesthesia  Confirmation Checklist:  Patient's identity using two indicators, relevant allergies, procedure was appropriate and matched the consent or emergent situation and correct equipment/implants were available  Time out: Immediately prior to the procedure a time out was called    Universal Protocol: the Joint [facility] Universal Protocol was followed    Preparation: Patient was prepped and draped in usual sterile fashion       ANESTHESIA    Anesthesia: See MAR for details  Local Anesthetic:  LET (lido,epi,tetracaine)      SEDATION  Patient Sedated: Yes    Sedation Type:  Moderate (conscious) sedation  Sedation:  Fentanyl and see MAR for details  Vital signs: Vital signs monitored during sedation      PROCEDURE  Describe Procedure: The bronchoscope was inserted through the mouth via ETT.     Airway Examination:       A complete airway examination was performed from the distal trachea to the subsegmental level in each lobe of both lungs.  Pertinent findings include mild hemorrhagic bruising of the right endoluminal bronchioles secondary to suctioning. No right sided mucus plugging or purulence.     Left main bronchus noted to be clear. Left lobe bronchus with thick hemorrhagic mucus plugging suctioned clear. Left upper lobe bronchus with thick hemorrhagic mucus plugging suctioned. Left lingula with thick hemorrhagic mucus plug, suctioned clear. Some residual mucus noted. Some irritation was noted alongside the  left bronchial bifurcations.      Any disposable equipment was visually inspected and deemed to be intact immediately post procedure.    Patient Tolerance:  Patient tolerated the procedure well with no immediate complications  Length of time physician/provider present for 1:1 monitoring during sedation: 20   Attending of Record:   Rodrigue Leos MD  Trainee(s) Present: Aaron Vinson MD; Shelia Bowen MD     Medications:    200 mcg fentanyl  6 mcg Versed bolus (see MAR)     Sedation Time: Total sedation time was 20 minutes of continuous bedside 1:1 monitoring.      Time Out:  Performed by verifying the correct patient, correct procedure, and ensuring that all equipment / support staff are present.      The patient's medical record has been reviewed.  The indication for the procedure was reviewed.  The necessary history and physical examination was performed and reviewed.  The risks, benefits and alternatives of the procedure were discussed with the the patient's representative (daughter) in detail and questions were answered to the best of my ability.  Verbal consent was not obtained.  Written consent was obtained.  This procedure was deemed urgent.  The proposed procedure and the patient's identification were verified prior to the procedure by the physician and the nurse, technician, resident physician (resident / fellow).     Bronchoscopy Risk Assessment Guidelines:                A. Patient symptoms to consider when assessing pulmonary TB risk are:  I. Cough greater than 3 weeks; and fever, hemoptysis, pleuritic chest pain, weight loss greater than 10 lbs, night sweats, fatigue, infiltrates on upper lobes or superior segments of lower lobes, cavitation on chest x-ray.  B. Patient risk factors to consider when assessing pulmonary TB risk are:   I. Exposure to known TB case, foreign-born persons (within 5 years of arrival to [place]), residence in a crowded setting (correctional facility,  long-term care center,  etc.), persons with HIV or immunosuppression.     A Tuberculosis risk assessment was performed:   This patient has NO KNOWN RISK of Tuberculosis (proceed with bronchoscopy)     The procedure was performed in a negative airflow room: No. The reason the patient could not be moved to a negative airflow room was patient was in ICU     The patient was assessed for the adequacy for the procedure and to receive medications.      Mental Status:  sedated  Airway examination:  ETT in place  Pulmonary:  Decreased breathsound throughout  CV:  Sinus tachycardia, on tele  ASA Grade:  (IV)  Severe systemic disease that is an incapacitating disease that is a constant threat to life     Immediately before administration of medications the patient was re-assessed for adequacy to receive sedatives including the heart rate, respiratory rate, mental status, oxygen saturation, blood pressure and adequacy of pulmonary ventilation. These same parameters were continuously monitored throughout the procedure.

## 2023-02-15 NOTE — PROGRESS NOTES
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 Briceno on 2/8/23.    24-hour events:  - Bronch yesterday, removed mucus plug in L main bronchus, LLL, and HILLARY  - Patient able to move L toes to command  - Tmax 101.1 overnight  - Precedex gtt at 0.2  - PS 8/10, Fi 35% for 4.5 hrs  - Attending-level discussion with neurocrit and vascular about who is managing Lumbar drain    Today's Changes:  - Bronch today, cleared out mucus plugs in HILLARY and LLL  - Pan cultures  - Switched from ceftriaxone to ertapenem  - Schedule K 20 mEq BID  - Chest PT to left lung  - Anesthesia removing lumbar drain in afternoon, (4-5 hours after last SQH dose)  - Can remove apical CT, keep basilar CT      Neuro/ pain/ sedation:  #Depression  #Acute postoperative pain  #Sedation  - Monitor neuro status. Notify MD for acute changes  - Pain: Schedule  Tylenol.  PRN: Tylenol, oxycodone, Dilaudid  - PTA mirtazapine  #LE Weakness, incomplete spinal cord schemia in s/o thoracoabdominal aneurysm repair  On, 2/10 unable to move b/l LE, no sensation, dusky discoloration hands R>L, multiphasic Dopplers in R & L ulnar artery & brachial arch. On 2/11 /follow commands b/l UE, started withdrawing b/l LE. On 2/12 intermittently withdrawing b/l LE. On 2/13 moved b/l toes to command for 1st time in 4 days. On 2/14 continues withdrawing from  pain b/l LE and moving L toes to command.  - Lumbar drain removed by anesthesia 2/15, can liberalize MAP goals to >65  - S/p hydrocortisone 50 mg q6h (2/11-2/13)  - Weaned off Angiotensin 2 and all pressors since 2/12, restarted NE 0.01-0.06 on 2/14  - Neurocrit is managing lumbar drain, goal ICP <10, through 2/15. Anesthesia removing drain in afternoon  - Neurocrit rec MRI T/L w/o contrast. Can consider MRI T&L soon as becoming more stable  - Precedex gtt if agitation, wean for neuro exams  #Hx stroke (2019)  - Chronic dysarthria and dysphagia, s/p warfarin transitioned to Eliquis in 2021, unclear if taking  - Question of residual LE weakness PTA    Pulmonary care:   #Mechanical ventilation  #Pneumonia, hospital acquired   Vent Mode: CMV/AC  (Continuous Mandatory Ventilation/ Assist Control)  FiO2 (%): 35 %  Resp Rate (Set): 14 breaths/min  Tidal Volume (Set, mL): 350 mL  PEEP (cm H2O): 8 cmH2O  Pressure Support (cm H2O): 10 cmH2O  Resp: 14  - Pa/Fi ratio down to 225 from 385 yesterday  - Goal SpO2 > 92%. IS q15-30 minutes when awake.  - S/p bronch 2/11, small old blood LLL, small mucus b/l suctioned  - CT Chest 2/14 complete LL atelectasis 2/2 mucus plugging, mild pulmonary edema R lung  - S/p bronch 2/14, removed mucus plug in L main bronchus, LLL, and HILLARY  - CXR 2/15 decreased aeration LLL, persistent opacities  - Bronch today, cleared out mucus plugs in HILLARY and LLL  - Resp Cx positive for Enterobacter cloacae. Zosyn 2/12-2/13, susceptibilities returned and switched ceftriaxone 2/13. Fevers 2/14, switched to ertapenem  - 3% NS nebs, intrapulmonary percussive therapy  - Increase PEEP, chest physio, consider R side down positioning or APRV  - Can remove apical CT, keep basilar CT    Cardiovascular:    #Cardiogenic shock  #HTN  #Ascending aortic aneurysm s/p ascending repair with aortic root reconstruction  #Bioprosthetic aortic valve in 2018  #Relative hypotension  Pre CPBP: LV/RV normal, 55-60%. Mild MR w/  central jet from A2/P2. Mild TR.  Post CPBP: LV normal, 55-60%. RV mildly reduced. Mild AS, trace AI. Mild MR. Mild TR.  - Weaned off Angiotensin 2 and all pressors since 2/12, restarted NE 0.01-0.06 on 2/14 for MAPs not reaching goals. Possible septic component with relative hypotension/requiring pressors for first time in 3 days  - Lumbar drain removed, so liberalize MAP goals to >65  - PRN Hydralazine for SBP >160  - Hold PTA metoprolol succinate 50 mg, lisinopril 40 mg, atorvastatin 40 mg  - ASA 81 mg    GI care / Nutrition:   - TF at goal of 30cc/hr  - PPI  - Bowel regimen: MiraLAX, senna, hold with diarrhea  - Rectal tube  - Fiber Banatrol     Renal / Fluids / Electrolytes:   #Hypokalemia   BL creat appears to be ~ 0.9-1.0  - Strict I/O, daily weights, avoid/limit nephrotoxins  - Replete lytes PRN per protocol  - Scheduled K 20 mEq BID  - Free water 100 q2h (deficit 3.1L)  - Check afternoon BMP    Endocrine:    #Stress hyperglycemia  Preop A1c 4.7  - Med SSI  - Goal BG <180 for optimal healing    ID / Antibiotics:  #Stress induced leukocytosis  #Pneumonia, Enterobacter cloacae (Resp Cx 2/10)  - Monitor fever curve, WBC, and inflammatory markers as appropriate  - Resp Cx positive for Enterobacter cloacae. Zosyn 2/12-2/13, susceptibilities returned and switched ceftriaxone 2/13. Fevers 2/14, switched to ertapenem 2/15  - WBC 11.6    Heme:     #Acute blood loss anemia  #Acute blood loss thrombocytopenia  No s/sx active bleeding  - CBC daily, Hgb goal >7  - Hgb 10.1  - Neurocrit: when pull drain, want platelets >100 otherwise transfuse  - SQH    MSK / Skin:  #Sternotomy  #Surgical Incision  - Sternal precautions, postop incision management protocol  - PT/OT/CR  - PM&R consult for rehab, ways to optimize her  - Rooke boots    Prophylaxis:     - Mechanical DVT ppx  - Chemical DVT ppx: SQH  - PPI    Lines / Tubes / Drains:  - ETT  - RIJ CVC  - Arterial Lines - left axillary  - CTs x3 - can remove apical CT, keep  basilar CT  - Black  - NJ    Disposition:  - CVICU    Patient seen, findings and plan discussed with CVICU staff and CT surgeons and fellow.    Valdo Patel MD  Anesthesiology Resident, CA-1, PGY-2  Ascom *[id number]     ====================================    TODAY'S PROGRESS  SUBJECTIVE  Intubated and sedated. Bried NE up to 0.06 overnight but weaned off. Unchanged neuro status.    OBJECTIVE  1. VITAL SIGNS  Temp:  [100  F (37.8  C)-101.1  F (38.4  C)] 100.4  F (38  C)  Pulse:  [] 98  Resp:  [14-20] 14  BP: (152)/(74) 152/74  MAP:  [63 mmHg-119 mmHg] 116 mmHg  Arterial Line BP: ()/() 172/80  FiO2 (%):  [35 %] 35 %  SpO2:  [90 %-99 %] 99 %  Vent Mode: CMV/AC  (Continuous Mandatory Ventilation/ Assist Control)  FiO2 (%): 35 %  Resp Rate (Set): 14 breaths/min  Tidal Volume (Set, mL): 350 mL  PEEP (cm H2O): 8 cmH2O  Pressure Support (cm H2O): 10 cmH2O  Resp: 14      2. INTAKE/ OUTPUT  I/O last 3 completed shifts:  In: 2219.56 [I.V.:369.56; NG/GT:1130]  Out: 2483 [Urine:1675; Drains:240; Stool:500; Chest Tube:68]    3. PHYSICAL EXAMINATION  General: sedated  Neuro: sedated. Follows commands in UE, withdraws from pain in LE. Moved L toes to command overnight  Resp: intubated, ventilated  CV: S1, S2, RRR, no m/r/g   Abdomen: Soft, non-distended, non-tender  Incisions: CDI  Extremities: warm and well perfused  CT: To suction, serosang output and clots, no airleak, crepitus    4. INVESTIGATIONS  Arterial Blood Gases   Recent Labs   Lab 02/15/23  0412 02/14/23  0316 02/13/23  0341 02/12/23 1955   PH 7.49* 7.51* 7.49* 7.49*   PCO2 38 38 38 37   PO2 79* 154* 114* 94   HCO3 29* 30* 28 28     Complete Blood Count   Recent Labs   Lab 02/15/23  0413 02/14/23  0324 02/13/23  0341 02/12/23  2247 02/12/23  1604 02/12/23  0347   WBC 11.6* 10.8 8.1  --   --  10.3   HGB 10.1* 11.2* 11.3* 11.2*   < > 9.8*    155 112*  --   --  92*    < > = values in this interval not displayed.     Basic Metabolic Panel  Recent  Labs   Lab 02/15/23  0900 02/15/23  0413 02/15/23  0412 02/14/23  2310 02/14/23  2040 02/14/23  1633 02/14/23  0824 02/14/23  0818 02/14/23  0324 02/13/23  0603 02/13/23  0341   NA  --  150*  --   --   --  150*  --   --  148*  --  146*   POTASSIUM  --  3.7  --   --   --  3.6  --  3.8 3.2*   < > 3.4   CHLORIDE  --  115*  --   --   --  115*  --   --  113*  --  111*   CO2  --  25  --   --   --  26  --   --  25  --  23   BUN  --  25.8*  --   --   --  24.8*  --   --  25.5*  --  24.4*   CR  --  0.67  --   --   --  0.64  --   --  0.67  --  0.69   * 141* 125* 123*   < > 139*   < >  --  135*   < > 178*    < > = values in this interval not displayed.     Liver Function Tests  Recent Labs   Lab 02/15/23  0413 02/14/23  0324 02/13/23  0341 02/12/23  0347 02/10/23  0357 02/09/23  0301 02/08/23  2328 02/08/23  1826 02/08/23  1640 02/08/23  1616   * 161* 202* 248*   < > 100*   < >  --   --   --    ALT 84* 71* 68* 59*   < > 43*   < > 30  --   --    ALKPHOS 186* 170* 161* 151*   < > 51   < > 53  --   --    BILITOTAL 0.4 0.4 0.5 0.6   < > 0.9   < > 1.5*  --   --    ALBUMIN 2.3* 2.4* 2.4* 2.3*   < > 2.4*   < > 1.9*  --   --    INR  --   --   --   --   --  1.23*  --  1.49* 1.32* 1.87*    < > = values in this interval not displayed.     Pancreatic Enzymes  No lab results found in last 7 days.  Coagulation Profile  Recent Labs   Lab 02/09/23  0301 02/08/23  1826 02/08/23  1640 02/08/23  1616   INR 1.23* 1.49* 1.32* 1.87*   PTT 37 72* 44* 38     Lactate  Invalid input(s): LACTATE    5. RADIOLOGY  Recent Results (from the past 24 hour(s))   XR Chest Port 1 View    Narrative    EXAM: XR CHEST PORT 1 VIEW  2/8/2023 7:12 PM      HISTORY: Post Op CVTS Surgery    COMPARISON: CT chest/abdomen/pelvis 2/5/2023    FINDINGS: Single view of the chest. Post surgical change in the chest  with median sternotomy wires and several discontinuous sternotomy  fixation wires. Additional wires projecting over the left upper  quadrant.  Endotracheal tube tip is approximately 4 cm from the darling.  Prosthetic aortic valve. Right IJ central venous catheter tip is in  the proximal right pulmonary artery. Two left apical chest tubes and  the left basilar chest tube. Enteric tube tip and sidehole projected  over the stomach. Trachea is midline. Cardiac silhouette is enlarged  and somewhat obscured by diffuse mixed interstitial and airspace  opacities in left upper lobe. The left lung base is relatively clear.  No pleural effusion. Small left apical pneumothorax. No right-sided  pleural effusion or pneumothorax. Several acute left lateral rib  fractures. Subcutaneous emphysema in the left axilla. The upper  abdomen is unremarkable.      Impression    IMPRESSION:   1. Post surgical changes of left thoracotomy and thoracoabdominal  aortic repair.  2. Endotracheal tube tip is approximately 4 cm from the darling. Right  IJ [place]arisa catheter tip is in the distal right pulmonary artery.  3. Small right apical pneumothorax.   4. Mixed interstitial airspace opacities throughout the left upper  lung field, atelectasis and/or edema.    I have personally reviewed the examination and initial interpretation  and I agree with the findings.    PRATIK RESTREPO MD         SYSTEM ID:  [id number]   XR Abdomen Port 1 View    Narrative    EXAM: XR ABDOMEN PORT 1 VIEW  2/8/2023 7:15 PM     HISTORY:  OG       TECHNIQUE: Single frontal radiograph of the abdomen    COMPARISON:  CT chest, abdomen and pelvis 2/5/2023.    FINDINGS:   Portable AP supine radiograph the abdomen. Gastric tube tip and  sidehole project over the expected location of the stomach. Left  basilar chest tube. Contrast visualized within several nondistended  loops of bowel. Nonobstructive bowel gas pattern. No pneumatosis,  portal venous gas. Severe degenerative changes of the lower lumbar  spine. Please refer to same-day chest radiograph for further details  regarding the visualized lung bases.       Impression    IMPRESSION: Gastric tube tip and sidehole project over the expected  location of the stomach. Nonobstructive bowel gas pattern.     I have personally reviewed the examination and initial interpretation  and I agree with the findings.    PRATIK RESTREPO MD         SYSTEM ID:  [id number]   XR Chest Port 1 View    Impression    RESIDENT PRELIMINARY INTERPRETATION  Impression:   1. Support devices in stable position as noted above.  2. Increased patchy and consolidative opacities throughout the left  lung.  3. Likely small bilateral pleural effusions.

## 2023-02-15 NOTE — PLAN OF CARE
ICU End of Shift Summary. See flowsheets for vital signs and detailed assessment.    Changes this shift: No acute changes during shift. No changes in neuro from start of shift. Continues to squeeze hand equally bilaterally, eyes are tracking congruently. Will shrug shoulders. Does not move RLE voluntarily, withdraws toes to pain. LLE able to wiggle toes. ST during night rates 100-120's. Tmax 100.6. Lumbar drain pressures 11-20mmHg, with 10cc drained q1hr. titrating Levo overnight between off-.09 to maintain MAP>90.     Plan: Possible repeat bronch today. Continue neuro checks Q2, Lumbar pressures Q1.     Goal Outcome Evaluation:      Plan of Care Reviewed With: patient    Overall Patient Progress: no changeOverall Patient Progress: no change    Outcome Evaluation: Low dose of precedex for restlessness. Utilizing norepinepherine to maintain MAP >90 overnight, tolerating vent well.

## 2023-02-15 NOTE — PROGRESS NOTES
"Bronchoscopy Risk Assessment Guidelines      A. Patient symptoms to consider when assessing pulmonary TB risk are:    I. Cough greater than 3 weeks; and fever, hemoptysis, pleuritic chest    pain, weight loss greater than 10 lbs, night sweats, fatigue, infiltrates on    upper lobes or superior segments of lower lobes, cavitation on chest    x-ray.   B. Patient risk factors to consider when assessing pulmonary TB risk are:    I. Exposure to known TB case, foreign-born persons (within 5 years of    arrival to [place]), residence in a crowded setting (correctional facility,     long-term care center, etc.), persons with HIV or immunosuppression.    Patients with symptoms and risk factors should generally be considered \"suspect risk\" and bronchoscopies should be performed in airborne precautions.    This patient has NO KNOWN RISK of Tuberculosis (proceed with bronchoscopy)    Specimens sent: no  Complications: None  Scope used: #[id number] Adult  Attending Physician: Dr. Deric Torres, RT on 2/15/2023 at 12:04 PM  " Received referral from Dr Mejia 

## 2023-02-15 NOTE — PROGRESS NOTES
Vascular surgery note    Bronchoscopy yesterday with report of significant amounts of mucous removed. Improved FiO2 down to 35% this morning. Continues to have no lower extremity movement on my exam.    BP (!) 152/74   Pulse 100   Temp (!) 100.6  F (38.1  C)   Resp 14   Ht 1.524 m (5')   Wt 66.1 kg (145 lb 11.6 oz)   SpO2 91%   BMI 28.46 kg/m       I/O last 3 completed shifts:  In: 2219.56 [I.V.:369.56; NG/GT:1130]  Out: 2483 [Urine:1675; Drains:240; Stool:500; Chest Tube:68]    Intubated, mild sedation on precedex though able to follow commands to nod head and squeeze hands. Does not wiggle toes or plantar/dorsiflexion to command. No withdrawal to pain in BLE  Thoracoabdominal and L groin incisions c/d/i    Labs reviewed. Hgb stable, slightly leukocytosis of 11.6    BAL from 2/14 with gram negative bacilli. Prior BAL growing enterobacter on 2/10/23.    CT noncontrast yesterday showed total atelectasis of the left lung. Perigraft hematoma.    A/P: 73 year-old female POD#7 s/p thoracoabdominal aneurysm and type B dissection repair, lower extremity paralysis with likely spinal cord ischemia, enterobactor pneumonia.    - continue spinal cord protective measures, MAP > 90, Hgb > 10, ICP < 10 with continuation of lumbar drain.  - will defer to neuro crit regarding duration of the above spinal protective measures. Neuro crit plans to continue through today, 2/15/23  - T&L spine MRI when able, swan now removed  - cont abx, enterobacter pna  - remaining care per CVTS/CVICU, planning for repeat bronchoscopy today, weaning vent, tube feeds.    Lisandro Lemus MD

## 2023-02-15 NOTE — ANESTHESIA PROCEDURE NOTES
Airway       Patient location during procedure: ICU       Procedure Start/Stop Times: 2/10/2023 9:40 PM  Staff -        Resident/Fellow: Hollie Lewis MD       Other Anesthesia Staff: Andrés Appiah MD       Performed By: resident  Consent for Airway        Urgency: emergent       Consent: The procedure was performed in an emergent situation.  Report Obtained from Primary Care Team       History regarding most recent potassium obtained: Yes       History regarding presence/absence of renal failure obtained:Yes       History regarding stroke/CVA obtained:Yes       History regarding presence/absence of NM disorder: YesIndications and Patient Condition       Indications for airway management: respiratory insufficiency       Mallampati: Not Assessed     Induction type:intravenous       Mask difficulty assessment: 0 - not attempted    Final Airway Details       Final airway type: endotracheal airway       Successful airway: ETT - single  Endotracheal Airway Details        ETT size (mm): 7.5       Cuffed: yes       Successful intubation technique: video laryngoscopy       VL Blade Size: MAC 4       Grade View of Cords: 1       Adjucts: stylet       Position: Center       Measured from: lips       Secured at (cm): 21       Bite block used: None    Post intubation assessment        ETT secured, Vent settings by primary/ICU team, Primary/ICU team to review CXR, Sedation to be ordered by primary/ICU team and No apparent complications       Placement verified by: capnometry, equal breath sounds and chest rise        Number of attempts at approach: 1       Number of other approaches attempted: 0       Secured with: commercial tube argueta       Ease of procedure: easy       Dentition: Intact and Unchanged       Dental guard used and removed.    Medication(s) Administered   etomidate (AMIDATE) injection - Intravenous   12 mg - 2/10/2023 9:40:00 PM  rocuronium (ZEMURON) 10 mg/mL injection - Intravenous   70 mg -  2/10/2023 9:40:00 PM  Medication Administration Time: 2/10/2023 9:40 PM

## 2023-02-15 NOTE — PLAN OF CARE
Shift:  0700- 1930         Events:   Bronchoscopy done today- lots of mucus and clots removed. Chest tubes x 2 removed. Given versed and fentanyl for sedation. Given prn oxycodone for pain. Pt having fevers, given PRN tylenol. Urine culture done. Lumbar drain removed. MAP goals liberlized to MAP >60.   Labs drawn per orders. Restraints on for pulling at lines/ tubes, cms intact. Hourly rounded and call light in place. Delirium interventions in place.     Assessment:     Neuro: Pt is alert. Follows simple commands. Difficult to obtain good neuro exam as pt is hmong speaking. Bilateral UE good strengths, LLE is able to move a bit. RLE unable to move, but has sensation and withdraws from pain..    CV: HR is , rhythm is SR/ ST. BP is supported with levo. Pulses are dopplered. MAP goal >60. Order to doppler BLE and RUE.  BP is very liable.     Pulm:  Lungs are clear. Vent settings are: AC 14 / PEEP 10/ tv 350 / 40 %. Scant ETT and oral secretions.    : Black in place. UOP is >30 / hr.    GI: NJ tube in place. Soft abd. TF at goal rate.  Q 2 hours.    Skin: Redness to coccyx, foam in place.Scattered bruising noted. Intact.     BP (!) 152/74   Pulse 101   Temp (!) 101.1  F (38.4  C)   Resp 14   Ht 1.524 m (5')   Wt 66.1 kg (145 lb 11.6 oz)   SpO2 98%   BMI 28.46 kg/m      Drips:    NS -  5  Levophed - 0.06  Precedex 0.3    Lines:    CVC right IJ  Arterial right brachial  PIV x 2  ETT  at 22  NJ at 83 cm    Omega Moore RN on 2/15/2023 at 6:00 PM

## 2023-02-15 NOTE — PROGRESS NOTES
Lumbar Drain Removal     Lumbar drain was removed at the request of the CV ICU team. The drain was clamped, bandage loosened, and catheter was pulled with black tip intact, without complication. Small volume clear CSF was noted on removal of the drain, and the insertion site was covered with an island dressing. No insertion site swelling, overt bleeding, or concern for infection.     Miguel Angel Evans MD  Anesthesiology, CA2/PGY3  [facility]   February 15, 2023

## 2023-02-15 NOTE — PLAN OF CARE
Major Shift Events: CT this am. Pt bronched this am. X-ray post bronch showed increased aeration in left lung.     Neuro:  Follows simple commands - squeezes hands, wiggles lt toes, shrugs shoulders, tracks fingers. Unable to moved rt toes, withdraws to pain. Lumbar drain in place, pressures 11-14, drained 10 mL qh. Restrains & mitts in place. Precedex gtt.    CV: -130s. Tmax 101.1. Map >90, SBP <160, pressures difficult to maintain within goal. 3x CT, small serosang output.     Pulm: PS 8/10, 35% 4.5 hr today. LS coarse/diminished.     GI/: NJ, feeds at goal. Flushes 100 q2h. BS & insulin q4h. Rectal tube, large output. Black with adequate output.     Plan: Repeat bronch in am.

## 2023-02-16 NOTE — PLAN OF CARE
Goal Outcome Evaluation:    Major Shift Events:  At 2000 assessment patient A&O per interpretation of son, able to follow commands and able to move upper extremities.  Was able to wiggle toes in LLE and had slight movement of RLE.  Same movements noted two hours later.  At 0000 assessment, patient was noted to have completely flaccid BLE and to not be withdrawing to painful stimuli in either extremities.  Neuro and CVTS updated.  Plan in place for ongoing monitoring and new MAP of >85.  Previously stopped levophed gtt restarted at 0150 to maintain MAP >85.  At 0200 neuro assessment patient somnolent and moderately sedated.  Not following commands but withdrawing in BUE.  At this time precedex gtt was stopped.  Following reassessment patient was back to previous assessment baseline of moving upper extremities and not lower extremities as well as following commands.      Plan: MAP goal >85, await cultures, trend temperatures, Q2 hour neuros and CMS    For vital signs and complete assessments, please see documentation flowsheets.

## 2023-02-16 NOTE — PROGRESS NOTES
Vascular surgery note    Bronchoscopy repeated yesterday with report of copious plugging mucous removed. Lumbar drain removed. Pan cultured for 38.4C fevers. PM&R consulted. Continues to have no lower extremity movement on my exam.    BP (!) 152/74   Pulse (!) 126   Temp (!) 100.6  F (38.1  C)   Resp 19   Ht 1.524 m (5')   Wt 65.5 kg (144 lb 6.4 oz)   SpO2 99%   BMI 28.20 kg/m       I/O last 3 completed shifts:  In: 2596.96 [P.O.:60; I.V.:306.96; NG/GT:1540]  Out: 2050 [Urine:1690; Drains:48; Stool:300; Chest Tube:12]    Intubated, mild sedation on low dose precedex, follows commands to nod head and squeeze hands. Does not wiggle toes or plantar/dorsiflexion to command. No withdrawal to induced pain in BLE  Thoracoabdominal and L groin incisions c/d/i    Labs reviewed.     BAL from 2/14 with gram negative bacilli. Prior BAL growing enterobacter on 2/10/23.    Norepinephrine drip for hemodynamic support.    A/P: 73 year-old female POD#7 s/p thoracoabdominal aneurysm and type B dissection repair, lower extremity paralysis with likely spinal cord ischemia, enterobactor pneumonia.    - spinal cord protective measures per neuro crit  - T&L spine MRI when able, swan now removed  - cont abx, enterobacter pna  - continue ASA 81mg  - atorvastatin when medically appropriate  - remaining care per CVTS/CVICU, planning for repeat bronchoscopy today, weaning vent, tube feeds.    Nayely Urrutia, LUIS EDUARDO  Vascular Surgery  Pager: [phone]  [email or web address]  Send message or 10 digit call back number Securely via [employer] with the Vocera Web Console (learn more here)

## 2023-02-16 NOTE — PROVIDER NOTIFICATION
At 2000 patient was able to wiggle toes in LLE and had slight contract of RLE.  Movement also noted of BLE at 2130.  Upon writer's 0000 assessment patient was found to have no movement nor withdrawal to stimuli to BLE.  Continued to move upper extremities equally and following commands.  Pulses remained dopplerable throughout.    Lumbar drain removed today and MAP parameters had recently been reduced to >60.  CVTS and Neurotcrit MDs notified of neuro status changes.    Per assessment of neurocrit MD (communicated in Hmong language) patient unable to move extremities but was able to feel pinching.      Plan:  Continue to monitor for ongoing changes with new MAP goal >85.

## 2023-02-16 NOTE — PROGRESS NOTES
Antimicrobial Stewardship Team Note    Antimicrobial Stewardship Program - A joint venture between [facility] and  Physicians to optimize antibiotic management.  NOT a formal consult - Restricted Antimicrobial Review     Patient: Shelly Becerril  MRN: [id number]  Allergies: Patient has no known allergies.    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    History of Present Illness: She presented to OSH in HTN emergency and underwent left thoracotomy with thoracoabdominal aortic aneurysm repair with left heart bypass on 2/8/23. Patient was placed on mechanical ventilation, lumbar drain placed, and pressors initiated on 2/8/2023. On 2/10, patient had a bronchoscopy due to mucus plugging and difficulty weaning off ventilator. Sample from bronchoalveolar lavage was sent for culture. On 2/10/2023 the morning staff noted she was not moving her bilateral lower extremities.  Per vascular surgery the patient had not demonstrated hip flexion since OR.  After the reports of not moving bilateral lower extremities and concerns for spinal ischemia, MAP goal was raised to greater than 90 and lumbar drain was increased.   On 2/11, sputum cultures resulted in 3+ gram negative bacilli, >25 PMNs/low power field, 1+ mixed zach and Zosyn was initiated empirically. Chest X-Ray from 2/12 showed minimal streaky right basilar opacities, likely atelectasis. Patient was weaned off pressors on 2/12. GNR in sputum culture identified as Enterobacter cloacae that is resistant to ampicillin, Unasyn, cefazolin, cefoxitin. With susceptibilities, Zosyn was deescalated to ceftriaxone 2g daily on  2/13.  Bronchoscopy was repeated on 2/14 with removal of mucus plug in L main bronchus, LLL, & HILLARY. Bronchoalveolar lavage resulted in 2+ Non lactose fermenting gram negative bacilli, pending identification. Patient overall remained afebrile with WBCs within normal limits until 2/15. Patient became febrile (tmax 100.8) on 2/15 with a spike in WBC to 11.6 from 10.8 on 2/14, leading to switching ceftriaxone to ertapenem. Blood cultures  are NGTD and urine culture grew <10,000 CFU/mL of urogenital zach. CT of chest completed on 2/14 suggestive of volume overload, with bilateral pleural effusions, diffuse anasarca, small volume ascites, and trace pericardial effusion. Repeat of chest X-ray completed on 2/15 showed no substantial change in appearance of mixed pulmonary opacities. Patient is sedated using dexmedetomidine.  Lumbar drain removed on 2/15. Pressors stopped on 2/15.            Active Anti-infective Medications   (From admission, onward)                 Start     Stop    02/12/23 0900  piperacillin-tazobactam  4.5 g,   Intravenous,   EVERY 6 HOURS        Possible post-op infection    Gram negative bacilli in respiratory cultures        02/18/23 0859                  Assessment: AmpC producing Enterobacter cloacae VAP  Today, the patient is febrile (tmax 101.1), remaining on mechanical ventilation and off pressor support. The patient is on day 6 of antimicrobial therapy currently on ertapenem. 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 Even though ceftriaxone is susceptible in vitro per susceptibility result, ceftriaxone is susceptible to hydrolysis by beta-lactamase making them ineffective treatment options for the management of clinically  significant AmpC producing organisms. Similarly, piperacillin/tazobactam could be hydrolyzed in the setting of AmpC production. It reasonable to suspect that the use of ceftriaxone and Zosyn likely killed off the present wild-type of Enterobacter cloacae that exhibited initial susceptibility to both Zosyn and ceftriaxone in vitro leaving predominantly AmpC producing phenotypes which could explain the fevers spiking while on suboptimal therapy.   Per IDSA guidelines on the management of Treatment of AmpC ?-lactamase producing Enterobacter cloacae, cefepime is both a weak inducer of AmpC production and is able to withstands hydrolysis by forming a stable acyl enzyme complex. Thus, cefepime is the preferred agent for treatment of AmpC-E. Other treatment options include fluoroquinolones. The use of fluroquinolones could be considered in the case of concerns of cefepime induced neurotoxicity in this patient. Ertapenem is more so reserved for ESBL producing organisms which is not the case for Enterobacter cloacae cultures obtained from BAL on 2/10. Based on the previous antimicrobial therapy, cultures, and sensitivities, we recommend changing ertapenem to cefepime. We agree with the continuous management of mucus plugs as the infection resolves.     Recommendation:   1) Stop ertapenem   2) Start cefepime 2 g IV every 8 hours, per current renal function (adjust dose accordingly)    Pharmacy took the following actions: Electronic Note created, paged provider .    Discussed with ID Staff: Ryan Betancourt MD; Lottie Almaguer, ReillyD, BCIDP  Rashawn Cosme PharmD Candidate    Vital Signs/Clinical Features:  Vitals         02/14 0700  02/15 0659 02/15 0700  02/16 0659 02/16 0700  02/16 1522   Most Recent      Temp ( F) 99.9 -  101.1    99.9 -  101.3    100.2 -  101.1     100.2 (37.9) 02/16 1500    Pulse 90 -  121    89 -  125    99 -  128     107 02/16 1500    Resp 14 -  21    14 -  19      16     16 02/16 1200    /74 -   180/85        134/62     134/62 02/16 1421    SpO2 (%) 93 -  98    90 -  100    96 -  99     97 02/16 1500            Labs  Estimated Creatinine Clearance: 59.7 mL/min (based on SCr of 0.73 mg/dL).  Recent Labs   Lab Test 02/12/23 0347 02/13/23  0341 02/14/23  0324 02/14/23  1633 02/15/23  0413 02/16/23  0358   CR 0.81 0.69 0.67 0.64 0.67 0.73       Recent Labs   Lab Test 02/11/23  0332 02/12/23  0347 02/12/23  1604 02/12/23  2247 02/13/23  0341 02/14/23  0324 02/15/23  0413 02/16/23  0358   WBC 10.1 10.3  --   --  8.1 10.8 11.6* 12.7*   HGB 11.1* 9.8* 8.9* 11.2* 11.3* 11.2* 10.1* 9.9*   HCT 33.4* 29.4*  --   --  33.6* 34.5* 31.9* 32.0*   MCV 88 89  --   --  92 93 94 97   PLT 81* 92*  --   --  112* 155 200 275       Recent Labs   Lab Test 02/11/23 0332 02/12/23 0347 02/13/23  0341 02/14/23  0324 02/15/23  0413 02/16/23  0358   BILITOTAL 0.8 0.6 0.5 0.4 0.4 0.5   ALKPHOS 159* 151* 161* 170* 186* 176*   ALBUMIN 2.4* 2.3* 2.4* 2.4* 2.3* 2.4*   * 248* 202* 161* 145* 117*   ALT 59* 59* 68* 71* 84* 85*       Recent Labs   Lab Test 02/11/23 0336 02/12/23  0346 02/13/23  0341 02/14/23  0316 02/15/23  0412 02/16/23  0358   LACT 1.6 1.6 1.5 1.4 1.5 1.4             Culture Results:  7-Day Micro Results       Procedure Component Value Units Date/Time    Urine Culture [[id number]] Collected: 02/15/23 1219    Order Status: Completed Lab Status: Final result Updated: 02/16/23 1210    Specimen: Urine, Black Catheter      Culture <10,000 CFU/mL Urogenital zach    Respiratory Aerobic Bacterial Culture with Gram Stain     Order Status: Sent Lab Status: No result     Specimen: Bronchial Alveolar Lavage from Lung, Lower Lobe, Left     Respiratory Aerobic Bacterial Culture with Gram Stain     Order Status: Sent Lab Status: No result     Specimen: Bronchial Alveolar Lavage from Lung, Left     Blood Culture Hand, Right [[id number]]  (Normal) Collected: 02/15/23 0752    Order Status: Completed Lab Status: Preliminary result  Updated: 02/16/23 0902    Specimen: Blood from Hand, Right      Culture No growth after 1 day    Blood Culture Hand, Right [[id number]]  (Normal) Collected: 02/15/23 0752    Order Status: Completed Lab Status: Preliminary result Updated: 02/16/23 0902    Specimen: Blood from Hand, Right      Culture No growth after 1 day    Blood Culture Peripheral Blood     Order Status: Canceled Lab Status: No result     Specimen: Peripheral Blood     Blood Culture Peripheral Blood     Order Status: Canceled Lab Status: No result     Specimen: Peripheral Blood     Respiratory Aerobic Bacterial Culture with Gram Stain [[id number]] Collected: 02/14/23 1655    Order Status: Canceled Lab Status: No result Updated: 02/14/23 1927    Specimen: Bronchial Alveolar Lavage from Lung, Left     Respiratory Aerobic Bacterial Culture with Gram Stain [[id number]]  (Abnormal) Collected: 02/14/23 1655    Order Status: Completed Lab Status: Preliminary result Updated: 02/16/23 1334    Specimen: Bronchial Alveolar Lavage from Lung, Left      Culture 2+ Non lactose fermenting gram negative bacilli      2+ Stenotrophomonas maltophilia      1+ Normal zach     Gram Stain Result >25 PMNs/low power field      2+ Gram negative bacilli    Respiratory Aerobic Bacterial Culture with Gram Stain [[id number]]  (Abnormal)  (Susceptibility) Collected: 02/10/23 1432    Order Status: Completed Lab Status: Final result Updated: 02/12/23 0946    Specimen: Bronchial Alveolar Lavage from Lung, Left      Culture 3+ Enterobacter cloacae complex      1+ Normal zach     Gram Stain Result >25 PMNs/low power field      3+ Gram negative bacilli      1+ Mixed zach    Susceptibility       Enterobacter cloacae complex (1)       Antibiotic Interpretation Sensitivity   Method Status    Ampicillin Resistant   QUIANA Final     Intrinsically Resistant       Ampicillin/ Sulbactam Resistant   QUIANA Final     Intrinsically Resistant       Piperacillin/Tazobactam Susceptible <=4  ug/mL QUIANA Final    Cefazolin  [*]  Resistant   QUIANA Final     Intrinsically Resistant       Cefoxitin  [*]  Resistant >=64 ug/mL QUIANA Final     Intrinsically Resistant       Ceftazidime Susceptible <=1 ug/mL QUIANA Final    Ceftriaxone Susceptible <=1 ug/mL QUIANA Final    Cefepime Susceptible <=1 ug/mL QUIANA Final    Meropenem Susceptible <=0.25 ug/mL QUIANA Final    Amikacin  [*]  Susceptible <=2 ug/mL QUIANA Final    Gentamicin Susceptible <=1 ug/mL QUIANA Final    Tobramycin Susceptible <=1 ug/mL QUIANA Final    Ciprofloxacin Susceptible <=0.25 ug/mL QUIANA Final    Levofloxacin Susceptible <=0.12 ug/mL QUIANA Final    Nitrofurantoin  [*]  Susceptible 32 ug/mL QUIANA Final    Trimethoprim/Sulfamethoxazole Susceptible <=1/19 ug/mL QUIANA Final               [*]  Suppressed Antibiotic                                                   Imaging: US Lower Extremity Venous Duplex Bilateral    Result Date: 2/16/2023  EXAMINATION: DOPPLER VENOUS ULTRASOUND OF BILATERAL LOWER EXTREMITIES, 2/16/2023 1:27 PM COMPARISON: None. HISTORY: Persistent fevers, prolonged bedrest; rule out DVT TECHNIQUE:  Gray-scale evaluation with compression, spectral flow and color Doppler assessment of the deep venous system of both legs from groin to knee, and then at the ankles. FINDINGS: In both lower extremities, the common femoral, femoral, popliteal and posterior tibial veins demonstrate normal compressibility and blood flow.     IMPRESSION: No evidence of deep venous thrombosis in either lower extremity. JELENA BROOKS MD   SYSTEM ID:  [id number]    US Lower Extremity Arterial Duplex Bilateral    Result Date: 2/10/2023  BILATERAL LOWER EXTREMITY DUPLEX ARTERIAL ULTRASOUND 2/10/2023 11:28 AM CLINICAL HISTORY: s/p thoracoabdominal aortic repair with left heart bypass. Has very cold and purple extremities. COMPARISONS: CTA 2/5/2023.. REFERRING PROVIDER: SANDRA NIETO MD TECHNIQUE: Grayscale, color Doppler, Doppler waveform ultrasound evaluation of bilateral external iliac  arteries through anterior and posterior tibial arteries. FINDINGS: RIGHT:      COMMON FEMORAL ARTERY: 86 cm/s, triphasic      PROFUNDUS FEMORAL ARTERY: 86 cm/s, triphasic      SUPERFICIAL FEMORAL ARTERY, origin: 110 cm/s, triphasic      SUPERFICIAL FEMORAL ARTERY, mid: 208 cm/s, triphasic, 1.89 velocity      SUPERFICIAL FEMORAL ARTERY, distal: 75 cm/s, triphasic      POPLITEAL ARTERY: 77 cm/s, triphasic      POSTERIOR TIBIAL ARTERY, ankle: 32 cm/s, biphasic      ANTERIOR TIBIAL ARTERY, ankle: 14 cm/s, saw toothed biphasic LEFT:      COMMON FEMORAL ARTERY: 243 cm/s, triphasic      PROFUNDUS FEMORAL ARTERY: 127 cm/s, triphasic      SUPERFICIAL FEMORAL ARTERY, proximal: 190 cm/s, triphasic       SUPERFICIAL FEMORAL ARTERY, mid: 175 cm/s, triphasic      SUPERFICIAL FEMORAL ARTERY, distal: 64 cm/s, triphasic      POPLITEAL ARTERY: 65 cm/s, triphasic      POSTERIOR TIBIAL ARTERY, ankle: 105 cm/s, triphasic      ANTERIOR TIBIAL ARTERY, ankle: 44 cm/s, triphasic     IMPRESSION: 1. RIGHT:      A. Elevated velocity in the mid superficial femoral artery to 208 cm/s. 50-75% diameter stenosis by velocity criteria, but the velocity ratio is less than 2 and the artery fills [id number] in color Doppler image. Likely less than 50% diameter stenosis.      B. Anterior tibial artery disease.  2. LEFT:      A. Elevated common femoral artery velocity to 243 cm/s, etiology not demonstrated.      B. Otherwise negative lower extremity duplex arterial ultrasound. Guidelines: [facility] duplex criteria for lower limb arterial occlusive disease -Percent stenosis- Normal (1-19%): Peak systolic velocity (cm/s): <150, End-diastolic velocity (cm/s): <40, Velocity ratio (Vr): <1.5, Distal arterial waveform: Triphasic -Percent stenosis- 20-49%: Peak systolic velocity (cm/s): 150-200, End-diastolic velocity (cm/s): <40, Velocity ratio (Vr): 1.5-2.0, Distal arterial waveform: Triphasic -Percent stenosis- 50-75%: Peak systolic  velocity (cm/s): 200-300, End-diastolic velocity (cm/s): <90, Velocity ratio (Vr): 2.0-3.9, Distal arterial waveform: Poststenotic turbulence distal to stenosis, monophasic distal waveform -Percent stenosis- >75%: Peak systolic velocity (cm/s): >300, End-diastolic velocity (cm/s): <90, Velocity ratio (Vr): >4.0, Distal arterial waveform: Dampened distal waveform and low PSV/EDV* in the stenosis -Percent stenosis- Occlusion: Absent flow by color Doppler/pulsed Doppler spectral analysis; length of occlusion estimated from distance between exit and reentry collateral arteries *PSV = peak systolic velocity, EDV = end-diastolic velocity http://link.reyes.com/chapter/10.1007/[email or web address]_23/fulltext html I have personally reviewed the examination and initial interpretation and I agree with the findings. JON RAMOS MD   SYSTEM ID:  [id number]    US Upper Extremity Arterial Duplex Left    Result Date: 2/10/2023  ULTRASOUND UPPER EXTREMITY ARTERIAL DUPLEX LEFT 2/10/2023 11:33 AM CLINICAL HISTORY: cold right hand, eval central vessels for art line options. COMPARISONS: None available. REFERRING PROVIDER: SANDRA NIETO TECHNIQUE: Left subclavian and axillary arteries evaluated with grayscale, color Doppler, and spectral pulsed wave Doppler ultrasound. FINDINGS: LEFT: Subclavian artery, medial: 129/0 cm/s, triphasic, 8.5 mm Subclavian artery, mid: 136/0 cm/s, triphasic, 6.6 mm Subclavian artery, distal: 115/0 cm/s, triphasic, 6.6 mm Axillary artery: 109/0 cm/s, triphasic, 4.9 mm     IMPRESSION: Patent left subclavian and axillary arteries with measurements as in the report. JON RAMOS MD   SYSTEM ID:  [id number]    US Upper Ext Arterial Duplex Right Port    Result Date: 2/10/2023  ULTRASOUND UPPER EXTREMITY ARTERIAL DUPLEX RIGHT 2/10/2023 11:31 AM CLINICAL HISTORY: s/p thoracoabdominal aortic repair with left heart bypass. Has very cold and purple hands.. Previous right radial catheter. COMPARISONS: None available.  REFERRING PROVIDER: SANDRA NIETO TECHNIQUE: Right arm arteries evaluated with color Doppler and spectral pulsed wave Doppler ultrasound. FINDINGS: RIGHT: Subclavian artery, proximal: 49/0 cm/s, biphasic Subclavian artery, mid: 91/0 cm/s, triphasic Subclavian artery, proximal: 92/0 cm/s, biphasic Subclavian artery, distal: 202/0 cm/s, triphasic Axillary artery: 118/0 cm/s, triphasic Brachial artery, proximal: 188/0 cm/s, triphasic Brachial artery, mid: 207/0 cm/s, triphasic Brachial artery, antecubital fossa; 132/0 cm/s, triphasic Radial artery, origin: 95/0 cm/s, triphasic Radial artery, proximal forearm: occluded Radial artery, mid forearm: occluded Radial artery, wrist: occluded Ulnar artery, origin: 62/0 cm/s, triphasic Ulnar artery, wrist: 8 cm/s, faintly pulsatile     IMPRESSION: 1. 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 2. Right ulnar artery slow faintly pulsatile flow at the wrist. JON RAMOS MD   SYSTEM ID:  [id number]    US Upper Extremity Venous Duplex Right    Result Date: 2/11/2023  EXAMINATION: DOPPLER VENOUS ULTRASOUND OF RIGHT UPPER EXTREMITY, 2/11/2023 5:47 PM COMPARISON: None. HISTORY: New right upper extremity and neck swelling TECHNIQUE:  Gray-scale evaluation with compression, spectral flow, and color Doppler assessment of the deep venous system of right upper extremity. FINDINGS: Right internal jugular catheter, right internal jugular vein is not well seen secondary to overlying bandage. Normal blood flow and waveforms are demonstrated in the  innominate, subclavian, and axillary veins. There is normal compressibility of the brachial, basilic veins. Nonocclusive superficial vein thrombus seen in the right cephalic vein from the antecubital fossa to the distal upper arm.     IMPRESSION: No evidence of deep venous thrombosis in either upper extremity. Nonocclusive superficial vein thrombus seen in the cephalic vein associated with indwelling line. I have personally  reviewed the examination and initial interpretation and I agree with the findings. JELENA BROOKS MD   SYSTEM ID:  [id number]    XR Chest Port 1 View    Result Date: 2/16/2023  EXAM: XR CHEST PORT 1 VIEW  2/16/2023 1:21 AM HISTORY:  chest tubes s/p thoracoabdominal aortic aneurysm repair   COMPARISON:  2/15/2023 FINDINGS: AP portable semiupright view of the chest. Right IJ sheath in similar position to prior left chest tube in similar position. Enteric tube is seen coursing outside the field-of-view. Postsurgical changes of the chest with intact median sternotomy wires. Prosthetic cardiac valve. Trachea is midline. Similar streaky right basilar pulmonary opacities. No substantial change in appearance of mixed pulmonary opacities overlying the left hemithorax with persistent retrocardiac opacification. Cardiomediastinal silhouette and pulmonary vasculature are within normal limits. Small left pleural effusion. No significant right pleural effusion. No appreciable pneumothorax. No acute osseous abnormality. Visualized upper abdomen is unremarkable.      IMPRESSION: 1. Stable position of support devices, as above. 2. No substantial change in appearance of mixed pulmonary opacities overlying the left hemithorax with persistent retrocardiac opacification. 3. Stable small left pleural effusion. I have personally reviewed the examination and initial interpretation and I agree with the findings. ISABEL ESCALERA MD   SYSTEM ID:  [id number]    XR Chest Port 1 View    Result Date: 2/15/2023  Exam: XR CHEST PORT 1 VIEW, 2/15/2023 3:47 PM Comparison: Same day chest x-ray at 0047 hours History: post-CT removal Findings: Single portable AP view of the chest the patient at 30 degrees. Interval removal of two left chest tubes. There is a left chest tube in stable position. Stable surgical changes of the chest with intact median sternotomy wires. Enteric tube course of the stomach and out of the field-of-view. Right IJ sheath catheter  changed in position. Prosthetic cardiac valve. Trachea midline. Cardiac silhouette remains obscured. Similar streaky right basilar opacities. Unchanged retrocardiac opacity. Mildly improved aeration of the left lung. No appreciable pneumothorax. No right pleural effusion. Known rib fractures. No new osseous abnormality.     Impression: 1. Interval removal of two left chest tubes. Left basal chest tube in stable position. No pneumothorax. 2. Improved aeration of left lung with residual opacification. 3. Similar right basilar streaky opacities, atelectasis/edema. I have personally reviewed the examination and initial interpretation and I agree with the findings. PRATIK RESTREPO MD   SYSTEM ID:  [id number]    XR Chest Port 1 View    Result Date: 2/15/2023  EXAM: XR CHEST PORT 1 VIEW  2/15/2023 1:08 AM HISTORY:  chest tubes s/p thoracoabdominal aortic aneurysm repair   COMPARISON:  2/14/2023 FINDINGS: AP portable semiupright view of the chest. Endotracheal tube is seen at the mid thoracic trachea, approximately 2 cm cephalad to the darling. Right IJ sheath in similar position to prior. Stable 3 left-sided chest tubes. Postsurgical changes of the chest with intact median sternotomy wires. Prosthetic cardiac valve. Enteric tube is seen coursing into the field-of-view. Trachea is midline. Decreased streaky pulmonary opacities in the right lung base. Decreased aeration of the left lung base. Cardiomediastinal silhouette and pulmonary vasculature are within normal limits. Continued retrocardiac opacification with silhouetting left hemidiaphragm. Small left pleural effusion. No significant right-sided pleural effusion. No appreciable pneumothorax. No acute osseous abnormality. Redemonstration of left-sided rib fractures. Visualized upper abdomen is unremarkable.      IMPRESSION: 1. Stable position of support devices, as above. 2. Decreased aeration of the left lung base. Persistent retrocardiac opacification and silhouetting  left hemidiaphragm, favoring atelectasis. 3. Decreased right basilar streaky opacities. I have personally reviewed the examination and initial interpretation and I agree with the findings. ISABEL ESCALERA MD   SYSTEM ID:  [id number]    XR Chest Port 1 View    Result Date: 2/14/2023  Exam: XR CHEST PORT 1 VIEW, 2/14/2023 1:54 PM Comparison: Same day chest x-ray at 0123 hours History: follow up after bronchoscopy, evaluate for increased aeration Findings: Portable AP view of the chest with the patient at 30 degrees. Enteric tube courses over the stomach and out of the field of view. Right IJ sheath unchanged in position. Stable position of 3 left-sided chest tubes. Stable position of endotracheal tube in the midthoracic trachea. Stable postsurgical changes of the chest. Trachea midline. Cardiac silhouette is obscured. Increased aeration in the left lung. Continued retrocardiac opacification. Stable streaky right basilar predominant opacities. No appreciable pneumothorax. No significant right pleural effusion. Visualized upper abdomen is unremarkable. Unchanged left rib fractures. No new osseous abnormality.     Impression: 1. Increased aeration in the left lung with residual opacification 2. Stable support devices. I have personally reviewed the examination and initial interpretation and I agree with the findings. JELENA BROOKS MD   SYSTEM ID:  [id number]    XR Chest Port 1 View    Result Date: 2/14/2023  EXAM: XR CHEST PORT 1 VIEW  2/14/2023 1:30 AM HISTORY:  chest tubes s/p thoracoabdominal aortic aneurysm repair   COMPARISON:  2/13/2023 FINDINGS: AP portable semiupright view of the chest. Stable position of 3 left-sided chest tubes. Right IJ sheath in stable position. Interval removal of right IJ Animas-Clarisa catheter. Endotracheal tube is seen in the mid thoracic trachea, approximately 3 cm cephalad to the darling. Enteric tube is seen with distal tip coursing outside the field-of-view. Postsurgical changes of the chest  with intact median sternotomy wires.  Trachea is midline. Left hydropneumothorax with decreased aeration of the left hemithorax with now near complete opacification. Stable streaky right basilar pulmonary opacities. Cardiomediastinal silhouette and pulmonary vasculature are within normal limits. Persistent retrocardiac opacification with silhouetting left hemidiaphragm. Stable 1 cm nodular opacity overlying the right hemithorax, appears less conspicuous compared to 2/13/2023. No appreciable right pneumothorax. Small right pleural effusion. Unchanged left rib fractures. Visualized upper abdomen is unremarkable.      IMPRESSION: 1. Support devices , as above. 2. Near complete opacification of the left hemithorax, compared to 2/13/2023. 3. Persistent dense retrocardiac opacification within the left lower lobe. 4. Small right pleural effusion. 5. Nodular opacity overlying the right hemithorax is again seen, but appears less conspicuous compared to 2/13/2023, now projecting over the anterior right fourth rib. Consider continued attention on follow-up. Gagan Cohn RN was contacted by Dr. Knutson at 2/14/2023 2:53 AM and verbalized understanding of the finding.  I have personally reviewed the examination and initial interpretation and I agree with the findings. 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 Endotracheal tube overlying the mid thoracic trachea. Stable apically directed left-sided chest tubes. Right IJ [place]-Clarisa catheter distal tip projecting over the proximal right pulmonary artery. Enteric tube is seen coursing off the field of view. Stable left basilar chest tube. Postsurgical changes of the chest with intact median sternotomy wires. Aortic valve. Trachea is midline.  Minimally improved aeration of the left hemithorax. Persistent retrocardiac opacification with silhouette of left hemidiaphragm. Mildly increased right basilar streaky pulmonary opacities. 1 cm nodular opacity right upper lobe seen over the third anterior right rib. There is tubing in this area as well. Increased hazy opacity at the right lung base. Cardiomediastinal silhouette and pulmonary vasculature are within normal limits. Stable left hydropneumothorax. No right pneumothorax. Trace right pleural effusion. No acute osseous abnormality. Visualized upper abdomen is unremarkable.      IMPRESSION: 1. Support devices in stable position. 2. No substantial change in appearance of left hydropneumothorax. 3. Dense retrocardiac opacification of the left lower lobe with mixed pulmonary opacities seen through the left lung, with mildly improved aeration of the left upper lobe, compared to 2/10/2023. 4. Small right pleural effusion. 5. There is an 1 cm nodular opacity right upper lobe. New pulmonary nodule versus overlying devices extrinsic to the patient. Attention on follow-up. I have personally reviewed the examination and initial interpretation and I agree with the findings. ISABEL ESCALERA MD   SYSTEM ID:  [id number]    XR Chest Port 1 View    Result Date: 2/12/2023  Exam: XR CHEST PORT 1 VIEW, 2/12/2023 3:31 AM Comparison: CT and radiograph 2/10/2023 History: chest tubes s/p thoracoabdominal aortic aneurysm repair Findings: Portable AP view of the chest. Endotracheal tube tip projects over the midthoracic trachea. Right IJ [place]-Clarisa catheter tip projects over the proximal right pulmonary artery. Feeding tube tip projects beyond the field-of-view. Left chest tubes in stable position. Median sternotomy wires. Aortic valve prosthesis. The cardiomediastinal silhouette remains obscured on the left. Stable left hydropneumothorax, with the small pneumothorax component better visualized on CT dated 2/10/2023. Slightly increased  patchy opacities in the left lung. Unchanged dense retrocardiac opacities. Minimal streaky right basilar opacities. Small right pleural effusion. Visualized upper abdomen is unremarkable. Osseous structures are stable.     Impression: 1. Support devices in stable position. 2. Stable left hydropneumothorax, with the small pneumothorax component better visualized on CT dated 2/10/2023. 3. Minimal streaky right basilar opacities, likely atelectasis. 4. Small right pleural effusion. I have personally reviewed the examination and initial interpretation and I agree with the findings. MARCUS MUNOZ MD   SYSTEM ID:  [id number]    XR Chest Port 1 View    Result Date: 2/11/2023  EXAM: XR CHEST PORT 1 VIEW  2/10/2023 10:03 PM  HISTORY: Post re intubation , check ETT COMPARISON: Chest CT 2/10/2023. FINDINGS: Single view of the chest. Median sternotomy wires. The endotracheal tube tip projects 3.9 cm from the darling. Right IJ [place]-Clarisa catheter with tip projecting over the proximal right pulmonary artery. Stable left-sided chest tubes x3. Enteric tube courses outside of the field of view. Interval removal of midline. Trachea is midline. Ill-defined cardiomediastinal silhouette. Left-sided hydropneumothorax with pneumothorax components better visualized on same day CT. Grossly stable left-sided airspace opacities. Dense opacification of the left lower lung. Mild streaky right basilar opacities. No substantial pleural effusion on the right.     IMPRESSION: 1. Endotracheal tube tip projects 3.9 cm from the darling. Otherwise stable support devices. 2. Left sided hydropneumothorax. Pneumothorax component better visualized on same-day chest CT. 3. Streaky opacities at the right lung base, likely atelectasis/edema. 4. Dense opacification of the left lower lobe and moderate mixed opacities throughout the left lung, with improved aeration of the left upper lobe compared to prior. I have personally reviewed the examination and initial  interpretation and I agree with the findings. ISABEL ESCALERA MD   SYSTEM ID:  [id number]    XR Chest Port 1 View    Result Date: 2/10/2023  Exam: XR CHEST PORT 1 VIEW, 2/10/2023 1:20 AM Comparison: 2/9/2023 History: chest tubes s/p thoracoabdominal aortic aneurysm repair Findings: Portable AP view of the chest. Endotracheal tube tip projects over the midthoracic trachea. Enteric tube tip projects on the field of view. Right IJ [place]-Clarisa catheter tip projects in stable position. Stable left chest tubes. Mediastinal drains. Intact median sternotomy wires. Discontinuous sternotomy fixation wires. Trachea is midline. Cardiomediastinal silhouette is stable. No significant change in appearance of the patchy and consolidative opacities throughout the left lung. No appreciable pneumothorax. Stable small bilateral pleural effusions. Unchanged subcutaneous emphysema in the left axilla. Visualized upper abdomen is unremarkable. Unchanged left lateral rib fracture deformities.     Impression: 1. Support devices in stable position as noted above. 2. Unchanged patchy and consolidative opacities throughout the left lung. 3. Stable small bilateral pleural effusions. I have personally reviewed the examination and initial interpretation and I agree with the findings. ISABEL ESCALERA MD   SYSTEM ID:  [id number]    XR Abdomen Port 1 View    Result Date: 2/10/2023  EXAM: XR ABDOMEN PORT 1 VIEW  2/10/2023 4:38 PM HISTORY:  Verify small bowel feeding tube bedside placement   TECHNIQUE: Single frontal radiograph of the abdomen COMPARISON:  2/8/2023 FINDINGS: AP portable semiupright view the abdomen. Partial visualization of postsurgical changes of the chest with sternotomy wires and overlying drains or the left upper abdominal and left lower chest. Opacifications seen within the left lung base, better appreciated on dedicated CT chest, 2/10/2023. Left basilar chest tubes are partially visualized. Enteric tube is seen with distal tip projecting  over the distal duodenum Contrast is seen throughout numerous loops of large bowel. Nonobstructive bowel gas pattern. No pneumatosis, portal venous gas.     IMPRESSION:  Enteric tube seen with the distal tip projecting over the distal duodenum. I have personally reviewed the examination and initial interpretation and I agree with the findings. JELENA BROOKS MD   SYSTEM ID:  [id number]    Echo Limited    Result Date: 2023  [id number]^EMILIA^SANDRA  [facility] [address] [place] [address]  Name: JAYESH HOOVER MRN: [id number] : 1950 Study Date: 2023 08:41 AM Age: 73 yrs Gender: Female Patient Location: [facility] Reason For Study: Increasing pressor requirements Ordering Physician: SANDRA NIETO Referring Physician: LUKE LIMA Performed By: Lisa Power RDCS  BSA: 1.6 m2 Height: 60 in Weight: 147 lb HR: 94 BP: 129/58 mmHg ______________________________________________________________________________ Procedure Limited Portable Echo Adult. Echocardiogram with two-dimensional, color and spectral Doppler performed. ______________________________________________________________________________ Interpretation Summary Global and regional left ventricular function is hyperkinetic with an EF of 65-70%. Global right ventricular function is normal. A bioprosthetic aortic valve is present. The valve is well seated. Trace to mild valvular regurgitation at 9 o'clock position. The peak velocity across the aortic valve is 2.7 m/sec, mean gradient is 16 mmHg. Dilation of the inferior vena cava is present with abnormal respiratory variation in diameter. No pericardial effusion is present. ______________________________________________________________________________ Left Ventricle Left ventricular size is normal. Left ventricular wall thickness is normal. Global and regional left ventricular function is hyperkinetic  with an EF of 65-70%. No regional wall motion abnormalities are seen.  Right Ventricle The right ventricle is normal size. Global right ventricular function is normal. A pacemaker lead is noted in the right ventricle.  Atria Both atria appear normal.  Mitral Valve The mitral valve is normal.  Aortic Valve A bioprosthetic aortic valve is present. The valve is well seated. Trace to mild valvular regurgitation at 9 o'clock position. The peak velocity across the aortic valve is 2.7 m/sec. The mean gradient is 16 mmHg. The aortic valve acceleration time is 0.06 sec (normal < 0.1 sec).  Tricuspid Valve Mild tricuspid insufficiency is present. The right ventricular systolic pressure is approximated at 27.5 mmHg plus the right atrial pressure. Pulmonary artery systolic pressure is normal.  Pulmonic Valve The pulmonic valve is normal.  Vessels The aorta root is normal. Dilation of the inferior vena cava is present with abnormal respiratory variation in diameter. Unable to assess mean RA pressure given the patient is on a ventilator.  Pericardium No pericardial effusion is present.  Compared to Previous Study This study was compared with the study from 2.. . AoV gradients are slightly higher in the setting of a hyperdynamic left ventricle. ______________________________________________________________________________ MMode/2D Measurements & Calculations LVOT diam: 2.0 cm LVOT area: 3.1 cm2  Doppler Measurements & Calculations Ao V2 max: 266.0 cm/sec Ao max P.3 mmHg Ao V2 mean: 186.0 cm/sec Ao mean P.0 mmHg Ao V2 VTI: 47.6 cm YAMILET(I,D): 2.1 cm2 YAMILET(V,D): 2.3 cm2 Ao acc time: 0.06 sec LV V1 max PG: 15.5 mmHg LV V1 max: 197.0 cm/sec LV V1 VTI: 31.8 cm SV(LVOT): 99.9 ml SI(LVOT): 61.0 ml/m2 TR max ryan: 262.0 cm/sec TR max P.5 mmHg AV Ryan Ratio (DI): 0.74 YAMILET Index (cm2/m2): 1.3  ______________________________________________________________________________ Report approved by: Stephon Rice 2023 12:18 PM        CT Chest w/o Contrast    Result Date: 2/14/2023  EXAMINATION: CT CHEST W/O CONTRAST, 2/14/2023 11:27 AM CLINICAL HISTORY: hypoxia, near complete opacification of left lung on CXR COMPARISON: CT chest 2/10/2023. TECHNIQUE: Helical CT of the chest without contrast. Coronal, sagittal and axial MIP reformatted images obtained and reviewed. CONTRAST: None FINDINGS: Lines and tubes: Right IJ sheath in place. The [place]-Clarisa catheter has been removed. Endotracheal tube tip is just above the level of the darling. Enteric tube is at least at the level of the stomach. 2 apically oriented chest tubes and one basilar oriented chest tube in place. Left midline catheter terminates in the proximal subclavian vein. Lungs: The central tracheal bronchial tree is patent with a small amount of secretions/debris or along the endotracheal tube. Decreased right pleural effusion, stable left pleural effusion. No right-sided pneumothorax. Decreased left-sided pneumothorax. No appreciable hyperdensities within the pleural spaces suggest hemothorax. Abrupt ending of the left mainstem bronchus with distal air bronchograms, suggestive of left mainstem bronchial occlusion, likely from mucous plugging.. Compressive atelectasis adjacent to the right pleural effusion.. Complete left lung atelectasis, new from prior. Peribronchovascular groundglass and nodular opacities throughout the right lung, slightly increased from prior.. Nodules: -No enlarging or new suspicious pulmonary nodules. CHEST: Heart size is normal. Trace pericardial effusion. Aortic valve prosthesis. Ascending aortic and root replacement graft terminates at the apex of the aortic arch and the site of prior ascending aortic aneurysm. Dilated thoracic aortic arch measuring 4.3 cm in diameter is 2-D transaxial distal to the ascending aortic graft. The descending thoracic aorta has been replaced by a 34 mm Gelweave interposition graft from just distal to the left subclavian artery  extending to the celiac artery. Perigraft hematoma component along the descending aortic graft is slightly increased in size compared to prior likely reflecting interval increase in size of the intramural hematoma. There is a focal area of hyper attenuation within the hematoma, suggestive of residual back-bleeding from intercostals into this hematoma (series 2 image 42). Moderate atherosclerotic calcifications of the coronary arteries. The pulmonary artery are normal in caliber. Enlarged heterogeneous thyroid with numerous calcifications. Esophagus unremarkable. No appreciable thoracic lymphadenopathy, however it is difficult to assess on this noncontrast exam. No periaortic well-defined fluid collections to suggest aortic aneurysm rupture or pseudoaneurysm. Upper abdomen: Limited evaluation of the upper abdomen does not demonstrate any acute findings. Vicarious excretion of contrast into the gallbladder. Focal calcification in the posterior left lobe of the liver. Partially visualized  thoracoabdominal aortic graft. Small amount of free fluid in the upper abdomen. Bones: No suspicious osseous lesions. Degenerative changes of the spine. Transverse and longitudinal median sternotomy wires are intact. Acute nondisplaced anterolateral left fourth rib fracture, minimally displaced left fifth, displaced left sixth, and nondisplaced left seventh rib fractures. These are stable compared to prior. Soft tissue: Mild anasarca. Increased soft tissue thickening about the rib fractures, likely small adjacent hematoma.     IMPRESSION: 1. Abrupt termination of the left mainstem bronchus with new complete left lung atelectasis, likely secondary to mucous plugging. 2. Slightly increased size of the perigraft hematoma along the descending thoracic aortic 34 mm Gelweave interposition replacement graft with a small focal area of hyperdensity which may suggest continued bleeding into the hematoma. No evidence of aortic rupture or  pseudoaneurysm. 3. Mildly increased peribronchovascular groundglass and nodular opacities in the right lung likely represent pulmonary edema in this patient who appears volume overloaded. 4. Stable size of the left hydropneumothorax with decreased pneumothorax component. There is no hyperdensities within the effusion to suggest pneumothorax, however there was evidence of this on prior. 5. Decreased right-sided pleural effusion with unchanged associated compressive atelectasis. 6. Unchanged left rib fractures as described above, with associated hematoma in the adjacent left chest wall. 7. Expected post surgical changes of ascending aortic and thoracoabdominal aortic aneurysm repairs with multiple left-sided chest tubes and other devices as above. 8. Findings suggestive of volume overload, with bilateral pleural effusions, diffuse anasarca, small volume ascites, and trace pericardial effusion. 9. Other chronic findings as described above are not significantly changed. I have personally reviewed the examination and initial interpretation and I agree with the findings. ANITA DE LA ROSA MD   SYSTEM ID:  [id number]    CT Chest w/o Contrast    Result Date: 2/10/2023  EXAMINATION: CT CHEST W/O CONTRAST, 2/10/2023 1:04 PM CLINICAL HISTORY: S/p thoracoabdominal aortic repair with left heart bypass, weakness this morning COMPARISON: CT to 2/5/2023 TECHNIQUE: Helical CT of the chest without contrast. Coronal, sagittal and axial MIP reformatted images obtained and reviewed. CONTRAST: None FINDINGS: Lines and tubes: Right IJ [place]-Clarisa catheter tip is in the right main pulmonary artery. Endotracheal tube tip is just above the level of the darling. Enteric tube is at least at the level of the stomach. 2 apically oriented chest tubes and one basilar oriented chest tube in place. Right midline catheter terminates in the proximal subclavian vein. Lungs: The central tracheal bronchial tree is patent with a small amount of secretions/debris  or along the endotracheal tube. The segmental and subsegmental bronchi in the posterior lower lobes and abruptly as they enter the consolidative opacity, likely due to mucous plugging... Bilateral pleural effusions, right greater than left with small hemothorax component left base. Associated bilateral compressive atelectasis adjacent to the pleural effusions. The atelectasis on the left silhouettes the tortuous descending thoracic aorta. Small left pneumothorax. No right pneumothorax. Nodules: -No enlarging or new suspicious pulmonary nodules. CHEST: Heart size is normal. No pericardial effusion. Aortic valve prosthesis. New aortic stent graft at the apex of the aortic arch and the site of prior ascending aortic aneurysm. Aneurysmal ascending thoracic aorta measuring 4.3 cm in diameter is 2-D transaxial view proximal to the newly placed stent graft. The descending thoracic aorta is difficult to accurately measure on this noncontrast exam, however does not appear significantly changed in size compared to prior. Intramural hematoma component along the descending aorta. Moderate atherosclerotic calcifications of the coronary arteries. The pulmonary artery are normal in caliber. Enlarged heterogeneous thyroid with numerous calcifications. Esophagus unremarkable. No appreciable thoracic lymphadenopathy, however it is difficult to assess on this noncontrast exam. No periaortic hyperdensities are well-defined fluid collections to suggest aortic aneurysm rupture or pseudoaneurysm. Upper abdomen: Limited evaluation of the upper abdomen does not demonstrate any acute findings. Vicarious excretion of contrast into the gallbladder. Focal calcification in the posterior left lobe of the liver. Partially visualized thoracolumbar thoracoabdominal aortic graft. Bones: No suspicious osseous lesions. Degenerative changes of the spine. Transverse and longitudinal median sternotomy wires are intact. Acute nondisplaced anterolateral  left fourth rib fracture, minimally displaced left fifth, displaced left sixth, and nondisplaced left seventh rib fractures. There is approximately 6 mm posterior and 1.2 cm lateral displacement of the anterior sixth rib fracture fragment relative to its anteriormost aspect of the posterior fragment. Soft tissue: Subcutaneous emphysema about the left chest wall. Mild soft tissue edema in the posterior chest wall bilaterally.     IMPRESSION: 1. Expected postsurgical changes of ascending aortic and thoracoabdominal aortic aneurysm repairs, with small left hydropneumothorax, and small right pleural effusion. Multiple left-sided chest tubes are in place as described above. 2. No evidence for aortic rupture or pseudoaneurysm on this noncontrast exam but exam is limited due to lack of IV contrast. Intramural hematoma along the descending aorta and small left hemothorax. No emergent findings in the chest or abdomen, but close follow up suggested. 3. Bibasilar compressive atelectasis adjacent to the pleural effusions, left greater than right, with areas suspicious for mucous plugging bilaterally. Pulmonary toilet/bronchoscopy to the left lower lobe may be helpful 4. Posteriorly displaced left sixth rib fracture and nondisplaced/minimally displaced left fourth, fifth, and seventh rib fractures as described above. 5. 4.3 cm ascending aortic aneurysm in the 2-D transaxial view proximal to the newly placed stent graft. 6. Heterogeneous enlarged thyroid with scattered calcifications. I have personally reviewed the examination and initial interpretation and I agree with the findings. ANITA DE LA ROSA MD   SYSTEM ID:  [id number]    CT Head w/o Contrast    Result Date: 2/10/2023  CT HEAD W/O CONTRAST 2/10/2023 1:03 PM History: S/p thoracoabdominal aortic repair with left heart bypass, now with generalized weakness LE worsen than UE, not moving legs, c/f stroke Comparison: 11/5/2021 brain MR, 11/4/2021 head CT Technique: Using  multidetector thin collimation helical acquisition technique, axial, coronal and sagittal CT images from the skull base to the vertex were obtained without intravenous contrast.  (topogram) image(s) also obtained and reviewed. Findings: There is no acute loss of gray-white differentiation, intracranial hemorrhage, mass effect or midline shift. Prominent appearing focus of periventricular hypoattenuation abutting the right lateral ventricle is unchanged in appearance. Extensive degree of confluent periventricular hypoattenuation. Nonspecific calcifications of the basal ganglia bilaterally. Mild degree of cerebral parenchymal volume loss. Atherosclerotic calcifications of the vertebral arteries, basilar artery and carotid siphons. Orbits are unremarkable. Visualized portions of the paranasal sinuses and mastoid air cells are relatively clear.     Impression: 1. No acute intracranial pathology. 2. Extensive hypoattenuation throughout the periventricular white matter most likely represents the sequelae of chronic small vessel ischemic disease. 3. 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 I have personally reviewed the examination and initial interpretation and I agree with the findings. UYEN BOWEN MD   SYSTEM ID:  [id number]

## 2023-02-16 NOTE — PROGRESS NOTES
Neurocritical Care Progress Note    Reason for critical care admission: LE weakness  Admitting Team: ADELAIDA  Date of Service:  02/16/2023  Date of Admission:  2/4/2023  Hospital Day: 13    Assessment/Plan  Shelly Becerril is a 73 year old female with a past medical including ascending aortic aneurysm repair with aortic root reconstruction and bioprosthetic aortic valve replacement in 2018 who presented to [facility] ED on 2/3/2023 for evaluation and management of chest pain.  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  Per medical record review on admission she was neurologically intact. She was transferred to [facility] on 2/4/2023 for surgical consideration. On 2/8/2023 she underwent left thoracotomy for repair of the thoracoabdominal aortic aneurysm.     On 2/10/2023 at approximately 10 AM staff noted she was not moving her bilateral lower extremities.  Per vascular surgery resident the patient had not demonstrated hip flexion since OR.  After the reports of not moving bilateral lower extremities MAP goal was raised to greater than 90 and lumbar drain was increased.  NCC was consulted on 2/10/2023 for additional concerns regarding spinal ischemia status post thoracoabdominal aneurysm repair.     On exam today, the patient is following commands centrally and in the upper extremities. Her lower extremities have no spontaneous movement and there is no motor response to noxious stimulus. Per discussion with primary team, will work on obtaining spine imaging this afternoon.     Neuro  #Concern for spinal ischemia status post thoracoabdominal aneurysm repair   - MRI thoracic and lumbar spine without contrast (will provide additional recommendations once this has been completed)  - OK to normalize blood pressure goals from a neuro standpoint   - PT/OT as able      NCC will continue to follow for exam and  imaging. Please call use at [phone] with any additional questions or concerns.     The patient was seen and discussed with the NCC attending, Dr. Feliz.    Boom Mann MD  Neurology Resident, PGY-3  Ascom: [phone]    24 Hour Vital Signs Summary:  Temp: (!) 100.8  F (38.2  C) Temp  Min: 99.9  F (37.7  C)  Max: 101.3  F (38.5  C)  Resp: 16 Resp  Min: 14  Max: 19  SpO2: 98 % SpO2  Min: 90 %  Max: 100 %  Pulse: 112 Pulse  Min: 89  Max: 128    No data recorded   No data recorded.  No data recorded.    Respiratory monitoring:   Vent Mode: CMV/AC  (Continuous Mandatory Ventilation/ Assist Control)  FiO2 (%): 40 %  Resp Rate (Set): 14 breaths/min  Tidal Volume (Set, mL): 350 mL  PEEP (cm H2O): 10 cmH2O  Resp: 16    I/O last 3 completed shifts:  In: 2596.96 [P.O.:60; I.V.:306.96; NG/GT:1540]  Out: 2050 [Urine:1690; Drains:48; Stool:300; Chest Tube:12]    Current Medications:    acetaminophen  650 mg Oral or Feeding Tube Q8H     aspirin  81 mg Oral or NG Tube Daily     [Held by provider] atorvastatin  40 mg Oral Daily     ertapenem  1 g Intravenous Q24H     fiber modular  1 packet Per Feeding Tube TID     heparin ANTICOAGULANT  5,000 Units Subcutaneous Q8H     insulin aspart  1-6 Units Subcutaneous Q4H     mirtazapine  15 mg Oral or Feeding Tube At Bedtime     multivitamins w/minerals  15 mL Per Feeding Tube Daily     pantoprazole  40 mg Oral or Feeding Tube Daily     potassium chloride  20 mEq Oral BID     protein modular  1 packet Per Feeding Tube Daily     sodium chloride (PF)  3 mL Intracatheter Q8H     PRN Medications:  acetaminophen, bisacodyl, dextrose, dextrose, glucose **OR** dextrose **OR** glucagon, fentaNYL, hydrALAZINE, HYDROmorphone **OR** HYDROmorphone, lidocaine 4%, lidocaine (buffered or not buffered), magnesium hydroxide, methocarbamol, naloxone **OR** naloxone **OR** naloxone **OR** naloxone, ondansetron **OR** ondansetron, oxyCODONE **OR** oxyCODONE, polyethylene glycol, prochlorperazine **OR**  prochlorperazine, BETA BLOCKER NOT PRESCRIBED, senna-docusate, sodium chloride, sodium chloride (PF)    Infusions:    dexmedetomidine 0.2 mcg/kg/hr (02/16/23 1100)     dextrose       dextrose       norepinephrine 0.06 mcg/kg/min (02/16/23 1100)     BETA BLOCKER NOT PRESCRIBED       No Known Allergies    Physical Examination:  Vitals: BP (!) 152/74   Pulse 112   Temp (!) 100.8  F (38.2  C)   Resp 16   Ht 1.524 m (5')   Wt 65.5 kg (144 lb 6.4 oz)   SpO2 98%   BMI 28.20 kg/m    General: Adult female patient, lying in bed, intubated, NAD  HEENT: Normocephalic, atraumatic, no icterus, ETT in place   Cardiac: Appears adequately perfused.   Pulm: Symmetric chest rise   Extremities: Distal extremities appear adequately perfused.  Psych: Calm and cooperative.  Neuro:  Mental status: Awake, alert, attentive. Following commands with BUE. Exam is limited by ETT and language barrier.   Cranial nerves: Face appears symmetric with exam limited by ETT. No gaze preference.   Motor: Normal bulk. No abnormal movements. No movement of bilateral lower extremities.   Sensory: No movement to pain in BLE.  Coordination: Deferred.   Gait: Deferred.    Labs and Imaging: All relevant imaging and laboratory values personally reviewed.

## 2023-02-16 NOTE — PROGRESS NOTES
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 Briceno on 2/8/23. Course complicated by ischemic spinal cord injury.     TODAY'S PROGRESS:   MRI T/L spines today   MAP goal to 65, discussed with Vascular/neurocritical   Decrease PEEP  BLE US to eval for DVT, possible cause of Fevers   Remove chest tube     PLAN:  Neuro/ pain/ sedation:  # Depression--mirtazapine resumed   # Acute postoperative pain  # Sedation  - Pain: Scheduled Tylenol 650mg every 8 hours   -  PRN: Tylenol, oxycodone (5 mg total), Dilaudid- 0.2 IVP dilaudid PRN fentanyl-- 200mcg (for procedure yesterday)     # Hx stroke (2019)  - Chronic dysarthria and dysphagia, s/p warfarin transitioned to Eliquis in 2021, unclear if taking  - Question of residual LE weakness PTA--> will confer with family today.      # LE Weakness, incomplete spinal cord schemia in s/p thoracoabdominal aneurysm repair  On, 2/10 unable to move b/l LE, no sensation, dusky discoloration hands R>L, multiphasic Dopplers in R & L ulnar artery & brachial arch. On 2/11 /follow commands b/l UE, started withdrawing b/l LE. On 2/12 intermittently withdrawing b/l LE. On 2/13 moved b/l toes to command for 1st time in 4 days. On 2/14 continues withdrawing from pain b/l LE and moving L toes to command.  - Neurocrit followed  lumbar drain, goal ICP <10, through 2/15.   - Lumbar drain removed by anesthesia 2/15   - MAP goal  increased back to 85 overnight, after discussion will decrease back to 65.   - Weaned off Angiotensin 2 and all pressors since 2/12, restarted NE 0.01-0.06 on 2/14, pressor restarted overnight  - Neurocrit rec MRI T/L w/o contrast.   - Precedex gtt, wean for neuro exams    Pulmonary care:   # Acute respiratory failure   # Pneumonia, hospital acquired   Vent Mode: CMV/AC  (Continuous Mandatory Ventilation/ Assist Control)  FiO2 (%): 40 %  Resp Rate (Set): 14 breaths/min  Tidal Volume (Set, mL): 350 mL  PEEP (cm H2O): 10 cmH2O  Resp: 19     - Pa/Fi  352.5, paO2: 141   - Goal SpO2 > 92%. IS q15-30 minutes when awake.  - S/p bronch 2/11, small old blood LLL, small mucus b/l suctioned  - CT Chest 2/14 complete LL atelectasis 2/2 mucus plugging, mild pulmonary edema R lung  - S/p bronch 2/14, removed mucus plug in L main bronchus, LLL, and HILLARY  - CXR 2/15 decreased aeration LLL, persistent opacities  - Bronch 2/15, cleared out mucus plugs in HILLARY and LLL  --3% NS nebs prn, intrapulmonary percussive therapy  - PEEP-10, chest physio, consider R side down positioning or APRV  - Basilar CT, will plan to remove today     Cardiovascular:    # Cardiogenic shock - S/p hydrocortisone 50 mg q6h (2/11-2/13)  # HTN  # Ascending aortic aneurysm s/p ascending repair with aortic root reconstruction  # Bioprosthetic aortic valve in 2018  # Relative hypotension  Pre CPBP: LV/RV normal, 55-60%. Mild MR w/ central jet from A2/P2. Mild TR.  Post CPBP: LV normal, 55-60%. RV mildly reduced. Mild AS, trace AI. Mild MR. Mild TR.  - Weaned off Angiotensin 2 and all pressors since 2/12, restarted NE 0.01-0.06 on 2/15 for MAP goals  - Lumbar drain removed, so liberalize MAP goals to >65  - PRN Hydralazine for SBP >160  - Hold PTA metoprolol succinate 50 mg, lisinopril 40 mg, atorvastatin 40 mg  - ASA 81 mg     GI care / Nutrition:   # concern for protein calorie malnutrition  - TF at goal of 30cc/hr  - PPI  - Bowel regimen: MiraLAX, senna, hold with  diarrhea  - Rectal tube--possible removal   - Fiber Banatrol      Renal / Fluids / Electrolytes:   # Hypokalemia, replaced   BL creat appears to be ~ 0.9-1.0  - Strict I/O, daily weights, avoid/limit nephrotoxins  - Replete lytes PRN per protocol  - Scheduled K 20 mEq BID    # hypernatermia  - Free water 100 q2h (deficit 1.6L with calculator)  - reassess  2/17/23     Endocrine:    # Stress hyperglycemia  Preop A1c 4.7  - Med sliding scale insulin--1 unit aspart in past 24 hours   - Goal BG <180 for optimal healing     ID / Antibiotics:  # Pneumonia, Enterobacter cloacae (Resp Cx 2/10)  - Monitor fever curve, WBC, and inflammatory markers as appropriate  - 2/10 Resp culture:  Resp Cx positive for Enterobacter cloacae. Zosyn 2/12-2/13, susceptibilities returned and switched ceftriaxone 2/13. Fevers 2/14, switched to ertapenem 2/15  - 2/15: BAL sent, results pending.     Cultures:  - 2/10: Resp Cx positive for Enterobacter cloacae. Zosyn 2/12-2/13, susceptibilities returned and switched ceftriaxone 2/13. Fevers 2/14, switched to ertapenem     Heme:     # Acute blood loss anemia  # Acute blood loss thrombocytopenia  No s/sx active bleeding  - CBC daily, Hgb goal >7  - SQH     MSK / Skin:  # Sternotomy  # Surgical Incision  - Sternal precautions, postop incision management protocol  - PT/OT/CR  - PM&R consult for rehab- see noted 2/15/23  - PRAFO  boots     Prophylaxis:     - Mechanical DVT ppx  - Chemical DVT ppx: SQH  - PPI     Lines / Tubes / Drains:  - ETT  - RIJ CVC  - Arterial Lines - left axillary  - CTs x1 keep basilar CT  - Black  - NJ     Disposition:  - CVICU     Patient seen, findings and plan discussed with CV ICU staff, Dr. Leos.    Kashmir Cifuentes PA-C      ====================================  SUBJECTIVE:  Course reviewed. Loss of BLE overnight. MAPS increased back to 85. This am, pt awake follow commands consistently when asked. Unable to move BLE, does nod when asked if she had sensation in legs.  Motioning to have restraints and ETT removed.     Denies fever or chills.     OBJECTIVE:   1. VITAL SIGNS:   Temp:  [99.9  F (37.7  C)-101.3  F (38.5  C)] 100.2  F (37.9  C)  Pulse:  [] 120  Resp:  [14-19] 19  MAP:  [55 mmHg-119 mmHg] 76 mmHg  Arterial Line BP: ()/() 106/53  FiO2 (%):  [40 %] 40 %  SpO2:  [90 %-100 %] 98 %  Vent Mode: CMV/AC  (Continuous Mandatory Ventilation/ Assist Control)  FiO2 (%): 40 %  Resp Rate (Set): 14 breaths/min  Tidal Volume (Set, mL): 350 mL  PEEP (cm H2O): 10 cmH2O  Resp: 19      2. INTAKE/ OUTPUT:   I/O last 3 completed shifts:  In: 2596.96 [P.O.:60; I.V.:306.96; NG/GT:1540]  Out: 2050 [Urine:1690; Drains:48; Stool:300; Chest Tube:12]    3. PHYSICAL EXAMINATION:   General: sitting up in bed, awake, alert,  Neuro:  follows commands consistently. BUE with 5/5  strength, no spontaneously movement in BLE. Sensation intact in BLE, pt unable to dorsi or plantar flex feet   Chest: incision edges well approximated, no warmth or purulence noted, TTP along incision site. CT present and secured.   Resp: BBS, decreased at bases   CV: RRR, S1S2  Abdomen: Soft, Non-distended, Non-tender.   Extremities: warm and well perfused, pulses 2+. Generalized edema in extremities     4. INVESTIGATIONS:   Arterial Blood Gases   Recent Labs   Lab 02/16/23  0358 02/15/23  0412 02/14/23  0316 02/13/23  0341   PH 7.47* 7.49* 7.51* 7.49*   PCO2 37 38 38 38   PO2 141* 79* 154* 114*   HCO3 27 29* 30* 28     Complete Blood Count   Recent Labs   Lab 02/16/23  0358 02/15/23  0413 02/14/23  0324 02/13/23  0341   WBC 12.7* 11.6* 10.8 8.1   HGB 9.9* 10.1* 11.2* 11.3*    200 155 112*     Basic Metabolic Panel  Recent Labs   Lab 02/16/23  0400 02/16/23  0358 02/15/23  2333 02/15/23  2002 02/15/23  0900 02/15/23  0413 02/14/23  2040 02/14/23  1633 02/14/23  0824 02/14/23  0818 02/14/23  0324   NA  --  147*  --   --   --  150*  --  150*  --   --  148*   POTASSIUM  --  4.7  --   --   --  3.7  --   3.6  --  3.8 3.2*   CHLORIDE  --  113*  --   --   --  115*  --  115*  --   --  113*   CO2  --  24  --   --   --  25  --  26  --   --  25   BUN  --  26.1*  --   --   --  25.8*  --  24.8*  --   --  25.5*   CR  --  0.73  --   --   --  0.67  --  0.64  --   --  0.67   * 154* 101* 130*   < > 141*   < > 139*   < >  --  135*    < > = values in this interval not displayed.     Liver Function Tests  Recent Labs   Lab 02/16/23  0358 02/15/23  0413 02/14/23  0324 02/13/23  0341   * 145* 161* 202*   ALT 85* 84* 71* 68*   ALKPHOS 176* 186* 170* 161*   BILITOTAL 0.5 0.4 0.4 0.5   ALBUMIN 2.4* 2.3* 2.4* 2.4*     Pancreatic Enzymes  No lab results found in last 7 days.  Coagulation Profile  No lab results found in last 7 days.      5. RADIOLOGY:   Recent Results (from the past 24 hour(s))   XR Chest Port 1 View    Narrative    Exam: XR CHEST PORT 1 VIEW, 2/15/2023 3:47 PM    Comparison: Same day chest x-ray at 0047 hours    History: post-CT removal    Findings:  Single portable AP view of the chest the patient at 30 degrees.  Interval removal of two left chest tubes. There is a left chest tube  in stable position. Stable surgical changes of the chest with intact  median sternotomy wires. Enteric tube course of the stomach and out of  the field-of-view. Right IJ sheath catheter changed in position.  Prosthetic cardiac valve.    Trachea midline. Cardiac silhouette remains obscured. Similar streaky  right basilar opacities. Unchanged retrocardiac opacity. Mildly  improved aeration of the left lung. No appreciable pneumothorax. No  right pleural effusion. Known rib fractures. No new osseous  abnormality.      Impression    Impression:   1. Interval removal of two left chest tubes. Left basal chest tube in  stable position. No pneumothorax.  2. Improved aeration of left lung with residual opacification.  3. Similar right basilar streaky opacities, atelectasis/edema.    I have personally reviewed the examination and initial  interpretation  and I agree with the findings.    PRATIK RESTREPO MD         SYSTEM ID:  [id number]       =========================================

## 2023-02-16 NOTE — PROGRESS NOTES
Major Shift Events:  Opens eyes spontaneously, follows commands, 4/5 strength in BUE, 0/5 BLE. Pt reports feeling sensation in BLE however does not withdraw. Precedex gtt @ 0.2 for agitation. 5mg Oxycodone x 1 for abdominal pain. MRI done. ST. T-max 101.5. MAP >65. Pt pressure supporting on 12/8, tolerating well. TF @ goal, 100cc FWF Q2H. Rectal tube, minimal output. Black.   Plan: Continue PS, extubate when pt is ready.   For vital signs and complete assessments, please see documentation flowsheets.

## 2023-02-16 NOTE — PROGRESS NOTES
Brief note:    Called by RN regarding change in her neuro status at 2355. Examined the patient at bedside around 0005. Lumbar drain was removed earlier in the day around noon. Pt had been following commands and was moving BUE. She had some resistance and was withdrawing in BLE. MAP goals were changed from 90 to >65. At about 2345, pt had a sudden change in neuro status where she was not moving or withdrawing to noxious stimuli in BLE. She had sensation but no motor strength. She was following commands and was moving everything in BUE. MAPs were in 80-85.     Discussed with NCC and recommended to continue monitor and stay the course.     Discussed with CVTS fellow who spoke to the staff- recommended to keep MAP goal >85.    Sergio Boggs MD  CVTS [facility]

## 2023-02-17 NOTE — PROGRESS NOTES
Neurocritical Care Progress Note    Reason for critical care admission: Aortic dissection   Admitting Team: ADELAIDA  Date of Service:  02/17/2023  Date of Admission:  2/4/2023  Hospital Day: 14    Assessment/Plan  Shelly Becerril is a 73 year old female with a past medical including ascending aortic aneurysm repair with aortic root reconstruction and bioprosthetic aortic valve replacement in 2018 who presented to [facility] ED on 2/3/2023 for evaluation and management of chest pain.  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  Per medical record review on admission she was neurologically intact. She was transferred to [facility] on 2/4/2023 for surgical consideration. On 2/8/2023 she underwent left thoracotomy for repair of the thoracoabdominal aortic aneurysm.     On 2/10/2023 at approximately 10 AM staff noted she was not moving her bilateral lower extremities.  Per vascular surgery resident the patient had not demonstrated hip flexion since OR.  After the reports of not moving bilateral lower extremities MAP goal was raised to greater than 90 and lumbar drain was increased.  NCC was consulted on 2/10/2023 for additional concerns regarding spinal ischemia status post thoracoabdominal aneurysm repair.     The patient is following commands centrally and in the upper extremities. Her lower extremities have no spontaneous movement and there is no motor response to noxious stimulus. MR T/L confirming spinal cord infarct. Suspect this was a complication of her aortic aneurysm repair, therefore, no further interventions from a stroke standpoint need to be completed. Unfortunately, there's no way to reverse damage to the spinal cord. Will remain paraplegic.      Neuro  #Mid thoracic spinal cord infarct  #Parapalegia   #At risk for autonomic dysreflexia    -Goal normotension    -Spinal cord rehab, PT/OT as able       NCC will sign off. Please call use at [phone] with any additional questions or concerns.     The patient was seen and discussed with the NCC attending, Dr. Chowdhury.    Barbara SHOOK CNP  Neurocritical care nurse practitioner  Pager: [phone]  Ascom: [phone] available M- 0700 to 1700    24 Hour Vital Signs Summary:  Temp: 99.9  F (37.7  C) Temp  Min: 99.5  F (37.5  C)  Max: 101.1  F (38.4  C)  Resp: 18 Resp  Min: 16  Max: 20  SpO2: 97 % SpO2  Min: 92 %  Max: 100 %  Pulse: 92 Pulse  Min: 90  Max: 126  BP: 108/46 Systolic (24hrs), Av , Min:102 , Max:188   Diastolic (24hrs), Av, Min:42, Max:87    Respiratory monitoring:   Vent Mode: CMV/AC  (Continuous Mandatory Ventilation/ Assist Control)  FiO2 (%): 40 %  Resp Rate (Set): 14 breaths/min  Tidal Volume (Set, mL): 350 mL  PEEP (cm H2O): 8 cmH2O  Pressure Support (cm H2O): 12 cmH2O  Resp: 18    I/O last 3 completed shifts:  In: 3326.21 [I.V.:1046.21; NG/GT:1670]  Out: 2150 [Urine:1600; Stool:550]    Current Medications:    acetaminophen  650 mg Oral or Feeding Tube Q8H     aspirin  81 mg Oral or NG Tube Daily     [Held by provider] atorvastatin  40 mg Oral Daily     ceFEPIme  2 g Intravenous Q8H     fiber modular  1 packet Per Feeding Tube TID     heparin ANTICOAGULANT  5,000 Units Subcutaneous Q8H     insulin aspart  1-6 Units Subcutaneous Q4H     mirtazapine  15 mg Oral or Feeding Tube At Bedtime     multivitamins w/minerals  15 mL Per Feeding Tube Daily     pantoprazole  40 mg Oral or Feeding Tube Daily     potassium chloride  20 mEq Oral BID     protein modular  1 packet Per Feeding Tube Daily     sodium chloride (PF)  3 mL Intracatheter Q8H     sulfamethoxazole-trimethoprim  320 mg Intravenous Q8H     PRN Medications:  acetaminophen, bisacodyl, dextrose, dextrose, glucose **OR** dextrose **OR** glucagon, fentaNYL, hydrALAZINE, HYDROmorphone **OR** HYDROmorphone, lidocaine 4%, lidocaine (buffered or not buffered), magnesium hydroxide,  methocarbamol, naloxone **OR** naloxone **OR** naloxone **OR** naloxone, ondansetron **OR** ondansetron, oxyCODONE **OR** oxyCODONE, polyethylene glycol, prochlorperazine **OR** prochlorperazine, BETA BLOCKER NOT PRESCRIBED, senna-docusate, sodium chloride, sodium chloride (PF)    Infusions:    dexmedetomidine 0.2 mcg/kg/hr (02/17/23 0618)     dextrose       dextrose       norepinephrine Stopped (02/17/23 0630)     BETA BLOCKER NOT PRESCRIBED       No Known Allergies    Physical Examination:  Vitals: /46   Pulse 92   Temp 99.9  F (37.7  C)   Resp 18   Ht 1.524 m (5')   Wt 66.4 kg (146 lb 6.2 oz)   SpO2 97%   BMI 28.59 kg/m    General: Adult female patient, lying in bed, intubated, NAD  HEENT: Normocephalic, atraumatic, no icterus, ETT in place   Cardiac: Appears adequately perfused.   Pulm: Symmetric chest rise   Extremities: Distal extremities appear adequately perfused.  Psych: Calm and cooperative.  Neuro:  Mental status: Awake, alert, attentive. Following commands with BUE. Exam is limited by ETT and language barrier.   Cranial nerves: Face appears symmetric with exam limited by ETT. No gaze preference.   Motor: Normal bulk. No abnormal movements. No movement of bilateral lower extremities.   Sensory: No movement to pain in BLE.  Coordination: Deferred.   Gait: Deferred.    Labs and Imaging:     All relevant imaging and laboratory values personally reviewed.

## 2023-02-17 NOTE — PROGRESS NOTES
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 Briceno on 2/8/23. Course complicated by ischemic spinal cord injury.     TODAY'S PROGRESS:   Wean sedation as able   Bronchoscopy later today   Increase PS support   Stop schedule potassium   Remove arterial line     PLAN:  Neuro/ pain/ sedation:  # Depression--mirtazapine resumed   # Acute postoperative pain  # Sedation  - Pain: Scheduled Tylenol 650mg every 8 hours   -  PRN: Tylenol, oxycodone prn, and Dilaudid-IVP prn     # Hx stroke (2019)  - Chronic dysarthria and dysphagia, s/p warfarin transitioned to Eliquis in 2021, unclear if taking  - Question of residual LE weakness PTA.      # Spinal cord ischemia, Paraplegia  - On, 2/10 unable to move b/l LE, no sensation, dusky discoloration hands R>L, multiphasic Dopplers in R & L ulnar artery & brachial arch. On 2/11 /follow commands b/l UE, started withdrawing b/l LE. On 2/12 intermittently withdrawing b/l LE. On 2/13 moved b/l toes to command for 1st time in 4 days. On 2/14 continues withdrawing from pain b/l LE and moving L toes to command.  - Neurocrit followed  lumbar drain               - Lumbar drain removed by anesthesia 2/15               - MAP goal 65.   - Neurocrit rec MRI T/L w/o contrast.   - Precedex gtt- rate 0.2- wean as able.   - MRI TL spine with infarct from T10. Discussed with NCC, no indication for additional imaging, plan for  spinal cord rehab.      Pulmonary care:   # Acute respiratory failure   # Pneumonia, hospital acquired   Vent Mode: CMV/AC  (Continuous Mandatory Ventilation/ Assist Control)  FiO2 (%): 40 %  Resp Rate (Set): 14 breaths/min  Tidal Volume (Set, mL): 350 mL  PEEP (cm H2O): 8 cmH2O  Pressure Support (cm H2O): 12 cmH2O  Resp: 18    - S/p bronch 2/11, small old blood LLL, small mucus b/l suctioned  - CT Chest 2/14 complete LL atelectasis 2/2 mucus plugging, mild pulmonary edema R lung  - S/p bronch 2/14, removed mucus plug in L main bronchus, LLL, and HILLARY  - CXR 2/15 decreased aeration LLL, persistent opacities  - Bronch 2/15, cleared out mucus plugs in HILLARY and LLL  - 3% NS nebs prn, intrapulmonary percussive therapy  - PEEP- 8,  Chest PT   - PS this am 7/8 with tachypnea and lower VT;s     Cardiovascular:    # Cardiogenic shock -S/p hydrocortisone 50 mg q6h (2/11-2/13)  # Hx of hypertension   # Ascending aortic aneurysm s/p ascending repair with aortic root reconstruction  # Bioprosthetic aortic valve in 2018  # Relative hypotension  Pre CPBP: LV/RV normal, 55-60%. Mild MR w/ central jet from A2/P2. Mild TR.  Post CPBP: LV normal, 55-60%. RV mildly reduced. Mild AS, trace AI. Mild MR. Mild TR.  - PRN Hydralazine for SBP   - Hold PTA metoprolol succinate 50 mg, lisinopril 40 mg, atorvastatin 40 mg  -  ontinue with ASA 81 mg     GI care/Nutrition:   # concern for protein calorie malnutrition  - TF-30cc/hr. Free water flushes 60mL q 4 hrs   - Bowel regimen: MiraLAX, senna, hold with diarrhea  - Fiber and Banatrol      Renal / Fluids / Electrolytes:   # Hypokalemia, replaced   BL creat appears to be ~ 0.9-1.0  - Replete lytes PRN per protocol  - stop schedule K      # hypernatremia, resolved   - Na now 139, decrease free water to 60 ml every 4 hrs     Endocrine:    # Stress hyperglycemia  Preop A1c 4.7  - Med sliding scale insulin  - Goal BG <180 for optimal healing     ID / Antibiotics:  # Pneumonia, poly microbial-->  Stenotrophomonas and enterobacter   - 2/10 Resp culture:  Resp Cx positive for Enterobacter cloacae. Zosyn 2/12-2/13, susceptibilities returned and switched ceftriaxone 2/13. Fevers 2/14, switched to ertapenem 2/15  - 2/1: BAL: Stenotrophomonas and enterobacter   - ID consulted, spoke with ID  2/16/23--> Bactrim IV and change to cefepime IV    - plan for formal note today.     Cultures:  - 2/10: Resp Cx positive for Enterobacter cloacae.   - 2/14: Steno and enterobacter     Heme:     # Acute blood loss anemia  # Acute blood loss thrombocytopenia  - CBC daily, Hgb goal >7  - SQH     MSK / Skin:  # Sternotomy  # Surgical Incision  # Spinal cord ischemia, paraplegia   - Sternal precautions, postop incision management protocol  - PT/OT/CR  - PM&R consult for rehab- see noted 2/15/23- PRAFO  boots  - will need to reconsult for spinal cord rehab      Prophylaxis:     - Mechanical DVT and Chemical DVT ppx: SQH  - PPI     Lines / Tubes / Drains:  - ETT  - RIJ CVC  - Black  - NJ  - Rectal tube      Disposition:  - [facility]     Patient seen, findings and plan discussed with CV ICU staff, Dr. Leos.    Kashmir Cifuentes PA-C      ====================================  SUBJECTIVE:  Course reviewed. PST until midnight then returned to vent due to tachypnea and low Vt's this am. Per RN, BP's labile at times, does not correspond to any state of agitation or activity. Patient follows simple commands, unable to move lower extremities. Reports pain at incisional site.     OBJECTIVE:   1. VITAL SIGNS:   Temp:  [99.5  F (37.5  C)-101.1  F (38.4  C)] 99.9  F (37.7  C)  Pulse:  [] 92  Resp:  [16-20] 18  BP: (102-188)/(42-87) 108/46  MAP:  [55 mmHg-141 mmHg] 64 mmHg  Arterial Line BP: ()/(26-96) 95/44  FiO2 (%):  [40 %] 40 %  SpO2:  [92 %-100 %] 97 %  Vent Mode: CMV/AC  (Continuous Mandatory Ventilation/ Assist Control)  FiO2 (%): 40 %  Resp Rate (Set): 14 breaths/min  Tidal Volume (Set, mL): 350 mL  PEEP (cm H2O): 8 cmH2O  Pressure  Support (cm H2O): 12 cmH2O  Resp: 18    2. INTAKE/ OUTPUT:   I/O last 3 completed shifts:  In: 3326.21 [I.V.:1046.21; NG/GT:1670]  Out: 2150 [Urine:1600; Stool:550]    3. PHYSICAL EXAMINATION:   General: calms, opens eyes to normal tone of voice, follows simple commands correctly.  Neuro: A&Ox3, follow simple commands. BUE with  strength 5/5, BLE with no movement.   Chest: BBS, decreased left side breath sounds.   CV: RRR, S1/S2.   Abdomen: abdomen soft and compressible, incision edges well approximated, no redness or warmth  Extremities: warm and well perfused, pulses 2+    4. INVESTIGATIONS:   Arterial Blood Gases   Recent Labs   Lab 02/17/23  0232 02/16/23  0358 02/15/23  0412 02/14/23  0316   PH 7.46* 7.47* 7.49* 7.51*   PCO2 37 37 38 38   PO2 105 141* 79* 154*   HCO3 26 27 29* 30*     Complete Blood Count   Recent Labs   Lab 02/17/23  0233 02/16/23  0358 02/15/23  0413 02/14/23  0324   WBC 13.1* 12.7* 11.6* 10.8   HGB 8.7* 9.9* 10.1* 11.2*    275 200 155     Basic Metabolic Panel  Recent Labs   Lab 02/17/23  0431 02/17/23  0233 02/17/23  0011 02/16/23 2005 02/16/23  0400 02/16/23 0358 02/15/23  0900 02/15/23  0413 02/14/23  2040 02/14/23  1633   NA  --  139  --   --   --  147*  --  150*  --  150*   POTASSIUM  --  4.9  --   --   --  4.7  --  3.7  --  3.6   CHLORIDE  --  109*  --   --   --  113*  --  115*  --  115*   CO2  --  22  --   --   --  24  --  25  --  26   BUN  --  26.7*  --   --   --  26.1*  --  25.8*  --  24.8*   CR  --  0.77  --   --   --  0.73  --  0.67  --  0.64   * 100* 125* 124*   < > 154*   < > 141*   < > 139*    < > = values in this interval not displayed.     Liver Function Tests  Recent Labs   Lab 02/17/23  0233 02/16/23  0358 02/15/23  0413 02/14/23  0324   * 117* 145* 161*   * 85* 84* 71*   ALKPHOS 172* 176* 186* 170*   BILITOTAL 0.4 0.5 0.4 0.4   ALBUMIN 2.1* 2.4* 2.3* 2.4*     Pancreatic Enzymes  No lab results found in last 7 days.  Coagulation Profile  No  lab results found in last 7 days.      5. RADIOLOGY:   Recent Results (from the past 24 hour(s))   US Lower Extremity Venous Duplex Bilateral    Narrative    EXAMINATION: DOPPLER VENOUS ULTRASOUND OF BILATERAL LOWER EXTREMITIES,  2/16/2023 1:27 PM     COMPARISON: None.    HISTORY: Persistent fevers, prolonged bedrest; rule out DVT    TECHNIQUE:  Gray-scale evaluation with compression, spectral flow and  color Doppler assessment of the deep venous system of both legs from  groin to knee, and then at the ankles.    FINDINGS:  In both lower extremities, the common femoral, femoral, popliteal and  posterior tibial veins demonstrate normal compressibility and blood  flow.      Impression    IMPRESSION: No evidence of deep venous thrombosis in either lower  extremity.    JELENA BROOKS MD         SYSTEM ID:  [id number]   MR Thoracic Spine w/o Contrast   Result Value    Radiologist flags Spinal cord infarction (Urgent)    Narrative    Thoracic and Lumbar spine MRI without contrast    History: Paralaysis BLE.    Comparison: Chest CT 2/14/2023.    Technique:  Axial T2-weighted, and sagittal T1, STIR, and T2-weighted  images of the lumbar spine without intravenous contrast. Sagittal T1,  STIR, and T2-weighted images of the thoracic spine without contrast  were obtained, with axial T2-weighted images through levels of  interest in the thoracic spine.     Findings:  Thoracic Spine:    Increased T2 signal centrally within the central cord extending from  approximately T10 through the conus medullaris with associated  diffusion restriction on DWI/ADC images.. Alignment of the thoracic  vertebra appears within normal limits. Vertebral body hemangioma at  T4. Normal vertebral body heights. Slightly T1 hyperintense/slightly  T2 hypointense signal within the thecal sac from approximately T9-L4.  No associated mass effect on the thecal sac to suggest an epidural  component.    Lumbar Spine:  There are 5 type lumbar vertebra, used for  the purposes of this  dictation.  The tip of the conus is somewhat obscured. The alignment  of the lumbar spine is unremarkable. Multilevel degenerative disc  disease, which is most notable at L4-5 with associated endplate signal  changes. Please see above for separately dictated findings involving  the thecal sac.    On a level by level basis, the findings are as follows:  L1-2: Posterior disc bulge with no significant spinal canal stenosis.  Mild left neural foraminal narrowing. No significant right neural  foraminal narrowing. Facet arthrosis.    L2-3:  Posterior disc bulge with facet arthropathy resulting in  moderate spinal canal stenosis. No significant right neural foraminal  narrowing. Mild left neural foraminal narrowing. The disc bulge  appears to abut the exiting bilateral L2 nerve roots in the  extraforaminal zones. Facet arthropathy.    L3-4:  Asymmetric left disc bulge resulting in mild spinal canal  stenosis. This disc bulge results in mild left neural foraminal  narrowing and abuts the exiting left L3 nerve root in the extra  foraminal zone. Facet arthrosis.    L4-5:  Asymmetric right disc bulge. Mild spinal canal stenosis. Right  asymmetric disc bulge resulting in moderate to severe right neural  foraminal narrowing and abutment of the exiting L4 nerve roots at the  right foraminal zone. No significant left neural foraminal narrowing.  Right greater than left facet arthropathy.    L5-S1:  Asymmetric right disc bulge. No significant spinal canal  stenosis. Moderate right neural foraminal narrowing. Mild left neural  foraminal narrowing. The disc bulge abuts the exiting L5 nerve root on  the right.    The visualized paraspinous tissues are within normal limits. Layering  right-sided pleural effusion. Loculated collection along the left  posterior pleura measuring 2.7 x 1.0 cm (series 101, image 26),  unchanged compared to prior CT. Partially visualized aneurysmal  descending thoracic aorta. Mixed  consolidative/atelectatic changes of  the left lung parenchyma similar to prior. Partially visualized chest  tube extending along the left posterior pleura.      Impression    Impression:   1. Myelopathic signal extending from approximately T10 through the  conus medullaris with associated diffusion restriction. Findings  compatible with cord infarction related to the artery of Adamkiewicz  perfusion territory.  2. Slightly T1 hyperintense/T2 hypointense diffuse signal changes of  the CSF from approximately T9-L4. Findings are indeterminant and its  difficult to exclude a neoplastic process - recommend repeat  evaluation of the thoracic and lumbar spine with contrast. Findings  are felt less likely to represent an intrathecal hematoma.  3. Moderate to severe lumbar spondylosis as detailed above. This  results in moderate spinal canal stenosis at L2-3 and multilevel  neural foraminal narrowing as detailed above.  4. Partially visualized left-sided hydropneumothorax with mixed  consolidative/atelectatic changes of the left lung parenchyma. Small  loculated effusion on the left measuring up to 2.7 x 1.0 cm.   5. Layering right-sided pleural effusion similar to prior.  6. Partially visualized descending thoracic aneurysmal change.    [Urgent Result: Spinal cord infarction]    Finding was identified on 2/16/2023 4:47 PM.     Dr. Vinson was contacted by Dr. Uriostegui at 2/16/2023 5:25 PM and  verbalized understanding of the urgent finding.     Following discussion with Dr. Whitfield the findings regarding the thecal  sac signal changes were discussed with Dr. Ward at 9:17 PM on  2/16/2023. Plans for repeat imaging with contrast.     I have personally reviewed the examination and initial interpretation  and I agree with the findings.    MARIANNA WHITFIELD MD         SYSTEM ID:  [id number]   MR Lumbar Spine w/o Contrast   Result Value    Radiologist flags Spinal cord infarction (Urgent)    Narrative    Thoracic and Lumbar spine MRI  without contrast    History: Paralaysis BLE.    Comparison: Chest CT 2/14/2023.    Technique:  Axial T2-weighted, and sagittal T1, STIR, and T2-weighted  images of the lumbar spine without intravenous contrast. Sagittal T1,  STIR, and T2-weighted images of the thoracic spine without contrast  were obtained, with axial T2-weighted images through levels of  interest in the thoracic spine.     Findings:  Thoracic Spine:    Increased T2 signal centrally within the central cord extending from  approximately T10 through the conus medullaris with associated  diffusion restriction on DWI/ADC images.. Alignment of the thoracic  vertebra appears within normal limits. Vertebral body hemangioma at  T4. Normal vertebral body heights. Slightly T1 hyperintense/slightly  T2 hypointense signal within the thecal sac from approximately T9-L4.  No associated mass effect on the thecal sac to suggest an epidural  component.    Lumbar Spine:  There are 5 type lumbar vertebra, used for the purposes of this  dictation.  The tip of the conus is somewhat obscured. The alignment  of the lumbar spine is unremarkable. Multilevel degenerative disc  disease, which is most notable at L4-5 with associated endplate signal  changes. Please see above for separately dictated findings involving  the thecal sac.    On a level by level basis, the findings are as follows:  L1-2: Posterior disc bulge with no significant spinal canal stenosis.  Mild left neural foraminal narrowing. No significant right neural  foraminal narrowing. Facet arthrosis.    L2-3:  Posterior disc bulge with facet arthropathy resulting in  moderate spinal canal stenosis. No significant right neural foraminal  narrowing. Mild left neural foraminal narrowing. The disc bulge  appears to abut the exiting bilateral L2 nerve roots in the  extraforaminal zones. Facet arthropathy.    L3-4:  Asymmetric left disc bulge resulting in mild spinal canal  stenosis. This disc bulge results in mild  left neural foraminal  narrowing and abuts the exiting left L3 nerve root in the extra  foraminal zone. Facet arthrosis.    L4-5:  Asymmetric right disc bulge. Mild spinal canal stenosis. Right  asymmetric disc bulge resulting in moderate to severe right neural  foraminal narrowing and abutment of the exiting L4 nerve roots at the  right foraminal zone. No significant left neural foraminal narrowing.  Right greater than left facet arthropathy.    L5-S1:  Asymmetric right disc bulge. No significant spinal canal  stenosis. Moderate right neural foraminal narrowing. Mild left neural  foraminal narrowing. The disc bulge abuts the exiting L5 nerve root on  the right.    The visualized paraspinous tissues are within normal limits. Layering  right-sided pleural effusion. Loculated collection along the left  posterior pleura measuring 2.7 x 1.0 cm (series 101, image 26),  unchanged compared to prior CT. Partially visualized aneurysmal  descending thoracic aorta. Mixed consolidative/atelectatic changes of  the left lung parenchyma similar to prior. Partially visualized chest  tube extending along the left posterior pleura.      Impression    Impression:   1. Myelopathic signal extending from approximately T10 through the  conus medullaris with associated diffusion restriction. Findings  compatible with cord infarction related to the artery of Adamkiewicz  perfusion territory.  2. Slightly T1 hyperintense/T2 hypointense diffuse signal changes of  the CSF from approximately T9-L4. Findings are indeterminant and its  difficult to exclude a neoplastic process - recommend repeat  evaluation of the thoracic and lumbar spine with contrast. Findings  are felt less likely to represent an intrathecal hematoma.  3. Moderate to severe lumbar spondylosis as detailed above. This  results in moderate spinal canal stenosis at L2-3 and multilevel  neural foraminal narrowing as detailed above.  4. Partially visualized left-sided  hydropneumothorax with mixed  consolidative/atelectatic changes of the left lung parenchyma. Small  loculated effusion on the left measuring up to 2.7 x 1.0 cm.   5. Layering right-sided pleural effusion similar to prior.  6. Partially visualized descending thoracic aneurysmal change.    [Urgent Result: Spinal cord infarction]    Finding was identified on 2/16/2023 4:47 PM.     Dr. Vinson was contacted by Dr. Uriostegui at 2/16/2023 5:25 PM and  verbalized understanding of the urgent finding.     Following discussion with Dr. Whitfield the findings regarding the thecal  sac signal changes were discussed with Dr. Ward at 9:17 PM on  2/16/2023. Plans for repeat imaging with contrast.     I have personally reviewed the examination and initial interpretation  and I agree with the findings.    MARIANNA WHITFIELD MD         SYSTEM ID:  [id number]   XR Chest Port 1 View    Impression    RESIDENT PRELIMINARY INTERPRETATION  IMPRESSION:  1. Interval removal of left-sided chest tube. Remainder of support  devices are in stable position, as above.  2. Decreased aeration of the left hemithorax with continued mixed  pulmonary opacities and retrocardiac opacification, likely  atelectasis/edema.  3. Stable small left pleural effusion.       =========================================

## 2023-02-17 NOTE — CONSULTS
General Infectious Disease Service Consultation - [place] Team     Patient:  Shelly Becerril   Date of birth 1950, Medical record number [id number]  Date of Visit:  02/17/2023  Date of Admission: 2/4/2023  Consult Requester:Omega Briceno, *         Physician Attestation   I, Chelsie Gonzales MD, was present with the medical/ANT student who participated in the service and in the documentation of the note.  I have verified the history and personally performed the physical exam and medical decision making.  I agree with the assessment and plan of care as documented in the note.      Key findings:   Please see medical student note for details. Briefly, 73 year old female with PMH notable for ascending aortic dissection s/p aortic root reconstruction & AV replacement (2018), who was initially admitted 2/4/23 with HTN emergency and was found to have a Type B aortic dissection. She is now s/p L thoracotomy w/ thoracoabdominal aortic aneurysm repair 2/8/23. Her post-operative course was c/b spinal cord ischemia (LE neurologic deficits improving). She had a CT chest on 2/10 that showed mucus plugging. BAL grew Enterobacter cloacae. She was initially on pip/tazo but switched to cefepime 2/13. Per chart reveiw she started developing fevers and leukocytosis around 2/14 with repeat chest imaging that day showing near complete opacification of L lung. CT suggested mucus plugging. Another bronchoscopy on the 14th was performed with cultures now showing E cloacae and Stenotrophomonas. Pt had been broadened to ertapenem on 2/15. Chart reviewed by Antimicrobial Stewardship team and recommended de-escalating to cefepime, though Stenotrophomonas had not yet grown. Discussed with team last night and added bactrim to the regimen for Steno. Today, Ms. Becerril has been afebrile with stable leukocytosis. Per discussion with nursing staff, she is having purulent/slightly bloody secretions and frequent stools. Labs notable for  transaminitis since ~2/8, overall improving.    Now that susceptibilities for both organisms have returned, would discontinue cefepime and continue bactrim monotherapy as it will cover both organisms. Stenotrophomonas pneumonias are typically treated for 14 days as outlined below. Would obtain RUQ US and Hepatitis B serologies to see if we can establish an etiology for her LFTs. They are downtrending somewhat which is reassuring. Given frequency of stool output, prolonged ICU stay, and extensive antibiotic exposure, would also check for C difficile.     Chelsie Gonzales MD  Date of Service (when I saw the patient): 02/17/23           Assessment and Recommendations:     [place] Findings   Shelly Becerril is a 74 y/o female with a past medical history significant for ascending aortic aneurysm s/p ascending repair with aortic root reconstruction and prosthetic aortic valve (both in 2018) who was admitted to the hospital following left thoracotomy with thoracoabdominal aortic repair on 2/8/2023. Her hospitalization has been complicated by hospital acquired pneumonia. Respiratory cultures are positive for Enterobacter cloacae and Stenotrophomonas maltophilia. To cover both organisms until sensitivities are available, we recommend treating with both cefepime and bactrim. Plan to re-evaluate for monotherapy that covers both organisms once sensitivities are available. We will plan to treat for 14 days.     In addition, patient was noted to have elevated LFTs that were normal on admission. While this may be related to acalculous cholecystitis, per chart review patient has not had viral hepatitis testing. If patient does have a chronic infection, her acute illness may lead to worsening of her infection and acute increases in her LFTs. We recommend screening patient for HBV and obtaining RUQ US.     Patient also was reported to have loose stool, and rectal tube appears to be draining moderate amount of stool. Patient also was  experiencing abdominal pain. Given her long exposure to antibiotic therapy, may consider testing for C. Diff.     ASSESSMENT:  1. Hospital-acquired pneumonia, cultures positive for Enterobacter cloacae and Stenotrophomonas maltophilia.  2. L thoracotomy with thoracoabdominal aortic repair (2/8/2023)  3. Ascending aortic aneurysm s/p ascending repair with aortic root reconstruction and prosthetic aortic valve (2018)  4. Elevated LFTs  5. QTc 419       RECOMMENDATION:  1. Discontinue cefepime  2. Continue IV bactrim 15 mg/kg (based on TMP component) in 3 divided doses per day for 14 days.   - PO bactrim also acceptable.   3. Screen for HBV (surface Ab, Surface Ag, Core Ab) and obtain RUQ US given elevated LFTs   4. Consider testing for C. Diff     Jen Newell MS4    ________________________________________________________________    Consult Question:.  Admission Diagnosis: Aortic dissection (H) [I71.00]         History of Present Illness:     Shelly Becerril is a 72 y/o female with a past medical history significant for ascending aortic aneurysm s/p ascending repair with aortic root reconstruction and prosthetic aortic valve (both in 2018) who was admitted to the hospital following left thoracotomy with thoracoabdominal aortic repair on 2/8/2023. Her hospitalization has been complicated by hospital acquired pneumonia. She first experienced mucous plugging on 2/10, and respiratory culture grew Enterobacter cloacae. She was started on zosyn, which was later de-escalated to ceftriaxone. Respiratory culture on 2/14 grew both Enterobacter cloacae and Stenotrophomonas maltophilia. On 2/15, she was febrile with increasing WBC, so ertapenem was started. She was then de-escalated to cefepime.     Patient was intubated during our conversation but was able to interact. She endorses abdominal and chest pain. Per her nurse, she has endorsed abdominal pain several times and has had multiple loose stools.     Antibiotics    Cefazolin 2/8 - 2/9  Cefepime 2/17 - present   Sulfamethoxazole-trimethoprim 2/16 - present   Ceftriaxone 2/13 - 2/15  Ertapenem 2/15 - 2/16  Piperacillin-tazobactam 2/11 - 2/13    Recent Inflammatory Markers  Recent Labs   Lab Test 02/17/23  0233 02/16/23  0358 02/15/23  0413 02/14/23  0324 02/13/23  0341 02/12/23  0347 02/11/23  0332   WBC 13.1* 12.7* 11.6* 10.8 8.1 10.3 10.1       Recent culture results include:  Culture   Date Value Ref Range Status   02/15/2023 <10,000 CFU/mL Urogenital zach  Final   02/15/2023 No growth after 2 days  Preliminary   02/15/2023 No growth after 2 days  Preliminary   02/14/2023 2+ Enterobacter cloacae complex (A)  Preliminary   02/14/2023 2+ Stenotrophomonas maltophilia (A)  Preliminary   02/14/2023 1+ Normal zach  Preliminary   02/10/2023 3+ Enterobacter cloacae complex (A)  Final   02/10/2023 1+ Normal zach  Final            Review of Systems:   See HPI         Past Medical History:     Past Medical History:   Diagnosis Date     Anticoagulated on Coumadin      Aortic aneurysm (H)      Hypertension 12/19/2019            Past Surgical History:     Past Surgical History:   Procedure Laterality Date     CARDIAC SURGERY  04/2018    aortic valve replacement, aortic root reconstruction     CV CORONARY ANGIOGRAM  2/7/2023    Procedure: CV CORONARY ANGIOGRAM;  Surgeon: Flo Dutton MD;  Location:  [facility]     MI ESOPHAGOGASTRODUODENOSCOPY TRANSORAL DIAGNOSTIC N/A 4/6/2021    Procedure: ESOPHAGOGASTRODUODENOSCOPY (EGD) WITH BIOPSY.;  Surgeon: Joey Garcia MD;  Location: [facility];  Service: Gastroenterology     REPAIR ANEURYSM ASCENDING AORTA N/A 2/8/2023    Procedure: Left thoracotomy, thoracoabdominal aortic aneurysm repair, repair with left heart bypass, open exposure of left femoral artery;  Surgeon: Omega Briceno MD;  Location:  OR            Family History:   History reviewed. No pertinent family history.  IMMUNE:  No history of  immunodeficiency within the family.         Social History:     Social History     Tobacco Use     Smoking status: Never     Smokeless tobacco: Never   Substance Use Topics     Alcohol use: Never     History   Sexual Activity     Sexual activity: Not on file       No results found for: HIV         Current Medications :       acetaminophen  650 mg Oral or Feeding Tube Q8H     aspirin  81 mg Oral or NG Tube Daily     [Held by provider] atorvastatin  40 mg Oral Daily     ceFEPIme  2 g Intravenous Q8H     fiber modular  1 packet Per Feeding Tube TID     heparin ANTICOAGULANT  5,000 Units Subcutaneous Q8H     insulin aspart  1-6 Units Subcutaneous Q4H     mirtazapine  15 mg Oral or Feeding Tube At Bedtime     multivitamins w/minerals  15 mL Per Feeding Tube Daily     pantoprazole  40 mg Oral or Feeding Tube Daily     potassium chloride  20 mEq Oral BID     protein modular  1 packet Per Feeding Tube Daily     sodium chloride (PF)  3 mL Intracatheter Q8H     sulfamethoxazole-trimethoprim  320 mg Intravenous Q8H            Allergies:   No Known Allergies         Physical Exam:   Vitals were reviewed  For the past 24 hours, the ranges for their vital signs:  Temp:  [99.5  F (37.5  C)-101.1  F (38.4  C)] 99.9  F (37.7  C)  Pulse:  [] 92  Resp:  [16-20] 18  BP: (102-188)/(42-87) 108/46  MAP:  [55 mmHg-141 mmHg] 64 mmHg  Arterial Line BP: ()/(26-96) 95/44  FiO2 (%):  [40 %] 40 %  SpO2:  [92 %-100 %] 97 %    Physical Examination:  GENERAL:  Resting in bed, intubated  HEENT:  Head is normocephalic, atraumatic   EYES:  Eyes have anicteric sclerae without conjunctival injection  LUNGS: Coarse lung sounds bilaterally. Intubated.   CARDIOVASCULAR:  Tachycardia with normal rhythm, no murmurs   ABDOMEN:  Soft, nondistended. Diffusely tender to palpation. Rectal tube draining stool.   SKIN:  No acute rashes.  Line(s) are in place without any surrounding erythema or exudate.   NEUROLOGIC:  Grossly nonfocal. Able to respond to  commands.          Laboratory Data:     Hematology Studies    Recent Labs   Lab Test 23  0358 02/15/23  0413 23  0324 23  0341 23  2247 23  1604 23   WBC 13.1* 12.7* 11.6* 10.8 8.1  --   --  10.3   HGB 8.7* 9.9* 10.1* 11.2* 11.3* 11.2*   < > 9.8*   MCV 97 97 94 93 92  --   --  89    275 200 155 112*  --   --  92*    < > = values in this interval not displayed.     Metabolic Studies     Recent Labs   Lab Test 02/17/23  0233 02/16/23  0358 02/15/23  0413 02/14/23  1633 23  0818 23    147* 150* 150*  --  148*   POTASSIUM 4.9 4.7 3.7 3.6 3.8 3.2*   CHLORIDE 109* 113* 115* 115*  --  113*   CO2 22 24 25 26  --  25   BUN 26.7* 26.1* 25.8* 24.8*  --  25.5*   CR 0.77 0.73 0.67 0.64  --  0.67   GFRESTIMATED 82 87 >90 >90  --  >90       Hepatic Studies    Recent Labs   Lab Test 02/17/23  0233 02/16/23  0358 02/15/23  0413 02/14/23  0324 23  0341 23  034   BILITOTAL 0.4 0.5 0.4 0.4 0.5 0.6   ALKPHOS 172* 176* 186* 170* 161* 151*   ALBUMIN 2.1* 2.4* 2.3* 2.4* 2.4* 2.3*   * 117* 145* 161* 202* 248*   * 85* 84* 71* 68* 59*     Microbiology:  Culture   Date Value Ref Range Status   02/15/2023 <10,000 CFU/mL Urogenital zach  Final   02/15/2023 No growth after 2 days  Preliminary   02/15/2023 No growth after 2 days  Preliminary   2023 2+ Enterobacter cloacae complex (A)  Preliminary   2023 2+ Stenotrophomonas maltophilia (A)  Preliminary   2023 1+ Normal zach  Preliminary   02/10/2023 3+ Enterobacter cloacae complex (A)  Final   02/10/2023 1+ Normal zach  Final     Imagin23 CT Chest  IMPRESSION:   1. Abrupt termination of the left mainstem bronchus with new complete  left lung atelectasis, likely secondary to mucous plugging.  2. Slightly increased size of the perigraft hematoma along the  descending thoracic aortic 34 mm Gelweave interposition replacement  graft with a small focal area of  hyperdensity which may suggest  continued bleeding into the hematoma. No evidence of aortic rupture or  pseudoaneurysm.  3. Mildly increased peribronchovascular groundglass and nodular  opacities in the right lung likely represent pulmonary edema in this  patient who appears volume overloaded.  4. Stable size of the left hydropneumothorax with decreased  pneumothorax component. There is no hyperdensities within the effusion  to suggest pneumothorax, however there was evidence of this on prior.  5. Decreased right-sided pleural effusion with unchanged associated  compressive atelectasis.  6. Unchanged left rib fractures as described above, with associated  hematoma in the adjacent left chest wall.  7. Expected post surgical changes of ascending aortic and  thoracoabdominal aortic aneurysm repairs with multiple left-sided  chest tubes and other devices as above.  8. Findings suggestive of volume overload, with bilateral pleural  effusions, diffuse anasarca, small volume ascites, and trace  pericardial effusion.  9. Other chronic findings as described above are not significantly  Changed.    2/17/23 CXR  IMPRESSION:  1. Interval removal of left-sided chest tube. Remainder of support  devices are in stable position, as above.  2. Decreased aeration of the left hemithorax with continued mixed  pulmonary opacities and retrocardiac opacification, likely  atelectasis/edema.  3. Stable small left pleural effusion

## 2023-02-17 NOTE — PROGRESS NOTES
"Bronchoscopy Risk Assessment Guidelines      A. Patient symptoms to consider when assessing pulmonary TB risk are:    I. Cough greater than 3 weeks; and fever, hemoptysis, pleuritic chest    pain, weight loss greater than 10 lbs, night sweats, fatigue, infiltrates on    upper lobes or superior segments of lower lobes, cavitation on chest    x-ray.   B. Patient risk factors to consider when assessing pulmonary TB risk are:    I. Exposure to known TB case, foreign-born persons (within 5 years of    arrival to [place]), residence in a crowded setting (correctional facility,     long-term care center, etc.), persons with HIV or immunosuppression.    Patients with symptoms and risk factors should generally be considered \"suspect risk\" and bronchoscopies should be performed in airborne precautions.    This patient has NO KNOWN RISK of Tuberculosis (proceed with bronchoscopy)    Specimens sent: no  Complications: None  Scope used: OR BF- Q190 #[id number]  Attending Physician: LILIANA Isabel RT on 2/17/2023 at 1:43 PM  "

## 2023-02-17 NOTE — PROGRESS NOTES
Vascular surgery note    Lumbar MRI completed last night, hemodynamically unstable with pressors vs hydralazine requirements. TMax 38.2, vent weaned to CPAP this morning. Low dose precedex. Continues to have no lower extremity movement on my exam.    /46   Pulse 92   Temp 99.9  F (37.7  C)   Resp 18   Ht 1.524 m (5')   Wt 66.4 kg (146 lb 6.2 oz)   SpO2 97%   BMI 28.59 kg/m       I/O last 3 completed shifts:  In: 3326.21 [I.V.:1046.21; NG/GT:1670]  Out: 2150 [Urine:1600; Stool:550]    Intubated, mild sedation on low dose precedex, follows commands to nod head and squeeze hands. Does not wiggle toes or plantar/dorsiflexion to command. No withdrawal to induced pain in BLE  Thoracoabdominal and L groin incisions c/d/i    Labs reviewed.   Imaging reviewed: Lumbar MRI last night demonstrated myelopathic signal extending from approximately T10 through the conus medullaris with associated diffusion restriction, likely cord infarction related to the artery of Adamkiewicz perfusion territory. Additionally, there are diffuse signal changes of the CSF from approximately T9-L4. Findings are felt less likely to represent an intrathecal hematoma.    Norepinephrine drip for hemodynamic support.    A/P: 73 year-old female POD#7 s/p thoracoabdominal aneurysm and type B dissection repair, lower extremity paralysis with spinal cord ischemia, enterobactor pneumonia.    - appreciate neurocrit input on MRI findings  - spinal cord measures per neuro crit  - cont abx, enterobacter pna  - continue ASA 81mg  - atorvastatin when medically appropriate  - remaining care per CVTS/CVICU, weaning vent, tube feeds.    Nayely Urrutia, LUIS EDUARDO  Vascular Surgery  Pager: [phone]  [email or web address]  Send message or 10 digit call back number Securely via [employer] with the Vocera Web Console (learn more here)

## 2023-02-17 NOTE — PLAN OF CARE
Right wrist and Left wrist restraints discontinued at 12:20 PM on 2/17/2023.    Restraint discontinue criteria met, patient is calm, cooperative and safe. Restraints removed.     Patient's Response: Needs reinforcement  Family Notification: Other  Attending Physician Notified: MD ordered restraint, Attending Physician's Name: garland Jones RN

## 2023-02-17 NOTE — PROCEDURES
[facility]    Procedure: Bronchoscopy    Date/Time: 2/17/2023 1:42 PM  Performed by: Aaron Vinson MD  Authorized by: Aaron Vinson MD       UNIVERSAL PROTOCOL   Site Marked: Yes  Prior Images Obtained and Reviewed:  Yes  Required items: Required blood products, implants, devices and special equipment available    Patient identity confirmed:  Verbally with patient  Patient was reevaluated immediately before administering moderate or deep sedation or anesthesia  Confirmation Checklist:  Patient's identity using two indicators, relevant allergies, procedure was appropriate and matched the consent or emergent situation and correct equipment/implants were available  Time out: Immediately prior to the procedure a time out was called    Universal Protocol: the Joint Commission Universal Protocol was followed    Preparation: Patient was prepped and draped in usual sterile fashion      SEDATION  Patient Sedated: Yes    Sedation Type:  Moderate (conscious) sedation  Sedation:  Fentanyl, midazolam and see MAR for details  Vital signs: Vital signs monitored during sedation      PROCEDURE  Describe Procedure:   Providers: Aaron Vinson, PGY3, Surgery Resident; Shelia Bowen MD, Fellow;  Supervising Attending: Rodrigue Leos MD, Attending;    A complete airway examination was performed from the distal trachea to the subsegmental level in each lobe of both lungs.    Pertinent findings include mucosal sloughing with notable irritation at the distal darling with more significant irritation of the left main bronchus. No right sided mucus plugging or purulence. Left main bronchus noted to be clear. Immediately distal with left lobe bronchus with thick blood clot and mucous plugging suctioned clear. Left lingula with thick hemorrhagic mucus plugs, suctioned clear. Some residual oozing noted with notable irritation with at the inferior border distal to the main left bronchus was  noted alongside the left bronchial bifurcation.   Patient Tolerance:  Patient tolerated the procedure well with no immediate complications  Length of time physician/provider present for 1:1 monitoring during sedation: 20   Performed by verifying the correct patient, correct procedure, and ensuring that all equipment / support staff are present.      The patient's medical record has been reviewed.  The indication for the procedure was reviewed.  The necessary history and physical examination was performed and reviewed.  The risks, benefits and alternatives of the procedure were discussed with the the patient's representative (daughter) in detail and questions were answered to the best of my ability.  Verbal consent was not obtained.  Written consent was obtained.  This procedure was deemed urgent.  The proposed procedure and the patient's identification were verified prior to the procedure by the physician and the nurse, technician, resident physician (resident / fellow).     Bronchoscopy Risk Assessment Guidelines:                A. Patient symptoms to consider when assessing pulmonary TB risk are:  I. Cough greater than 3 weeks; and fever, hemoptysis, pleuritic chest pain, weight loss greater than 10 lbs, night sweats, fatigue, infiltrates on upper lobes or superior segments of lower lobes, cavitation on chest x-ray.  B. Patient risk factors to consider when assessing pulmonary TB risk are:   I. Exposure to known TB case, foreign-born persons (within 5 years of arrival to [place]), residence in a crowded setting (correctional facility,  long-term care center, etc.), persons with HIV or immunosuppression.     A Tuberculosis risk assessment was performed:   This patient has NO KNOWN RISK of Tuberculosis (proceed with bronchoscopy)     The procedure was performed in a negative airflow room: No. The reason the patient could not be moved to a negative airflow room was patient was in ICU     The patient was assessed for the  adequacy for the procedure and to receive medications.      Mental Status:  sedated  Airway examination:  ETT in place  Pulmonary:  Decreased breath sound throughout  CV:  Sinus tachycardia, on telemetry  ASA Grade:  (IV)  Severe systemic disease that is an incapacitating disease that is a constant threat to life     Immediately before administration of medications the patient was re-assessed for adequacy to receive sedatives including the heart rate, respiratory rate, mental status, oxygen saturation, blood pressure and adequacy of pulmonary ventilation. These same parameters were continuously monitored throughout the procedure.

## 2023-02-17 NOTE — PLAN OF CARE
D/I/A: patient remains in the Surgical/Neuro ICU.   Shift Events  Writer assumed cares [id number]  Free water flush decreased to 60cc q4hrs per orders  Precedex paused -tolerating well  Restraints discontinued.   Tachypnea noted with pressure support trials.   Bronchoscopy completed by SICU MDs  Levophed used briefly during bronch, otherwise remained off this shift  Sputum sample sent to lab per orders  Stool sample sent to lab for C-Diff rule out.    Neuro- unchanged. Arouses to voice. Follows commands with 3/5 strength in Bilateral UE. No response to stimuli in bilateral LE. PERRL. Nodding appropriately to yes/no questions.   CV- sinus rhythm/tach HR 90s-110, rare PVCs. BP   Pulm- lungs coarse/diminished, clear after bronch. Pressure support 7/8 throughout the morning -tachypnea noted, RR 30s. Bronch around 1300. CMV settings after bronch.   GI- NJ in place with tube feeds at goal of 30cc/hr, FWF decreased to 60cc q4hrs. Watery stool output via rectal tube. Tube feeds paused around 1600 for upcoming abdominal ultrasound  - dunbar in place, 40cc/hr urine output.   Pain- c/o abdominal pain- MD notifed, patient reports pain is well controlled with 5mg PRN Oxycodone and 0.2mg Dilaudid  Activity- bedrest with frequent turns.  Social- son at bedside, updated about POC, questions answered.    See flow sheets for further details and assessments.    P: continue to monitor, notify MD of significant changes.    Goal Outcome Evaluation:      Plan of Care Reviewed With: patient    Overall Patient Progress: improvingOverall Patient Progress: improving    Outcome Evaluation: Levophed stopped. Precedex paused -tolerating well, restraints discontinued. Tachypnea noted with pressure support trials. Bronch completed by SICU MDs

## 2023-02-17 NOTE — PLAN OF CARE
Major Shift Events:  Neuro exam unchanged. Continues to have 4/5 strength in BUE, 0/5 strength in BLE. Follows commands, but does not withdraw in BLE. IV dilaudid given for abdominal pain. SR/ST. Levo restarted to maintain MAP >65. T-max 100.2 F. Switched back to CMV settings 40%/350/14/12 ~midnight. LS coarse/diminished. Black in place with adequate urine output. Rectal tube with some drainage of stool. TF at goal. FWF 100ml/2hr.     Plan: Resume PS this morning with goal for eventual extubation.   For vital signs and complete assessments, please see documentation flowsheets.

## 2023-02-18 NOTE — PLAN OF CARE
Goal Outcome Evaluation:    Neuro: Alert, following some simple commands. 3/5 strength uppers; No withdrawal in lowers - team aware. Pet pt with family translation pt can feel sensation in lower extremities. Continuing to monitor. Able to nob yes/no at times.  CV: HR 90-100s. -110s. Tmax: 99.9  Pulm: CMV settings. PS 7/8 today, weaned to 7/5. Coarse lung sounds.  GI: TF at goal, NJ. Rectal tube.  : Urinary catheter. Urine output increased throughout shift.  IV/Gtt: MAC IJ     AM & repeat both K 5.5;  ml bolus given; repeat K 5.1  Contuing to monitor Na - team aware 129  Plan: Continue to wean vent/PEEP as able  Continue to monitor K/kidney function per team    Will continue to monitor for safety and comfort.    Alvina Fagan, RN

## 2023-02-18 NOTE — PROGRESS NOTES
VASCULAR SURGERY PROGRESS NOTE    NAEON.     /53 (BP Location: Right arm)   Pulse 93   Temp 99.3  F (37.4  C) (Axillary)   Resp 23   Ht 1.524 m (5')   Wt 69.2 kg (152 lb 8.9 oz)   SpO2 95%   BMI 29.79 kg/m       I/O last 3 completed shifts:  In: 1245.38 [I.V.:30.38; NG/GT:645]  Out: 1170 [Urine:770; Stool:400]    Intubated, sedated. Moves upper extremities. No movement or withdrawal in lower extremities. Feet warm and well perfused.    Labs reviewed.     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

## 2023-02-18 NOTE — PROGRESS NOTES
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 Briceno on 2/8/23. Course complicated by ischemic spinal cord injury.      TODAY'S PROGRESS:   - Wean sedation as able, PST as tolerated today  - Wean PEEP to 5, switch back to CMV, with plan for PST in AM 2/19 for possible extubation  - FENA 1.3% with determination of intrinsic tubular injury  - Give IV NS bolus this AM  - Recheck BMP this afternoon  - Discussed with family the role of possible family care conference for education and addressing patient status and ongoing recovery     PLAN:  Neuro/ pain/ sedation:  # Depression--mirtazapine resumed   # Acute postoperative pain  # Sedation  - Pain: Scheduled Tylenol 650mg every 8 hours   -  PRN: Tylenol, oxycodone prn, and Dilaudid-IVP prn     # Hx stroke (2019)  - Chronic dysarthria and dysphagia, s/p warfarin transitioned to Eliquis in 2021, unclear if taking  - Question of residual LE weakness PTA.      # Spinal cord ischemia, Paraplegia  - On, 2/10 unable to move b/l LE, no sensation, dusky discoloration hands R>L, multiphasic Dopplers in R & L ulnar artery & brachial arch. On 2/11 /follow commands b/l UE, started withdrawing b/l LE. On 2/12 intermittently withdrawing b/l LE. On 2/13 moved b/l toes to command for 1st time in 4 days. On 2/14 continues withdrawing from pain b/l LE and moving L toes to command.  - Neurocrit followed  lumbar drain               - Lumbar drain removed by anesthesia 2/15               - MAP goal >65.   - Neurocrit rec MRI T/L w/o contrast.   - Precedex  gtt- rate 0.0- wean as able.   - MRI TL spine with infarct from T10. Discussed with NCC, no indication for additional imaging, plan for spinal cord rehab.      Pulmonary care:   # Acute respiratory failure   # Pneumonia, hospital acquired   Vent Mode: CPAP/PS  (Continuous positive airway pressure with Pressure Support)  FiO2 (%): 40 %  Resp Rate (Set): 14 breaths/min  Tidal Volume (Set, mL): 350 mL  PEEP (cm H2O): 8 cmH2O  Pressure Support (cm H2O): 7 cmH2O  Resp: 21    - S/p bronch 2/11, small old blood LLL, small mucus b/l suctioned  - CT Chest 2/14 complete LL atelectasis 2/2 mucus plugging, mild pulmonary edema R lung  - S/p bronch 2/14, removed mucus plug in L main bronchus, LLL, and HILLARY  - CXR 2/15 decreased aeration LLL, persistent opacities  - Bronch 2/15, cleared out mucus plugs in HILLARY and LLL  - Bronch 2/17, cleared out clot with combined mucus plugs in HILLARY and LLL  - 3% NS nebs prn, intrapulmonary percussive therapy  - PEEP- 8,  Chest PT   - PS this am 2/18, tolerating well, will decrease PEEP to 5, switch to CMV with plan for reassessment 2/19 AM  - Daily CXR     Cardiovascular:    # Cardiogenic shock -S/p hydrocortisone 50 mg q6h (2/11-2/13)  # Hx of hypertension   # Ascending aortic aneurysm s/p ascending repair with aortic root reconstruction  # Bioprosthetic aortic valve in 2018  # Relative hypotension  Pre CPBP: LV/RV normal, 55-60%. Mild MR w/ central jet from A2/P2. Mild TR.  Post CPBP: LV normal, 55-60%. RV mildly reduced. Mild AS, trace AI. Mild MR. Mild TR.  - PRN Hydralazine for SBP   - Hold PTA metoprolol succinate 50 mg, lisinopril 40 mg, atorvastatin 40 mg  - Continue with ASA 81 mg     GI care/Nutrition:   # concern for protein calorie malnutrition  - TF-30cc/hr. Free water flushes 60mL q 4 hrs   - Bowel regimen: MiraLAX, senna, hold with diarrhea  - Fiber and Banatrol   # LFT elevated with normal T bilirubin   - CTM, trend LFTs   - HBV pending   - US abdominal 2/18 reviewed, no current  ongoing concern for acute cholecystitis given no pericholecystic fluid and normal GB wall      Renal / Fluids / Electrolytes:   # Increased creatinine with decreased UOP  - FENA 1.3% this AM with concern for possible intrinsic renal process, currently on IV Bactrim  # Hyperkalemia  - NS bolus, EKG this AM, no arrythmia noted, CTM, recheck BMP 2/18 PM  # Hyponatremia  Cr 1.04 from 0.8 today, BL creat appears to be ~ 0.9-1.0  - NS bolus, decrease FWF to only TF patency  - Replete lytes PRN per protocol      Endocrine:    # Stress hyperglycemia  Preop A1c 4.7  - Med sliding scale insulin  - Goal BG <180 for optimal healing     ID / Antibiotics:  # Pneumonia, poly microbial--> Stenotrophomonas and enterobacter   - 2/10 Resp culture:  Resp Cx positive for Enterobacter cloacae. Zosyn 2/12-2/13, susceptibilities returned and switched ceftriaxone 2/13. Fevers 2/14, switched to ertapenem 2/15  - 2/1: BAL: Stenotrophomonas and enterobacter   - ID consulted, spoke with ID  2/16/23--> Bactrim IV   - C diff negative     Cultures:  - 2/10: Resp Cx positive for Enterobacter cloacae.   - 2/14: Steno and enterobacter   - 2/15: BC x 2 NGTD 22 d     Heme:     # Acute blood loss anemia  # Acute blood loss thrombocytopenia  - CBC daily, Hgb goal >7  - SQH     MSK / Skin:  # Sternotomy  # Surgical Incision  # Spinal cord ischemia, paraplegia   - Sternal precautions, postop incision management protocol  - PT/OT/CR  - PM&R consult for rehab- see noted 2/15/23- PRAFO  boots  - Will need to reconsult for spinal cord rehab      Prophylaxis:     - Mechanical DVT and Chemical DVT ppx: SQH  - PPI     Lines / Tubes / Drains:  - ETT  - RIJ CVC  - Black  - NJ  - Rectal tube - consider removal with decreasing TF     Disposition:  - CVICU    ICU Checklist  F - Feeding:   A - Analgesia:   S - Sedation:   T - Thromboembolic prophylaxis:      H - Head of bed elevated  U - Ulcer prophylaxis:  G - Glycemic control  S - SBT     B - Bowel regimen  I -  Indwelling catheter  D - De-escalation of antibiotics    Patient seen, findings and plan discussed with CVICU staff and cardiothoracic surgeons.    Aaron Vinson MD, MS4   2/18/2023 at 5:33 AM      ====================================    TODAY'S PROGRESS  SUBJECTIVE  NAEON. Sedation fully weaned. Meeting MAP goals > 65. Remains intubated. PST this AM. NPO with TF. Voiding with rectal tube in place.     OBJECTIVE  1. VITAL SIGNS  Temp:  [99.7  F (37.6  C)-100.8  F (38.2  C)] 99.9  F (37.7  C)  Pulse:  [] 90  Resp:  [13-37] 18  BP: ()/(42-88) 99/48  MAP:  [61 mmHg-88 mmHg] 61 mmHg  Arterial Line BP: ()/(42-59) 85/42  FiO2 (%):  [40 %] 40 %  SpO2:  [92 %-100 %] 98 %  Vent Mode: CMV/AC  (Continuous Mandatory Ventilation/ Assist Control)  FiO2 (%): 40 %  Resp Rate (Set): 14 breaths/min  Tidal Volume (Set, mL): 350 mL  PEEP (cm H2O): 8 cmH2O  Pressure Support (cm H2O): 12 cmH2O  Resp: 18      2. INTAKE/ OUTPUT  I/O last 3 completed shifts:  In: 1705.86 [I.V.:230.86; NG/GT:1025]  Out: 1375 [Urine:925; Stool:450]    3. PHYSICAL EXAMINATION    General: calms, opens eyes to normal tone of voice, follows simple commands correctly.  Neuro: A&Ox3, follow simple commands. BUE with  strength 4/5, BLE with no movement.   RV: Ventilated; coarse diminished breath sounds  CV: RRR  Abdomen: abdomen soft and compressible, incision edges well approximated, no redness or warmth  Extremities: warm and well perfused, pulses 2+    4. INVESTIGATIONS  Arterial Blood Gases   Recent Labs   Lab 02/17/23  0232 02/16/23  0358 02/15/23  0412 02/14/23  0316   PH 7.46* 7.47* 7.49* 7.51*   PCO2 37 37 38 38   PO2 105 141* 79* 154*   HCO3 26 27 29* 30*     Complete Blood Count   Recent Labs   Lab 02/18/23  0412 02/17/23  0233 02/16/23  0358 02/15/23  0413   WBC 10.6 13.1* 12.7* 11.6*   HGB 7.6* 8.7* 9.9* 10.1*    277 275 200     Basic Metabolic Panel  Recent Labs   Lab 02/18/23  0421 02/18/23  0412 02/18/23  0038  02/17/23 2004 02/17/23 0431 02/17/23 0233 02/16/23  0400 02/16/23  0358 02/15/23  0900 02/15/23  0413   NA  --  133*  --   --   --  139  --  147*  --  150*   POTASSIUM  --  5.5*  --   --   --  4.9  --  4.7  --  3.7   CHLORIDE  --  103  --   --   --  109*  --  113*  --  115*   CO2  --  21*  --   --   --  22  --  24  --  25   BUN  --  32.4*  --   --   --  26.7*  --  26.1*  --  25.8*   CR  --  1.04*  --   --   --  0.77  --  0.73  --  0.67   GLC 90 97 98 80   < > 100*   < > 154*   < > 141*    < > = values in this interval not displayed.     Liver Function Tests  Recent Labs   Lab 02/18/23  0412 02/17/23  0233 02/16/23  0358 02/15/23  0413   * 134* 117* 145*   * 103* 85* 84*   ALKPHOS 234* 172* 176* 186*   BILITOTAL 0.3 0.4 0.5 0.4   ALBUMIN 2.1* 2.1* 2.4* 2.3*     Pancreatic Enzymes  No lab results found in last 7 days.  Coagulation Profile  No lab results found in last 7 days.  Lactate  Invalid input(s): LACTATE    5. RADIOLOGY  Recent Results (from the past 24 hour(s))   US Abdomen Limited    Narrative    EXAMINATION: US ABDOMEN LIMITED, 2/17/2023 8:51 PM    COMPARISON: CT chest, abdomen and pelvis 2/5/2023.    HISTORY: Elevated AST/ALT.    TECHNIQUE: The abdomen was scanned in standard fashion with  specialized ultrasound transducer(s) using both grey scale and limited  color Doppler techniques.    FINDINGS:   Fluid: The visualized right-sided pleural effusion.    Liver: The liver demonstrates normal echotexture, measuring 15.8 cm in  craniocaudal dimension. There is no focal mass. The main portal vein  is patent with antegrade flow towards the liver.    Gallbladder: Cholelithiasis. No gallbladder wall thickening or  hyperemia. No pericholecystic fluid. Unable to assess sonographic  Acevedo's sign secondary to intubated state.    Bile Ducts: Both the intra- and extrahepatic biliary system are of  normal caliber.  The common bile duct measures 8 mm in diameter, which  is borderline enlarged for  age.    Pancreas: Visualized portions of the head and body of the pancreas are  unremarkable.     Kidney: The right kidney measures 9.0 cm long. There is no  hydronephrosis or hydroureter, no shadowing renal calculi, cystic  lesion or mass.     The proximal aorta measures up to 3.7 cm, best visualized on recent  CTA studies.      Impression    IMPRESSION:   1.  Cholelithiasis with no additional sonographic findings to suggest  acute cholecystitis.  2.  Borderline dilated common bile duct measuring up to 8 mm. No  obstructing foci identified.  3.  Impression visualized right-sided pleural effusion.    I have personally reviewed the examination and initial interpretation  and I agree with the findings.    ISABEL ESCALERA MD         SYSTEM ID:  [id number]

## 2023-02-18 NOTE — PLAN OF CARE
Goal Outcome Evaluation:    Major Shift Events:  Abdominal Ultrasound done.  Neuro: Unchanged, follows commands and able to move uppers with strength 3/5 and no response to stimuli bilateral lowers. Responds to Yes/No question.    Cardiac: SR/ST 90s-100s, no ectopy, soft BP 90s/30s at MN and resolved without intervention, otherwise map>65 and maintaining SBP<160.  Tmax of 100.8 and took tylenol at 5am was 98.4.  K=5.5, Dr. Teixeira was notified, primary team to address.    Resp: On vent, setting /40/8/14, with coarse LS with thin clear secretion.  GI/: TF was held until Abdominal US was completed, restarted at 2200 at goal with 60ml water flush q4-hrs.  BS checks q4-hours.  Black is intact and rectal tube is intact.   LDA: PIV-SL left arm, R-CVC double lumen, brown is SL.  Plan: Will monitor SBP, trail pressure support for w/u vent extubation.      For vital signs and complete assessments, please see documentation flowsheets.

## 2023-02-19 NOTE — PROGRESS NOTES
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 Briceno on 2/8/23. Course complicated by ischemic spinal cord injury.      24-Hour Events:  - PS 2/18 tolerated well  - Discussed with family the role of possible family care conference for education and addressing patient status and ongoing recovery  - FENA 1.3% with determination of intrinsic tubular injury    TODAY'S PROGRESS:   - PST for possible extubation  - Shifted K  - Started SQH  - Lasix 40 mg, goal net -1L  - Recheck BMP, if K still elevated likely 2/2 Bactrim and could consider changing to different Abx  - Removed rectal tube  - Mild abdominal pain noted 2/19 AM, soft nontender, no rebound and passing stools, continue to monitor  - Could consider talking to anesthesia for intercostal or paravertebral blocks if pain is limiting her respiratory effort    ** Family asked if care conference can be Wednesday this week (2/22) because multiple family members are free then**      PLAN:  Neuro/ pain/ sedation:  #Depression--mirtazapine resumed   #Sedation  #Acute postoperative pain  - Pain: Scheduled Tylenol 650mg every 8 hours   - PRN: Tylenol, oxycodone prn, and Dilaudid-IVP prn  #Spinal cord ischemia, Paraplegia  - On, 2/10 unable to move b/l LE, no sensation, dusky discoloration hands R>L, multiphasic Dopplers in R & L ulnar artery & brachial arch. On 2/11 /follow commands b/l UE, started withdrawing b/l LE. On 2/12 intermittently withdrawing b/l LE. On 2/13 moved b/l toes to command for 1st time in 4 days. On 2/14  continues withdrawing from pain b/l LE and moving L toes to command. Recently not withdrawing to pain b/l LE  - Neurocrit followed lumbar drain, anesthesia removed 2/15. Normal MAP goal >65  - MRI TL: infarct from T10. Talked to NCC, not need more imaging, get spinal cord rehab.   - PM&R following   #Hx stroke (2019)  - Chronic dysarthria and dysphagia, s/p warfarin then Eliquis in 2021, unclear if taking  - Question of PTA residual LE weakness     Pulmonary care:   #Acute respiratory failure   #Pneumonia, hospital acquired   Vent Mode: CMV/AC  (Continuous Mandatory Ventilation/ Assist Control)  FiO2 (%): 40 %  Resp Rate (Set): 14 breaths/min  Tidal Volume (Set, mL): 350 mL  PEEP (cm H2O): 5 cmH2O  Pressure Support (cm H2O): 7 cmH2O  Resp: 18  - Bronch 2/11, small old blood LLL, small mucus b/l suctioned  - CT Chest 2/14 complete LL atelectasis 2/2 mucus plugging, mild pulmonary edema R lung  - Bronch 2/14, removed mucus plug in L main bronchus, LLL, and HILLARY  - Bronch 2/15, cleared mucus plugs in HILLARY and LLL  - Bronch 2/17, cleared clot and mucus plugs in HILLARY and LLL  - 3% NS nebs prn, intrapulmonary percussive therapy, Chest PT left lung  - PS this am 2/18, tolerating well, will decrease PEEP to 5, switch to CMV with plan for reassessment 2/19 AM  - Daily CXR: Decreased aeration in the left hemithorax with persistent interstitial patchy airspace opacities and retrocardiac opacification, atelectasis/edema.  - Lasix 40 mg, goal net -1L  - Could consider talking to anesthesia for intercostal or paravertebral blocks if pain is limiting her respiratory effort     Cardiovascular:    #Cardiogenic shock -S/p hydrocortisone 50 mg q6h (2/11-2/13)  #Hx of hypertension   #Ascending aortic aneurysm s/p ascending repair with aortic root reconstruction  #Bioprosthetic aortic valve in 2018  #Relative hypotension  Pre CPBP: LV/RV normal, 55-60%. Mild MR w/ central jet from A2/P2. Mild TR.  Post CPBP: LV normal, 55-60%. RV mildly  reduced. Mild AS, trace AI. Mild MR. Mild TR.  - PRN Hydralazine for SBP   - Hold PTA metoprolol succinate 50 mg, lisinopril 40 mg, atorvastatin 40 mg  - Continue with ASA 81 mg     GI care/Nutrition:   #Concern for protein calorie malnutrition  - TF-30cc/hr. Free water flushes 60mL q 4 hrs. Decrease today with low Na.  - Bowel regimen: MiraLAX, senna, hold with diarrhea. Removed rectal tube  - Fiber and Banatrol   #LFT elevated with normal T bilirubin  - CTM, trend LFTs  - HBV pending  - US abdominal 2/18 reviewed, no current ongoing concern for acute cholecystitis given no pericholecystic fluid and normal GB wall   - Mild abdominal pain noted 2/19 AM, soft nontender, no rebound and passing stools, continue to monitor     Renal / Fluids / Electrolytes:   BL creat appears to be ~ 0.9-1.0  - Strict I/Os, daily weights, replete lytes PRN per protocol  - Decrease FWF to only TF patency  - FENA 1.3% with concern for possible intrinsic renal process, currently on IV Bactrim  - Na 131 from 129 today,   #Hyperkalemia  - K 5.6, shifted then 5.3 > 5.7 later. Likely 2/2 Bactrim. Recheck BMP, if K still elevated could consider changing to different Abx      Endocrine:    # Stress hyperglycemia  Preop A1c 4.7  - Med sliding scale insulin  - Goal BG <180 for optimal healing     ID / Antibiotics:  # Pneumonia, poly microbial--> Stenotrophomonas and enterobacter   - 2/10 Resp culture:  Resp Cx had Enterobacter cloacae. Zosyn 2/12-2/13, susceptibilities returned and switched ceftriaxone 2/13. Fevers 2/14, switched to ertapenem 2/15  - 2/1: BAL: Stenotrophomonas and enterobacter   - ID consulted, spoke with ID  2/16/23--> Bactrim IV, likely causing hyperkalemia and hyponatremia. If K stays elevated despite shifting, could consider changing to different Abx  - C diff negative  - BCx 2/19     Cultures:  - 2/10: Resp Cx positive for Enterobacter cloacae.   - 2/14: Steno and enterobacter   - 2/15: BC x 2 NGTD     Heme:     # Acute blood  loss anemia  # Acute blood loss thrombocytopenia  - CBC daily, Hgb goal >7  - SQH     MSK / Skin:  # Sternotomy  # Surgical Incision  # Spinal cord ischemia, paraplegia   - Sternal precautions, postop incision management protocol  - PT/OT/CR  - PM&R consult for rehab- see noted 2/15/23- PRAFO  boots  - Need to reconsult for spinal cord rehab      Prophylaxis:     - Mechanical DVT and Chemical DVT ppx: SQH  - PPI     Lines / Tubes / Drains:  - ETT  - RIJ CVC  - Black  - NJ     Disposition:  - CVICU        Patient seen, findings and plan discussed with CVICU staff and cardiothoracic surgeons.    Valdo Patel MD  Anesthesiology Resident, CA-1, PGY-2        ====================================    TODAY'S PROGRESS  SUBJECTIVE  NAEON. Sedation fully off. Meeting MAP goals > 65. Remains intubated. PST this AM. NPO with TF. Removed rectal tube    OBJECTIVE  1. VITAL SIGNS  Temp:  [98.6  F (37  C)-100.2  F (37.9  C)] 100  F (37.8  C)  Pulse:  [] 113  Resp:  [11-28] 18  BP: (104-158)/() 134/86  FiO2 (%):  [40 %] 40 %  SpO2:  [93 %-99 %] 96 %  Vent Mode: CMV/AC  (Continuous Mandatory Ventilation/ Assist Control)  FiO2 (%): 40 %  Resp Rate (Set): 14 breaths/min  Tidal Volume (Set, mL): 350 mL  PEEP (cm H2O): 5 cmH2O  Pressure Support (cm H2O): 7 cmH2O  Resp: 18      2. INTAKE/ OUTPUT  I/O last 3 completed shifts:  In: 2400 [I.V.:1210; NG/GT:470]  Out: 3415 [Urine:3015; Stool:400]    3. PHYSICAL EXAMINATION    General: calms, opens eyes to normal tone of voice, follows simple commands correctly.  Neuro: A&Ox3, follow simple commands. BUE with  strength 4/5, BLE with no movement.   RV: Ventilated; coarse diminished breath sounds  CV: RRR  Abdomen: abdomen soft and compressible, incision edges well approximated, no redness or warmth  Extremities: warm and well perfused, pulses 2+    4. INVESTIGATIONS  Arterial Blood Gases   Recent Labs   Lab 02/17/23  0232 02/16/23  0358 02/15/23  0412 02/14/23  0316   PH 7.46*  7.47* 7.49* 7.51*   PCO2 37 37 38 38   PO2 105 141* 79* 154*   HCO3 26 27 29* 30*     Complete Blood Count   Recent Labs   Lab 02/19/23 0409 02/18/23 0412 02/17/23 0233 02/16/23 0358   WBC 15.1* 10.6 13.1* 12.7*   HGB 8.0* 7.6* 8.7* 9.9*    269 277 275     Basic Metabolic Panel  Recent Labs   Lab 02/19/23 0418 02/19/23 0409 02/19/23  0020 02/19/23  0010 02/18/23  1555 02/18/23  1551 02/18/23  1233 02/18/23  1037 02/18/23 0421 02/18/23 0412   NA  --  131*  --  129*  --  129*  --   --   --  133*   POTASSIUM  --  5.6*  --  5.4*  --  5.1  --  5.5*  --  5.5*   CHLORIDE  --  102  --  100  --  100  --   --   --  103   CO2  --  18*  --  20*  --  20*  --   --   --  21*   BUN  --  29.5*  --  30.2*  --  32.3*  --   --   --  32.4*   CR  --  0.96*  --  0.98*  --  0.99*  --   --   --  1.04*   * 129* 118* 128*   < > 149*   < >  --    < > 97    < > = values in this interval not displayed.     Liver Function Tests  Recent Labs   Lab 02/19/23 0409 02/18/23 0412 02/17/23 0233 02/16/23 0358   *  154* 227* 134* 117*   *  167* 157* 103* 85*   ALKPHOS 291*  291* 234* 172* 176*   BILITOTAL 0.4  0.4 0.3 0.4 0.5   ALBUMIN 2.3*  2.3* 2.1* 2.1* 2.4*     Pancreatic Enzymes  No lab results found in last 7 days.  Coagulation Profile  No lab results found in last 7 days.  Lactate  Invalid input(s): LACTATE    5. RADIOLOGY  Recent Results (from the past 24 hour(s))   US Abdomen Limited    Narrative    EXAMINATION: US ABDOMEN LIMITED, 2/17/2023 8:51 PM    COMPARISON: CT chest, abdomen and pelvis 2/5/2023.    HISTORY: Elevated AST/ALT.    TECHNIQUE: The abdomen was scanned in standard fashion with  specialized ultrasound transducer(s) using both grey scale and limited  color Doppler techniques.    FINDINGS:   Fluid: The visualized right-sided pleural effusion.    Liver: The liver demonstrates normal echotexture, measuring 15.8 cm in  craniocaudal dimension. There is no focal mass. The main portal vein  is  patent with antegrade flow towards the liver.    Gallbladder: Cholelithiasis. No gallbladder wall thickening or  hyperemia. No pericholecystic fluid. Unable to assess sonographic  Acevedo's sign secondary to intubated state.    Bile Ducts: Both the intra- and extrahepatic biliary system are of  normal caliber.  The common bile duct measures 8 mm in diameter, which  is borderline enlarged for age.    Pancreas: Visualized portions of the head and body of the pancreas are  unremarkable.     Kidney: The right kidney measures 9.0 cm long. There is no  hydronephrosis or hydroureter, no shadowing renal calculi, cystic  lesion or mass.     The proximal aorta measures up to 3.7 cm, best visualized on recent  CTA studies.      Impression    IMPRESSION:   1.  Cholelithiasis with no additional sonographic findings to suggest  acute cholecystitis.  2.  Borderline dilated common bile duct measuring up to 8 mm. No  obstructing foci identified.  3.  Impression visualized right-sided pleural effusion.    I have personally reviewed the examination and initial interpretation  and I agree with the findings.    ISABEL ESCALERA MD         SYSTEM ID:  [id number]

## 2023-02-19 NOTE — PLAN OF CARE
Goal Outcome Evaluation:  Major Shift Events:  Insomnia.  Neuro: Hmong speaking patient, able to move upper limbs voluntarily and lowers paraplegic. Pupil equal and reactive.  Mitts on for pulling on ETT and NG tubing.  Resp: On CMV setting from 2000, @ FiO2=40, RR=14, PEEP=5, XJ=069.   ETT @22, LS:coarse, scant thin secretion, and tending to over breath the vent (RR=20s).  Cardic: SR/ST, 90s-110s, SBP<140 (elevated when suctioned and activity) baseline maintain below 140, SBP.  Afebrile and K=5.4, Vd=678 at 0000, patient received 500ml bolus, NS. AM lab K=5.6, Ox=804, Lactic acid 1.9 and notified Dr. Peters.  Gi/Gu: NPO with TF at 30ml/hr.  NJ @83. BS check every 4hrs, sliding scale.  Patient with dunbar with adequate urine output and rectal tube with 200cc during the shift.    Skin: L-later chest dressing changed, easily bruised (L-arm, L-leg and R-leg), mepilex at the coccyx.   Plan: Continue to monitor Na and K value and notify provider with status bueno and also pressure trail again w/u for extubation.  For vital signs and complete assessments, please see documentation flowsheets.

## 2023-02-19 NOTE — PROVIDER NOTIFICATION
Dr. Covarrubias was notified of K=5.4, and Yn=144, and also was informed of patient's abdominal more distended and was instructed to monitor BM and simethicone given and bolus 500ml NS ordered.

## 2023-02-19 NOTE — PLAN OF CARE
Goal Outcome Evaluation:    Neuro: Pt very restless this AM. Alert, following some simple commands. 3/5 strength uppers; No withdrawal in lowers - team aware. Pet pt with family translation pt can feel sensation in lower extremities. Continuing to monitor. Able to nob yes/no at times.  CV: HR 90-110s. -140s. Hydralazine x1. Tmax: 101.5 - team aware; orders for repeat cultures. Tylenol/ice packs/fan utilized (family told the importance of these objects)  Pulm: CMV settings. PS trial this AM interupted with 79-85%, switched back to CMV.  Pt FiO2 increased to 60% with PEEP adjusted to keep SaO2 >92%. Continuing to wean as able. Coarse lung sounds.  GI: TF paused this AM d/t nausea - PRNs given. TF restarted without issue. NJ. Rectal tube pulled today.   : Urinary catheter. Laxis given.  IV/Gtt: MAC IJ   Periph blood cultures done.     Potassium shifted per orders without much success - team aware.  Contuing to monitor Na; supplements given per ordered.  Plan: Continue to wean vent.    Will continue to monitor for safety and comfort.     Alvina Fagan, RN

## 2023-02-20 NOTE — PROGRESS NOTES
[facility] PROGRESS NOTE     Patient:  Shelly Becerril   YOB: 1950, MRN: [id number]  Date of Visit: 02/20/2023  Date of Admission: 2/4/2023          ASSESSMENT AND PLAN   [place] Findings   Shelly Becerril is a 72 y/o female with a past medical history significant for ascending aortic aneurysm s/p ascending repair with aortic root reconstruction and prosthetic aortic valve (both in 2018) who was admitted to the hospital following left thoracotomy with thoracoabdominal aortic repair on 2/8/2023. Her hospitalization has been complicated by hospital acquired pneumonia. Respiratory cultures are positive for Enterobacter cloacae and Stenotrophomonas maltophilia, currently being treated with bactrim. Patient underwent bronchoscopy today, which showed mid-trachea ulceration, and left sided mucous plugging and blood clot, which were suctioned.    Patient was noted to have increasing WBC and was febrile to 101.5. The cause of this is currently unknown. Her LFTs have also been elevated, though have started to decrease. Patient was negative on HBV screen and her RUQ US on 2/17 did not show evidence of cholecystitis. In addition, patient was tested for C. Diff on 2/17 and was negative. Blood cultures from 2/15 and 2/19 show no growth to date. Patient has not had increased secretions or stool output today, and ventilator settings continue to improve. In addition, the skin surrounding her CVC did not appear erythematous. Patient's antibiotic covers both Enterobacter cloacae and Stenotrophomonas maltophilia, and given her clinical picture worsening pneumonia is highly unlikely. Possible etiology of leukocytosis is her known myelopathy from ischemic spinal cord injury.          IMPRESSION  1. Hospital-acquired pneumonia, cultures positive for Enterobacter cloacae and Stenotrophomonas maltophilia.  2. Ischemic spinal cord injury   3. L thoracotomy with thoracoabdominal aortic repair  (2/8/2023)  4. Ascending aortic aneurysm s/p ascending repair with aortic root reconstruction and prosthetic aortic valve (2018)  5. Elevated LFTs (improving)  6. QTc 419    RECOMMENDATIONS:  -Continue PO bactrim    Thank you for the consult. ID will continue to follow with you. Please check [facility] for staff covering the service.     Staffed with the attending physician Dr. Crystal Newell MS4  Infectious disease service    ID Staff Assessment and Recommendations:  This patient was seen, examined and evaluated by me. I personally reviewed the medical records, vital signs, medications, labs and imaging studies. I agree with the documentation as above by the medical student.    Briefly, 74 yo F with hx of ascending aortic dissection s/p aortic root reconstruction and AVR (2018), initially admitted with hypertensive emergency, found to have Type B aortic dissection, s/p L thoracotomy w/ thoracoabdominal aortic aneurysm repair 2/8/23. Post-op course is complicated by spinal cord ischemia on recent MRI 2/16. Additionally has VAP with extensive mucus plugging. Sputum culture grew Enterobacter cloacae and Stenotrophomonas maltophila, and started on Bactrim. Negative C diff, obtained due to loose stools/abdominal pain. Due elevated LFT, further work up obtained- with negative hepatitis B, C serologies. RUQ ultrasound showed cholelithiasis w/o acute cholecystitis.     Now, has been spiking fevers, and up trending leukocytosis. Blood cultures remained negative so far. Remained intubated, sedated. Ordered another blood culture panel this evening while febrile. Recommend to continue bactrim for now, to treat VAP.     [facility] team will continue to follow. Please reach out if any questions or concerns.     Isaura Rojas MD  Infectious Diseases Staff Physician  Pager: [phone]  Date of Service (when I saw the patient): 2/20/2023         SUBJECTIVE      Interval History and Events:  -Febrile to 101.5 in  afternoon of 2/19  -Underwent bronchoscopy, which was significant for mucous plugging and blood clots and ulceration at [facility]     Antimicrobial Treatment:  Sulfamethoxazole-trimethoprim 2/16 - present   Cefazolin 2/8 - 2/9  Cefepime 2/17   Ceftriaxone 2/13 - 2/15  Ertapenem 2/15 - 2/16  Piperacillin-tazobactam 2/11 - 2/13           OBJECTIVE     Physical Examination:    Temp:  [99.5  F (37.5  C)-101.5  F (38.6  C)] 99.7  F (37.6  C)  Pulse:  [106-128] 106  Resp:  [14-52] 16  BP: (106-151)/(49-65) 112/50  FiO2 (%):  [50 %] 50 %  SpO2:  [94 %-99 %] 95 %    I/O last 3 completed shifts:  In: 2095 [I.V.:975; NG/GT:520]  Out: 2940 [Urine:2940]    Vitals:    02/16/23 0400 02/17/23 0430 02/18/23 0600 02/19/23 0400   Weight: 65.5 kg (144 lb 6.4 oz) 66.4 kg (146 lb 6.2 oz) 69.2 kg (152 lb 8.9 oz) 71.1 kg (156 lb 12 oz)    02/20/23 0400   Weight: 70.2 kg (154 lb 12.2 oz)       Constitutional: Intubated, intermittently interactive. Resting in bed, no acute distress.   HEENT: NC/AT, sclera clear, conjunctiva normal  Respiratory: Intubated. Coarse lung sounds bilaterally.   Cardiovascular: Tachycardic with regular rhythm, no murmurs   GI: Soft, nondistended, nontender   Skin: Warm, dry, well-perfused. Mild bruising on bilateral upper extremities   Neurologic: Intermittently interactive and able to answer yes/no questions.   Vascular access: Dressing over CVC on right neck CDI, non-tender, no surrounding erythema.    I reviewed the following laboratory data:    Microbiology:    Respiratory culture 2/10: positive for E. Cloacae   Respiratory culture 2/14: positive for E. Cloacae and Stenotrophomonas maltophilia  Respiratory culture 2/17: positive for E. Cloacae and Stenotrophomonas maltophilia    Blood culture 2/15: no growth   Blood culture 2/19: no growth to date     Urine culture 2/15: urogenital zach     C. Diff toxin B by PCR: negative

## 2023-02-20 NOTE — PROGRESS NOTES
[facility]     PM&R PROGRESS NOTE      ASSESSMENT/PLAN:    Shelly Becerril is a 73 year old female with a history of ascending aortic dissection status-post aortic root reconstruction and aortic valve replacement (2018), history of UGI bleed (on coumadin), who initially presented to an OSH with complaints of chest, back, and abdominal pain, along with nausea and SOB. She was found to be in hypertensive emergency, with SBP above 200s. She was admitted to [facility] 2/4/2023 with type B aortic dissection involving the descending thoracic aorta, extending to just above the origin of the celiac artery. 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  Patient previously seen by PM&R on 2/15/2023, primary team asked for PM&R to evaluate patient today for proper ankle/foot braces.    Recommendations:  - PRAFO order placed for patient to have bilaterally to help prevent ankle plantarflexion contractures.  Patient's left ankle has decreased dorsiflexion compared to right.    - Contracture and muscle shortening prevention: provide AROM/PROM to all the joints.     - Important to continue repositioning patient q2hr to prevent skin breakdown to areas of high pressure including sacrum and bilateral heels. WOC RN already following patient for mid back pressure injury.    - Continue with dunbar management/care per primary team.    - Current bowel program which was recently restarted includes scheduled Miralax, patient on tube feeds, with good results per nursing.  If bowel program no longer successful would recommend Senakot one tab BID, and a dulcolax suppository every other day prn       Thank you for consulting the PM&R Department.       Please call for any questions. Pager number [phone].    Winston Manning, DO  PGY4 PM&R Resident   Physical Medicine & Rehabilitation  [facility]  [place]        INTERVAL HISTORY:  Shelly Becerril was seen and examined at bedside. No significant changes since last evaluation on 2/15.    MEDICATIONS:    Current Facility-Administered Medications:      acetaminophen (TYLENOL) tablet 650 mg, 650 mg, Oral or Feeding Tube, Q8H, Valdo Patel MD, 650 mg at 02/20/23 1011     acetaminophen (TYLENOL) tablet 650 mg, 650 mg, Oral, Q4H PRN, Malcolm Mary MD, 650 mg at 02/20/23 0749     acetylcysteine (MUCOMYST) 10 % nebulizer solution 2 mL, 2 mL, Nebulization, BID, Sanjuana Corona, RT     albuterol (PROVENTIL) neb solution 2.5 mg, 2.5 mg, Nebulization, BID, Sanjuana Corona, RT     aspirin (ASA) chewable tablet 81 mg, 81 mg, Oral or NG Tube, Daily, Malcolm Mary MD, 81 mg at 02/20/23 0748     bisacodyl (DULCOLAX) suppository 10 mg, 10 mg, Rectal, Daily PRN, Malcolm Mary MD     dextrose 10% infusion, , Intravenous, Continuous PRN, Valdo Alonso     glucose gel 15-30 g, 15-30 g, Oral, Q15 Min PRN **OR** dextrose 50 % injection 25-50 mL, 25-50 mL, Intravenous, Q15 Min PRN **OR** glucagon injection 1 mg, 1 mg, Subcutaneous, Q15 Min PRN, Valdo Patel MD     heparin ANTICOAGULANT injection 5,000 Units, 5,000 Units, Subcutaneous, Q8H, Valdo Alonso, 5,000 Units at 02/20/23 0748     hydrALAZINE (APRESOLINE) injection 10-20 mg, 10-20 mg, Intravenous, Q4H PRN, Kashmir Cifuentes PA-C, 10 mg at 02/19/23 0913     HYDROmorphone (DILAUDID) injection 0.1 mg, 0.1 mg, Intravenous, Q2H PRN, 0.1 mg at 02/13/23 2116 **OR** HYDROmorphone (DILAUDID) injection 0.2 mg, 0.2 mg, Intravenous, Q2H PRN, Malcolm Mary MD, 0.2 mg at 02/19/23 0115     insulin aspart (NovoLOG) injection (RAPID ACTING), 1-6 Units, Subcutaneous, Q4H, Rosie Trimble APRN CNP, 1 Units at 02/19/23 2354     lidocaine (LMX4) cream, , Topical, Q1H PRN, Rekha Urbina MD     lidocaine (LMX4) cream, , Topical, Q1H PRN, Malcolm Mary MD     lidocaine 1 % 0.1-1 mL, 0.1-1 mL, Other, Q1H PRN,  Malcolm Mary MD     lidocaine 1 % 0.1-5 mL, 0.1-5 mL, Other, Q1H PRN, Rekha Urbina MD     magnesium hydroxide (MILK OF MAGNESIA) suspension 30 mL, 30 mL, Oral, Daily PRN, Malcolm Mary MD     methocarbamol (ROBAXIN) tablet 500 mg, 500 mg, Oral, Q6H PRN, Malcolm Mary MD, 500 mg at 02/20/23 0748     mirtazapine (REMERON) tablet TABS 15 mg, 15 mg, Oral or Feeding Tube, At Bedtime, Kashmir Cifuentes PA-C, 15 mg at 02/19/23 2222     multivitamins w/minerals liquid 15 mL, 15 mL, Per Feeding Tube, Daily, Valdo Alonso, 15 mL at 02/20/23 0748     naloxone (NARCAN) injection 0.2 mg, 0.2 mg, Intravenous, Q2 Min PRN **OR** naloxone (NARCAN) injection 0.4 mg, 0.4 mg, Intravenous, Q2 Min PRN **OR** naloxone (NARCAN) injection 0.2 mg, 0.2 mg, Intramuscular, Q2 Min PRN **OR** naloxone (NARCAN) injection 0.4 mg, 0.4 mg, Intramuscular, Q2 Min PRN, Esperanza Dixon MD     ondansetron (ZOFRAN ODT) ODT tab 4 mg, 4 mg, Oral, Q6H PRN **OR** ondansetron (ZOFRAN) injection 4 mg, 4 mg, Intravenous, Q6H PRN, Malcolm Mary MD, 4 mg at 02/20/23 0748     oxyCODONE (ROXICODONE) tablet 5 mg, 5 mg, Oral, Q4H PRN, 5 mg at 02/19/23 2039 **OR** oxyCODONE IR (ROXICODONE) tablet 10 mg, 10 mg, Oral, Q4H PRN, Marquis Yoder MD, 10 mg at 02/20/23 0749     pantoprazole (PROTONIX) 2 mg/mL suspension 40 mg, 40 mg, Oral or Feeding Tube, Daily, Omega Briceno MD, 40 mg at 02/20/23 0748     polyethylene glycol (MIRALAX) Packet 17 g, 17 g, Oral, Daily, Rekha Urbina MD     polyethylene glycol (MIRALAX) Packet 17 g, 17 g, Oral, Daily PRN, Aaron Vinson MD     prochlorperazine (COMPAZINE) injection 5 mg, 5 mg, Intravenous, Q6H PRN, 5 mg at 02/19/23 0917 **OR** prochlorperazine (COMPAZINE) tablet 5 mg, 5 mg, Oral, Q6H PRN, Malcolm Mary MD     protein modular (PROSOURCE TF20) packet 1 packet, 1 packet, Per Feeding Tube, Daily, Valdo Alonso, 1 packet at 02/20/23 0749     Reason beta blocker  order not selected, , Does not apply, DOES NOT GO TO Usman ROYAL Ranjan, MD     senna-docusate (SENOKOT-S/PERICOLACE) 8.6-50 MG per tablet 1 tablet, 1 tablet, Oral or Feeding Tube, QAM, Ashutosh Copeland PA-C, 1 tablet at 02/20/23 1011     senna-docusate (SENOKOT-S/PERICOLACE) 8.6-50 MG per tablet 1 tablet, 1 tablet, Oral, BID PRN, Aaron Vinson MD     simethicone (MYLICON) suspension 40 mg, 40 mg, Oral, Q6H PRN, Aaron Vinson MD, 40 mg at 02/18/23 2120     sodium chloride (PF) 0.9% PF flush 10-40 mL, 10-40 mL, Intracatheter, Once PRN, Rekha Urbina MD     sodium chloride (PF) 0.9% PF flush 3 mL, 3 mL, Intracatheter, Q8H, Malcolm Mary MD, 3 mL at 02/19/23 2356     sodium chloride tablet 2 g, 2 g, Oral or Feeding Tube, TID w/meals, Aaron Vinson MD, 2 g at 02/20/23 1148     sulfamethoxazole-trimethoprim (BACTRIM DS) 800-160 MG per tablet 2 tablet, 2 tablet, Oral or Feeding Tube, Q8H, Ashutosh Copeland PA-C, 2 tablet at 02/20/23 1011      EXAMINATION:  Vital signs:  Temp: (!) 100.9  F (38.3  C) Temp src: Bladder BP: (!) 140/62 Pulse: (!) 124   Resp: 21 SpO2: 93 % O2 Device: Mechanical Ventilator Oxygen Delivery: 2 LPM Height: 152.4 cm (5') Weight: 70.2 kg (154 lb 12.2 oz)  Estimated body mass index is 30.23 kg/m  as calculated from the following:    Height as of this encounter: 1.524 m (5').    Weight as of this encounter: 70.2 kg (154 lb 12.2 oz).    General: NAD, intubated  Extremities: Warm and well perfused in bilateral upper and lower extremities.  MSK/neuro:  Left ankle dorsiflexion approximately 10 degrees PROM, right ankle dorsiflexion 20 degrees PROM  Skin: No pressure injuries to bilateral feet      LABS AND IMAGING:  Results for orders placed or performed during the hospital encounter of 02/04/23 (from the past 24 hour(s))   Basic metabolic panel   Result Value Ref Range    Sodium 131 (L) 136 - 145 mmol/L    Potassium 5.2 3.4 - 5.3 mmol/L    Chloride 100 98 - 107 mmol/L     Carbon Dioxide (CO2) 19 (L) 22 - 29 mmol/L    Anion Gap 12 7 - 15 mmol/L    Urea Nitrogen 27.0 (H) 8.0 - 23.0 mg/dL    Creatinine 0.97 (H) 0.51 - 0.95 mg/dL    Calcium 8.1 (L) 8.8 - 10.2 mg/dL    Glucose 164 (H) 70 - 99 mg/dL    GFR Estimate 62 >60 mL/min/1.73m2   Magnesium   Result Value Ref Range    Magnesium 2.5 (H) 1.7 - 2.3 mg/dL   Phosphorus   Result Value Ref Range    Phosphorus 2.9 2.5 - 4.5 mg/dL   Blood Culture Hand, Left    Specimen: Hand, Left; Blood   Result Value Ref Range    Culture No growth after 12 hours     Narrative    Only an Aerobic Blood Culture Bottle was collected, interpret results with caution.       Blood Culture Hand, Right    Specimen: Hand, Right; Blood   Result Value Ref Range    Culture No growth after 12 hours    Glucose by meter   Result Value Ref Range    GLUCOSE BY METER POCT 106 (H) 70 - 99 mg/dL   Basic metabolic panel   Result Value Ref Range    Sodium 129 (L) 136 - 145 mmol/L    Potassium 5.1 3.4 - 5.3 mmol/L    Chloride 99 98 - 107 mmol/L    Carbon Dioxide (CO2) 20 (L) 22 - 29 mmol/L    Anion Gap 10 7 - 15 mmol/L    Urea Nitrogen 25.5 (H) 8.0 - 23.0 mg/dL    Creatinine 0.90 0.51 - 0.95 mg/dL    Calcium 8.0 (L) 8.8 - 10.2 mg/dL    Glucose 165 (H) 70 - 99 mg/dL    GFR Estimate 68 >60 mL/min/1.73m2   Glucose by meter   Result Value Ref Range    GLUCOSE BY METER POCT 157 (H) 70 - 99 mg/dL   XR Chest Port 1 View    Narrative    Exam: XR CHEST PORT 1 VIEW, 2/20/2023 1:20 AM    Comparison: 2/19/2023    History: left-sided atelectasis s/p thoracoabdominal aortic aneurysm  repair    Findings:  A single portable semiupright view of the chest. Endotracheal tube  projects in the mid thoracic trachea. Right IJ central venous  catheter. Enteric tube traverses below the field-of-view. Median  sternotomy wires.    Trachea is midline. Cardiomediastinal silhouette is not  well-visualized. Increased right basilar streaky opacities There is  complete opacification of the left hemithorax.  Left pleural effusion.  Small right pleural effusion No pneumothorax. Left rib fracture. The  upper abdomen is unremarkable.      Impression    Impression:     1. Complete opacification of the left hemithorax likely represents  complete atelectasis of the left lung with effusion. Consolidation  from pneumonia is in the differential.  2. Increased right basilar streaky opacities may represent atelectasis  and/or edema.  3. Small right pleural effusion.    I have personally reviewed the examination and initial interpretation  and I agree with the findings.    ISABEL ESCALERA MD         SYSTEM ID:  [id number]   Glucose by meter   Result Value Ref Range    GLUCOSE BY METER POCT 121 (H) 70 - 99 mg/dL   Comprehensive metabolic panel   Result Value Ref Range    Sodium 130 (L) 136 - 145 mmol/L    Potassium 5.5 (H) 3.4 - 5.3 mmol/L    Chloride 99 98 - 107 mmol/L    Carbon Dioxide (CO2) 21 (L) 22 - 29 mmol/L    Anion Gap 10 7 - 15 mmol/L    Urea Nitrogen 26.6 (H) 8.0 - 23.0 mg/dL    Creatinine 0.89 0.51 - 0.95 mg/dL    Calcium 7.8 (L) 8.8 - 10.2 mg/dL    Glucose 115 (H) 70 - 99 mg/dL    Alkaline Phosphatase 283 (H) 35 - 104 U/L     (H) 10 - 35 U/L     (H) 10 - 35 U/L    Protein Total 5.7 (L) 6.4 - 8.3 g/dL    Albumin 2.3 (L) 3.5 - 5.2 g/dL    Bilirubin Total 0.4 <=1.2 mg/dL    GFR Estimate 69 >60 mL/min/1.73m2   CBC with platelets   Result Value Ref Range    WBC Count 16.1 (H) 4.0 - 11.0 10e3/uL    RBC Count 2.50 (L) 3.80 - 5.20 10e6/uL    Hemoglobin 7.5 (L) 11.7 - 15.7 g/dL    Hematocrit 23.6 (L) 35.0 - 47.0 %    MCV 94 78 - 100 fL    MCH 30.0 26.5 - 33.0 pg    MCHC 31.8 31.5 - 36.5 g/dL    RDW 14.9 10.0 - 15.0 %    Platelet Count 348 150 - 450 10e3/uL   Magnesium   Result Value Ref Range    Magnesium 2.6 (H) 1.7 - 2.3 mg/dL   Phosphorus   Result Value Ref Range    Phosphorus 3.0 2.5 - 4.5 mg/dL   Glucose by meter   Result Value Ref Range    GLUCOSE BY METER POCT 103 (H) 70 - 99 mg/dL   Glucose by meter   Result  Value Ref Range    GLUCOSE BY METER POCT 105 (H) 70 - 99 mg/dL       Patient was seen and discussed with staff attending, Dr. Lorenzo.    Total of 45 min spent in this encounter, more than 50% in counseling and coordination.

## 2023-02-20 NOTE — CONSULTS
"         Interventional Pulmonology          Initial Inpatient Consultation Note                                     February 20, 2023            Patient: Shelly Becerril    Date of Admission: 2/4/2023  Reason for Consultation: Left lung collapse.   Requesting Physician: No referring provider defined for this encounter.      Assessment:   Shelly Becerril is a 73 year old admitted on 2/4/2023 with thoracoabdominal aortic aneurysm repair . Interventional Pulmonology is consulted today for blood tinged secretions and collapse of the left lung on x-ray. DDx includes secretions in the left lung vs airway malacia.     Plan:    Bronchoscopy with inspection this morning.     Recommend albuterol neb therapy 4x daily along with mucomyst BID to help break up secretions.     Patient seen and discussed with Dr. Joon Martin  Interventional Pulmonary Fellow    Thank you for this consultation, we will continue to follow along.              Chief Complaint: \"Left lung atelectasis\"    History of Present Illness:   Shelly Becerril is a 73 year old female with a pmhx of ascending aortic aneurysm s/p ascending repair with aortic root reconstruction. Patient underwent left thoracotomy with thoracoabdominal aortic aneurysm repair with left heart bypass with Dr Briceno on 2/8/23. Patient currently on the ventilator at 50% fio2. Was called by primary team this morning that the patient was persistent collapse of hte left lung on x-ray. They have bronched her prior to this and state that she has some blood in the left lung. Requesting IP to see if there is anything to cryo. Currently unable to obtain ROS as patient is intubated and sedated. Heparin drip was stopped yesterday.            Data:   All pertinent laboratory and imaging data reviewed.           Past Medical History:     Past Medical History:   Diagnosis Date     Anticoagulated on Coumadin      Aortic aneurysm (H)      Hypertension 12/19/2019            Past Surgical History: "     Past Surgical History:   Procedure Laterality Date     CARDIAC SURGERY  04/2018    aortic valve replacement, aortic root reconstruction     CV CORONARY ANGIOGRAM  2/7/2023    Procedure: CV CORONARY ANGIOGRAM;  Surgeon: Flo Dutton MD;  Location:  [facility]     WA ESOPHAGOGASTRODUODENOSCOPY TRANSORAL DIAGNOSTIC N/A 4/6/2021    Procedure: ESOPHAGOGASTRODUODENOSCOPY (EGD) WITH BIOPSY.;  Surgeon: Joey Garcia MD;  Location: [facility];  Service: Gastroenterology     REPAIR ANEURYSM ASCENDING AORTA N/A 2/8/2023    Procedure: Left thoracotomy, thoracoabdominal aortic aneurysm repair, repair with left heart bypass, open exposure of left femoral artery;  Surgeon: Omega Briceno MD;  Location:  OR            Social History:     Social History     Socioeconomic History     Marital status:      Spouse name: Not on file     Number of children: Not on file     Years of education: Not on file     Highest education level: Not on file   Occupational History     Not on file   Tobacco Use     Smoking status: Never     Smokeless tobacco: Never   Substance and Sexual Activity     Alcohol use: Never     Drug use: Never     Sexual activity: Not on file   Other Topics Concern     Not on file   Social History Narrative     Not on file     Social Determinants of Health     Financial Resource Strain: Not on file   Food Insecurity: Not on file   Transportation Needs: Not on file   Physical Activity: Not on file   Stress: Not on file   Social Connections: Not on file   Intimate Partner Violence: Not on file   Housing Stability: Not on file            Family History:   History reviewed. No pertinent family history.         Allergies:   No Known Allergies         Medications:       acetaminophen  650 mg Oral or Feeding Tube Q8H     aspirin  81 mg Oral or NG Tube Daily     [Held by provider] atorvastatin  40 mg Oral Daily     fiber modular  1 packet Per Feeding Tube TID     heparin  ANTICOAGULANT  5,000 Units Subcutaneous Q8H     insulin aspart  1-6 Units Subcutaneous Q4H     mirtazapine  15 mg Oral or Feeding Tube At Bedtime     multivitamins w/minerals  15 mL Per Feeding Tube Daily     pantoprazole  40 mg Oral or Feeding Tube Daily     protein modular  1 packet Per Feeding Tube Daily     sodium chloride (PF)  3 mL Intracatheter Q8H     sodium chloride  2 g Oral or Feeding Tube TID w/meals     sulfamethoxazole-trimethoprim  320 mg Intravenous Q8H            Review of Systems:   Limited.          Physical Exam:   Temp:  [99.5  F (37.5  C)-101.5  F (38.6  C)] 100  F (37.8  C)  Pulse:  [108-128] 120  Resp:  [18-52] 24  BP: (106-159)/(44-65) 151/65  FiO2 (%):  [40 %-60 %] 50 %  SpO2:  [92 %-100 %] 96 %  General: Intubated and sedated.   EENT: No scleral icterus  Neck: Trachea supple/midline  Lungs: Decreased breath sounds diffusely.   Cardiovascular: Normal rate, regular rhythm  Abdomen: Soft, non-tender, non-distended   MSK: No edema, no clubbing   Neurologic: intubated and sedated.   Skin: No obvious rash. Warm and dry

## 2023-02-20 NOTE — PROGRESS NOTES
"Bronchoscopy Risk Assessment Guidelines      A. Patient symptoms to consider when assessing pulmonary TB risk are:    I. Cough greater than 3 weeks; and fever, hemoptysis, pleuritic chest    pain, weight loss greater than 10 lbs, night sweats, fatigue, infiltrates on    upper lobes or superior segments of lower lobes, cavitation on chest    x-ray.   B. Patient risk factors to consider when assessing pulmonary TB risk are:    I. Exposure to known TB case, foreign-born persons (within 5 years of    arrival to [place]), residence in a crowded setting (correctional facility,     long-term care center, etc.), persons with HIV or immunosuppression.    Patients with symptoms and risk factors should generally be considered \"suspect risk\" and bronchoscopies should be performed in airborne precautions.    This patient has NO KNOWN RISK of Tuberculosis (proceed with bronchoscopy)    Specimens sent: no  Complications: None  Scope used: #[id number] Adult  Attending Physician: Dr. Joon Suarez, RT on 2/20/2023 at 9:54 AM  "

## 2023-02-20 NOTE — PROGRESS NOTES
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 Briceno on 2/8/23. Course complicated by ischemic spinal cord injury.      24-Hour Events:  No acute events overnight. No BM for 3-4 days. Strong PST 2/18 but did not tolerate well 2/19.    TODAY'S PROGRESS:   Lasix 40 this AM, goal net -1L  Add back on bowel regimen - miralax and senna daily  IV > oral double strength bactrim BID, end date = total of 14 days  Ertapenem discontinued on 2/16  Pull RIJ CVC  Bronch this AM by pulm, epinephrine to ulceration in mid trachea  PST as able this PM s/p ativan for bronch  Plan for PST + extubation attempt 2/21 AM  Tentative family conference Wednesday 2/22  Pulm recs: albuterol neb therapy QID, mucocyst BID for secretions  Replace dunbar    PLAN:  Neuro/ pain/ sedation:   #Sedation  #Acute postoperative pain  - Pain: Scheduled Tylenol 650mg every 8 hours   - PRN: Tylenol, oxycodone prn, and Dilaudid-IVP prn  #Spinal cord ischemia, Paraplegia  - On, 2/10 unable to move b/l LE, no sensation, dusky discoloration hands R>L, multiphasic Dopplers in R & L ulnar artery & brachial arch. On 2/11 /follow commands b/l UE, started withdrawing b/l LE. On 2/12 intermittently withdrawing b/l LE. On 2/13 moved b/l toes to command for 1st time in 4 days. On 2/14 continues withdrawing from pain b/l LE and moving L toes to command. Recently not withdrawing to pain b/l LE  - Neurocrit followed lumbar drain, anesthesia removed 2/15. Normal MAP goal >65  - MRI TL: infarct from T10. Talked to [facility], not need more imaging,  get spinal cord rehab.   - PM&R following   #Hx stroke (2019)  - Chronic dysarthria and dysphagia, s/p warfarin then Eliquis in 2021, unclear if taking  - Question of PTA residual LE weakness   #Hx Depression  - PTA mirtazapine resumed    Pulmonary care:   #Acute respiratory failure   #Pneumonia, hospital acquired   Vent Mode: CPAP/PS  (Continuous positive airway pressure with Pressure Support)  FiO2 (%): 50 %  Resp Rate (Set): 14 breaths/min  Tidal Volume (Set, mL): 350 mL  PEEP (cm H2O): 8 cmH2O  Pressure Support (cm H2O): 10 cmH2O  Resp: 14  - Bronch 2/11, small old blood LLL, small mucus b/l suctioned  - CT Chest 2/14 complete LL atelectasis 2/2 mucus plugging, mild pulmonary edema R lung  - Bronch 2/14, removed mucus plug in L main bronchus, LLL, and HILLARY  - Bronch 2/15, cleared mucus plugs in HILLARY and LLL  - Bronch 2/17, cleared clot and mucus plugs in HILLARY and LLL  - Bronch 2/20 via pulm, ulceration in mid trachea not currently bleeding, applied topical epinephrine. ET tube placed past ulceration. Moderate malacia. R tracheobronchial tree normal      - Intrapulmonary percussive therapy, Chest PT left lung  - Daily CXR: Complete opacification of the left hemithorax, increased right basilar streaky opacities atelectasis vs edema, small right pleural effusion  - Lasix 40 mg, goal net -1L  - Could consider talking to anesthesia for intercostal or paravertebral blocks if pain is limiting her respiratory effort     Cardiovascular:    #Cardiogenic shock -S/p hydrocortisone 50 mg q6h (2/11-2/13)  #Hx of hypertension   #Ascending aortic aneurysm s/p ascending repair with aortic root reconstruction  #Bioprosthetic aortic valve in 2018  #Relative hypotension  Pre CPBP: LV/RV normal, 55-60%. Mild MR w/ central jet from A2/P2. Mild TR.  Post CPBP: LV normal, 55-60%. RV mildly reduced. Mild AS, trace AI. Mild MR. Mild TR.  - PRN Hydralazine for SBP   - Hold PTA metoprolol succinate 50 mg, lisinopril 40 mg, atorvastatin 40  mg  - Continue with ASA 81 mg      GI care/Nutrition:   #Concern for protein calorie malnutrition  - TF-30cc/hr.  - Bowel regimen: Restart miralax, senna. Removed rectal tube 2/19.  - Fiber and Banatrol > fiber source today //. Hold fiber today  - Change banatrol > fiber source 2/2 potassium in banatrol. Hold today 2/2 no BM 3-4 days.  #LFT elevated with normal T bilirubin, normalizing  - CTM, trend LFTs  - HBV pending  - US abdominal 2/18 reviewed, no current ongoing concern for acute cholecystitis given no pericholecystic fluid and normal GB wall   - Mild abdominal pain noted 2/19 AM, soft nontender, no rebound and passing stools, continue to monitor     Renal / Fluids / Electrolytes:   BL creat appears to be ~ 0.9-1.0  - Strict I/Os, daily weights, replete lytes PRN per protocol  - Decrease FWF to only TF patency  - 40mg IV lasix this AM, goal net -1L  #Mild MARCOS, improving  - FENA 1.3% with concern for possible intrinsic renal process  - IV bactrim possible source, switched to PO  - Cr normalizing  #Hyponatremia  - 130 (129)  - Likely 2/2 D5W in IV bactrim, switched to PO this AM  - Limiting FWF as above  #Hyperkalemia  - K 5.6, shifted 2/19 then 5.3 > 5.7 later. Likely 2/2 IV Bactrim, switched to PO      Endocrine:    # Stress hyperglycemia  Preop A1c 4.7  - Med sliding scale insulin (3u last 24 hrs)  - Goal BG <180 for optimal healing     ID / Antibiotics:  # Pneumonia, poly microbial--> Stenotrophomonas and enterobacter   - 2/10 Resp culture:  Resp Cx had Enterobacter cloacae.   - 2/1: BAL: Stenotrophomonas and enterobacter   - C diff negative    Antibiotics:  - Zosyn 2/12-2/13  - Ceftriaxone 2/13 - 2/14  - Ertapenem 2/15 - 2/16  - Bactrim 2/16 - end date 2/30 (Switched IV > PO 2/20)     Cultures:  - 2/10: Resp Cx positive for Enterobacter cloacae.   - 2/14: Steno and enterobacter   - 2/15: BC x 2 NGTD  - 2/17: Respiratory cx positive for steno, enterobacter cloacae  - 2/19: BC x2 NGTD     Heme:     # Acute  blood loss anemia  # Acute blood loss thrombocytopenia  - No s/sx bleeding  - CBC daily, Hgb goal >7   - SQH     MSK / Skin:  # Sternotomy  # Surgical Incision  # Spinal cord ischemia, paraplegia   # Wounds, mid-back  - Sternal precautions, postop incision management protocol  - PT/OT/CR  - PM&R consult for rehab- see noted 2/15/23- PRAFO  boots  - Need to reconsult for spinal cord rehab   - WOC nurse assessed mid-back wounds 2/20, recommended regular cares by nursing. Will follow weekly      Prophylaxis:     - Mechanical DVT and Chemical DVT ppx: SQH  - PPI     Lines / Tubes / Drains:  - ETT  - RIJ CVC (REMOVE TODAY)  - Black (REPLACE TODAY)  - NJ     Disposition:  - CVICU        Patient seen, findings and plan discussed with CVICU staff and cardiothoracic surgeons.    Ramo Rai, MS4   2/20/2023 at 1:55 PM      ====================================    TODAY'S PROGRESS  SUBJECTIVE  NAEON. Sedation off. Meeting MAP goals > 65. Remains intubated. Removed rectal tube yesterday. No BM for few days, mild abdominal distension.     OBJECTIVE  1. VITAL SIGNS  Temp:  [99.5  F (37.5  C)-101.5  F (38.6  C)] 99.7  F (37.6  C)  Pulse:  [106-128] 112  Resp:  [14-52] 14  BP: (106-151)/(49-65) 128/54  FiO2 (%):  [50 %] 50 %  SpO2:  [93 %-99 %] 93 %  Vent Mode: CPAP/PS  (Continuous positive airway pressure with Pressure Support)  FiO2 (%): 50 %  Resp Rate (Set): 14 breaths/min  Tidal Volume (Set, mL): 350 mL  PEEP (cm H2O): 8 cmH2O  Pressure Support (cm H2O): 10 cmH2O  Resp: 14      2. INTAKE/ OUTPUT  I/O last 3 completed shifts:  In: 2095 [I.V.:975; NG/GT:520]  Out: 2940 [Urine:2940]    3. PHYSICAL EXAMINATION    General: calms, opens eyes to normal tone of voice, follows simple commands correctly.  Neuro: A&Ox3, follow simple commands. BUE with  strength 4/5, BLE with no movement.   RV: Ventilated; coarse diminished breath sounds on R, minimal to no breath sounds on L.  CV: Tachycardic, regular rhthym  Abdomen: abdomen  soft and compressible, incision edges well approximated, no redness or warmth  Extremities: warm and well perfused, 2+ edema, pulses 2+    4. INVESTIGATIONS  Arterial Blood Gases   Recent Labs   Lab 02/17/23  0232 02/16/23  0358 02/15/23  0412 02/14/23  0316   PH 7.46* 7.47* 7.49* 7.51*   PCO2 37 37 38 38   PO2 105 141* 79* 154*   HCO3 26 27 29* 30*     Complete Blood Count   Recent Labs   Lab 02/20/23  0327 02/19/23  0409 02/18/23  0412 02/17/23  0233   WBC 16.1* 15.1* 10.6 13.1*   HGB 7.5* 8.0* 7.6* 8.7*    325 269 277     Basic Metabolic Panel  Recent Labs   Lab 02/20/23  1153 02/20/23  0812 02/20/23  0327 02/20/23  0326 02/19/23  2353 02/19/23  2352 02/19/23  2043 02/19/23  1532 02/19/23  1254 02/19/23  1224 02/19/23  0830 02/19/23  0827 02/19/23  0418 02/19/23  0409   NA  --   --  130*  --   --  129*  --  131*  --   --   --  131*  --  131*   POTASSIUM  --   --  5.5*  --   --  5.1  --  5.2  --  5.6*   < > 5.7*  --  5.6*   CHLORIDE  --   --  99  --   --  99  --  100  --   --   --   --   --  102   CO2  --   --  21*  --   --  20*  --  19*  --   --   --   --   --  18*   BUN  --   --  26.6*  --   --  25.5*  --  27.0*  --   --   --   --   --  29.5*   CR  --   --  0.89  --   --  0.90  --  0.97*  --   --   --   --   --  0.96*   * 103* 115* 121*   < > 165*   < > 164*   < >  --    < >  --    < > 129*    < > = values in this interval not displayed.     Liver Function Tests  Recent Labs   Lab 02/20/23  0327 02/19/23  0409 02/18/23  0412 02/17/23  0233   * 154*  154* 227* 134*   * 167*  167* 157* 103*   ALKPHOS 283* 291*  291* 234* 172*   BILITOTAL 0.4 0.4  0.4 0.3 0.4   ALBUMIN 2.3* 2.3*  2.3* 2.1* 2.1*     Pancreatic Enzymes  No lab results found in last 7 days.  Coagulation Profile  No lab results found in last 7 days.  Lactate  Invalid input(s): LACTATE    5. RADIOLOGY  Recent Results (from the past 24 hour(s))   US Abdomen Limited    Narrative    EXAMINATION: US ABDOMEN LIMITED,  2/17/2023 8:51 PM    COMPARISON: CT chest, abdomen and pelvis 2/5/2023.    HISTORY: Elevated AST/ALT.    TECHNIQUE: The abdomen was scanned in standard fashion with  specialized ultrasound transducer(s) using both grey scale and limited  color Doppler techniques.    FINDINGS:   Fluid: The visualized right-sided pleural effusion.    Liver: The liver demonstrates normal echotexture, measuring 15.8 cm in  craniocaudal dimension. There is no focal mass. The main portal vein  is patent with antegrade flow towards the liver.    Gallbladder: Cholelithiasis. No gallbladder wall thickening or  hyperemia. No pericholecystic fluid. Unable to assess sonographic  Acevedo's sign secondary to intubated state.    Bile Ducts: Both the intra- and extrahepatic biliary system are of  normal caliber.  The common bile duct measures 8 mm in diameter, which  is borderline enlarged for age.    Pancreas: Visualized portions of the head and body of the pancreas are  unremarkable.     Kidney: The right kidney measures 9.0 cm long. There is no  hydronephrosis or hydroureter, no shadowing renal calculi, cystic  lesion or mass.     The proximal aorta measures up to 3.7 cm, best visualized on recent  CTA studies.      Impression    IMPRESSION:   1.  Cholelithiasis with no additional sonographic findings to suggest  acute cholecystitis.  2.  Borderline dilated common bile duct measuring up to 8 mm. No  obstructing foci identified.  3.  Impression visualized right-sided pleural effusion.    I have personally reviewed the examination and initial interpretation  and I agree with the findings.    ISABEL ESCALERA MD         SYSTEM ID:  [id number]

## 2023-02-20 NOTE — PLAN OF CARE
Major Shift Events: Alert, opens eyes spontaneously, between restless and calm, Perrl, moves bilateral uppers spontaneously, no response to stimuli in bilateral lowers. Intermittently following commands. Sinus tach 110's-120s. Goal of SBP less than 140, maintaining without antihypertensives. T Max 101.7, blood cultures sent, fan applied, icepacks applied. 3+ edema throughout, lasix given x1. Has been on PS since noon, 10/8 50%. Bronch performed today by pulm IR. NJ with TF at goal of 30 mL/hr. Black exchanged per CVTS. Stool regimen restarted, patient has been incontinent of diarrhea. MAC removed per CVTS, PICC placed in RUE.   Plan: Continue to wean vent as able. Cool as able.   For vital signs and complete assessments, please see documentation flowsheets.

## 2023-02-20 NOTE — PLAN OF CARE
Major Shift Events: Patient restless, frequently pulling at lines and tubes overnight, bilateral wrist restraints restarted and mitts continued. Patient continues to follow simple commands, moves BUE strength 3/5, and not withdraw in BLE. Tmax 100.8 with continued tylenol, fan, and ice packs utilized. ST, rates 100s-120s. SBP goal <140 maintained without PRNs. CMV settings overnight R 14,  tV 350, PEEP 8, 50% FiO2 with patient over breathing vent with RR 24-28, -128, PRN oxy and robaxin given with improvement, MD aware. No BM. TF at goal.  K 5.5 with am labs, MD notified, with plan per MD to hold next bactrim dose. Black patent with adequate UOP.    Plan: Continue with plan of care.    For vital signs and complete assessments, please see documentation flowsheets.

## 2023-02-20 NOTE — PROCEDURES
INTERVENTIONAL PULMONOLOGY       Procedure(s):    A flexible bronchoscopy  Airway exam  ET tube manipulation     Indication:  Left lung atelectasis    Attending of Record:  Choco Dupree MD     Interventional Pulmonary Fellow   Aquilino Martin    Trainees Present:   None     Medications:    continued on ICU gtt medications (see MAR)    Sedation Time:   None    Time Out:  Performed    The patient's medical record has been reviewed.  The indication for the procedure was reviewed.  The necessary history and physical examination was performed and reviewed.  Patient performed emergently.    After clinical evaluation and reviewing the indication, risks, alternatives and benefits of the procedure the patient was deemed to be in satisfactory condition to undergo the procedure.           A Tuberculosis risk assessment was performed:  The patient has no known RISK of Tuberculosis    The procedure was performed in a negative airflow room: The patient could not be moved to a negative airflow room because of critical illness and instability    Maneuvers / Procedure:      Airway Examination: A complete airway examination was performed from the distal trachea to the subsegmental level in each lobe of both lungs.  Pertinent findings include the following.    At the mid trachea, there is an ulceration at 11oclock position. Not bleeding at this time. The left main bronchus is with clear secretions. The left upper lobe reveals a patent lingula, but the anterior and apicoposterior segment are blocked off with a blood clot which was suctioned. Overall, her left sided airways have moderate malacia.     The right tracheobronchial tree is normal and no blood clots or secretions seen.     The ulceration on the trachea was topically applied with epinephrine. The ET tube was placed past this ulceration in the distal trachea.          Any disposable equipment was visually inspected and deemed to be intact immediately post procedure.       Relevant Pictures    Ulceration at mid trachea              Assessment and Recommendations:     1. Successful completion of bronchoscopy with inspection. Suspect bleeding is coming from ulceration in the mid trachea. We applied topical epinephrine to the lesion and suctioned out the blood clot in the distal left airways.   2. ICU care per primary.           Aquilino Martin  Interventional pulmonary fellow  Pager: [phone]

## 2023-02-20 NOTE — PROCEDURES
[facility]    Double Lumen PICC Placement    Date/Time: 2/20/2023 4:47 PM  Performed by: Wellington Martinez RN  Authorized by: Rekha Urbina MD   Indications: vascular access      UNIVERSAL PROTOCOL   Site Marked: Yes  Prior Images Obtained and Reviewed:  Yes  Required items: Required blood products, implants, devices and special equipment available    Patient identity confirmed:  Verbally with patient, arm band, provided demographic data, hospital-assigned identification number and anonymous protocol, patient vented/unresponsive  NA - No sedation, light sedation, or local anesthesia  Confirmation Checklist:  Patient's identity using two indicators, relevant allergies, procedure was appropriate and matched the consent or emergent situation and correct equipment/implants were available  Time out: Immediately prior to the procedure a time out was called (Wellington)    Universal Protocol: the Joint Commission Universal Protocol was followed    Preparation: Patient was prepped and draped in usual sterile fashion       ANESTHESIA    Anesthesia: Local infiltration  Local Anesthetic:  Lidocaine 1% without epinephrine  Anesthetic Total (mL):  5      SEDATION    Patient Sedated: No        Preparation: skin prepped with ChloraPrep  Skin prep agent: skin prep agent completely dried prior to procedure  Sterile barriers: maximum sterile barriers were used: cap, mask, sterile gown, sterile gloves, and large sterile sheet  Hand hygiene: hand hygiene performed prior to central venous catheter insertion  Type of line used: PICC  Catheter type: double lumen  Lumen type: non-valved and power PICC  Lumen Identification: Purple and Red  Catheter size: 4 Fr  Brand: Bard  Lot number: [id number]  Placement method: venipuncture, MST, ultrasound and tip navigation system  Number of attempts: 1  Difficulty threading catheter: no  Successful placement: yes  Orientation: right    Location: brachial  vein (lateral) (0.54cm)  Tip Location: SVC  Arm circumference: adults 10 cm  Extremity circumference: 26  Visible catheter length: 2  Total catheter length: 42  Dressing and securement: adhesive securement device, blood cleaned with CHG, blood removed, fixation device, chlorhexidine patch applied, alcohol impregnated caps, hemostatic agent, securement device, site cleansed, statlock and transparent securement dressing  Post procedure assessment: blood return through all ports, free fluid flow and placement verified by 3CG technology  PROCEDURE Describe Procedure: Right brachial-lateral vein 0.54cm dm.2cm external.Placement verified by Thelma 3CG.PICC okay to use.  Disposal: sharps and needle count correct at the end of procedure, needles and guidewire disposed in sharps container

## 2023-02-20 NOTE — PROGRESS NOTES
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 Briceno on 2/8/23.    Areas Assessed:      Areas visualized during today's visit: Posterior surfaces     Pressure Injury Location: mid back    Last photo: 2/20  Wound type: Pressure Injury     Pressure Injury Stage: 1, hospital acquired      This is a Medical Device Related Pressure Injury (MDRPI) due to bunched linens  Wound history/plan of care:   Found by bedside RN 2/12  Wound base: 100 % non-blanchable, erythema and maroon fading      Palpation of the wound bed: normal      Drainage: none     Description of drainage: none     Measurements (length x width x depth, in cm) 4  x 10  x  0 cm      Tunneling N/A     Undermining N/A  Periwound skin: Intact      Color: normal and consistent with surrounding tissue      Temperature: normal   Odor: none  Pain: no grimacing or signs of discomfort, none  Pain intervention prior to dressing change: N/A  Treatment goal: Simplify plan of care  STATUS: improving  Supplies ordered: at bedside    Treatment Plan:     Mid back wound(s): Daily  cleanse with saline and pat dry. May leave open to air. Ensure linens without wrinkles and no devices under patient.      Orders: Reviewed    RECOMMEND PRIMARY TEAM ORDER: None, at this time  Education provided: importance of repositioning and plan of care  Discussed plan of care with: Nurse  WOC nurse follow-up plan: weekly  Notify WOC if wound(s) deteriorate.  Nursing to notify the Provider(s) and re-consult  the [facility] Nurse if new skin concern.    DATA:     Current support surface: Standard  Low air loss (KARLA pump, Isolibrium, Pulsate, skin guard, etc)  Containment of urine/stool: Incontinent pad in bed, Indwelling catheter and Internal fecal management  BMI: Body mass index is 30.23 kg/m .   Active diet order: None     Output: I/O last 3 completed shifts:  In: 2095 [I.V.:975; NG/GT:520]  Out: 2940 [Urine:2940]     Labs:   Recent Labs   Lab 02/20/23  0327   ALBUMIN 2.3*   HGB 7.5*   WBC 16.1*     Pressure injury risk assessment:   Sensory Perception: 2-->very limited  Moisture: 3-->occasionally moist  Activity: 1-->bedfast  Mobility: 2-->very limited  Nutrition: 3-->adequate  Friction and Shear: 1-->problem  Rasheed Score: 12    Ana Maria Daniels RN CWOCN   Pager no longer is use, please contact through [facility] [employer] group: [facility] Nurse  Dept. Office Number: [phone]

## 2023-02-20 NOTE — PROGRESS NOTES
VASCULAR SURGERY PROGRESS NOTE    NAEON.     BP (!) 151/65 (BP Location: Right arm)   Pulse 120   Temp 100  F (37.8  C) (Bladder)   Resp 24   Ht 1.524 m (5')   Wt 70.2 kg (154 lb 12.2 oz)   SpO2 96%   BMI 30.23 kg/m       I/O last 3 completed shifts:  In: 2095 [I.V.:975; NG/GT:520]  Out: 2940 [Urine:2940]    Intubated, sedated. Moves upper extremities. No movement or withdrawal in lower extremities. Feet warm and well perfused. Doppler signal in bilateral lower extremities. Left thoracotomy incision clean, dry, intact.     Labs reviewed.     A/P: 73 year-old female s/p thoracoabdominal aneurysm and type B dissection repair, lower extremity paralysis with spinal cord ischemia, enterobactor pneumonia    - appreciate neurocrit, CVICU  - continue ASA 81mg  - atorvastatin when medically appropriate  - remaining care per CVTS/CVICU    Nayely Urrutia CNP  Vascular Surgery  Pager: [phone]  [email or web address]  Send message or 10 digit call back number Securely via [employer] with the [employer] Web Console (learn more here)

## 2023-02-21 NOTE — PLAN OF CARE
Major shift events: Extubated at 1150 on HFNC 40% 40L. 1 pm placed on BIPAP d/t increased WOB. BIPAP 10/5, RR 12, 50%. Post extubation CXR done. EKG done.Team spoke with family with . Care conference tomorrow at 1100.  will be present. Possible disscussion of a trach.  Potassium 6.1 this am last potassium 5.0.  Potassium check q4h. Lokelma started and TF formula changed      PT was alert -lethargic. Pupil equal and reactive. Follows commands and answer questions with the help of . Able to move upper extremities purposely 3/5 and Edwin lowers paralyzed. Tele -124. SBP , Tmax 100.5- cooling blanket, ice packs and fan in place.Pulses dopplerable. . No BM this shift but passing gas. TF at goal and standard FWF. Black- good uop.

## 2023-02-21 NOTE — PROGRESS NOTES
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 Briceno on 2/8/23. Course complicated by ischemic spinal cord injury.     TODAY'S PROGRESS:   Lasix 40, insulin, Ca gluconate, D50 this AM for hyperkalemia of 6.1  Start lokelma 10g TID for 24 hrs  EKG stable,   Keep PO Bactrim  PST this AM - extubated to HFNC 40L > BiPAP   Repeat CXR showing decreased aeration in L lung s/p extubation  Tentative family conference Wednesday 2/22, 11am or 12am  Reassess pain following extubation  Restart PTA atorvastatin 40 mg  VBG this PM  Changed to renal TF    PLAN:  Neuro/ pain/ sedation:   #Sedation  #Acute postoperative pain  - Pain: Scheduled Tylenol 650mg every 8 hours - reassess pain after extubation  - PRN: Tylenol, oxycodone prn, and Dilaudid-IVP prn  #Spinal cord ischemia, Paraplegia  - On, 2/10 unable to move b/l LE, no sensation, dusky discoloration hands R>L, multiphasic Dopplers in R & L ulnar artery & brachial arch. On 2/11 /follow commands b/l UE, started withdrawing b/l LE. On 2/12 intermittently withdrawing b/l LE. On 2/13 moved b/l toes to command for 1st time in 4 days. On 2/14 continues withdrawing from pain b/l LE and moving L toes to command. Recently not withdrawing to pain b/l LE  - Neurocrit followed lumbar drain, anesthesia removed 2/15. Normal MAP goal >65  - MRI TL: infarct from T10. Talked to NCC, not need more imaging, get spinal cord rehab.   - PM&R following   #Hx stroke (2019)  - Chronic dysarthria and dysphagia, s/p warfarin then Eliquis in 2021, unclear if  taking  - Question of PTA residual LE weakness   #Hx Depression  - PTA mirtazapine resumed    Pulmonary care:   #Acute respiratory failure   #Pneumonia, hospital acquired   Vent Mode: CPAP/PS  (Continuous positive airway pressure with Pressure Support)  FiO2 (%): 40 %  Resp Rate (Set): 14 breaths/min  Tidal Volume (Set, mL): 350 mL  PEEP (cm H2O): 8 cmH2O  Pressure Support (cm H2O): 10 cmH2O  Resp: 24  - Daily CXR: Improved aeration of the left upper lobe, increased right perihilar and basilar interstitial opacities  - PST, 5/5 this AM. Extubated to HFNC > BiPAP. Repeat CXR showing decreased aeration in L lung s/p extubation. Will continue BiPAP for time being  - Intrapulmonary percussive therapy, Chest PT left lung    Bronch history:  - Bronch 2/11, small old blood LLL, small mucus b/l suctioned  - CT Chest 2/14 complete LL atelectasis 2/2 mucus plugging, mild pulmonary edema R lung  - Bronch 2/14, removed mucus plug in L main bronchus, LLL, and HILLARY  - Bronch 2/15, cleared mucus plugs in HILLARY and LLL  - Bronch 2/17, cleared clot and mucus plugs in HILLARY and LLL  - Bronch 2/20 via pulm, ulceration in mid trachea not currently bleeding, applied topical epinephrine. ET tube placed past ulceration. Moderate malacia. R tracheobronchial tree normal         Cardiovascular:    #S/p L thoracotomy, thoracoabdominal aortic repair 2/8/23  #Cardiogenic shock, resolved -S/p hydrocortisone 50 mg q6h (2/11-2/13)  #Hx of hypertension   #Hx Ascending aortic aneurysm s/p ascending repair with aortic root reconstruction 2018  #Bioprosthetic aortic valve in 2018  #Relative hypotension  Pre CPBP: LV/RV normal, 55-60%. Mild MR w/ central jet from A2/P2. Mild TR.  Post CPBP: LV normal, 55-60%. RV mildly reduced. Mild AS, trace AI. Mild MR. Mild TR.  - PRN Hydralazine for SBP >140  - Restarted PTA atorvastatin 40 mg on 2/21  - Hold PTA metoprolol succinate 50 mg, lisinopril 40 mg  - Continue with ASA 81 mg      GI  care/Nutrition:   #Nutrition  - TF-30cc/hr > renal formula per nutrition this AM for hyperkalemia  - Bowel regimen: miralax, senna  - Currently holding fiber, fiber source  - Nutrition following  #LFT elevated with normal T bilirubin, normalizing  - CTM, trend LFTs  - HBV pending  - US abdominal 2/18 reviewed, no current ongoing concern for acute cholecystitis given no pericholecystic fluid and normal GB wall   - Mild abdominal pain noted 2/19 AM, soft nontender, no rebound and passing stools, continue to monitor     Renal / Fluids / Electrolytes:   BL creat appears to be ~ 0.9-1.0  - Strict I/Os, daily weights, replete lytes PRN per protocol  - Decrease FWF to only TF patency  - 40mg IV lasix this AM for hyperkalemia  #Mild MARCOS, improving  - FENA 1.3% with concern for possible intrinsic renal process  - Cr normalizing  #Hyponatremia  - 130 (130), asymptomatic  - Likely 2/2 D5W in IV bactrim, switched to PO   - Limiting FWF as above  #Hyperkalemia  - Started 2/18, consistently elevated ~5.5 since. Likely 2/2 bactrim  Lasix 40, insulin, Ca gluconate, D50 this AM for hyperkalemia of 6.1  - Lokelma 10g TID for 24 hrs      Endocrine:    # Stress hyperglycemia  Preop A1c 4.7  - Med sliding scale insulin (0u last 24 hrs)  - Consider decreasing BG checks, discontinuing SSI  - Goal BG <180 for optimal healing     ID / Antibiotics:  # Poly microbial VAP --> Stenotrophomonas and enterobacter   - Following conversations with ID and pharmacy: will continue PO bactrim 2/2 gold standard drug for steno. Same bioavailability with PO vs. IV bactrim. Will hold on initiating levofloxacin 2/2 pt's history of aortic dissection and poor sensitivity to steno.   - C dif negative    Antibiotics:  Sulfamethoxazole-trimethoprim 2/16 - present (switched to PO 2/20)  Cefazolin 2/8 - 2/9  Cefepime 2/17   Ceftriaxone 2/13 - 2/15  Ertapenem 2/15 - 2/16  Piperacillin-tazobactam 2/11 - 2/13      Cultures:  - 2/10: Resp Cx positive for Enterobacter  cloacae, stenotrophomonas.   - 2/14: Steno and enterobacter   - 2/15: BC x 2 NGTD  - 2/17: Respiratory cx positive for steno, enterobacter cloacae  - 2/19: BC x2 NGTD  - 2/20: BC x2 NGTD      Heme:     # Acute blood loss anemia  # Acute blood loss thrombocytopenia  - No s/sx bleeding  - CBC daily, Hgb goal >7   - SQH     MSK / Skin:  # Sternotomy  # Surgical Incision  # Spinal cord ischemia, paraplegia   # Wounds, mid-back  - Sternal precautions, postop incision management protocol  - PT/OT/CR  - PM&R consult for rehab- see noted 2/15/23- PRAFO  boots  - Need to reconsult for spinal cord rehab   - WOC nurse assessed mid-back wounds 2/20, recommended regular cares by nursing. Will follow weekly      Prophylaxis:     - Mechanical DVT and Chemical DVT ppx: SQH  - PPI     Lines / Tubes / Drains:  - ETT  - Double lumen PICC RUE (2/20-current)  - Black (2/21-current)  - NJ     Disposition:  - CVICU    Patient seen, findings and plan discussed with CVICU staff and cardiothoracic surgeons.    Ramo Rai, MS4   2/20/2023 at 1:55 PM      ====================================        OBJECTIVE  1. VITAL SIGNS  Temp:  [99  F (37.2  C)-101.7  F (38.7  C)] 99.7  F (37.6  C)  Pulse:  [100-125] 114  Resp:  [11-29] 24  BP: ()/(53-89) 107/54  Cuff Mean (mmHg):  [67] 67  FiO2 (%):  [40 %-50 %] 40 %  SpO2:  [91 %-97 %] 91 %  Vent Mode: CPAP/PS  (Continuous positive airway pressure with Pressure Support)  FiO2 (%): 40 %  Resp Rate (Set): 14 breaths/min  Tidal Volume (Set, mL): 350 mL  PEEP (cm H2O): 8 cmH2O  Pressure Support (cm H2O): 10 cmH2O  Resp: 24      2. INTAKE/ OUTPUT  I/O last 3 completed shifts:  In: 1600 [I.V.:340; NG/GT:570]  Out: 3220 [Urine:3220]    3. PHYSICAL EXAMINATION    General: calms, opens eyes to normal tone of voice, follows simple commands correctly with upper extremities  Neuro: A&Ox3, follow simple commands. BUE with  strength 4/5, BLE with no movement and does not withdraw to pain.  RV: extubated  in BIPAP, coarse diminished breath sounds on R, minimal to no breath sounds on L.  CV: Tachycardic, regular rhthym  Abdomen: abdomen soft and compressible, incision edges well approximated, no redness or warmth  Extremities: warm and well perfused, 1+ edema, pulses 2+    4. INVESTIGATIONS  Arterial Blood Gases   Recent Labs   Lab 02/17/23  0232 02/16/23  0358 02/15/23  0412   PH 7.46* 7.47* 7.49*   PCO2 37 37 38   PO2 105 141* 79*   HCO3 26 27 29*     Complete Blood Count   Recent Labs   Lab 02/21/23  0429 02/20/23  0327 02/19/23  0409 02/18/23  0412   WBC 15.8* 16.1* 15.1* 10.6   HGB 7.3* 7.5* 8.0* 7.6*    348 325 269     Basic Metabolic Panel  Recent Labs   Lab 02/21/23  1225 02/21/23  0821 02/21/23  0813 02/21/23  0733 02/21/23  0656 02/21/23  0631 02/21/23  0429 02/20/23  0812 02/20/23  0327 02/19/23  2353 02/19/23  2352 02/19/23  2043 02/19/23  1532   NA  --   --   --   --   --   --  130*  --  130*  --  129*  --  131*   POTASSIUM 5.5*  --  5.5*  --   --   --  6.1*  --  5.5*  --  5.1  --  5.2   CHLORIDE  --   --   --   --   --   --  98  --  99  --  99  --  100   CO2  --   --   --   --   --   --  21*  --  21*  --  20*  --  19*   BUN  --   --   --   --   --   --  34.4*  --  26.6*  --  25.5*  --  27.0*   CR  --   --   --   --   --   --  0.87  --  0.89  --  0.90  --  0.97*   GLC 92 152*  --  147* 155*   < > 118*   < > 115*   < > 165*   < > 164*    < > = values in this interval not displayed.     Liver Function Tests  Recent Labs   Lab 02/21/23  0429 02/20/23  0327 02/19/23  0409 02/18/23  0412   AST 88* 102* 154*  154* 227*   * 136* 167*  167* 157*   ALKPHOS 319* 283* 291*  291* 234*   BILITOTAL 0.4 0.4 0.4  0.4 0.3   ALBUMIN 2.4* 2.3* 2.3*  2.3* 2.1*     Pancreatic Enzymes  No lab results found in last 7 days.  Coagulation Profile  No lab results found in last 7 days.  Lactate  Invalid input(s): LACTATE    5. RADIOLOGY  Recent Results (from the past 24 hour(s))   US Abdomen Limited    Narrative     EXAMINATION: US ABDOMEN LIMITED, 2/17/2023 8:51 PM    COMPARISON: CT chest, abdomen and pelvis 2/5/2023.    HISTORY: Elevated AST/ALT.    TECHNIQUE: The abdomen was scanned in standard fashion with  specialized ultrasound transducer(s) using both grey scale and limited  color Doppler techniques.    FINDINGS:   Fluid: The visualized right-sided pleural effusion.    Liver: The liver demonstrates normal echotexture, measuring 15.8 cm in  craniocaudal dimension. There is no focal mass. The main portal vein  is patent with antegrade flow towards the liver.    Gallbladder: Cholelithiasis. No gallbladder wall thickening or  hyperemia. No pericholecystic fluid. Unable to assess sonographic  Acevedo's sign secondary to intubated state.    Bile Ducts: Both the intra- and extrahepatic biliary system are of  normal caliber.  The common bile duct measures 8 mm in diameter, which  is borderline enlarged for age.    Pancreas: Visualized portions of the head and body of the pancreas are  unremarkable.     Kidney: The right kidney measures 9.0 cm long. There is no  hydronephrosis or hydroureter, no shadowing renal calculi, cystic  lesion or mass.     The proximal aorta measures up to 3.7 cm, best visualized on recent  CTA studies.      Impression    IMPRESSION:   1.  Cholelithiasis with no additional sonographic findings to suggest  acute cholecystitis.  2.  Borderline dilated common bile duct measuring up to 8 mm. No  obstructing foci identified.  3.  Impression visualized right-sided pleural effusion.    I have personally reviewed the examination and initial interpretation  and I agree with the findings.    ISABEL ESCALERA MD         SYSTEM ID:  [id number]

## 2023-02-21 NOTE — PLAN OF CARE
Goal Outcome Evaluation:      Plan of Care Reviewed With: patient (Bedside RN, primary team)    Overall Patient Progress: no changeOverall Patient Progress: no change    Outcome Evaluation: Pt tolerating TF at goal rate, however, hyperkalemia progressing since 2/18. Changed to renal TF formula.

## 2023-02-21 NOTE — PROGRESS NOTES
CLINICAL NUTRITION SERVICES - REASSESSMENT NOTE   Nutrition Prescription    Malnutrition Status:    Moderate malnutrition in the context of acute on chronic illness    Recommendations already ordered by Registered Dietitian (RD):  Transition to low K+ EN support d/t hyperkalemia:  Novasource Renal @ 30 ml/hr (720 ml) + 1 pkt Prosource TF20 provides 1520 kcal (30 kcal/kg), 85 g pro (1.7 g/kg), 132 g CHO, 516 ml free water, and 0 g fiber daily.     Ordered minimum FWF required for tube patency:  30 mL q4h    Adjusted bridle string that was pressing against columella    Future/Additional Recommendations:  -Monitor tolerance and lytes with renal formula  -Monitor stooling trends     EVALUATION OF THE PROGRESS TOWARD GOALS   Diet: NPO    Enteral Access: NDT    FWF: None    Nutrition Support: EN  2/10 - 2/13: Novasource Renal @ 30 ml/hr (720 ml) + 1 pkt Prosource TF20 provides 1520 kcal (30 kcal/kg), 85 g pro (1.7 g/kg), 132 g CHO, 516 ml free water, and 0 g fiber daily.     2/13 - 2/21: TwoCal HN @ 30 mL/hr (720 mL/day) + 1 pkt Prosource TF20 to provide 1520 kcals (30 kcal/kg/day), 80 g PRO (1.6 g/kg/day), 504 mL H2O, 158 g CHO and 4 g Fiber daily.     Enteral Intake: Tolerating TF at goal rate, but hyperkalemic on non-renal formula.   -->7-day average enteral nutrition infusions: 661 mL TF + 1 ProSource protein packets = 1402 kcals (28 kcal/kg, or >100% minimum assessed kcal needs) and 76 g protein (1.5 g protein/kg, or >100% minimum assessed protein needs).     NEW FINDINGS   -Wt trends: Weight remains uptrended compared to admit, most likely fluid-related  02/21/23 0000 67.7 kg (149 lb 4 oz) Bed scale   02/20/23 0400 70.2 kg (154 lb 12.2 oz) Bed scale   02/19/23 0400 71.1 kg (156 lb 12 oz) Bed scale   02/18/23 0600 69.2 kg (152 lb 8.9 oz) Bed scale   02/17/23 0430 66.4 kg (146 lb 6.2 oz) Bed scale   02/16/23 0400 65.5 kg (144 lb 6.4 oz) --   02/15/23 0000 66.1 kg (145 lb 11.6 oz) Bed scale   02/13/23 0200 67.8 kg (149  lb 7.6 oz) Bed scale   02/12/23 0400 67 kg (147 lb 11.3 oz) Bed scale   02/09/23 0200 62.5 kg (137 lb 12.6 oz) Bed scale   02/08/23 0400 56 kg (123 lb 7.3 oz) Bed scale   02/07/23 0400 59.6 kg (131 lb 6.3 oz) --   02/04/23 0250 57.3 kg (126 lb 4.8 oz) Bed scale     -Labs: Reviewed, notable for:     K+ 6.1 (H, uptrended) - on Bactrim    Mg++ 2.6 (H)    Phos 3.6 (WNL)    BUN 34.4 (H, uptrended)    -Cardio: AAA s/p repair and aortic root reconstruction, cardiogenic shock    -GI: Rectal tube removed 2/19, 3 BMs yesterday per I/Os. Baseline constipation (BM every 10 days per pt). Fiber discontinued 2/20.    -Respiratory: Intubated on mechanical ventilation. Possible PST and extubation today.    -Skin: Per most recent WOCN note on 2/20, stage 1 pressure injury on mid back, improving status.   Skin beneath nasal bridle was inspected; bridle string twisted and clipped in a manner that was adding pressure against the columella. Tracking in skin noted on columella from string and FT pressing against it. Writer adjusted to offload all pressure and RN made aware to watch.      -Meds & Vitamin/Mineral Supplementation: Reviewed, notable for:     Liquid MVI/mineral    Miralax    Senna-docusate    Remeron    Medium dose sliding scale insulin    Lasix, insulin and D50W to shift K+     MALNUTRITION  % Intake: Decreased intake does not meet criteria  % Weight Loss: None noted  Subcutaneous Fat Loss: Facial region:  Mild, Upper arm:  Mild and Lower arm:  Mild  Muscle Loss: Temporal:  Mild, Facial & jaw region:  Mild, Thoracic region (clavicle, acromium bone, deltoid, trapezius, pectoral):  Moderate, Upper arm (bicep, tricep):  Moderate, Lower arm  (forearm):  Moderate, Upper leg (quadricep, hamstring):  Moderate, Patellar region:  Moderate and Posterior calf:  Moderate  Fluid Accumulation/Edema: Trace - Moderate (1-3+ edema per RN flowsheets)  Malnutrition Diagnosis: Moderate malnutrition in the context of acute on chronic  illness    Previous Goals   Total avg nutritional intake to meet a minimum of 25 kcal/kg and 1.5 g PRO/kg daily (per dosing wt 56 kg).  Evaluation: Met    Previous Nutrition Diagnosis  Inadequate oral intake related to NPO status related to intubation as evidenced by need for TF to meet energy needs.  Evaluation: Ongoing    CURRENT NUTRITION DIAGNOSIS  Altered nutrition-related laboratory values (hyperkalemia) related to medication, kidney function, currently on non-renal TF formula as evidenced by K+ 6.1 today and elevated/high normal since 2/18 and need for renal TF formula.    INTERVENTIONS  Implementation  -Collaboration with other providers: Rounds - discussed switch to renal TF; bedside RN - discussed to keep an eye on pt's nose for pressure injury after writer adjusted bridle  -Enteral Nutrition - Changed to renal formula  -FWF to maintain patency    Goals  Total avg nutritional intake to meet a minimum of 25 kcal/kg and 1.5 g PRO/kg daily (per dosing wt 56 kg).    Monitoring/Evaluation  Progress toward goals will be monitored and evaluated per protocol.     Anne Glover RD, LD  Pager: [phone]

## 2023-02-21 NOTE — PROGRESS NOTES
Major Shift Events:  Alert, opening eyes spontaneously. RAAS -1 to +1. PERRL. Moves BUE spontaneously, no response to stimuli in BLE. Intermittently following basic commands. -120s. SBP < 140, MAP > 65 goals met w/o intervention. Tmax 101.5, fan, ice packs, cooling blanket applied. Pt frequently overbreathing vent, improved w/ PRN oxy, robaxin. CMV settings unchanged. Black w/ adequate UO. Incontinent loose stool, bowel meds held. K 6.1, shifted & 40 mg lasix per orders. Recheck @ 0800.  Plan: Recheck K q2h. PST today.   For vital signs and complete assessments, please see documentation flowsheets.

## 2023-02-21 NOTE — PROGRESS NOTES
[facility] PROGRESS NOTE     Patient:  Shelly Becerril   YOB: 1950, MRN: [id number]  Date of Visit: 02/21/2023  Date of Admission: 2/4/2023          ASSESSMENT AND PLAN   [place] Findings   Shelly Becerril is a 72 y/o female with a past medical history significant for ascending aortic aneurysm s/p ascending repair with aortic root reconstruction and prosthetic aortic valve (both in 2018) who was admitted to the hospital following left thoracotomy with thoracoabdominal aortic repair on 2/8/2023. Her hospitalization has been complicated by hospital acquired pneumonia. Respiratory cultures are positive for Enterobacter cloacae and Stenotrophomonas maltophilia, currently being treated with bactrim. Patient was extubated today. Transaminases remain elevated but are downtrending, though alk phos increased today (283>319).     Patient continues to be intermittently febrile with elevated but stable WBC. The cause of this is currently unknown. Discussed with primary team the possibility of switching to IV levofloxacin; however, per pharmacy this carries a risk of aortic dissection. There was also some concern of poor absorption of PO bactrim, though through further discussion it was decided that IV and PO bactrim would offer the same coverage for Enterobacter cloacae and Stenotrophomonas maltophilia. Plan to continue PO bactrim for now and continue to follow cultures.        IMPRESSION  1. Hospital-acquired pneumonia, cultures positive for Enterobacter cloacae and Stenotrophomonas maltophilia.  2. Ischemic spinal cord injury   3. L thoracotomy with thoracoabdominal aortic repair (2/8/2023)  4. Ascending aortic aneurysm s/p ascending repair with aortic root reconstruction and prosthetic aortic valve (2018)  5. Elevated LFTs (improving)  6. QTc 419    RECOMMENDATIONS:  -Continue PO bactrim   -Continue to follow cultures     Thank you for the consult. ID will continue to follow with you. Please check  [facility] for staff covering the service.     Staffed with the attending physician Dr. Crystal Newell MS4  Infectious disease service    ID Staff Assessment and Recommendations:  This patient was seen, examined and evaluated by me. I personally reviewed the medical records, vital signs, medications, labs and imaging studies. I agree with the documentation as above by the medical student.     Briefly, 72 yo F with hx of ascending aortic dissection s/p aortic root reconstruction and AVR (2018), initially admitted with hypertensive emergency, found to have Type B aortic dissection, s/p L thoracotomy w/ thoracoabdominal aortic aneurysm repair 2/8/23. Post-op course is complicated by spinal cord ischemia on recent MRI 2/16. Additionally has VAP with extensive mucus plugging. Sputum and BAL cultures grew Enterobacter cloacae and Stenotrophomonas maltophila, and started on Bactrim. Negative C diff, obtained due to loose stools/abdominal pain. Due to elevated LFT, further work up obtained- with negative hepatitis B, C serologies. RUQ ultrasound showed cholelithiasis w/o acute cholecystitis.      Has been spiking fevers, with leukocytosis. Blood cultures remained negative so far. No other source of infection per physical exam. At this time, continue with po bactrim.     [place] ID team will continue to follow. Please reach out if any questions or concerns.      Isaura Rojas MD  Infectious Diseases Staff Physician  Pager: [phone]  Date of Service (when I saw the patient): 2/21/2023           SUBJECTIVE      Interval History and Events:  -Extubated today   -Febrile overnight to 101.1     Antimicrobial Treatment:  Sulfamethoxazole-trimethoprim 2/16 - present   Cefazolin 2/8 - 2/9  Cefepime 2/17   Ceftriaxone 2/13 - 2/15  Ertapenem 2/15 - 2/16  Piperacillin-tazobactam 2/11 - 2/13           OBJECTIVE     Physical Examination:    Temp:  [99  F (37.2  C)-101.7  F (38.7  C)] 99.3  F (37.4  C)  Pulse:  [100-125]  120  Resp:  [11-29] 21  BP: ()/(50-89) 110/61  Cuff Mean (mmHg):  [67] 67  FiO2 (%):  [40 %-50 %] 40 %  SpO2:  [91 %-99 %] 93 %    I/O last 3 completed shifts:  In: 1600 [I.V.:340; NG/GT:570]  Out: 3220 [Urine:3220]    Vitals:    02/17/23 0430 02/18/23 0600 02/19/23 0400 02/20/23 0400   Weight: 66.4 kg (146 lb 6.2 oz) 69.2 kg (152 lb 8.9 oz) 71.1 kg (156 lb 12 oz) 70.2 kg (154 lb 12.2 oz)    02/21/23 0000   Weight: 67.7 kg (149 lb 4 oz)       Constitutional:  Resting in bed, no acute distress.   HEENT: NC/AT, sclera clear, conjunctiva normal  Respiratory: Breathing on HFNC. Mildly coarse lung sounds bilaterally.   Cardiovascular: Tachycardic with regular rhythm, no murmurs   GI: Soft, nondistended, nontender   Skin: Warm, dry, well-perfused. Mild bruising on bilateral upper extremities   Neurologic: Interactive and able to answer yes/no questions.   Vascular access: Dressing over PICC on right upper extremity non-tender, no surrounding erythema.    I reviewed the following laboratory data:    Microbiology:    Respiratory culture 2/10: positive for E. Cloacae   Respiratory culture 2/14: positive for E. Cloacae and Stenotrophomonas maltophilia  Respiratory culture 2/17: positive for E. Cloacae and Stenotrophomonas maltophilia    Blood culture 2/15: no growth   Blood culture 2/19: no growth to date     Urine culture 2/15: urogenital zach     C. Diff toxin B by PCR: negative

## 2023-02-21 NOTE — PROGRESS NOTES
VASCULAR SURGERY PROGRESS NOTE    NAEON.     /61   Pulse 120   Temp 99.3  F (37.4  C)   Resp 21   Ht 1.524 m (5')   Wt 67.7 kg (149 lb 4 oz)   SpO2 93%   BMI 29.15 kg/m       I/O last 3 completed shifts:  In: 1600 [I.V.:340; NG/GT:570]  Out: 3220 [Urine:3220]    Intubated, sedated. Moves upper extremities. No movement or withdrawal in lower extremities. Feet warm and well perfused. Doppler signal in bilateral lower extremities. Left thoracotomy incision clean, dry, intact.     Labs reviewed.     A/P: 73 year-old female s/p thoracoabdominal aneurysm and type B dissection repair, lower extremity paralysis with spinal cord ischemia, enterobactor pneumonia    - appreciate PM&R, ID, CVICU  - continue ASA 81mg  - atorvastatin when medically appropriate  - remaining care per CVTS/CVICU    Nayely Urrutia CNP  Vascular Surgery  Pager: [phone]  [email or web address]  Send message or 10 digit call back number Securely via [employer] with the [employer] Web Console (learn more here)

## 2023-02-21 NOTE — PROGRESS NOTES
Pt extubated without problems.  Pt placed on HFNC 40%/40 lpm with SaO2 96%..  BS very diminished on the L and clear on the R.  No cough from pt.

## 2023-02-22 NOTE — PROGRESS NOTES
Vascular Surgery Progress Note    Extubated yesterday to BIPAP. Increasing Fio2 and pressure support requirements through the evening.    BP 91/63   Pulse 101   Temp 98.1  F (36.7  C)   Resp 16   Ht 1.524 m (5')   Wt 64.7 kg (142 lb 10.2 oz)   SpO2 100%   BMI 27.86 kg/m      Awake, alert, tachypneic on BiPAP  L chest dressing intact. L groin incision c/d/i  Diffuse anasarca. Feet warm. Monophasic doppler signals in bilateral DP and PT    WBC increased to 17.7 from 15.8  Hgb 7.3, stable  K 4.8  Cr 0.78    CXR this morning with decreased left lung aeration and L pleural effusion    A/P: 73 year-old female s/p thoracoabdominal aneurysm and type B dissection repair, lower extremity paralysis with spinal cord ischemia, polymicrobial pneumonia.    Tenuous respiratory state, trending towards reintubation. Family care conference is planned for today.  Continue aspirin, tube feeds, antibiotics  Appreciate excellent care from CVICU, infectious disease, and PM&R teams    Lisandro Lemus MD

## 2023-02-22 NOTE — PROGRESS NOTES
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 Briceno on 2/8/23. Course complicated by ischemic spinal cord injury and ventilator associated pneumonia.     TODAY'S PROGRESS:   K+ wnl, discontinuing lokelma  Lasix 40 this AM for decreased UOP  CT chest today to inspect for empyema  IP consult for atelectatic L lung - no bronch at this time  Lactate - 1.5  PO bactrim > IV ceftazidime, vancomycin 2/2 possible acute liver injury  Repeat blood cx, UA  Beta D glucan, glucose  Respiratory cx, fungal cx if re-intubated  RUQ abdominal ultrasound 2/2 elevated LFTs, alk phos, Tbili  Discontinue tylenol, atorvastatin    Intrapulmonary percussive therapy, Chest PT left lung  Consider CT chest if reintubated      Family conference today 1pm with in-person , Dr. Vinson and Dr. Briceno present: family informed of patient's status as likely paraplegic 2/2 spinal cord ischemia, and potential for re-intubation and future need for tracheostomy. Family affirmed understanding of these details and appropriate questions were answered. Family affirmed desires for full code status.     PLAN:  Neuro/ pain/ sedation:   #Sedation  #Acute postoperative pain  - Pain: Discontinuing tylenol   - PRN: Tylenol, oxycodone, dilaudid, robaxin  #Spinal cord ischemia, Paraplegia  - On, 2/10 unable to move b/l LE, no sensation, dusky discoloration hands R>L, multiphasic Dopplers in R & L ulnar artery & brachial arch. On 2/11 /follow commands b/l UE, started withdrawing b/l LE. On 2/12 intermittently withdrawing b/l LE. On 2/13  moved b/l toes to command for 1st time in 4 days. On 2/14 continues withdrawing from pain b/l LE and moving L toes to command. Recently not withdrawing to pain b/l LE  - Neurocrit followed lumbar drain, anesthesia removed 2/15. Normal MAP goal >65  - MRI TL: infarct from T10. Talked to [facility], not need more imaging, get spinal cord rehab.   - PM&R following   #Hx stroke (2019)  - Chronic dysarthria and dysphagia, s/p warfarin then Eliquis in 2021, unclear if taking  - Question of PTA residual LE weakness   #Hx Depression  - PTA mirtazapine resumed    Pulmonary care:   #Acute respiratory failure   #Pneumonia, hospital acquired   Vent Mode: CPAP/PS  (Continuous positive airway pressure with Pressure Support)  FiO2 (%): 70 %  Resp Rate (Set): 14 breaths/min  Tidal Volume (Set, mL): 350 mL  PEEP (cm H2O): 8 cmH2O  Pressure Support (cm H2O): 10 cmH2O  Resp: 28  - Daily CXR: absent aeration in L lobe, increased opacities in R lung, most likely edema  - BiPAP overnight, increased work of breathing prompting increase in support from 12/6 > 14/7, FiO2 50 > 70%. Trialed off BiPAP this AM, satting low 90's on 6L NC. Returned to BiPAP  - IP consult this AM for atelectatic L lung - no bronch at this time 2/2 high risk respiratory status  - Intrapulmonary percussive therapy, Chest PT left lung    Bronch history:  - Bronch 2/11, small old blood LLL, small mucus b/l suctioned  - CT Chest 2/14 complete LL atelectasis 2/2 mucus plugging, mild pulmonary edema R lung  - Bronch 2/14, removed mucus plug in L main bronchus, LLL, and HILLARY  - Bronch 2/15, cleared mucus plugs in HILLARY and LLL  - Bronch 2/17, cleared clot and mucus plugs in HILLARY and LLL  - Bronch 2/20 via pulm, ulceration in mid trachea not currently bleeding, applied topical epinephrine. ET tube placed past ulceration. Moderate malacia. R tracheobronchial tree normal         Cardiovascular:    #S/p L thoracotomy, thoracoabdominal aortic repair 2/8/23  #Cardiogenic shock, resolved  -S/p hydrocortisone 50 mg q6h (2/11-2/13)  #Hx of hypertension   #Hx Ascending aortic aneurysm s/p ascending repair with aortic root reconstruction 2018  #Bioprosthetic aortic valve in 2018  #Relative hypotension  Pre CPBP: LV/RV normal, 55-60%. Mild MR w/ central jet from A2/P2. Mild TR.  Post CPBP: LV normal, 55-60%. RV mildly reduced. Mild AS, trace AI. Mild MR. Mild TR.  - PRN Hydralazine for SBP >140  - Restarted PTA atorvastatin 40 mg on 2/21 - LFT uptrend this AM, will hold again  - Hold PTA metoprolol succinate 50 mg, lisinopril 40 mg  - Continue with ASA 81 mg      GI care/Nutrition:   #Nutrition  - TF-30cc/hr, renal formula for hyperkalemia  - Bowel regimen: miralax, senna  - Currently holding fiber,  - Nutrition following  #Elevated LFTs now with elevated Tbili  - ALT and AST uptrend this AM, now with increased Tbili (0.4>3.3), likely cholestatic component   - Repeat RUQ abdominal US this AM  - CTM, trend LFTs  - HBV, HCV negative      Renal / Fluids / Electrolytes:   BL creat appears to be ~ 0.9-1.0  - Strict I/Os, daily weights, replete lytes PRN per protocol  - Decrease FWF to only TF patency  - 40mg IV lasix this AM for low UOP  #Mild MARCOS, improving  - FENA 1.3% with concern for possible intrinsic renal process  - Cr normalizing  #Hyponatremia, improving  - 133 (130), asymptomatic  - Likely 2/2 D5W in IV bactrim, switched to PO   - Limiting FWF as above  #Hyperkalemia  - Started 2/18, consistently elevated ~5.5 since. Likely 2/2 bactrim, tube feeding formula  - Lokelma 10g TID for 24 hrs, discontinuing this AM      Endocrine:    # Stress hyperglycemia  Preop A1c 4.7  - Med sliding scale insulin (1u last 24 hrs)  - Consider decreasing BG checks, discontinuing sliding scale insulin when no longer requiring insulin  - Goal BG <180 for optimal healing     ID / Antibiotics:  # Poly microbial VAP --> Stenotrophomonas and enterobacter   - In conjunction with ID and pharmacy: switch PO bactrim > IV  ceftazidime, vancomycin 2/2 possible hepatic toxicity from bactrim  - Repeat blood cx, UA  - Consider CT chest if reintubated    Antibiotics:  Sulfamethoxazole-trimethoprim 2/16 - present (switched to PO 2/20)  Cefazolin 2/8 - 2/9  Cefepime 2/17   Ceftriaxone 2/13 - 2/15  Ertapenem 2/15 - 2/16  Piperacillin-tazobactam 2/11 - 2/13      Cultures:  - 2/10: Resp Cx positive for Enterobacter cloacae, stenotrophomonas.   - 2/14: Steno and enterobacter   - 2/15: BC x 2 NGTD  - 2/17: Respiratory cx positive for steno, enterobacter cloacae  - 2/19: BC x2 NGTD  - 2/20: BC x2 NGTD        Heme:     # Acute blood loss anemia  # Acute blood loss thrombocytopenia  - No s/sx bleeding  - Hgb 7.0 this AM, recheck 1500  - CBC daily, Hgb goal >7   - SQH     MSK / Skin:  # Sternotomy  # Surgical Incision  # Spinal cord ischemia, paraplegia   # Wounds, mid-back  - Sternal precautions, postop incision management protocol  - PT/OT/CR  - PM&R consult for rehab- see noted 2/15/23- PRAFO  boots  - Need to reconsult for spinal cord rehab   - WOC nurse assessed mid-back wounds 2/20, recommended regular cares by nursing. Will follow weekly      Prophylaxis:     - Mechanical DVT and Chemical DVT ppx: SQH  - PPI     Lines / Tubes / Drains:  - Double lumen PICC RUE (2/20-current)  - Black (2/21-current)  - NJ     Disposition:  - CVICU    Patient seen, findings and plan discussed with CVICU staff and cardiothoracic surgeons.    Ramo Rai, MS4   2/22/2023 at 8:26 AM      ====================================  Subjective  Pt noted abdominal pain overnight, improved with prn robaxin and oxycodone. Had BM overnight. Pt able to communicate this AM that abdominal discomfort was due to hunger. Tolerated BiPAP overnight with increasing support.      OBJECTIVE  1. VITAL SIGNS  Temp:  [97.9  F (36.6  C)-100.7  F (38.2  C)] 100  F (37.8  C)  Pulse:  [] 108  Resp:  [11-32] 28  BP: ()/(40-99) 103/40  FiO2 (%):  [50 %-70 %] 70 %  SpO2:  [92 %-100  %] 93 %  Vent Mode: CPAP/PS  (Continuous positive airway pressure with Pressure Support)  FiO2 (%): 70 %  Resp Rate (Set): 14 breaths/min  Tidal Volume (Set, mL): 350 mL  PEEP (cm H2O): 8 cmH2O  Pressure Support (cm H2O): 10 cmH2O  Resp: 28      2. INTAKE/ OUTPUT  I/O last 3 completed shifts:  In: 1666.25 [I.V.:231.25; NG/GT:715]  Out: 2655 [Urine:2655]    3. PHYSICAL EXAMINATION    General: calms, opens eyes to normal tone of voice, follows simple commands correctly with upper extremities  Neuro: A&Ox3, follow simple commands. BUE with  strength 4/5, BLE with no movement and does not withdraw to pain.  RV: on BIPAP, coarse diminished breath sounds on R, minimal to no breath sounds on L.  CV: Tachycardic, regular rhthym  Abdomen: abdomen soft and compressible, incision edges well approximated, no redness or warmth  Extremities: warm and well perfused, 1+ edema, pulses 2+    4. INVESTIGATIONS  Arterial Blood Gases   Recent Labs   Lab 02/21/23  2111 02/21/23  1602 02/17/23  0232 02/16/23  0358   PH 7.43 7.46* 7.46* 7.47*   PCO2 38 35 37 37   PO2 72* 57* 105 141*   HCO3 25 25 26 27     Complete Blood Count   Recent Labs   Lab 02/22/23  1202 02/22/23  0418 02/21/23  0429 02/20/23  0327 02/19/23  0409   WBC  --  17.7*  17.7* 15.8* 16.1* 15.1*   HGB 7.4* 7.0* 7.3* 7.5* 8.0*   PLT  --  343 348 348 325     Basic Metabolic Panel  Recent Labs   Lab 02/22/23  1222 02/22/23  0742 02/22/23  0630 02/22/23  0418 02/22/23  0416 02/22/23  0031 02/22/23  0030 02/21/23  2019 02/21/23  0631 02/21/23  0429 02/20/23  0812 02/20/23  0327 02/19/23  2353 02/19/23  2352   NA  --   --   --  133*  --   --   --   --   --  130*  --  130*  --  129*   POTASSIUM  --   --  4.6 4.8  4.8  --  4.5  --  4.9   < > 6.1*  --  5.5*  --  5.1   CHLORIDE  --   --   --  99  --   --   --   --   --  98  --  99  --  99   CO2  --   --   --  23  --   --   --   --   --  21*  --  21*  --  20*   BUN  --   --   --  38.9*  --   --   --   --   --  34.4*  --  26.6*   --  25.5*   CR  --   --   --  0.78  --   --   --   --   --  0.87  --  0.89  --  0.90   * 123*  --  137* 110*  --    < >  --    < > 118*   < > 115*   < > 165*    < > = values in this interval not displayed.     Liver Function Tests  Recent Labs   Lab 02/22/23  0630 02/22/23  0418 02/21/23  0429 02/20/23  0327 02/19/23  0409   AST  --  500* 88* 102* 154*  154*   ALT  --  311* 121* 136* 167*  167*   ALKPHOS  --  885* 319* 283* 291*  291*   BILITOTAL  --  3.3* 0.4 0.4 0.4  0.4   ALBUMIN  --  2.1* 2.4* 2.3* 2.3*  2.3*   INR 1.05  --   --   --   --      Pancreatic Enzymes  No lab results found in last 7 days.  Coagulation Profile  Recent Labs   Lab 02/22/23  0630   INR 1.05   PTT 31     Lactate  Invalid input(s): LACTATE    5. RADIOLOGY  Recent Results (from the past 24 hour(s))   US Abdomen Limited    Narrative    EXAMINATION: US ABDOMEN LIMITED, 2/17/2023 8:51 PM    COMPARISON: CT chest, abdomen and pelvis 2/5/2023.    HISTORY: Elevated AST/ALT.    TECHNIQUE: The abdomen was scanned in standard fashion with  specialized ultrasound transducer(s) using both grey scale and limited  color Doppler techniques.    FINDINGS:   Fluid: The visualized right-sided pleural effusion.    Liver: The liver demonstrates normal echotexture, measuring 15.8 cm in  craniocaudal dimension. There is no focal mass. The main portal vein  is patent with antegrade flow towards the liver.    Gallbladder: Cholelithiasis. No gallbladder wall thickening or  hyperemia. No pericholecystic fluid. Unable to assess sonographic  Acevedo's sign secondary to intubated state.    Bile Ducts: Both the intra- and extrahepatic biliary system are of  normal caliber.  The common bile duct measures 8 mm in diameter, which  is borderline enlarged for age.    Pancreas: Visualized portions of the head and body of the pancreas are  unremarkable.     Kidney: The right kidney measures 9.0 cm long. There is no  hydronephrosis or hydroureter, no shadowing renal  calculi, cystic  lesion or mass.     The proximal aorta measures up to 3.7 cm, best visualized on recent  CTA studies.      Impression    IMPRESSION:   1.  Cholelithiasis with no additional sonographic findings to suggest  acute cholecystitis.  2.  Borderline dilated common bile duct measuring up to 8 mm. No  obstructing foci identified.  3.  Impression visualized right-sided pleural effusion.    I have personally reviewed the examination and initial interpretation  and I agree with the findings.    ISABEL ESCALERA MD         SYSTEM ID:  [id number]

## 2023-02-22 NOTE — PROGRESS NOTES
Interventional Pulmonology          Follow-up Inpatient Progress Note                                      February 22, 2023              Patient: Shelly Becerril  Date of Service: 2/22/2023    Date of Admission: 2/4/2023      Assessment:   Shelly Becerril is a 73 year old female admitted on 2/4/2023 with acute hypoxic respiratory failure. Interventional Pulmonology was initially consulted for hemoptysis and left lung atelectasis. Tracheal ulcer was identified as the cause of bleeding and topical epi was administered and ET tube placed over the lesion to help healing. Patient now extubated and x-ray today shows recurrence of her left lung atelectasis. On exam she was initially on bipap, now transitioned to 6L NC satting 90%.     Patient would likely not tolerate bronchoscopy at this time and we would casue her to be intubated.     Address underlying weakness and inability to cough. Optimize pulmonary toilet.   Recommend meta nebs, standing duonebs every 6 hours along with mucomyst.   Encourage out of bed to chair    Patient seen and discussed with Dr. Joon Martin  Interventional Pulmonology Fellow    Thank you for this consultation.           Interval History:   No acute overnight events. ROS limited due to Hmong speaking patient.          Data:   Diagnostic studies reviewed by myself.          Review of Systems:   Limited.          Physical Exam:   Temp:  [97.9  F (36.6  C)-100.7  F (38.2  C)] 100  F (37.8  C)  Pulse:  [] 108  Resp:  [11-32] 28  BP: ()/(40-99) 103/40  FiO2 (%):  [50 %-70 %] 70 %  SpO2:  [92 %-100 %] 93 %    Intake/Output Summary (Last 24 hours) at 2/22/2023 1305  Last data filed at 2/22/2023 1300  Gross per 24 hour   Intake 1365 ml   Output 1420 ml   Net -55 ml       General: Awake mild respiratory distress.   EENT: Dry mucous membranes  Neck: Trachea supple/midline  Lungs: Decreased breath sounds diffusely  Cardiovascular: Normal S1 and S2.  RRR.    Abdomen: Soft,  non-tender, non-distended   MSK: No edema  Neurologic: Alert, mild respiratory distress.   Skin: Warm/dry

## 2023-02-22 NOTE — PROCEDURES
[facility] [place]     Procedure: R Chest Tube Placement     Date/Time: 2/22/2023  Performed by: Resident Physician, Dr. Aaron Vinson MD; Dr. Valdo Bishop MD;  Authorized by: Attending, Xiomara Barton MD;  Other(s) attending procedure: Critical Care Fellow, Dr. Lydia Flores MD;      UNIVERSAL PROTOCOL   Site Marked: NA  Prior Images Obtained and Reviewed:  Yes  Required items: Required blood products, implants, devices and special equipment available    Patient identity confirmed:  Verbally with patient and staff  Confirmation Checklist:  Patient's identity based on emergent situation and correct equipment/implants were available  Time out: None, ACLS based  Universal Protocol: the Joint Commission Universal Protocol was followed    Preparation: Patient was prepped in sterile fashion    ESBL (mL):  1.0     ANESTHESIA     Anesthesia: None, systemic sedation       SEDATION     Patient Sedated: Yes    Findings: Finger thoracostomy with immediate return of air, following with placement of 24 Fr. chest tube at the R 4th IC space with return of air in apical space, then place to suction, with return of air, with minimal sanginuous fluid.  Specimens: none  Complications: None     PROCEDURE      Length of time physician/provider present for 1:1 monitoring during sedation: 10     Aaron Vinson MD    [facility]

## 2023-02-22 NOTE — DEATH PRONOUNCEMENT
MD DEATH PRONOUNCEMENT    At bedside to pronounce Ms. Shelly Becerril dead.    Physical Exam: Unresponsive to noxious stimuli, Spontaneous respirations absent, Breath sounds absent, Carotid pulse absent, Heart sounds absent and Pupillary light reflex absent    Patient was pronounced dead at 4:16PM, 2023.  Critical Care Attending present: Dr. Xiomara Barton    Preliminary Cause of Death: PEA from acute respiratory hypoxic failure    Principal Problem:    Aortic dissection (H)       Infectious disease present?: YES    Communicable disease present? (examples: HIV, chicken pox, TB, Ebola, CJD) :  No    Multi-drug resistant organism present? (example: MRSA): NO    Please consider an autopsy if any of the following exist:  YES Unexpected or unexplained death during or following any dental, medical, or surgical diagnostic treatment procedures.   NO Death of mother at or up to seven days after delivery.     NO All  and pediatric deaths.     NO Death where the cause is sufficiently obscure to delay completion of the death certificate.   NO Deaths in which autopsy would confirm a suspected illness/condition that would affect surviving family members or recipients of transplanted organs.     The following deaths must be reported to the 's Office:  YES A death that may be due entirely or in part to any factors other than natural disease (recent surgery, recent trauma, suspected abuse/neglect).   NO A death that may be an accident, suicide, or homicide.     NO Any sudden, unexpected death in which there is no prior history of significant heart disease or any other condition associated with sudden death.   NO A death under suspicious, unusual, or unexpected circumstances.    NO Any death which is apparently due to natural causes but in which the  does not have a personal physician familiar with the patient s medical history, social, or environmental situation or the circumstances of the  terminal event.   NO Any death apparently due to Sudden Infant Death Syndrome.     YES Deaths that occur during, in association with, or as consequences of a diagnostic, therapeutic, or anesthetic procedure.   NO Any death in which a fracture of a major bone has occurred within the past (6) six months.   YES A death of persons note seen by their physician within 120 days of demise.     NO Any death in which the  was an inmate of a public institution or was in the custody of Law Enforcement personnel.   NO  All unexpected deaths of children   NO Solid organ donors   NO Unidentified bodies   NO Deaths of persons whose bodies are to be cremated or otherwise disposed of so that the bodies will later be unavailable for examination;   NO Deaths unattended by a physician outside of a licensed healthcare facility or licensed residential hospice program   NO Deaths occurring within 24 hours of arrival to a health care facility if death is unexpected.    NO Deaths associated with the decedent s employment.   NO Deaths attributed to acts of terrorism.   NO Any death in which there is uncertainty as to whether it is a medical examiner s care should be discussed with the medical investigator.      Body disposition: TBD

## 2023-02-22 NOTE — PROGRESS NOTES
Major Shift Events:  Lethargic to restless. PERRL. Following simple commands. Moves BUE spontaneously, no response to stimuli in BLE. Restlessness improved w/ PRN oxy, robaxin. C/o abdominal pain this AM. SBP < 140, MAP > 65 goals met w/o intervention. Pulses assessed w/ doppler. Afebrile. Remains on BiPAP; settings increased to 14/7 70% from 12/6 50% @ 0000 d/t tachypnea. Continues to have labored work of breathing. Black w/ decreased OP this AM, CVTS notified. No BM, passing gas. K+ WDL.  Plan: Continue to monitor respiratory status, q4h K checks. Care conference today  For vital signs and complete assessments, please see documentation flowsheets.

## 2023-02-22 NOTE — PROGRESS NOTES
[facility] PROGRESS NOTE     Patient:  Shelly Becerril   YOB: 1950, MRN: [id number]  Date of Visit: 02/22/2023  Date of Admission: 2/4/2023          ASSESSMENT AND PLAN   [place] Findings   Shelly Becerril is a 72 y/o female with a past medical history significant for ascending aortic aneurysm s/p ascending repair with aortic root reconstruction and prosthetic aortic valve (both in 2018) who was admitted to the hospital following left thoracotomy with thoracoabdominal aortic repair on 2/8/2023. Her hospitalization has been complicated by hospital acquired pneumonia. Respiratory cultures are positive for Enterobacter cloacae and Stenotrophomonas maltophilia, currently being treated with bactrim. Patient was extubated yesterday, but was placed on BiPAP due to increased work of breathing. CXR today also showed increasing opacification of left lung. Liver enzymes very elevated today compared to previous (, , ). On exam patient was tender to palpation at RLQ.    Patient's status has been worsening clinically, and so far cause is unclear. Patient continues to be intermittently febrile with elevated WBC with neutrophilic predominance. Per primary team plan is to perform abdominal US again in the setting of worsening liver enzymes. Additional workup, such as CT, is pending patient's respiratory status. Patient may be re-intubated today and require a trach in the future. In addition, patient's liver enzymes increased overnight with unclear cause. Bactrim may cause hepatotoxicity, so per primary team patient has been switched from bactrim to ceftazidime and vancomycin. 1,3 beta D glucan and aspergillus are pending. Blood cultures 2/19 and 2/20 show no growth to date. Patient has care conference scheduled for today.     IMPRESSION  1. Hospital-acquired pneumonia, cultures positive for Enterobacter cloacae and Stenotrophomonas maltophilia.  2. Ischemic spinal cord injury   3. L  thoracotomy with thoracoabdominal aortic repair (2/8/2023)  4. Ascending aortic aneurysm s/p ascending repair with aortic root reconstruction and prosthetic aortic valve (2018)  5. Elevated LFTs   6. QTc 419    RECOMMENDATIONS:  -Discontinue PO bactrim   -Agree with starting IV vancomycin and ceftazidime 2 g IV Q8H  -Continue to follow blood cultures, aspergillus, and 1,3 beta D glucan     Thank you for the consult. ID will continue to follow with you. Please check [facility] for staff covering the service.     Staffed with the attending physician Dr. Crystal Newell MS4  Infectious disease service     ID Staff Assessment and Recommendations:  This patient was seen, examined and evaluated by me. I personally reviewed the medical records, vital signs, medications, labs and imaging studies. I agree with the documentation as above by the medical student.       Briefly, 72 yo F with hx of ascending aortic dissection s/p aortic root reconstruction and AVR (2018), initially admitted with hypertensive emergency, found to have Type B aortic dissection, s/p L thoracotomy w/ thoracoabdominal aortic aneurysm repair 2/8/23. Post-op course is complicated by spinal cord ischemia on recent MRI 2/16. Additionally has VAP with extensive mucus plugging. Sputum and BAL cultures grew Enterobacter cloacae and Stenotrophomonas maltophila, and started on Bactrim. Negative C diff, obtained due to loose stools/abdominal pain. Due to elevated LFT, further work up obtained- with negative hepatitis B, C serologies. RUQ ultrasound showed cholelithiasis w/o acute cholecystitis.      Has been spiking fevers, with leukocytosis. Blood cultures remained negative so far. She was extubated a day prior but requires BiPAP, with worsening labs including elevated LFT. Antibiotics are broadened to vancomycin, ceftazidime. Awaiting care conference this afternoon, and further work up and likely intubation afterwards if remains full code.     I went  to see the patient, but care conference regarding GOC was going on and unable to examine the patient. Discussed in detailed with ICU team and on ID team rounds.      [facility] team will continue to follow. Please reach out if any questions or concerns.      Isaura Rojas MD  Infectious Diseases Staff Physician  Pager: [phone]  Date of Service (when I saw the patient): 2/22/2023           SUBJECTIVE      Interval History and Events:  -Placed on BiPAP due to increased work of breathing   -Care conference today at 1 PM    Antimicrobial Treatment:  Sulfamethoxazole-trimethoprim 2/16 - present   Cefazolin 2/8 - 2/9  Cefepime 2/17   Ceftriaxone 2/13 - 2/15  Ertapenem 2/15 - 2/16  Piperacillin-tazobactam 2/11 - 2/13           OBJECTIVE     Physical Examination:    Temp:  [97.9  F (36.6  C)-100.7  F (38.2  C)] 98.4  F (36.9  C)  Pulse:  [] 103  Resp:  [11-32] 14  BP: ()/(50-99) 99/68  FiO2 (%):  [40 %-70 %] 70 %  SpO2:  [91 %-100 %] 100 %    I/O last 3 completed shifts:  In: 1666.25 [I.V.:231.25; NG/GT:715]  Out: 2655 [Urine:2655]    Vitals:    02/18/23 0600 02/19/23 0400 02/20/23 0400 02/21/23 0000   Weight: 69.2 kg (152 lb 8.9 oz) 71.1 kg (156 lb 12 oz) 70.2 kg (154 lb 12.2 oz) 67.7 kg (149 lb 4 oz)    02/22/23 0000   Weight: 64.7 kg (142 lb 10.2 oz)       Constitutional:  Patient sleeping in bed, on BiPAP. Responsive to stimuli    HEENT: NC/AT  Respiratory: Breathing on BiPAP. Mildly coarse lung sounds on right. No lung sounds on left.    Cardiovascular: Tachycardic with regular rhythm, no murmurs   GI: Slightly more firm compared to previous exams. Tender to palpation RLQ.   Skin: Warm, dry, well-perfused. Mild bruising on bilateral upper extremities   Neurologic: Interactive and able to answer yes/no questions.   Vascular access: Dressing over PICC on right upper extremity non-tender, no surrounding erythema.    I reviewed the following laboratory data:    Microbiology:    Respiratory culture 2/10:  positive for E. Cloacae   Respiratory culture 2/14: positive for E. Cloacae and Stenotrophomonas maltophilia  Respiratory culture 2/17: positive for E. Cloacae and Stenotrophomonas maltophilia    Blood culture 2/15: no growth   Blood culture 2/19: no growth to date   Blood culture 2/20: no growth to date     Urine culture 2/15: urogenital zach     C. Diff toxin B by PCR: negative

## 2023-02-22 NOTE — PROGRESS NOTES
Care Management Follow Up    Length of Stay (days): 18    Expected Discharge Date:       Concerns to be Addressed:       Patient plan of care discussed at interdisciplinary rounds: Yes    Anticipated Discharge Disposition:       Anticipated Discharge Services:    Anticipated Discharge DME:      Patient/family educated on Medicare website which has current facility and service quality ratings:    Education Provided on the Discharge Plan:    Patient/Family in Agreement with the Plan:      Referrals Placed by CM/SW:    Private pay costs discussed: Not applicable    Additional Information:     0822 SW spoke with attending MD who asked that SW reach out to family and  Services to change care conference time due to surgeon's unavailability at the scheduled time.    838 SW called daughter Quintin ([phone]) and provided update on time change and reason for it. Quintin expressed understanding and call was ended.    0824 SW called [employer]  [facility] ([phone]) and spoke with Precious who was able to reschedule  for 1300.    1300 Care Conference at pt's bedside. In attendance were 7 of pt's children and grandchildren, Dr Vinson, and Dr Briceno, who attended via phone call.      services were provided through [employer]  [facility] ([phone]). The , Caitlyn Mauro was present to provide services.    Dr Vinson explained pt's condition and events leading to it. MD described treatments given and possible outcomes (the potential need for a tracheostomy; possible procedure to put tube into gallbladder) as well as likely outcomes (loss of the use of her legs which would require wheelchair use going forward; and the need for TCU/ARU when she is stronger).     MD addressed questions from the family regarding the injury to pt's spinal cord. The family reiterated several times that they were not challenging the care team, but that they relied on the medical team for information  and they wanted to understand everything that was happening to their mother. The family expressed thanks to the team several times for their care and help in helping their mother.    MD then confirmed pt's code status (Full Code) and the conference ended.    The family asked SW to have several changes made to the letter. SW agreed.    1310 Code was called.    1600 SW met with provider Dr Barton at bedside who agreed to sign letters once edited, if still needed.    1640 Met with MD Vinson and family, called [facility] ([phone]) for  services. Caitlyn Mauro provided services over the phone.    Family asked for letters to be edited to reflect pt's death. SW agreed.SWexcused self from family to revise the letters (5 total)    1744 pgd provider and received message that MD had already left.    JASON spoke with Dr Vinson who agreed to sign the letters. Letters were then printed, signed and given to family.    SW will continue to follow as needed.    TRAE Shook, LGSW  ED/OBS   [facility]  Phone: [phone]  Pager: [phone]  Fax: [phone]     On-call pager, [phone], 4:00 pm to midnight

## 2023-02-23 LAB
ATRIAL RATE - MUSE: 163 BPM
DIASTOLIC BLOOD PRESSURE - MUSE: NORMAL MMHG
INTERPRETATION ECG - MUSE: NORMAL
P AXIS - MUSE: NORMAL DEGREES
PR INTERVAL - MUSE: NORMAL MS
QRS DURATION - MUSE: 192 MS
QT - MUSE: 432 MS
QTC - MUSE: 507 MS
R AXIS - MUSE: 150 DEGREES
SYSTOLIC BLOOD PRESSURE - MUSE: NORMAL MMHG
T AXIS - MUSE: 65 DEGREES
VENTRICULAR RATE- MUSE: 83 BPM

## 2023-02-23 NOTE — PROGRESS NOTES
Family Discussion:  - Per my examination, the patient is unable or incompetent to participate in giving a history and/or making treatment decisions due to ongoing respiratory distress requiring supplement O2. This discussion was necessary in order to obtain a history and to make necessary treatment decisions.   - The medically necessary treatment(s) discussed were: code status, goals of care, necessary interventions  - This or these treatments are deemed medically necessary because of worsening clinical condition: Acute respiratory distress secondary to stenotrophomonas pneumonia, ongoing bilateral lower extremity paraplegia, worsening renal function, with worsening liver function.     Total time spent in face to face discussion: 75 minutes     Family meeting in person with patient's sons, daughters in person with in person [facility] [place], [place] , , with attending surgeon Dr. Briceno available on speaker phone at 1:05PM today.     We reviewed Ms. Becerril's clinical status, including her ongoing paraplegia in her bilateral extremities, in addition to ongoing acute respiratory failure with a persistent left pleural consolidation with associated effusion with concern for stenotrophomonas pneumonia, developing liver dysfunction, and concern for MARCOS. We discussed her paraplegia and the need for long-term rehabilitation. Dr. Briceno spoke to the technical aspects of her operation, and answered questions for family. We discussed her respiratory distress and her ongoing pneumonia and discussed the likely need for intubation later today for ongoing acute respiratory failure as well associated likely prolonged need for ventilation requiring a tracheostomy with any intubation. We discussed the role of tracheostomy. We discussed the role of therapeutic bronchoscopy for improving aeration of her left lung, and the inability to do such procedure without her intubated. We discussed the plan for additional imaging today to  assess her liver, kidney, and biliary tree function and source of infection. Her family discussed that that she should undergo all required interventions for her recovery and long-term rehabilitation. We discussed her code status and confirmed that she would want both intubation and CPR if needed. Family will further discuss the need for tracheostomy but wanted her to undergo any procedures that would preserve and prolong her life as needed. The family thanked Dr. Briceno, the , and medical team for support in caring for Ms. Becerril.    Discussion findings and takeaways were discussed with CVICU staff and CVTS staff.    Aaron Vinson MD

## 2023-02-23 NOTE — PROGRESS NOTES
Pt awake- lethargic majority of the day. Tele ST. MAPS >60 per team request. Care conference was done at 1pm with family. With decision arrived around intubation and potential need for long term trach. Code status discussed and reviewed with family  agreed on full code. Pt with increasing respiratory requirement at 3:00 pm with plan immanent intubate with anesthesia called for intubation. Around 3:05 pm team at bedside when patient O2 started dropped, with MAPs dropping to 40s. On assessment weak to minimal pulse palpated in bilateral femorals. Code blue called immediately. Code team available with CVICU at bedside running code, starting compressions and attempting emergent intubation. Epi and bicarb given with continued compression. Defibrillator pads placed with PEA noted on rhythm strip. Multiple episodes of ROSC noted, but shortly thereafter loss of femoral pulses. Intubation completed, with exchange of ETT due to persistent leak. RIJ incision site noted to be bleeding with increased abdominal distension and also decreased breath sounds after bagging of ETT. O2 noted to be ranging from 50s to 70's, with highest O2 in mid 80's. After multiple rounds of compressions, bedside finger thoracostomy with R chest tube placed in R pleural space. PEA again noted with loss of femoral pulses, agreement that additional attempts at ROSC would be futile. Time of death called at  4:16PM by CVICU. CVICU in communication with pt family.

## 2023-02-23 NOTE — DISCHARGE SUMMARY
[facility] Discharge Summary    Shelly Becerril MRN: [id number]   YOB: 1950 Age: 73 year old     Date of Admission:  2/4/2023  Date of Discharge::  2/23/2023  Admitting Physician:  Devon Patterson MD  Discharge Physician:  Xiomara Barton MD  Primary Care Physician:         Erica Canada          Discharge Diagnosis:   Aortic dissection         Procedures:     Past Surgical History:   Procedure Laterality Date     CV CORONARY ANGIOGRAM  02/07/2023    Procedure: CV CORONARY ANGIOGRAM;  Surgeon: Flo Dutton MD;  Location:  HEART CARDIAC CATH LAB     PICC DOUBLE LUMEN PLACEMENT Right 02/20/2023    Right brachial-lateral vein 42cm total 2cm external.Placement verified by [facility]lock 3CG.PICC okay to use.     REPAIR ANEURYSM ASCENDING AORTA N/A 02/08/2023    Procedure: Left thoracotomy, thoracoabdominal aortic aneurysm repair, repair with left heart bypass, open exposure of left femoral artery;  Surgeon: Omega Briceno MD;  Location:  OR           Consultations:   OCCUPATIONAL THERAPY ADULT IP CONSULT  PHARMACY IP CONSULT  PHARMACY IP CONSULT  SOCIAL WORK IP CONSULT  CARDIOTHORACIC SURGERY ADULT IP CONSULT  CARE MANAGEMENT / SOCIAL WORK IP CONSULT  PHARMACY IP CONSULT  PHARMACY IP CONSULT  CARE MANAGEMENT / SOCIAL WORK IP CONSULT  NEUROLOGY CRITICAL CARE ADULT IP CONSULT  PHARMACY IP CONSULT  SOCIAL WORK IP CONSULT  WOUND OSTOMY CONTINENCE NURSE  IP CONSULT  PHYSICAL MEDICINE & REHAB GENERAL ADULT IP CONSULT  INFECTIOUS DISEASE GENERAL ADULT IP CONSULT  INTERVENTIONAL PULMONARY ADULT IP CONSULT  NURSING TO CONSULT FOR VASCULAR ACCESS CARE IP CONSULT  NURSING TO CONSULT FOR VASCULAR ACCESS CARE IP CONSULT  VASCULAR ACCESS FOR PICC PLACEMENT ADULT IP CONSULT          HPI (from H&P):   73 year old female with a history of ascending aortic dissection status-post aortic root reconstruction and aortic valve replacement (2018), history of UGI bleed (on coumadin), who initially  presented to an OSH with complaints of chest, back, and abdominal pain, along with nausea and SOB. She was found to be in hypertensive emergency, with SBP above 200s. She was admitted to [facility] 2/4/2023 with type B aortic dissection involving the descending thoracic aorta, extending to just above the origin of the celiac artery.  She was admitted for further management.            Hospital Course:   She underwent a coronary angiogram on 2/7/23. Risks of intervention were discussed with the patient and family, and procedure was agreed upon for intervention. She underwent a left thoracotomy with thoracoabdominal aortic repair with Dr. Briceno of the [facility] Cardiothoracic Surgery team and Dr. Dixon of the [facility] Vascular Surgery team on 2/8/23, with a 34mm Gelweave interposition graft from just distal to left subclavian artery extending to just proximal of the celiac artery. She also underwent a left femoral artery exposure with transesophageal echocardiogram and spinal drain placement intraoperatively. She remained intubated post-operatively, with sedation weaned. Wound ostomy care was consulted, in addition to Nutrition teams. A feeding tube was placed and she was initiated on tube feeding. Mean arterial pressure goals were kept to support a blood pressure greater than >65mm Hg. She was resumed on prophylactic dose Heparin for deep venous thrombosis prophylaxis. As sedation was weaned, post-operatively she was noted to have bilateral lower extremity weakness due to incomplete spinal cord ischemia on 2/10, along with minimal bilateral lower extremity sensation, along with dusky discoloration in bilateral hands. She was then able to start wiggling her left toes, but remained unable to move her right lower extremity with decreased sensation in the right foot. Neurology was consulted with magnetic resonance imaging completed, confirming spinal cord ischemia from T10 to her conus medullaris, related to decreased flow to the  Artery of [place]n. PM&R was consulted for prevention of bilateral lower extremity contraction with specialized boots ordered for placement of her bilateral extremities. Her MAP goals were increased to support a higher blood pressure greater than 90mm Hg, with hemodynamic and inotropic support as needed. Her lumbar drain removed by the Anesthesiology team on 2/15/23. She underwent daily weaning trials to reduce respiratory support, as well as underwent repeat diagnostic and therapeutic bronchoscopy on 2/10/23, 2/14, 2/15, and 2/17 for ongoing mucous plugging and removal of clots from her left lung. She was noted to have persistent oozing from her trachea on 2/17 with prophylactic dose heparin held on 2/18 and 2/19 AM, resumed on 2/19. The Interventional Pulmonology team was consulted to assist with bronchoscopy on 2/20 with ongoing blood noted from the mid-trachea. Antibiotics were initiated to cover a polymicrobial infection (including growth of Stenotrophomonas) for coverage of an ongoing respiratory pulmonary infection, with Infectious Disease consulted. On 2/20/2023, she was extubated on 2/20/23 but noted to have increased respiratory requirements and work of breathing, with BIPAP resumed.     She continued to have increased respiratory requirements with the need for possible intubation discussed with the family in the setting of acute hypoxic respiratory failure. A care conference was held on 2/22/2023 with family decision reached for intubation and likely long term tracheostomy from 1300 to 1415. She was noted to have increased work of breathing and plan for intubation at 1500. Anesthesia team was called for intubation. Around 3:05 pm, the CVICU team was noted to be at bedside when patient oxygenation started dropped, with her blood pressure noted to be decreased. On assessment, she was noted have have weak to minimal pulse palpated in bilateral femoral arteries. A code blue was called with Advanced Cardiac  Life Suppport initiated. She underwent multiple episodes of return of spontaneous circulation noted, but shortly thereafter exhibited loss of femoral pulses. Intubation was completed, with exchange of endotracheal tube due to persistent leak. Her former right internal jugular central venous catheter incision site was noted to be bleeding with increased abdominal distension and right chest distension with decreased breath sounds after bagging of endotracheal tube. Oxygenation was noted to be ranging from 50s to 70's, with highest oxygenation in mid 80's. Left femoral arterial line cannulation under ultrasound guidance, with femoral wire unable to be threaded. After multiple rounds of compressions, bedside thoracostomy with right chest tube placed in right pleural space.  Pulseless electrical activity was again noted with loss of palpable femoral pulses, agreement that additional attempts at Advance Cardiac Life Support would be futile. Time of death called at 4:16PM by CVICU attending surgeon and Code , with all team members in agreement. All tubes and lines removed. Discharge of body to family at 2300 on 2/22/2023.          Final Pathology Result:   None         Pertinent Imaging Results:   None         Medications Prior to Admission:     No medications prior to admission.            Discharge Medications:     Discharge Medication List as of 2/22/2023 11:01 PM      CONTINUE these medications which have NOT CHANGED    Details   apixaban ANTICOAGULANT (ELIQUIS) 5 MG tablet Take 5 mg by mouth 2 times daily, Historical      ARTHRITIS PAIN RELIEF, ACETAM, 650 mg CR tablet [ARTHRITIS PAIN RELIEF, ACETAM, 650 MG CR TABLET] Take 650 mg by mouth every 8 (eight) hours as needed for pain (knee pain). , CARL, Historical      atorvastatin (LIPITOR) 40 MG tablet Take 40 mg by mouth daily, Historical      lisinopril (ZESTRIL) 40 MG tablet Take 40 mg by mouth daily, Historical      metoprolol succinate ER (TOPROL XL) 50  MG 24 hr tablet Take 50 mg by mouth daily, Historical      mirtazapine (REMERON) 15 MG tablet [MIRTAZAPINE (REMERON) 15 MG TABLET] Take 15 mg by mouth at bedtime. , Historical      omeprazole (PRILOSEC) 40 MG DR capsule Take 40 mg by mouth daily, Historical                  Day of Discharge Physical Exam:   Temp:  [99.1  F (37.3  C)-100  F (37.8  C)] 99.9  F (37.7  C)  Pulse:  [] 96  Resp:  [0-55] 55  BP: ()/() 114/79  SpO2:  [24 %-100 %] 79 %  General: Unresponsive to noxious stimuli, Spontaneous respirations absent, Breath sounds absent, Carotid pulse absent, Heart sounds absent and Pupillary light reflex absent         Discharge Instructions and Follow-Up:     Condition at discharge:   Preliminary Cause of Death: PEA from acute respiratory hypoxic failure    - - - - - - - - - - - - - - - - - -  Aaron Vinson MD  Department of Surgery

## 2023-02-24 LAB
1,3 BETA GLUCAN SER-MCNC: 47 PG/ML
BACTERIA BLD CULT: NO GROWTH
BACTERIA BLD CULT: NO GROWTH
GALACTOMANNAN AG SERPL QL IA: NEGATIVE
GALACTOMANNAN AG SPEC IA-ACNC: 0.08
OBSERVATION IMP: NEGATIVE

## 2023-02-25 LAB
BACTERIA BLD CULT: NO GROWTH
BACTERIA BLD CULT: NO GROWTH

## 2023-02-27 LAB
BACTERIA BLD CULT: NO GROWTH
BACTERIA BLD CULT: NO GROWTH

## 2023-03-15 NOTE — PROGRESS NOTES
QUERY RESPONSE    Patient presented with drug induced coagulopathy due to Eliquis.    Lora Gamboa MD  03/15/23  10:11 AM

## (undated) DEVICE — SU VICRYL 0 CTX 36" J370H

## (undated) DEVICE — SU PROLENE 4-0 RB-1DA 36" 8557H

## (undated) DEVICE — LINEN GOWN XLG 5407

## (undated) DEVICE — SU VICRYL 3-0 SH 8X18" UND J864D

## (undated) DEVICE — DRSG ABDOMINAL 07 1/2X8" 7197D

## (undated) DEVICE — TUBING PRESSURE 30"

## (undated) DEVICE — SU PDS II 1 TP-1 48" Z880G

## (undated) DEVICE — ESU HOLSTER PLASTIC DISP E2400

## (undated) DEVICE — TAPE MEDIPORE 4"X2YD 2864

## (undated) DEVICE — CATH ANGIO 6FR JL3.5 100CM ST+

## (undated) DEVICE — SU STEEL 6 CCS 4X18" M654G

## (undated) DEVICE — NDL COUNTER 20CT 31142493

## (undated) DEVICE — SU PROLENE 4-0 SHDA 36" 8521H

## (undated) DEVICE — Device

## (undated) DEVICE — WIPES FOLEY CARE SURESTEP PROVON DFC100

## (undated) DEVICE — GLOVE BIOGEL PI MICRO SZ 7.5 48575

## (undated) DEVICE — DRAPE FLUID WARMING 52 X 60" ORS-321

## (undated) DEVICE — SU STRATAFIX MONOCRYL 3-0 SPIRAL PS-2 45CM SXMP1B107

## (undated) DEVICE — LINEN TOWEL PACK X30 5481

## (undated) DEVICE — SU ETHIBOND 5 V-37 4X30" MB66G

## (undated) DEVICE — CATH ANGIO LCB 6FR 100CM ST+

## (undated) DEVICE — PACK HEART LEFT CUSTOM

## (undated) DEVICE — SU PROLENE 3-0 SHDA 48" 8534H

## (undated) DEVICE — RX SURGIFLO HEMOSTATIC MATRIX W/THROMBIN 8ML 2994

## (undated) DEVICE — SU VICRYL 3-0 SH 27" UND J416H

## (undated) DEVICE — SU PLEDGET SOFT TFE 13MMX7MMX1.5MM D7044

## (undated) DEVICE — SLEEVE TR BAND RADIAL COMPRESSION DEVICE 24CM TRB24-REG

## (undated) DEVICE — SUCTION CATH AIRLIFE TRI-FLO W/CONTROL PORT 14FR  T60C

## (undated) DEVICE — SU ETHIBOND 2-0 SHDA 30" X563H

## (undated) DEVICE — LIGHT HANDLE X1 31140133

## (undated) DEVICE — SOL NACL 0.9% IRRIG 3000ML BAG 2B7477

## (undated) DEVICE — SU SILK 2-0 SH CR 5X18" C0125

## (undated) DEVICE — TIES BANDING T50R

## (undated) DEVICE — SU ETHIBOND 0 CT-1 CR 8X18" CX21D

## (undated) DEVICE — CATHETER F6ST+ 100CM

## (undated) DEVICE — SU VICRYL 2-0 CT-1 27" UND J259H

## (undated) DEVICE — CATH ANGIO SUPERTORQUE AL1 6FRX100CM 532-645

## (undated) DEVICE — GW VASC .035IN DIA 260CML 7CML 3 MM RADIUS J CURVE 502455

## (undated) DEVICE — DRSG TEGADERM 8X12" 1629

## (undated) DEVICE — SU PLEDGET SOFT TFE 3/8"X3/26"X1/16" PCP40

## (undated) DEVICE — SU SILK 3-0 SH CR 8X18" C013D

## (undated) DEVICE — KIT HAND CONTROL ACIST 016795

## (undated) DEVICE — SU VICRYL 3-0 FS-1 27" J442H

## (undated) DEVICE — SU STRATAFIX PDS PLUS 2-0 SPIRAL CT-1 45CM SXPP1B411

## (undated) DEVICE — SUCTION MANIFOLD NEPTUNE 2 SYS 4 PORT 0702-020-000

## (undated) DEVICE — DRSG DRAIN 4X4" 7086

## (undated) DEVICE — DRAPE IOBAN INCISE 23X17" 6650EZ

## (undated) DEVICE — SU STRATAFIX PDS PLUS 0 CT 45CM SXPP1A406

## (undated) DEVICE — DRSG MEDIPORE 3 1/2X13 3/4" 3573

## (undated) DEVICE — SU SILK 2-0 TIE 12X30" A305H

## (undated) DEVICE — SU SILK 0 TIE 6X30" A306H

## (undated) DEVICE — DRSG MEPILEX BORDER SACRUM 6.3X7.9" 282055

## (undated) DEVICE — FASTENER CATH BALLOON CLAMPX2 STATLOCK 0684-00-493

## (undated) DEVICE — SU ETHIBOND 3-0 BBDA 36" X588H

## (undated) DEVICE — PACK ADULT HEART UMMC PV15CG92D

## (undated) DEVICE — PREP CHLORAPREP 26ML TINTED HI-LITE ORANGE 930815

## (undated) DEVICE — SIZER GRAFT 24-38MM 36320021

## (undated) DEVICE — SU SILK 3-0 TIE 12X30" A304H

## (undated) DEVICE — 6FR X 100CM PERFORMA DIAGNOSTIC CATHETER, JUDKINS RIGHT, JR4.0, 1.9CM TIP, 0.038IN MAX GUIDEWIRE

## (undated) DEVICE — GLIDEWIRE TERUMO .035X180CM 1.5,, J-TIP GR3525

## (undated) DEVICE — LINEN TOWEL PACK X6 WHITE 5487

## (undated) DEVICE — DRAIN CHEST TUBE RIGHT ANGLED 28FR 8128

## (undated) DEVICE — SOL NACL 0.9% IRRIG 1000ML BOTTLE 2F7124

## (undated) DEVICE — SU ETHIBOND 5 LRDA 30" B499T

## (undated) DEVICE — SU PROLENE 6-0 C-1DA 30" 8706H

## (undated) DEVICE — TUBING SUCTION DRAINAGE PLEURAL DUAL 8884714200

## (undated) DEVICE — CLIP HORIZON MED BLUE 002200

## (undated) DEVICE — DEFIB PRO-PADZ LVP LQD GEL ADULT 8900-2105-01

## (undated) DEVICE — CATH GD ST+ 100CM 6FR JR5.0

## (undated) DEVICE — SHTH INTRO 0.021IN ID 6FR DIA

## (undated) DEVICE — BLADE CLIPPER SGL USE 9680

## (undated) DEVICE — PROTECTOR ARM ONE-STEP TRENDELENBURG 40418

## (undated) DEVICE — SU PROLENE 5-0 RB-2DA 30" 8710H

## (undated) DEVICE — SYR 01ML 27GA 0.5" NDL TBC 309623

## (undated) DEVICE — ESU ELEC BLADE E-SEP INSULATED NEPTUNE 70MM 0703-070-002

## (undated) DEVICE — CLIP HORIZON SM RED WIDE SLOT 001201

## (undated) DEVICE — DRAIN CHEST TUBE 32FR STR 8032

## (undated) DEVICE — STPL SKIN 35W ROTATING HEAD PRW35

## (undated) DEVICE — ADH SKIN CLOSURE PREMIERPRO EXOFIN 1.0ML 3470

## (undated) DEVICE — PREP SCRUB SOL EXIDINE 4% CHG 4OZ 29002-404

## (undated) DEVICE — SUCTION DRY CHEST DRAIN OASIS 3600-100

## (undated) DEVICE — SU PROLENE 3-0 SHDA 36" 8522H

## (undated) DEVICE — SOL NACL 0.9% 10ML VIAL 0409-4888-02

## (undated) DEVICE — SU SILK 4-0 TIE 12X30" A303H

## (undated) RX ORDER — HEPARIN SODIUM 1000 [USP'U]/ML
INJECTION, SOLUTION INTRAVENOUS; SUBCUTANEOUS
Status: DISPENSED
Start: 2023-01-01

## (undated) RX ORDER — FENTANYL CITRATE 50 UG/ML
INJECTION, SOLUTION INTRAMUSCULAR; INTRAVENOUS
Status: DISPENSED
Start: 2023-01-01

## (undated) RX ORDER — VANCOMYCIN HYDROCHLORIDE 1 G/20ML
INJECTION, POWDER, LYOPHILIZED, FOR SOLUTION INTRAVENOUS
Status: DISPENSED
Start: 2023-01-01

## (undated) RX ORDER — PAPAVERINE HYDROCHLORIDE 30 MG/ML
INJECTION INTRAMUSCULAR; INTRAVENOUS
Status: DISPENSED
Start: 2023-01-01

## (undated) RX ORDER — CEFAZOLIN SODIUM 1 G/3ML
INJECTION, POWDER, FOR SOLUTION INTRAMUSCULAR; INTRAVENOUS
Status: DISPENSED
Start: 2023-01-01